# Patient Record
Sex: FEMALE | Race: WHITE | NOT HISPANIC OR LATINO | Employment: OTHER | URBAN - METROPOLITAN AREA
[De-identification: names, ages, dates, MRNs, and addresses within clinical notes are randomized per-mention and may not be internally consistent; named-entity substitution may affect disease eponyms.]

---

## 2017-01-05 ENCOUNTER — ANTI-COAG VISIT (OUTPATIENT)
Dept: CARDIOLOGY | Facility: CLINIC | Age: 72
End: 2017-01-05
Payer: MEDICARE

## 2017-01-05 ENCOUNTER — OFFICE VISIT (OUTPATIENT)
Dept: ENDOCRINOLOGY | Facility: CLINIC | Age: 72
End: 2017-01-05
Payer: MEDICARE

## 2017-01-05 VITALS
HEIGHT: 57 IN | SYSTOLIC BLOOD PRESSURE: 100 MMHG | WEIGHT: 164.88 LBS | HEART RATE: 60 BPM | BODY MASS INDEX: 35.57 KG/M2 | DIASTOLIC BLOOD PRESSURE: 44 MMHG

## 2017-01-05 DIAGNOSIS — I63.9 CEREBROVASCULAR ACCIDENT (CVA), UNSPECIFIED MECHANISM: ICD-10-CM

## 2017-01-05 DIAGNOSIS — E11.22 TYPE 2 DIABETES MELLITUS WITH STAGE 4 CHRONIC KIDNEY DISEASE, WITH LONG-TERM CURRENT USE OF INSULIN: ICD-10-CM

## 2017-01-05 DIAGNOSIS — I50.30 DIASTOLIC CONGESTIVE HEART FAILURE, NYHA CLASS 1: ICD-10-CM

## 2017-01-05 DIAGNOSIS — Z79.4 TYPE 2 DIABETES MELLITUS WITH STAGE 4 CHRONIC KIDNEY DISEASE, WITH LONG-TERM CURRENT USE OF INSULIN: ICD-10-CM

## 2017-01-05 DIAGNOSIS — N18.4 CKD (CHRONIC KIDNEY DISEASE), STAGE IV: ICD-10-CM

## 2017-01-05 DIAGNOSIS — E78.2 MIXED HYPERLIPIDEMIA: ICD-10-CM

## 2017-01-05 DIAGNOSIS — N18.4 CHRONIC KIDNEY DISEASE, STAGE IV (SEVERE): ICD-10-CM

## 2017-01-05 DIAGNOSIS — E55.9 VITAMIN D DEFICIENCY DISEASE: ICD-10-CM

## 2017-01-05 DIAGNOSIS — E66.09 NON MORBID OBESITY DUE TO EXCESS CALORIES: ICD-10-CM

## 2017-01-05 DIAGNOSIS — G45.9 TRANSIENT CEREBRAL ISCHEMIA, UNSPECIFIED TYPE: ICD-10-CM

## 2017-01-05 DIAGNOSIS — N18.4 TYPE 2 DIABETES MELLITUS WITH STAGE 4 CHRONIC KIDNEY DISEASE, WITH LONG-TERM CURRENT USE OF INSULIN: ICD-10-CM

## 2017-01-05 DIAGNOSIS — E11.21 DIABETIC NEPHROPATHY ASSOCIATED WITH TYPE 2 DIABETES MELLITUS: Primary | ICD-10-CM

## 2017-01-05 DIAGNOSIS — E03.9 ACQUIRED HYPOTHYROIDISM: ICD-10-CM

## 2017-01-05 DIAGNOSIS — Z79.01 LONG TERM (CURRENT) USE OF ANTICOAGULANTS: Primary | ICD-10-CM

## 2017-01-05 DIAGNOSIS — I10 ESSENTIAL HYPERTENSION: ICD-10-CM

## 2017-01-05 DIAGNOSIS — I48.91 ATRIAL FIBRILLATION WITH RVR: ICD-10-CM

## 2017-01-05 LAB
CTP QC/QA: NORMAL
INR PPP: 3.4 (ref 2–3)

## 2017-01-05 PROCEDURE — 99999 PR PBB SHADOW E&M-EST. PATIENT-LVL I: CPT | Mod: PBBFAC,,,

## 2017-01-05 PROCEDURE — 99214 OFFICE O/P EST MOD 30 MIN: CPT | Mod: S$PBB,,, | Performed by: NURSE PRACTITIONER

## 2017-01-05 PROCEDURE — 99211 OFF/OP EST MAY X REQ PHY/QHP: CPT | Mod: PBBFAC,27

## 2017-01-05 PROCEDURE — 85610 PROTHROMBIN TIME: CPT | Mod: PBBFAC

## 2017-01-05 PROCEDURE — 99999 PR PBB SHADOW E&M-EST. PATIENT-LVL IV: CPT | Mod: PBBFAC,,, | Performed by: NURSE PRACTITIONER

## 2017-01-05 RX ORDER — INSULIN ASPART 100 [IU]/ML
INJECTION, SOLUTION INTRAVENOUS; SUBCUTANEOUS
Qty: 2 BOX | Refills: 6
Start: 2017-01-05 | End: 2017-04-05 | Stop reason: SDUPTHER

## 2017-01-05 RX ORDER — LEVOTHYROXINE SODIUM 50 UG/1
50 TABLET ORAL
Qty: 90 TABLET | Refills: 4 | Status: SHIPPED | OUTPATIENT
Start: 2017-01-05 | End: 2017-07-03

## 2017-01-05 RX ORDER — ERGOCALCIFEROL 1.25 MG/1
50000 CAPSULE ORAL
Qty: 4 CAPSULE | Refills: 12 | Status: ON HOLD | OUTPATIENT
Start: 2017-01-05 | End: 2017-03-13

## 2017-01-05 NOTE — MR AVS SNAPSHOT
[facility]  [address]  [facility] [address]  Phone: [phone]                  Savita Polo   2017 9:30 AM   Office Visit    Description:  Female : 1945   Provider:  Nohemi Ardon DNP, NP   Department:  [facility]           Reason for Visit     Diabetes Mellitus           Diagnoses this Visit        Comments    Diabetic nephropathy associated with type 2 diabetes mellitus    -  Primary     Chronic kidney disease, stage IV (severe)         Diastolic congestive heart failure, NYHA class 1         Atrial fibrillation with RVR         CKD (chronic kidney disease), stage IV         Essential hypertension         Acquired hypothyroidism         Mixed hyperlipidemia         Vitamin D deficiency disease         Non morbid obesity due to excess calories         Type 2 diabetes mellitus with stage 4 chronic kidney disease, with long-term current use of insulin                To Do List           Future Appointments        Provider Department Dept Phone    2017 11:15 AM Patt Lindquist, PharmD [facility] Coumadin [phone]    1/10/2017 9:15 AM Lizeth Fleming MD [facility] - Dermatology [phone]    2017 10:00 AM LAB, APPOINTMENT [facility] [phone]    3/29/2017 8:00 AM LAB, APPOINTMENT [facility] [phone]    3/31/2017 8:00 AM Nadiya Nava MD [facility] - Internal Medicine [phone]      Goals (5 Years of Data)              12/20/16    9/15/16    6/8/16    HEMOGLOBIN A1C < 7.5   7.6  8.8  8.0      Follow-Up and Disposition     Follow-up and Disposition History       These Medications        Disp Refills Start End    levothyroxine (SYNTHROID) 50 MCG tablet 90 tablet 4 2017    Take 1 tablet (50 mcg total) by mouth before breakfast. - Oral    [facility] Ph #: [phone]        ergocalciferol (ERGOCALCIFEROL) 50,000 unit Cap 4 capsule 12 1/5/2017     Take 1 capsule (50,000 Units total) by mouth every 7 days. - Oral    [address] [facility] [facility] Ph #: [phone]       insulin detemir (LEVEMIR FLEXTOUCH) 100 unit/mL (3 mL) SubQ InPn pen 2 Box 6 1/5/2017     Inject 26 Units into the skin every evening. - Subcutaneous    [address] [facility] [facility] Ph #: [phone]       insulin aspart (NOVOLOG FLEXPEN) 100 unit/mL InPn pen 2 Box 6 1/5/2017 26-28-26 Plus correction scale    [address] [facility] [facility] Ph #: [phone]         [facility] On Call     [facility] On Call Nurse Care Line - 24/7 Assistance  Registered nurses in the [facility] provide clinical advisement, health education, appointment booking, and other advisory services.  Call for this free service at [phone].             Medications           Message regarding Medications     Verify the changes and/or additions to your medication regime listed below are the same as discussed with your clinician today.  If any of these changes or additions are incorrect, please notify your healthcare provider.        START taking these NEW medications        Refills    levothyroxine (SYNTHROID) 50 MCG tablet 4    Sig: Take 1 tablet (50 mcg total) by mouth before breakfast.    Class: Normal    Route: Oral    ergocalciferol (ERGOCALCIFEROL) 50,000 unit Cap 12    Sig: Take 1 capsule (50,000 Units total) by mouth every 7 days.    Class: Normal    Route: Oral      CHANGE how you are taking these medications     Start Taking Instead of    insulin detemir (LEVEMIR FLEXTOUCH) 100 unit/mL (3 mL) SubQ InPn pen insulin detemir (LEVEMIR FLEXTOUCH) 100 unit/mL (3 mL) SubQ InPn pen    Dosage:  Inject 26 Units into the skin every evening. Dosage:  Inject 60 Units into the skin every  evening.    Reason for Change:  Reorder     insulin aspart (NOVOLOG FLEXPEN) 100 unit/mL InPn pen insulin aspart (NOVOLOG FLEXPEN) 100 unit/mL InPn pen    Dosage:  26-28-26 Plus correction scale Dosage:  Inject 28 Units into the skin 3 (three) times daily with meals. Plus correction scale    Reason for Change:  Reorder            Verify that the below list of medications is an accurate representation of the medications you are currently taking.  If none reported, the list may be blank. If incorrect, please contact your healthcare provider. Carry this list with you in case of emergency.           Current Medications     ADVAIR DISKUS 500-50 mcg/dose DsDv diskus inhaler inhale 1 dose by mouth twice a day    albuterol (PROAIR HFA) 90 mcg/actuation inhaler Inhale 2 puffs into the lungs every 6 (six) hours as needed for Wheezing.    alprazolam (XANAX) 0.5 MG tablet TAKE 1 TABLET BY MOUTH 2 TIMES A DAY    buPROPion (WELLBUTRIN SR) 150 MG TBSR 12 hr tablet 1 BID    diltiazem (CARDIZEM CD) 240 MG 24 hr capsule Take 1 capsule (240 mg total) by mouth once daily.    ferrous sulfate 325 mg (65 mg iron) Tab tablet take 1 tablet by mouth once daily    fluticasone (FLONASE) 50 mcg/actuation nasal spray instill 1 spray into each nostril once daily    furosemide (LASIX) 20 MG tablet Take 1 tablet (20 mg total) by mouth 2 (two) times daily.    insulin aspart (NOVOLOG FLEXPEN) 100 unit/mL InPn pen 26-28-26 Plus correction scale    insulin detemir (LEVEMIR FLEXTOUCH) 100 unit/mL (3 mL) SubQ InPn pen Inject 26 Units into the skin every evening.    ipratropium (ATROVENT) 0.02 % nebulizer solution USE 1 VIAL IN NEBULIZER EVERY 8 HOURS    lisinopril (PRINIVIL,ZESTRIL) 5 MG tablet Take 1 tablet (5 mg total) by mouth once daily.    metoprolol tartrate (LOPRESSOR) 25 MG tablet Take 1 tablet (25 mg total) by mouth 2 (two) times daily.    nicotine (NICOTROL) 10 mg Crtg One puff into the mouth as needed, maximum 6 cartridges/day.    ondansetron  "(ZOFRAN) 4 MG tablet Take 1 tablet (4 mg total) by mouth every 8 (eight) hours as needed for Nausea.    pen needle, diabetic, safety (NOVOFINE AUTOCOVER) 30 gauge x 1/3" Ndle Uses 4 a day    pravastatin (PRAVACHOL) 40 MG tablet take 1 tablet by mouth once daily    warfarin (COUMADIN) 5 MG tablet take 1 tablet by mouth once daily    ergocalciferol (ERGOCALCIFEROL) 50,000 unit Cap Take 1 capsule (50,000 Units total) by mouth every 7 days.    levalbuterol (XOPENEX) 0.63 mg/3 mL nebulizer solution Take 3 mLs (0.63 mg total) by nebulization every 8 (eight) hours.    levothyroxine (SYNTHROID) 50 MCG tablet Take 1 tablet (50 mcg total) by mouth before breakfast.           Clinical Reference Information           Vital Signs - Last Recorded  Most recent update: 1/5/2017  9:50 AM by Nyla Crabtree MA    BP Pulse Ht Wt LMP BMI    (!) 100/44 (BP Location: Left arm, Patient Position: Sitting, BP Method: Automatic) 60 4' 9" (1.448 m) 74.8 kg (164 lb 14.5 oz) (LMP Unknown) 35.68 kg/m2      Blood Pressure          Most Recent Value    BP  (!)  100/44      Allergies as of 1/5/2017     Latex, Natural Rubber    Adhesive    Sulfa (Sulfonamide Antibiotics)      Immunizations Administered on Date of Encounter - 1/5/2017     None      Orders Placed During Today's Visit     Future Labs/Procedures Expected by Expires    TSH  1/5/2017 3/6/2018    Vitamin D  4/6/2017 1/5/2018    Comprehensive metabolic panel  4/7/2017 1/5/2018    Hemoglobin A1c  4/7/2017 1/5/2018    TSH  5/6/2017 3/6/2018    Lipid panel  5/7/2017 1/5/2018      [facility] Sign-Up     Activating your [facility] account is as easy as 1-2-3!     1) Visit [email or web address], select Sign Up Now, enter this activation code and your date of birth, then select Next.  [email or web address]  Expires: 1/14/2017  9:27 AM      2) Create a username and password to use when you visit [facility] in the future and select a security question in case you lose your password and select Next.    3) " Enter your e-mail address and click Sign Up!    Additional Information  If you have questions, please e-mail [email or web address] or call [phone] to talk to our [facility] staff. Remember, [facility] is NOT to be used for urgent needs. For medical emergencies, dial 911.         Smoking Cessation     If you would like to quit smoking:   You may be eligible for free services if you are a [place] resident and started smoking cigarettes before September 1, 1988.  Call the Smoking [employer]essation [employer] (SCT) toll free at [phone] [phone] or [phone].   Call 6-782-QUIT-NOW if you do not meet the above criteria.

## 2017-01-05 NOTE — PROGRESS NOTES
CC: Ms. Savita Polo arrives today for management of Type 2 complicated by neuropathy, nephropathy and CV disease and review of chronic medical conditions. She arrives with boyfriend today, in a wheelchair with home O2 therapy in use.    HPI: Ms. Savita Polo was diagnosed with Type 2 . Started on oral agents. Converted to insulin in 1992. Never hospitalized r/t DM.     Large bruise noted to left forehead; fell last week out of her walker- did not go to er- daughter made her stay awake for 8 hours  Right leg also hurting- declines xrays/er/urgent care  She is aware BG fluctuations based on po intake and admits to dietary indiscretions.     Quit smoking 2/14/16- (started smoking at age 19) then resumed smoking in April 2016 ; Smoking 1 pack over 2 weeks; on home O2- 3L  Continues to take Wellbutrin     CURRENT DIABETIC MEDS:  Levemir 28 units PM, Novolog 26-26-30 units AC  Uses Vials or Pens: pens  Denies difficulty with insulin administration. Rotates injection sites in abdomen.     Missing Insulin/PO medication doses: No  Prandial Insulin Injections Taken with Meals: Yes    Type of Glucose Meter: Prodigy  Denies hypoglycemia,    Brings logs for review  Lunch:   Dinner; 177-262  Bedtime:     Exercise: No    Dietary Habits: 2 meals/ daily; skips BK ; coffee from breakfast; gets Meals On Wheels for lunch - has not menu to choose options; daughter cooks dinner - home cooked meals; snacks sometimes at night     Last Eye Exam: 5/2016      REVIEW OF SYSTEMS   General: weight stable; denies fatigue.  Eyes: wears glasses; + blurry vision, denies eye pain .  Cardiac: denies palpitations or chest pain.   Respiratory: c/o SOB, c/o dyspnea;  uses inhalers and home O2 therapy- 3 L  GI: good appetite, no nausea, vomit, diarrhea, or abdominal pain.   : c/o nocturia x 1 nightly; denies urgency, dysuria  Skin: c/o easy bruising;  Multiple scabbed lesions noted to forehead  Neuro: + numbness, tingling,  toes.  Musc: denies leg pain    PHYSICAL EXAMINATION  Constitutional: Appears well, no distress  Neck: Supple, trachea midline.   Respiratory:  even and unlabored.  Cardiovascular: Rate normal, S1 and S2 with no murmurs; no carotid bruits.1+ BLE edema  Abd: soft, injection sites clear   Lymph:  no edema.   Skin:   + rash to forehead  Feet: appropriate footwear.    Hemoglobin A1C   Date Value Ref Range Status   12/20/2016 7.6 (H) 4.5 - 6.2 % Final     Comment:     According to ADA guidelines, hemoglobin A1C <7.0% represents  optimal control in non-pregnant diabetic patients.  Different  metrics may apply to specific populations.   Standards of Medical Care in Diabetes - 2016.  For the purpose of screening for the presence of diabetes:  <5.7%     Consistent with the absence of diabetes  5.7-6.4%  Consistent with increasing risk for diabetes   (prediabetes)  >or=6.5%  Consistent with diabetes  Currently no consensus exists for use of hemoglobin A1C  for diagnosis of diabetes for children.     09/15/2016 8.8 (H) 4.5 - 6.2 % Final     Comment:     According to ADA guidelines, hemoglobin A1C <7.0% represents  optimal control in non-pregnant diabetic patients.  Different  metrics may apply to specific populations.   Standards of Medical Care in Diabetes - 2016.  For the purpose of screening for the presence of diabetes:  <5.7%     Consistent with the absence of diabetes  5.7-6.4%  Consistent with increasing risk for diabetes   (prediabetes)  >or=6.5%  Consistent with diabetes  Currently no consensus exists for use of hemoglobin A1C  for diagnosis of diabetes for children.     06/08/2016 8.0 (H) 4.5 - 6.2 % Final         Assessment/Plan     1. Type 2 diabetes mellitus with diabetic chronic kidney disease, nephropathy, neuropathy  Decrease levemir to 26 u qhs  [place] Novolog to 26-28-26  Check bg ac/hs  - takes ACEi, statin    2. CKD 4- followed by nephrology, avoid insulin stacking    3. DM Peripheral neuropathy- optimize DM  control    4. Afib, CHF- avoid metformin, insulin staking, limited green leafy veg in diet- on coumadin     5. Hypothyroidism - Decrease LT4 to 50 mcg, recheck tsh in 6 weeks  Lab Results   Component Value Date    TSH 0.239 (L) 11/30/2016     6. HLD- LDL goal < 100  - controlled, LFTs WNL, continue statin    7. HTN - controlled, continue meds as previously prescribed and monitor; bp on lower side- she reports r/t to not eating yet today- will monitor at home- Notify Dr Nava    8. Obesity- Body mass index is 35.68 kg/(m^2). monitor portions, weight has stabilized     9.  Vitamin d deficiency - Vitamin d 22- Start ergocalciferol 50,000 IU x 1 week for 8 weeks, Repeat level with RTC    10. Suspected cellulitis to right calf- scrapped after fall last week, declines urgent care appt/ER- will send picture to Dr Nava for opinion - patient has multiple allergies

## 2017-01-05 NOTE — PROGRESS NOTES
INR elevated, likely due to recent cranberry intake and possible cold/virus.  Hold then resume warfarin dose.  Patient reports 1 fall.  She reports hitting her head and her daughter observed her closely for 8 hours after.  She denies any change in vision or headaches.  I reminded her to seek medical care for any new symptoms and that it is recommended to be evaluated after hitting head when on anticoagulation.  Patient advised to contact clinic with any changes, questions, or concerns.

## 2017-01-05 NOTE — Clinical Note
Dr Nava, Ms Polo fell last week- it looks like she may have cellulitis to her shin- i placed a pic her note, she declines er, urgent f/u- has several allergies. What antibiotic would you recommend?

## 2017-01-10 ENCOUNTER — INITIAL CONSULT (OUTPATIENT)
Dept: DERMATOLOGY | Facility: CLINIC | Age: 72
End: 2017-01-10
Payer: MEDICARE

## 2017-01-10 DIAGNOSIS — L98.1 NEUROTIC EXCORIATIONS: ICD-10-CM

## 2017-01-10 DIAGNOSIS — L03.90 CELLULITIS, UNSPECIFIED CELLULITIS SITE: Primary | ICD-10-CM

## 2017-01-10 DIAGNOSIS — D69.2 PURPURA: ICD-10-CM

## 2017-01-10 PROCEDURE — 99213 OFFICE O/P EST LOW 20 MIN: CPT | Mod: S$PBB,,, | Performed by: DERMATOLOGY

## 2017-01-10 PROCEDURE — 99999 PR PBB SHADOW E&M-EST. PATIENT-LVL III: CPT | Mod: PBBFAC,,, | Performed by: DERMATOLOGY

## 2017-01-10 PROCEDURE — 99213 OFFICE O/P EST LOW 20 MIN: CPT | Mod: PBBFAC | Performed by: DERMATOLOGY

## 2017-01-10 RX ORDER — CIPROFLOXACIN 250 MG/1
250 TABLET, FILM COATED ORAL 2 TIMES DAILY
Qty: 28 TABLET | Refills: 0 | Status: SHIPPED | OUTPATIENT
Start: 2017-01-10 | End: 2017-01-24

## 2017-01-10 RX ORDER — TRIAMCINOLONE ACETONIDE 0.25 MG/G
OINTMENT TOPICAL 2 TIMES DAILY
COMMUNITY
End: 2017-01-10 | Stop reason: DRUGHIGH

## 2017-01-10 RX ORDER — TRIAMCINOLONE ACETONIDE 1 MG/G
OINTMENT TOPICAL 2 TIMES DAILY
Qty: 454 G | Refills: 1 | Status: ON HOLD | OUTPATIENT
Start: 2017-01-10 | End: 2017-03-13

## 2017-01-10 NOTE — PROGRESS NOTES
Subjective:       Patient ID:  Savita Polo is a 71 y.o. female who presents for   Chief Complaint   Patient presents with    Rash     f/u controlled with abx & TAC cream     Rash  - Initial  Affected locations: right lower leg, chin and neck  Signs / symptoms: itching  Relieving factors/Treatments tried: Rx topical steroids and antibiotics (TAC 0.025% cream; Cipro 250 mg bid x 10 days (Oct 2016))  Improvement on treatment: moderate    Last OV 5/21/15 with Dr Montelongo for similar lesions   Past Medical History   Diagnosis Date    Allergy     Anxiety     Asthma     Atherosclerotic cerebrovascular disease 7/25/2012    CHF (congestive heart failure)     COPD (chronic obstructive pulmonary disease)     Diabetes mellitus     Diabetic peripheral neuropathy 7/24/2012    Diabetic retinopathy     DJD (degenerative joint disease) 7/24/2012    Dyslipidemia 7/24/2012    Fatty liver     Heart attack     Hypertension     Hypothyroidism     Joint pain     Keloid cicatrix     Obesity, Class I, BMI 30-34.9 7/24/2012    Stroke     TIA (transient ischemic attack) 7/25/2012    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 Negative for fever and chills.   Skin: Positive for itching and rash. Negative for daily sunscreen use, activity-related sunscreen use and recent sunburn.   Hematologic/Lymphatic: Bruises/bleeds easily.        Objective:    Physical Exam   Constitutional: She appears well-developed and well-nourished. No distress.   Neurological: She is alert and oriented to person, place, and time. She is not disoriented.   Psychiatric: She has a normal mood and affect.   Skin:   Areas Examined (abnormalities noted in diagram):   Scalp / Hair Palpated and Inspected  Head / Face Inspection Performed  Neck Inspection Performed  Chest / Axilla Inspection Performed  RUE  Inspected  LUE Inspection Performed  RLE Inspected  LLE Inspection Performed                   Diagram Legend     Erythematous scaling macule/papule c/w actinic keratosis       Vascular papule c/w angioma      Pigmented verrucoid papule/plaque c/w seborrheic keratosis      Yellow umbilicated papule c/w sebaceous hyperplasia      Irregularly shaped tan macule c/w lentigo     1-2 mm smooth white papules consistent with Milia      Movable subcutaneous cyst with punctum c/w epidermal inclusion cyst      Subcutaneous movable cyst c/w pilar cyst      Firm pink to brown papule c/w dermatofibroma      Pedunculated fleshy papule(s) c/w skin tag(s)      Evenly pigmented macule c/w junctional nevus     Mildly variegated pigmented, slightly irregular-bordered macule c/w mildly atypical nevus      Flesh colored to evenly pigmented papule c/w intradermal nevus       Pink pearly papule/plaque c/w basal cell carcinoma      Erythematous hyperkeratotic cursted plaque c/w SCC      Surgical scar with no sign of skin cancer recurrence      Open and closed comedones      Inflammatory papules and pustules      Verrucoid papule consistent consistent with wart     Erythematous eczematous patches and plaques     Dystrophic onycholytic nail with subungual debris c/w onychomycosis     Umbilicated papule    Erythematous-base heme-crusted tan verrucoid plaque consistent with inflamed seborrheic keratosis     Erythematous Silvery Scaling Plaque c/w Psoriasis     See annotation      Assessment / Plan:        Cellulitis, unspecified cellulitis site  Pt responded well to Cipro in the past so will refill  -     ciprofloxacin HCl (CIPRO) 250 MG tablet; Take 1 tablet (250 mg total) by mouth 2 (two) times daily.  Dispense: 28 tablet; Refill: 0    Neurotic excoriations  Try to avoid scratching  -     triamcinolone acetonide 0.1% (KENALOG) 0.1 % ointment; Apply topically 2 (two) times daily.  Dispense: 454 g; Refill: 1    Purpura  Per pt, s/p fall            Return in about 4 weeks (around 2/7/2017).

## 2017-01-10 NOTE — MR AVS SNAPSHOT
[facility] Dermatology  [address]  [place] [address]  Phone: [phone]  Fax: [phone]                  Savita Polo   1/10/2017 9:15 AM   Initial consult    Description:  Female : 1945   Provider:  Lizeth Fleming MD   Department:  [facility] - Dermatology           Reason for Visit     Rash           Diagnoses this Visit        Comments    Cellulitis, unspecified cellulitis site    -  Primary     Neurotic excoriations         Purpura                To Do List           Future Appointments        Provider Department Dept Phone    1/10/2017 9:15 AM MD Kurt Perez [facility] Dermatology [phone]    2017 10:15 AM Maegan Jones, PharmD [facility] Coumadin [phone]    2017 9:00 AM Lizeth Fleming MD [facility] [phone]    2017 10:00 AM LAB, APPOINTMENT [facility] [phone]    3/29/2017 8:00 AM LAB, APPOINTMENT [facility] [phone]      Goals (5 Years of Data)              12/20/16    9/15/16    6/8/16    HEMOGLOBIN A1C < 7.5   7.6  8.8  8.0      Follow-Up and Disposition     Return in about 4 weeks (around 2017).    Follow-up and Disposition History       These Medications        Disp Refills Start End    triamcinolone acetonide 0.1% (KENALOG) 0.1 % ointment 454 g 1 1/10/2017     Apply topically 2 (two) times daily. - Topical (Top)    Pharmacy: [facility] [facility] Ph #: [phone]       ciprofloxacin HCl (CIPRO) 250 MG tablet 28 tablet 0 1/10/2017 2017    Take 1 tablet (250 mg total) by mouth 2 (two) times daily. - Oral    Pharmacy: [facility] - [place] [address] [place] [address] Ph #: [phone]         [facility] On Call     [facility] On Call Nurse Care Line -  Assistance  Registered nurses in the [facility] provide clinical advisement, health education, appointment  booking, and other advisory services.  Call for this free service at [phone].             Medications           Message regarding Medications     Verify the changes and/or additions to your medication regime listed below are the same as discussed with your clinician today.  If any of these changes or additions are incorrect, please notify your healthcare provider.        START taking these NEW medications        Refills    triamcinolone acetonide 0.1% (KENALOG) 0.1 % ointment 1    Sig: Apply topically 2 (two) times daily.    Class: Normal    Route: Topical (Top)    ciprofloxacin HCl (CIPRO) 250 MG tablet 0    Sig: Take 1 tablet (250 mg total) by mouth 2 (two) times daily.    Class: Normal    Route: Oral      STOP taking these medications     triamcinolone acetonide 0.025% (KENALOG) 0.025 % Oint Apply topically 2 (two) times daily.    triamcinolone acetonide 0.025% (KENALOG) 0.025 % Oint Apply topically 2 (two) times daily.           Verify that the below list of medications is an accurate representation of the medications you are currently taking.  If none reported, the list may be blank. If incorrect, please contact your healthcare provider. Carry this list with you in case of emergency.           Current Medications     ADVAIR DISKUS 500-50 mcg/dose DsDv diskus inhaler inhale 1 dose by mouth twice a day    albuterol (PROAIR HFA) 90 mcg/actuation inhaler Inhale 2 puffs into the lungs every 6 (six) hours as needed for Wheezing.    alprazolam (XANAX) 0.5 MG tablet TAKE 1 TABLET BY MOUTH 2 TIMES A DAY    buPROPion (WELLBUTRIN SR) 150 MG TBSR 12 hr tablet 1 BID    diltiazem (CARDIZEM CD) 240 MG 24 hr capsule Take 1 capsule (240 mg total) by mouth once daily.    ergocalciferol (ERGOCALCIFEROL) 50,000 unit Cap Take 1 capsule (50,000 Units total) by mouth every 7 days.    ferrous sulfate 325 mg (65 mg iron) Tab tablet take 1 tablet by mouth once daily    fluticasone (FLONASE) 50 mcg/actuation nasal spray instill 1  "spray into each nostril once daily    furosemide (LASIX) 20 MG tablet Take 1 tablet (20 mg total) by mouth 2 (two) times daily.    insulin aspart (NOVOLOG FLEXPEN) 100 unit/mL InPn pen 26-28-26 Plus correction scale    insulin detemir (LEVEMIR FLEXTOUCH) 100 unit/mL (3 mL) SubQ InPn pen Inject 26 Units into the skin every evening.    ipratropium (ATROVENT) 0.02 % nebulizer solution USE 1 VIAL IN NEBULIZER EVERY 8 HOURS    levothyroxine (SYNTHROID) 50 MCG tablet Take 1 tablet (50 mcg total) by mouth before breakfast.    lisinopril (PRINIVIL,ZESTRIL) 5 MG tablet Take 1 tablet (5 mg total) by mouth once daily.    metoprolol tartrate (LOPRESSOR) 25 MG tablet Take 1 tablet (25 mg total) by mouth 2 (two) times daily.    nicotine (NICOTROL) 10 mg Crtg One puff into the mouth as needed, maximum 6 cartridges/day.    ondansetron (ZOFRAN) 4 MG tablet Take 1 tablet (4 mg total) by mouth every 8 (eight) hours as needed for Nausea.    pen needle, diabetic, safety (NOVOFINE AUTOCOVER) 30 gauge x 1/3" Ndle Uses 4 a day    pravastatin (PRAVACHOL) 40 MG tablet take 1 tablet by mouth once daily    warfarin (COUMADIN) 5 MG tablet take 1 tablet by mouth once daily    ciprofloxacin HCl (CIPRO) 250 MG tablet Take 1 tablet (250 mg total) by mouth 2 (two) times daily.    levalbuterol (XOPENEX) 0.63 mg/3 mL nebulizer solution Take 3 mLs (0.63 mg total) by nebulization every 8 (eight) hours.    triamcinolone acetonide 0.1% (KENALOG) 0.1 % ointment Apply topically 2 (two) times daily.           Clinical Reference Information           Vital Signs - Last Recorded     LMP                   (LMP Unknown)           Allergies as of 1/10/2017     Latex, Natural Rubber    Adhesive    Sulfa (Sulfonamide Antibiotics)      Immunizations Administered on Date of Encounter - 1/10/2017     None      [facility] Sign-Up     Activating your [facility] account is as easy as 1-2-3!     1) Visit [email or web address], select Sign Up Now, enter this activation code and " your date of birth, then select Next.  [email or web address]  Expires: 1/14/2017  9:27 AM      2) Create a username and password to use when you visit [facility] in the future and select a security question in case you lose your password and select Next.    3) Enter your e-mail address and click Sign Up!    Additional Information  If you have questions, please e-mail [email or web address] or call [phone] to talk to our [facility] staff. Remember, [facility] is NOT to be used for urgent needs. For medical emergencies, dial 911.         Smoking Cessation     If you would like to quit smoking:   You may be eligible for free services if you are a [place] resident and started smoking cigarettes before September 1, 1988.  Call the [employer]oking [employer]essation [employer] (SCT) toll free at (897) 661-3277 or (829) 750-9863.   Call 6-800-QUIT-NOW if you do not meet the above criteria.

## 2017-01-10 NOTE — LETTER
January 10, 2017      Nadiya Nava MD  [address]  [place] [address]           [facility] - Dermatology  [address]  [place] [address]  Phone: [phone]  Fax: [phone]          Patient: Savita Polo   MR Number: [id number]   YOB: 1945   Date of Visit: 1/10/2017       Dear Dr. Nadiya Nava:    Thank you for referring Savita Polo to me for evaluation. Attached you will find relevant portions of my assessment and plan of care.    If you have questions, please do not hesitate to call me. I look forward to following Savita Polo along with you.    Sincerely,    Lizeth Fleming MD    Enclosure  CC:  No Recipients    If you would like to receive this communication electronically, please contact [email or web address] or [phone] to request more information on [facility] access.    For providers and/or their staff who would like to refer a patient to [facility], please contact us through our one-stop-shop provider referral line, [facility], at [phone].    If you feel you have received this communication in error or would no longer like to receive these types of communications, please e-mail [email or web address]

## 2017-01-12 ENCOUNTER — OFFICE VISIT (OUTPATIENT)
Dept: PSYCHIATRY | Facility: CLINIC | Age: 72
End: 2017-01-12
Payer: MEDICARE

## 2017-01-12 VITALS
DIASTOLIC BLOOD PRESSURE: 44 MMHG | SYSTOLIC BLOOD PRESSURE: 91 MMHG | HEART RATE: 55 BPM | WEIGHT: 163 LBS | BODY MASS INDEX: 35.27 KG/M2

## 2017-01-12 DIAGNOSIS — F41.1 GAD (GENERALIZED ANXIETY DISORDER): Primary | ICD-10-CM

## 2017-01-12 PROCEDURE — 99999 PR PBB SHADOW E&M-EST. PATIENT-LVL II: CPT | Mod: PBBFAC,,, | Performed by: PSYCHIATRY & NEUROLOGY

## 2017-01-12 PROCEDURE — 99214 OFFICE O/P EST MOD 30 MIN: CPT | Mod: S$PBB,,, | Performed by: PSYCHIATRY & NEUROLOGY

## 2017-01-12 PROCEDURE — 99212 OFFICE O/P EST SF 10 MIN: CPT | Mod: PBBFAC | Performed by: PSYCHIATRY & NEUROLOGY

## 2017-01-12 RX ORDER — ALPRAZOLAM 0.5 MG/1
TABLET ORAL
Qty: 60 TABLET | Refills: 3 | Status: SHIPPED | OUTPATIENT
Start: 2017-01-12 | End: 2017-05-02 | Stop reason: SDUPTHER

## 2017-01-12 NOTE — PROGRESS NOTES
Outpatient Psychiatry Follow-Up Visit (MD/NP)    1/12/2017    Clinical Status of Patient:  Outpatient (Ambulatory)    Chief Complaint:  Savita Polo is a 71 y.o. female who presents today for follow-up of depression and anxiety.  Met with patient.      Interval History and Content of Current Session:  Interim Events/Subjective Report/Content of Current Session: She is having a very difficult time.  Six weeks ago, she found her boyfriend in bed with another woman.  She went to her own apartment and one hour later the police arrived and arrested her daughter for embezzlement.  Pt's grand daughter had a baby and moved in with the pt.  Pt is financially strapped, thinks she may need to find less expensive housing.  I suggested she contact Case Management and gave her their phone number.  She discontinued her Wellbutrin.  She was again advised to use as littlee as possible of her Xanax because of fall risk.    Psychotherapy:  · Target symptoms: depression, anxiety   · Why chosen therapy is appropriate versus another modality: relevant to diagnosis  · Outcome monitoring methods: self-report, observation  · Therapeutic intervention type: supportive psychotherapy  · Topics discussed/themes: relationships difficulties, parenting issues, stress related to medical comorbidities, symptom recognition, financial stressors, legal stressors  · The patient's response to the intervention is accepting. The patient's progress toward treatment goals is fair.   · Duration of intervention: 10 minutes.    Review of Systems   · PSYCHIATRIC: Pertinant items are noted in the narrative.    Past Medical, Family and Social History: The patient's past medical, family and social history have been reviewed and updated as appropriate within the electronic medical record - see encounter notes.    Compliance: yes    Side effects: None    Risk Parameters:  Patient reports no suicidal ideation  Patient reports no homicidal ideation  Patient  reports no self-injurious behavior  Patient reports no violent behavior    Exam (detailed: at least 9 elements; comprehensive: all 15 elements)   Constitutional  Vitals:  Most recent vital signs, dated less than 90 days prior to this appointment, were reviewed.   Vitals:    01/12/17 1035   BP: (!) 91/44   Pulse: (!) 55   Weight: 73.9 kg (163 lb)        General:  unremarkable, age appropriate     Musculoskeletal  Muscle Strength/Tone:  no tremor   Gait & Station:  non-ataxic     Psychiatric  Speech:  no latency; no press   Mood & Affect:  anxious, depressed  congruent and appropriate   Thought Process:  normal and logical   Associations:  intact   Thought Content:  normal, no suicidality, no homicidality, delusions, or paranoia   Insight:  intact   Judgement: behavior is adequate to circumstances   Orientation:  grossly intact   Memory: intact for content of interview   Language: grossly intact   Attention Span & Concentration:  able to focus   Fund of Knowledge:  intact and appropriate to age and level of education     Assessment and Diagnosis   Status/Progress: Based on the examination today, the patient's problem(s) is/are adequately but not ideally controlled.  New problems have been presented today.   Co-morbidities are complicating management of the primary condition.  There are no active rule-out diagnoses for this patient at this time.     General Impression: Depressed    No diagnosis found.    Intervention/Counseling/Treatment Plan   · Medication Management: Continue current medications. The risks and benefits of medication were discussed with the patient. except Wellbutrin already discontinued      Return to Clinic: 2 months

## 2017-01-22 RX ORDER — WARFARIN SODIUM 5 MG/1
TABLET ORAL
Qty: 30 TABLET | Refills: 0 | OUTPATIENT
Start: 2017-01-22

## 2017-01-23 ENCOUNTER — ANTI-COAG VISIT (OUTPATIENT)
Dept: CARDIOLOGY | Facility: CLINIC | Age: 72
End: 2017-01-23
Payer: MEDICARE

## 2017-01-23 DIAGNOSIS — Z79.01 LONG TERM (CURRENT) USE OF ANTICOAGULANTS: ICD-10-CM

## 2017-01-23 LAB — INR PPP: 2.2 (ref 2–3)

## 2017-01-23 PROCEDURE — 85610 PROTHROMBIN TIME: CPT | Mod: PBBFAC

## 2017-01-23 PROCEDURE — 99999 PR PBB SHADOW E&M-EST. PATIENT-LVL I: CPT | Mod: PBBFAC,,,

## 2017-01-23 PROCEDURE — 99211 OFF/OP EST MAY X REQ PHY/QHP: CPT | Mod: PBBFAC

## 2017-01-23 RX ORDER — METOPROLOL TARTRATE 25 MG/1
TABLET, FILM COATED ORAL
Qty: 60 TABLET | Refills: 2 | Status: SHIPPED | OUTPATIENT
Start: 2017-01-23 | End: 2017-04-22 | Stop reason: SDUPTHER

## 2017-01-23 RX ORDER — FUROSEMIDE 20 MG/1
TABLET ORAL
Qty: 60 TABLET | Refills: 2 | Status: SHIPPED | OUTPATIENT
Start: 2017-01-23 | End: 2017-04-22 | Stop reason: SDUPTHER

## 2017-01-23 RX ORDER — DILTIAZEM HYDROCHLORIDE 240 MG/1
CAPSULE, COATED, EXTENDED RELEASE ORAL
Qty: 30 CAPSULE | Refills: 2 | Status: SHIPPED | OUTPATIENT
Start: 2017-01-23 | End: 2017-03-22 | Stop reason: SDUPTHER

## 2017-01-24 RX ORDER — WARFARIN SODIUM 5 MG/1
TABLET ORAL
Qty: 30 TABLET | Refills: 0 | Status: SHIPPED | OUTPATIENT
Start: 2017-01-24 | End: 2017-02-21 | Stop reason: SDUPTHER

## 2017-01-24 RX ORDER — WARFARIN SODIUM 5 MG/1
TABLET ORAL
Qty: 30 TABLET | Refills: 0 | Status: CANCELLED | OUTPATIENT
Start: 2017-01-24

## 2017-01-24 RX ORDER — WARFARIN SODIUM 5 MG/1
TABLET ORAL
Qty: 30 TABLET | Refills: 0 | OUTPATIENT
Start: 2017-01-24

## 2017-01-24 NOTE — TELEPHONE ENCOUNTER
----- Message from Harman Stephens sent at 1/24/2017  2:31 PM CST -----  Contact: Zakia Tom [phone]  Type: Rx    Name of medication(s):warfarin (COUMADIN) 5 MG tablet     Is this a refill? New rx? Refill    Who prescribed medication? Dr Nava    Pharmacy Name, Phone, & Location: [facility]    Comments:Please advice    Thanks

## 2017-02-16 ENCOUNTER — LAB VISIT (OUTPATIENT)
Dept: LAB | Facility: HOSPITAL | Age: 72
End: 2017-02-16
Payer: MEDICARE

## 2017-02-16 DIAGNOSIS — E11.21 DIABETIC NEPHROPATHY ASSOCIATED WITH TYPE 2 DIABETES MELLITUS: ICD-10-CM

## 2017-02-16 LAB — TSH SERPL DL<=0.005 MIU/L-ACNC: 2.06 UIU/ML

## 2017-02-16 PROCEDURE — 84443 ASSAY THYROID STIM HORMONE: CPT

## 2017-02-16 PROCEDURE — 36415 COLL VENOUS BLD VENIPUNCTURE: CPT

## 2017-02-20 ENCOUNTER — ANTI-COAG VISIT (OUTPATIENT)
Dept: CARDIOLOGY | Facility: CLINIC | Age: 72
End: 2017-02-20
Payer: MEDICARE

## 2017-02-20 DIAGNOSIS — I63.9 CEREBROVASCULAR ACCIDENT (CVA), UNSPECIFIED MECHANISM: ICD-10-CM

## 2017-02-20 DIAGNOSIS — Z79.01 LONG TERM (CURRENT) USE OF ANTICOAGULANTS: ICD-10-CM

## 2017-02-20 DIAGNOSIS — G45.9 TRANSIENT CEREBRAL ISCHEMIA, UNSPECIFIED TYPE: ICD-10-CM

## 2017-02-20 LAB — INR PPP: 2.6 (ref 2–3)

## 2017-02-20 PROCEDURE — 85610 PROTHROMBIN TIME: CPT | Mod: PBBFAC

## 2017-02-20 PROCEDURE — 99999 PR PBB SHADOW E&M-EST. PATIENT-LVL I: CPT | Mod: PBBFAC,,,

## 2017-02-20 PROCEDURE — 99211 OFF/OP EST MAY X REQ PHY/QHP: CPT | Mod: PBBFAC

## 2017-02-20 NOTE — PROGRESS NOTES
Pt reports no changes or issues to alter INR. Will maintain dose.  I have reviewed the student's initial findings and agree with their assessment. I have personally spoken with and assessed the patient in clinic to devise care plan.

## 2017-02-21 RX ORDER — WARFARIN SODIUM 5 MG/1
TABLET ORAL
Qty: 30 TABLET | Refills: 0 | Status: ON HOLD | OUTPATIENT
Start: 2017-02-21 | End: 2017-03-13

## 2017-03-12 ENCOUNTER — HOSPITAL ENCOUNTER (INPATIENT)
Facility: HOSPITAL | Age: 72
LOS: 3 days | Discharge: HOME | DRG: 190 | End: 2017-03-16
Attending: EMERGENCY MEDICINE | Admitting: HOSPITALIST
Payer: MEDICARE

## 2017-03-12 DIAGNOSIS — I50.30 DIASTOLIC CONGESTIVE HEART FAILURE, NYHA CLASS 1: ICD-10-CM

## 2017-03-12 DIAGNOSIS — R05.9 COUGH: Primary | ICD-10-CM

## 2017-03-12 DIAGNOSIS — R06.00 DYSPNEA, UNSPECIFIED TYPE: ICD-10-CM

## 2017-03-12 DIAGNOSIS — J44.1 COPD EXACERBATION: Chronic | ICD-10-CM

## 2017-03-12 DIAGNOSIS — E11.9 TYPE 2 DIABETES MELLITUS WITHOUT RETINOPATHY: ICD-10-CM

## 2017-03-12 DIAGNOSIS — J18.9 PNEUMONIA: ICD-10-CM

## 2017-03-12 DIAGNOSIS — I10 ESSENTIAL HYPERTENSION: ICD-10-CM

## 2017-03-12 DIAGNOSIS — J18.9 CAP (COMMUNITY ACQUIRED PNEUMONIA): ICD-10-CM

## 2017-03-12 LAB
BASOPHILS # BLD AUTO: 0.03 K/UL
BASOPHILS NFR BLD: 0.2 %
DIFFERENTIAL METHOD: ABNORMAL
EOSINOPHIL # BLD AUTO: 0 K/UL
EOSINOPHIL NFR BLD: 0.1 %
ERYTHROCYTE [DISTWIDTH] IN BLOOD BY AUTOMATED COUNT: 12.7 %
HCT VFR BLD AUTO: 44.3 %
HGB BLD-MCNC: 15 G/DL
LYMPHOCYTES # BLD AUTO: 2.2 K/UL
LYMPHOCYTES NFR BLD: 15.6 %
MCH RBC QN AUTO: 32.9 PG
MCHC RBC AUTO-ENTMCNC: 33.9 %
MCV RBC AUTO: 97 FL
MONOCYTES # BLD AUTO: 1.2 K/UL
MONOCYTES NFR BLD: 8.6 %
NEUTROPHILS # BLD AUTO: 10.4 K/UL
NEUTROPHILS NFR BLD: 75.1 %
PLATELET # BLD AUTO: 202 K/UL
PMV BLD AUTO: 10.2 FL
RBC # BLD AUTO: 4.56 M/UL
WBC # BLD AUTO: 13.86 K/UL

## 2017-03-12 PROCEDURE — 83735 ASSAY OF MAGNESIUM: CPT

## 2017-03-12 PROCEDURE — 84484 ASSAY OF TROPONIN QUANT: CPT

## 2017-03-12 PROCEDURE — 83880 ASSAY OF NATRIURETIC PEPTIDE: CPT

## 2017-03-12 PROCEDURE — 99285 EMERGENCY DEPT VISIT HI MDM: CPT | Mod: 25

## 2017-03-12 PROCEDURE — 86900 BLOOD TYPING SEROLOGIC ABO: CPT

## 2017-03-12 PROCEDURE — 96365 THER/PROPH/DIAG IV INF INIT: CPT

## 2017-03-12 PROCEDURE — 83605 ASSAY OF LACTIC ACID: CPT

## 2017-03-12 PROCEDURE — 85025 COMPLETE CBC W/AUTO DIFF WBC: CPT

## 2017-03-12 PROCEDURE — 86901 BLOOD TYPING SEROLOGIC RH(D): CPT

## 2017-03-12 PROCEDURE — 93005 ELECTROCARDIOGRAM TRACING: CPT

## 2017-03-12 PROCEDURE — 87040 BLOOD CULTURE FOR BACTERIA: CPT

## 2017-03-12 PROCEDURE — 80053 COMPREHEN METABOLIC PANEL: CPT

## 2017-03-12 PROCEDURE — 96366 THER/PROPH/DIAG IV INF ADDON: CPT

## 2017-03-12 PROCEDURE — 96367 TX/PROPH/DG ADDL SEQ IV INF: CPT

## 2017-03-12 PROCEDURE — 99285 EMERGENCY DEPT VISIT HI MDM: CPT | Mod: ,,, | Performed by: EMERGENCY MEDICINE

## 2017-03-12 PROCEDURE — 85730 THROMBOPLASTIN TIME PARTIAL: CPT

## 2017-03-12 PROCEDURE — 84145 PROCALCITONIN (PCT): CPT

## 2017-03-12 PROCEDURE — 93010 ELECTROCARDIOGRAM REPORT: CPT | Mod: ,,, | Performed by: INTERNAL MEDICINE

## 2017-03-12 PROCEDURE — 84100 ASSAY OF PHOSPHORUS: CPT

## 2017-03-12 PROCEDURE — 85610 PROTHROMBIN TIME: CPT

## 2017-03-12 NOTE — IP AVS SNAPSHOT
[facility]  [address]  [facility] [address]  Phone: [phone]           Patient Discharge Instructions     Our goal is to set you up for success. This packet includes information on your condition, medications, and your home care. It will help you to care for yourself so you don't get sicker and need to go back to the hospital.     Please ask your nurse if you have any questions.        There are many details to remember when preparing to leave the hospital. Here is what you will need to do:    1. Take your medicine. If you are prescribed medications, review your Medication List in the following pages. You may have new medications to  at the pharmacy and others that you'll need to stop taking. Review the instructions for how and when to take your medications. Talk with your doctor or nurses if you are unsure of what to do.     2. Go to your follow-up appointments. Specific follow-up information is listed in the following pages. Your may be contacted by a transition nurse or clinical provider about future appointments. Be sure we have all of the phone numbers to reach you, if needed. Please contact your provider's office if you are unable to make an appointment.     3. Watch for warning signs. Your doctor or nurse will give you detailed warning signs to watch for and when to call for assistance. These instructions may also include educational information about your condition. If you experience any of warning signs to your health, call your doctor.               [facility]  Unless otherwise directed by your provider, please contact [facility], our nurse care line that is available for 24/7 assistance.     [phone] (toll-free)    Registered nurses in the [facility] provide clinical advisement, health education, appointment booking, and other advisory services.                    ** Verify the list of medication(s) below is accurate and up  to date. Carry this with you in case of emergency. If your medications have changed, please notify your healthcare provider.             Medication List      START taking these medications        Additional Info                      azithromycin 250 MG tablet   Commonly known as:  Z-MERCY   Quantity:  1 tablet   Refills:  0   Dose:  250 mg   Indications:  Pneumonia    Last time this was given:  250 mg on 3/16/2017  8:45 AM   Instructions:  Take 1 tablet (250 mg total) by mouth once daily.     Begin Date    AM    Noon    PM    Bedtime       cetirizine 10 MG tablet   Commonly known as:  ZYRTEC   Refills:  0   Dose:  10 mg    Last time this was given:  10 mg on 3/16/2017  8:45 AM   Instructions:  Take 1 tablet (10 mg total) by mouth once daily.     Begin Date    AM    Noon    PM    Bedtime       predniSONE 20 MG tablet   Commonly known as:  DELTASONE   Quantity:  2 tablet   Refills:  0   Dose:  40 mg    Last time this was given:  40 mg on 3/16/2017  8:45 AM   Instructions:  Take 2 tablets (40 mg total) by mouth once daily.     Begin Date    AM    Noon    PM    Bedtime         CHANGE how you take these medications        Additional Info                      ADVAIR DISKUS 500-50 mcg/dose Dsdv diskus inhaler   Quantity:  60 each   Refills:  2   What changed:  See the new instructions.   Generic drug:  fluticasone-salmeterol 500-50 mcg/dose    Instructions:  inhale 1 dose by mouth twice a day     Begin Date    AM    Noon    PM    Bedtime       fluticasone 50 mcg/actuation nasal spray   Commonly known as:  FLONASE   Quantity:  16 g   Refills:  1   What changed:  See the new instructions.    Last time this was given:  2 sprays on 3/16/2017  8:46 AM   Instructions:  instill 1 spray into each nostril once daily     Begin Date    AM    Noon    PM    Bedtime       ipratropium 0.02 % nebulizer solution   Commonly known as:  ATROVENT   Quantity:  62.5 mL   Refills:  4   What changed:  See the new instructions.    Instructions:  USE  1 VIAL IN NEBULIZER EVERY 8 HOURS     Begin Date    AM    Noon    PM    Bedtime         CONTINUE taking these medications        Additional Info                      albuterol 90 mcg/actuation inhaler   Commonly known as:  PROAIR HFA   Quantity:  2 Inhaler   Refills:  3   Dose:  2 puff    Instructions:  Inhale 2 puffs into the lungs every 6 (six) hours as needed for Wheezing.     Begin Date    AM    Noon    PM    Bedtime       alprazolam 0.5 MG tablet   Commonly known as:  XANAX   Quantity:  60 tablet   Refills:  3    Last time this was given:  0.25 mg on 3/16/2017  8:44 AM   Instructions:  TAKE 1 TABLET BY MOUTH 2 TIMES A DAY     Begin Date    AM    Noon    PM    Bedtime       diltiaZEM 240 MG 24 hr capsule   Commonly known as:  CARDIZEM CD   Quantity:  30 capsule   Refills:  2    Last time this was given:  240 mg on 3/16/2017  8:45 AM   Instructions:  take 1 capsule by mouth once daily     Begin Date    AM    Noon    PM    Bedtime       FERROUS SULFATE ORAL   Refills:  0   Dose:  1 tablet    Last time this was given:  325 mg on 3/16/2017  8:45 AM   Instructions:  Take 1 tablet by mouth once daily.     Begin Date    AM    Noon    PM    Bedtime       furosemide 20 MG tablet   Commonly known as:  LASIX   Quantity:  60 tablet   Refills:  2    Last time this was given:  20 mg on 3/15/2017  8:30 AM   Instructions:  take 1 tablet by mouth twice a day     Begin Date    AM    Noon    PM    Bedtime       insulin aspart 100 unit/mL Inpn pen   Commonly known as:  NOVOLOG FLEXPEN   Quantity:  2 Box   Refills:  6   Indications:  type 2 diabetes mellitus    Last time this was given:  12 Units on 3/16/2017  1:16 PM   Instructions:  26-28-26 Plus correction scale     Begin Date    AM    Noon    PM    Bedtime       insulin detemir 100 unit/mL (3 mL) Inpn pen   Commonly known as:  LEVEMIR FLEXTOUCH   Quantity:  2 Box   Refills:  6   Dose:  26 Units    Last time this was given:  20 Units on 3/16/2017  8:00 AM   Instructions:  Inject  "26 Units into the skin every evening.     Begin Date    AM    Noon    PM    Bedtime       levothyroxine 50 MCG tablet   Commonly known as:  SYNTHROID   Quantity:  90 tablet   Refills:  4   Dose:  50 mcg    Last time this was given:  50 mcg on 3/16/2017  5:45 AM   Instructions:  Take 1 tablet (50 mcg total) by mouth before breakfast.     Begin Date    AM    Noon    PM    Bedtime       lisinopril 5 MG tablet   Commonly known as:  PRINIVIL,ZESTRIL   Quantity:  90 tablet   Refills:  1   Dose:  5 mg    Last time this was given:  5 mg on 3/16/2017  8:45 AM   Instructions:  Take 1 tablet (5 mg total) by mouth once daily.     Begin Date    AM    Noon    PM    Bedtime       metoprolol tartrate 25 MG tablet   Commonly known as:  LOPRESSOR   Quantity:  60 tablet   Refills:  2    Last time this was given:  25 mg on 3/16/2017  8:45 AM   Instructions:  take 1 tablet by mouth twice a day     Begin Date    AM    Noon    PM    Bedtime       ondansetron 4 MG tablet   Commonly known as:  ZOFRAN   Quantity:  12 tablet   Refills:  0   Dose:  4 mg    Instructions:  Take 1 tablet (4 mg total) by mouth every 8 (eight) hours as needed for Nausea.     Begin Date    AM    Noon    PM    Bedtime       pen needle, diabetic, safety 30 gauge x 1/3" Ndle   Commonly known as:  NOVOFINE AUTOCOVER   Quantity:  150 each   Refills:  12    Instructions:  Uses 4 a day     Begin Date    AM    Noon    PM    Bedtime       pravastatin 40 MG tablet   Commonly known as:  PRAVACHOL   Quantity:  30 tablet   Refills:  3    Last time this was given:  40 mg on 3/16/2017  8:45 AM   Instructions:  take 1 tablet by mouth once daily     Begin Date    AM    Noon    PM    Bedtime       warfarin 2.5 MG tablet   Commonly known as:  COUMADIN   Refills:  0   Dose:  2.5 mg    Last time this was given:  5 mg on 3/15/2017  5:04 PM   Instructions:  Take 2.5 mg by mouth once daily. Except take 5 mg on Wed     Begin Date    AM    Noon    PM    Bedtime            Where to Get Your " Medications      These medications were sent to [address] - [place] - [address] [facility]  [address] [facility] [address]     Phone:  [phone]     azithromycin 250 MG tablet    predniSONE 20 MG tablet         You can get these medications from any pharmacy     You don't need a prescription for these medications     cetirizine 10 MG tablet                  Please bring to all follow up appointments:    1. A copy of your discharge instructions.  2. All medicines you are currently taking in their original bottles.  3. Identification and insurance card.    Please arrive 15 minutes ahead of scheduled appointment time.    Please call 24 hours in advance if you must reschedule your appointment and/or time.        Your Scheduled Appointments     Mar 22, 2017  8:00 AM CDT   Hospital Follow Up with PHYSICIAN, [facility]   [facility] ([facility])    [address] [place] [address]  [place] [address] [phone]            Mar 29, 2017  7:55 AM CDT   Fasting Lab with LAB, APPOINTMENT [place] [facility] ([facility])    [address] [facility] [address] [phone]            Mar 29, 2017  8:30 AM CDT   Anticoagulation with Adali Kim, PharmD   [facility] - Coumadin ([facility] )    [address] [address]  [place] [address]   [phone]            Mar 31, 2017  8:00 AM CDT   Physical with Nadiya Nava MD   [facility] - Internal Medicine ([facility])    [address] [place] [address] [phone]            Apr 05, 2017  9:30 AM CDT   Established Patient with Nohemi Ardon, DUGLAS, NP   [facility] - Endocrinology ([facility] )    [facility] [address] [phone]              Follow-up Information     Follow up with LAB, APPOINTMENT [place] On 3/29/2017.    Specialty:  Lab    Why:  At 7:55 AM         Follow up with Adali Kim PharmD On 3/29/2017.    Specialty:  Pharmacist    Why:  At 8:30 AM in Coumadin Clinic        Follow up with Nadiya Nava MD On 3/31/2017.    Specialty:  Internal Medicine    Why:  At 8:00 AM, Hospital Follow-up with PCP    Contact information:    [address]  [facility] [address]  [phone]          Follow up with Nohemi Ardon DNP, NP On 4/4/2017.    Specialty:  Endocrinology    Why:  At 9:30 AM in Endocrinology Clinic    Contact information:    [address]  [facility] [address]  [phone]          Follow up with Abad Kruger MD On 4/4/2017.    Specialty:  Psychiatry    Why:  At 9:30 AM in Behavioral Medicine Clinic    Contact information:    [address]  [facility] [address]  [phone]          Follow up with [facility] On 3/22/2017.    Specialty:  Priority Care    Why:  At 8:00 AM     Contact information:    [address] [facility] [id number] [phone]    Additional information:    [facility]      Referrals     Future Orders    Ambulatory referral to Outpatient Case Management     Questions:    Does the patient have a chronic or uncontrolled disease process?:      Does the patient have a new diagnosis of a catastrophic or life altering illness/treatment?:      Does the patient have any psycho-social issues that may affect their ability to adhere to treatment plan?:      Does patient have any behaviors or circumstances that may impede ability to adhere to treatment plan?:      Is patient at risk for admission/readmission?:          Discharge Instructions     Future Orders    Activity as tolerated     Call MD for:  difficulty breathing or increased cough     Call MD for:  persistent nausea and vomiting or diarrhea     Diet general     Questions:    Total calories:      Fat restriction, if any:      Protein restriction, if any:      Na restriction,  if any:      Fluid restriction:      Additional restrictions:          Primary Diagnosis     Your primary diagnosis was:  Respiratory Failure      Admission Information     Date & Time Provider Department CSN    3/12/2017 10:59 PM Enedelia Styles MD [facility] [id number]      Care Providers     Provider Role Specialty Primary office phone    Enedelia Styles MD Attending Provider Hospitalist [phone]    Enedelia Styles MD Team Attending  Hospitalist [phone]      Your Vitals Were     BP Pulse Temp Resp Height Weight    112/59 (BP Location: Left arm, Patient Position: Lying, BP Method: Automatic) 67 97.2 °F (36.2 °C) (Oral) 20 5' (1.524 m) 64.4 kg (142 lb)    Last Period SpO2 BMI          (LMP Unknown) 98% 27.73 kg/m2        Recent Lab Values        2/25/2015 6/25/2015 1/4/2016 2/23/2016 6/8/2016 9/15/2016 12/20/2016 3/13/2017      3:58 AM 10:54 AM 10:10 AM 11:42 AM  9:12 AM 10:18 AM  9:47 AM  6:20 AM    A1C 10.3 (H) 10.8 (H) 9.7 (H) 9.3 (H) 8.0 (H) 8.8 (H) 7.6 (H) 7.8 (H)    Comment for A1C at 10:18 AM on 9/15/2016:  According to ADA guidelines, hemoglobin A1C <7.0% represents  optimal control in non-pregnant diabetic patients.  Different  metrics may apply to specific populations.   Standards of Medical Care in Diabetes - 2016.  For the purpose of screening for the presence of diabetes:  <5.7%     Consistent with the absence of diabetes  5.7-6.4%  Consistent with increasing risk for diabetes   (prediabetes)  >or=6.5%  Consistent with diabetes  Currently no consensus exists for use of hemoglobin A1C  for diagnosis of diabetes for children.      Comment for A1C at  9:47 AM on 12/20/2016:  According to ADA guidelines, hemoglobin A1C <7.0% represents  optimal control in non-pregnant diabetic patients.  Different  metrics may apply to specific populations.   Standards of Medical Care in Diabetes - 2016.  For the purpose of screening for the presence of diabetes:  <5.7%     Consistent with the  absence of diabetes  5.7-6.4%  Consistent with increasing risk for diabetes   (prediabetes)  >or=6.5%  Consistent with diabetes  Currently no consensus exists for use of hemoglobin A1C  for diagnosis of diabetes for children.      Comment for A1C at  6:20 AM on 3/13/2017:  According to ADA guidelines, hemoglobin A1C <7.0% represents  optimal control in non-pregnant diabetic patients.  Different  metrics may apply to specific populations.   Standards of Medical Care in Diabetes - 2016.  For the purpose of screening for the presence of diabetes:  <5.7%     Consistent with the absence of diabetes  5.7-6.4%  Consistent with increasing risk for diabetes   (prediabetes)  >or=6.5%  Consistent with diabetes  Currently no consensus exists for use of hemoglobin A1C  for diagnosis of diabetes for children.        Pending Labs     Order Current Status    Blood culture x two cultures. Draw prior to antibiotics. Preliminary result    Blood culture x two cultures. Draw prior to antibiotics. Preliminary result      Allergies as of 3/16/2017        Reactions    Latex, Natural Rubber Dermatitis, Rash    Adhesive Itching, Rash    Allergy to adhesive in nicotine patch.    Sulfa (Sulfonamide Antibiotics) Rash      Advance Directives     An advance directive is a document which, in the event you are no longer able to make decisions for yourself, tells your healthcare team what kind of treatment you do or do not want to receive, or who you would like to make those decisions for you.  If you do not currently have an advance directive, [facility] encourages you to create one.  For more information call:  [phone]), [phone] ([phone]),  or log on to [email or web address].        Language Assistance Services     ATTENTION: Language assistance services are available, free of charge. Please call [phone].      ATENCIÓN: Si habla español, tiene a yoo disposición servicios gratuitos de asistencia lingüística. Sanjay  al [phone].     [facility] Ý: N?u b?n nói Ti?ng Vi?t, có các d?ch v? h? tr? ngôn ng? mi?n phí dành cho b?n. G?i s? [phone].        Stroke Education              Heart Failure Education       Heart Failure: Being Active  You have a condition called heart failure. Being active doesnt mean that you have to wear yourself out. Even a little movement each day helps to strengthen your heart. If you cant get out to exercise, you can do simple stretching and strengthening exercises at home. These are good ways to keep you well-conditioned and prevent you and your heart from becoming excessively weak.    Ideas to get you started  · Add a little movement to things you do now. Walk to mail letters. Park your car at the far end of the parking lot and walk to the store. Walk up a flight of stairs instead of taking the elevator.  · Choose activities you enjoy. You might walk, swim, or ride an exercise bike. Things like gardening and washing the car count, too. Other possibilities include: washing dishes, walking the dog, walking around the mall, and doing aerobic activities with friends.  · Join a group exercise program at a [facility] or [facility], a senior center, or a community center. Or look into a hospital cardiac rehabilitation program. Ask your doctor if you qualify.  Tips to keep you going  · Get up and get dressed each day. Go to a coffee shop and read a newspaper or go somewhere that you'll be in the presence of other active people. Youll feel more like being active.  · Make a plan. Choose one or more activities that you enjoy and that you can easily do. Then plan to do at least one each day. You might write your plan on a calendar.  · Go with a friend or a group if you like company. This can help you feel supported and stay motivated, too.  · Plan social events that you enjoy. This will keep you mentally engaged as well as physically motivated to do things you find pleasure in.  For your safety  · Talk with your  healthcare provider before starting an exercise program.  · Exercise indoors when its too hot or too cold outside, or when the air quality is poor. Try walking at a shopping mall.  · Wear socks and sturdy shoes to maintain your balance and prevent falls.  · Start slowly. Do a few minutes several times a day at first. Increase your time and speed little by little.  · Stop and rest whenever you feel tired or get short of breath.  · Dont push yourself on days when you dont feel well.  Date Last Reviewed: 3/20/2016  © 9048-7598 [employer]. [address] [place] [address]. All rights reserved. This information is not intended as a substitute for professional medical care. Always follow your healthcare professional's instructions.              Heart Failure: Evaluating Your Heart  You have a condition called heart failure. To evaluate your condition, your doctor will examine you, ask questions, and do some tests. Along with looking for signs of heart failure, the doctor looks for any other health problems that may have led to heart failure. The results of your evaluation will help your doctor form a treatment plan.  Health history and physical exam  Your visit will start with a health history. Tell the doctor about any symptoms youve noticed and about all medicines you take. Then youll have a physical exam. This includes listening to your heartbeat and breathing. Youll also be checked for swelling (edema) in your legs and neck. When you have fluid buildup or fluid in the lungs, it may be called congestive heart failure.  Diagnosing heart failure     During an echocardiogram, sound waves bounce off the heart. These are converted into a picture on the screen.   The following may be done to help your doctor form a diagnosis:  · X-rays show the size and shape of your heart. These pictures can also show fluid in your lungs.  · An electrocardiogram (ECG or EKG) shows the pattern of your heartbeat.  Small pads (electrodes) are placed on your chest, arms, and legs. Wires connect the pads to the ECG machine, which records your hearts electrical signals. This can give the doctor information about heart function.  · An echocardiogram uses ultrasound waves to show the structure and movement of your heart muscle. This shows how well the heart pumps. It also shows the thickness of the heart walls, and if the heart is enlarged. It is one of the most useful, non-invasive tests as it provides information about the heart's general function. This helps your doctor make treatment decisions.  · Lab tests evaluate small amounts of blood or urine for signs of problems. A BNP lab test can help diagnose and evaluate heart failure. BNP stands for B-type natriuretic peptide. The ventricles secrete more BNP when heart failure worsens. Lab tests can also provide information about metabolic dysfunction or heart dysfunction.  Your treatment plan  Based on the results of your evaluation and tests, your doctor will develop a treatment plan. This plan is designed to relieve some of your heart failure symptoms and help make you more comfortable. Your treatment plan may include:  · Medicine to help your heart work better and improve your quality of life  · Changes in what you eat and drink to help prevent fluid from backing up in your body  · Daily monitoring of your weight and heart failure symptoms to see how well your treatment plan is working  · Exercise to help you stay healthy  · Help with quitting smoking  · Emotional and psychological support to help adjust to the changes  · Referrals to other specialists to make sure you are being treated comprehensively  Date Last Reviewed: 3/21/2016  © 9898-3632 [employer]. [address] [place] [address], [place] [address]. All rights reserved. This information is not intended as a substitute for professional medical care. Always follow your healthcare professional's  instructions.              Heart Failure: Making Changes to Your Diet  You have a condition called heart failure. When you have heart failure, excess fluid is more likely to build up in your body because your heart isn't working well. This makes the heart work harder to pump blood. Fluid buildup causes symptoms such as shortness of breath and swelling (edema). This is often referred to as congestive heart failure or CHF. Controlling the amount of salt (sodium) you eat may help stop fluid from building up. Your doctor may also tell you to reduce the amount of fluid you drink.  Reading food labels    Your healthcare provider will tell you how much sodium you can eat each day. Read food labels to keep track. Keep in mind that certain foods are high in salt. These include canned, frozen, and processed foods. Check the amount of sodium in each serving. Watch out for high-sodium ingredients. These include MSG (monosodium glutamate), baking soda, and sodium phosphate.   Eating less salt  Give yourself time to get used to eating less salt. It may take a little while. Here are some tips to help:  · Take the saltshaker off the table. Replace it with salt-free herb mixes and spices.  · Eat fresh or plain frozen vegetables. These have much less salt than canned vegetables.  · Choose low-sodium snacks like sodium-free pretzels, crackers, or air-popped popcorn.  · Dont add salt to your food when youre cooking. Instead, season your foods with pepper, lemon, garlic, or onion.  · When you eat out, ask that your food be cooked without added salt.  · Avoid eating fried foods as these often have a great deal of salt.  If youre told to limit fluids  You may need to limit how much fluid you have to help prevent swelling. This includes anything that is liquid at room temperature, such as ice cream and soup. If your doctor tells you to limit fluid, try these tips:  · Measure drinks in a measuring cup before you drink them. This will  help you meet daily goals.  · Chill drinks to make them more refreshing.  · Suck on frozen lemon wedges to quench thirst.  · Only drink when youre thirsty.  · Chew sugarless gum or suck on hard candy to keep your mouth moist.  · Weigh yourself daily to know if your body's fluid content is rising.  My sodium goal  Your healthcare provider may give you a sodium goal to meet each day. This includes sodium found in food as well as salt that you add. My goal is to eat no more than ___________ mg of sodium per day.     When to call your doctor  Call your doctor right away if you have any symptoms of worsening heart failure. These can include:  · Sudden weight gain  · Increased swelling of your legs or ankles  · Trouble breathing when youre resting or at night  · Increase in the number of pillows you have to sleep on  · Chest pain, pressure, discomfort, or pain in the jaw, neck, or back   Date Last Reviewed: 3/21/2016  © 8010-1875 [employer]. [address] [place] [address]. All rights reserved. This information is not intended as a substitute for professional medical care. Always follow your healthcare professional's instructions.              Heart Failure: Medicines to Help Your Heart    You have a condition called heart failure (also known as congestive heart failure, or CHF). Your doctor will likely prescribe medicines for heart failure and any underlying health problems you have. Most heart failure patients take one or more types of medicinen. Your healthcare provider will work to find the combination of medicines that works best for you.  Heart failure medicines  Here are the most common heart failure medicines:  · ACE inhibitors lower blood pressure and decrease strain on the heart. This makes it easier for the heart to pump. Angiotensin receptor blockers have similar effects. These are prescribed for some patients instead of ACE inhibitors.  · Beta-blockers relieve stress on the heart.  They also improve symptoms. They may also improve the heart's pumping action over time.  · Diuretics (also called water pills) help rid your body of excess water. This can help rid your body of swelling (edema). Having less fluid to pump means your heart doesnt have to work as hard. Some diuretics make your body lose a mineral called potassium. Your doctor will tell you if you need to take supplements or eat more foods high in potassium.  · Digoxin helps your heart pump with more strength. This helps your heart pump more blood with each beat. So, more oxygen-rich blood travels to the rest of the body.  · Aldosterone antagonists help alter hormones and decrease strain on the heart.  · Hydralazine and nitrates are two separate medicines used together to treat heart failure. They may come in one combination pill. They lower blood pressure and decrease how hard the heart has to pump.  Medicines for related conditions  Controlling other heart problems helps keep heart failure under control, too. Depending on other heart problems you have, medicines may be prescribed to:  · Lower blood pressure (antihypertensives).  · Lower cholesterol levels (statins).  · Prevent blood clots (anticoagulants or aspirin).  · Keep the heartbeat steady (antiarrhythmics).  Date Last Reviewed: 3/5/2016  © 5137-0649 [employer]. [address] [place] [address], [place] [address]. All rights reserved. This information is not intended as a substitute for professional medical care. Always follow your healthcare professional's instructions.              Heart Failure: Procedures That May Help    The heart is a muscle that pumps oxygen-rich blood to all parts of the body. When you have heart failure, the heart is not able to pump as well as it should. Blood and fluid may back up into the lungs (congestive heart failure), and some parts of the body dont get enough oxygen-rich blood to work normally. These problems lead to the symptoms of  heart failure.     Certain procedures may help the heart pump better in some cases of heart failure. Some procedures are done to treat health problems that may have caused the heart failure such as coronary artery disease or heart rhythm problems. For more serious heart failure, other options are available.  Treating artery and valve problems  If you have coronary artery disease or valve disease, procedures may be done to improve blood flow. This helps the heart pump better, which can improve heart failure symptoms. First, your doctor may do a cardiac catheterization to help detect clogged blood vessels or valve damage. During this procedure, a  thin tube (catheter) in inserted into a blood vessel and guided to the heart. There a dye is injected and a special type of X-ray (angiogram) is taken of the blood vessels. Procedures to open a blocked artery or fix damaged valves can also be done using catheterization.  · Angioplasty uses a balloon-tipped instrument at the end of the catheter. The balloon is inflated to widen the narrowed artery. In many cases, a stent is expanded to further support the narrowed artery. A stent is a metal mesh tube.  · Valve surgery repairs or replacement of faulty valves can also be done during catheterization so blood can flow properly through the chambers of the heart.  Bypass surgery is another option to help treat blocked arteries. It uses a healthy blood vessel from elsewhere in the body. The healthy blood vessel is attached above and below the blocked area so that blood can flow around the blocked artery.  Treating heart rhythm problems  A device may be placed in the chest to help a weak heart maintain a healthy, heartbeat so the heart can pump more effectively:  · Pacemaker. A pacemaker is an implanted device that regulates your heartbeat electronically. It monitors your heart's rhythm and generates a painless electric impulse that helps the heart beat in a regular rhythm. A  pacemaker is programmed to meet your specific heart rhythm needs.  · Biventricular pacing/cardiac resynchronization therapy. A type of pacemaker that paces both pumping chambers of the heart at the same time to coordinate contractions and to improve the heart's function. Some people with heart failure are candidates for this therapy.  · Implantable cardioverter defibrillator. A device similar to a pacemaker that senses when the heart is beating too fast and delivers an electrical shock to convert the fast rhythm to a normal rhythm. This can be a life saving device.  In severe cases  In more serious cases of heart failure when other treatments no longer work, other options may include:  · Ventricular assist devices (VADs). These are mechanical devices used to take over the pumping function for one or both of the heart's ventricles, or pumping chambers. A VAD may be necessary when heart failure progresses to the point that medicines and other treatments no longer help. In some cases, a VAD may be used as a bridge to transplant.  · Heart transplant. This is replacing the diseased heart with a healthy one from a donor. This is an option for a few people who are very sick. A heart transplant is very serious and not an option for all patients. Your doctor can tell you more.  Date Last Reviewed: 3/20/2016  © 6763-6441 [employer]. [address] [place] [address], [place] [id number]. All rights reserved. This information is not intended as a substitute for professional medical care. Always follow your healthcare professional's instructions.              Heart Failure: Tracking Your Weight  You have a condition called heart failure. When you have heart failure, a sudden weight gain or a steady rise in weight is a warning sign that your body is retaining too much water and salt. This could mean your heart failure is getting worse. If left untreated, it can cause problems for your lungs and result in shortness of breath.  Weighing yourself each day is the best way to know if youre retaining water. If your weight goes up quickly, call your doctor. You will be given instructions on how to get rid of the excess water. You will likely need medicines and to avoid salt. This will help your heart work better.  Call your doctor if you gain more than 2 pounds in 1 day, more than 5 pounds in 1 week, or whatever weight gain you were told to report by your doctor. This is often a sign of worsening heart failure and needs to be evaluated and treated. Your doctor will tell you what to do next.   Tips for weighing yourself    · Weigh yourself at the same time each morning, wearing the same clothes. Weigh yourself after urinating and before eating.  · Use the same scale each day. Make sure the numbers are easy to read. Put the scale on a flat, hard surface -- not on a rug or carpet.  · Do not stop weighing yourself. If you forget one day, weigh again the next morning.  How to use your weight chart  · Keep your weight chart near the scale. Write your weight on the chart as soon as you get off the scale.  · Fill in the month and the start date on the chart. Then write down your weight each day. Your chart will look like this:    · If you miss a day, leave the space blank. Weigh yourself the next day and write your weight in the next space.  · Take your weight chart with you when you go to see your doctor.  Date Last Reviewed: 3/20/2016  © 6675-1337 [employer]. [address] [place] [address], [place] [address]. All rights reserved. This information is not intended as a substitute for professional medical care. Always follow your healthcare professional's instructions.              Heart Failure: Warning Signs of a Flare-Up  You have a condition called heart failure. Once you have heart failure, flare-ups can happen. Below are signs that can mean your heart failure is getting worse. If you notice any of these warning signs, call your healthcare  provider.  Swelling    · Your feet, ankles, or lower legs get puffier.  · You notice skin changes on your lower legs.  · Your shoes feel too tight.  · Your clothes are tighter in the waist.  · You have trouble getting rings on or off your fingers.  Shortness of breath  · You have to breathe harder even when youre doing your normal activities or when youre resting.  · You are short of breath walking up stairs or even short distances.  · You wake up at night short of breath or coughing.  · You need to use more pillows or sit up to sleep.  · You wake up tired or restless.  Other warning signs  · You feel weaker, dizzy, or more tired.  · You have chest pain or changes in your heartbeat.  · You have a cough that wont go away.  · You cant remember things or dont feel like eating.  Tracking your weight  Gaining weight is often the first warning sign that heart failure is getting worse. Gaining even a few pounds can be a sign that your body is retaining excess water and salt. Weighing yourself each day in the morning after you urinate and before you eat, is the best way to know if you're retaining water. Get a scale that is easy to read and make sure you wear the same clothes and use the same scale every time you weigh. Your healthcare provider will show you how to track your weight. Call your doctor if you gain more than 2 pounds in 1 day, 5 pounds in 1 week, or whatever weight gain you were told to report by your doctor. This is often a sign of worsening heart failure and needs to be evaluated and treated before it compromises your breathing. Your doctor will tell you what to do next.    Date Last Reviewed: 3/15/2016  © 2069-4280 [employer]. [address] [place] [address], [place] [address]. All rights reserved. This information is not intended as a substitute for professional medical care. Always follow your healthcare professional's instructions.              Coumadin Discharge Instructions                          Chronic Kindey Disease Education             Diabetes Discharge Instructions                                   [facility] Sign-Up     Activating your [facility] account is as easy as 1-2-3!     1) Visit [email or web address], select Sign Up Now, enter this activation code and your date of birth, then select Next.  [email or web address]  Expires: 4/30/2017  4:18 PM      2) Create a username and password to use when you visit [facility] in the future and select a security question in case you lose your password and select Next.    3) Enter your e-mail address and click Sign Up!    Additional Information  If you have questions, please e-mail [email or web address] or call [phone] to talk to our [facility] staff. Remember, [facility] is NOT to be used for urgent needs. For medical emergencies, dial 911.          [facility] complies with applicable Federal civil rights laws and does not discriminate on the basis of race, color, national origin, age, disability, or sex.

## 2017-03-13 PROBLEM — I48.0 PAROXYSMAL A-FIB: Status: ACTIVE | Noted: 2017-03-13

## 2017-03-13 PROBLEM — J18.9 PNEUMONIA: Status: ACTIVE | Noted: 2017-03-13

## 2017-03-13 PROBLEM — J96.21 ACUTE ON CHRONIC RESPIRATORY FAILURE WITH HYPOXIA: Status: ACTIVE | Noted: 2017-03-13

## 2017-03-13 PROBLEM — R05.9 COUGH: Status: ACTIVE | Noted: 2017-03-13

## 2017-03-13 PROBLEM — E86.9 VOLUME DEPLETION: Status: ACTIVE | Noted: 2017-03-13

## 2017-03-13 LAB
ABO + RH BLD: NORMAL
ALBUMIN SERPL BCP-MCNC: 2.9 G/DL
ALBUMIN SERPL BCP-MCNC: 2.9 G/DL
ALP SERPL-CCNC: 72 U/L
ALP SERPL-CCNC: 78 U/L
ALT SERPL W/O P-5'-P-CCNC: 23 U/L
ALT SERPL W/O P-5'-P-CCNC: 26 U/L
ANION GAP SERPL CALC-SCNC: 10 MMOL/L
ANION GAP SERPL CALC-SCNC: 11 MMOL/L
AORTIC VALVE STENOSIS: ABNORMAL
APTT BLDCRRT: 32.6 SEC
AST SERPL-CCNC: 20 U/L
AST SERPL-CCNC: 21 U/L
BACTERIA #/AREA URNS AUTO: ABNORMAL /HPF
BASOPHILS # BLD AUTO: 0.05 K/UL
BASOPHILS NFR BLD: 0.4 %
BILIRUB SERPL-MCNC: 0.5 MG/DL
BILIRUB SERPL-MCNC: 0.5 MG/DL
BILIRUB UR QL STRIP: NEGATIVE
BLD GP AB SCN CELLS X3 SERPL QL: NORMAL
BNP SERPL-MCNC: 370 PG/ML
BUN SERPL-MCNC: 47 MG/DL
BUN SERPL-MCNC: 49 MG/DL
CALCIUM SERPL-MCNC: 9.3 MG/DL
CALCIUM SERPL-MCNC: 9.5 MG/DL
CHLORIDE SERPL-SCNC: 102 MMOL/L
CHLORIDE SERPL-SCNC: 102 MMOL/L
CLARITY UR REFRACT.AUTO: CLEAR
CO2 SERPL-SCNC: 24 MMOL/L
CO2 SERPL-SCNC: 26 MMOL/L
COLOR UR AUTO: YELLOW
CREAT SERPL-MCNC: 1.4 MG/DL
CREAT SERPL-MCNC: 1.6 MG/DL
DIASTOLIC DYSFUNCTION: YES
DIFFERENTIAL METHOD: ABNORMAL
EOSINOPHIL # BLD AUTO: 0 K/UL
EOSINOPHIL NFR BLD: 0.1 %
ERYTHROCYTE [DISTWIDTH] IN BLOOD BY AUTOMATED COUNT: 12.7 %
EST. GFR  (AFRICAN AMERICAN): 36.8 ML/MIN/1.73 M^2
EST. GFR  (AFRICAN AMERICAN): 43.3 ML/MIN/1.73 M^2
EST. GFR  (NON AFRICAN AMERICAN): 32 ML/MIN/1.73 M^2
EST. GFR  (NON AFRICAN AMERICAN): 37.6 ML/MIN/1.73 M^2
ESTIMATED AVG GLUCOSE: 177 MG/DL
ESTIMATED PA SYSTOLIC PRESSURE: 63
FLUAV AG SPEC QL IA: NEGATIVE
FLUBV AG SPEC QL IA: NEGATIVE
GLUCOSE SERPL-MCNC: 150 MG/DL
GLUCOSE SERPL-MCNC: 184 MG/DL
GLUCOSE UR QL STRIP: NEGATIVE
GRAM STN SPEC: NORMAL
HBA1C MFR BLD HPLC: 7.8 %
HCT VFR BLD AUTO: 43.8 %
HGB BLD-MCNC: 14.9 G/DL
HGB UR QL STRIP: ABNORMAL
HYALINE CASTS UR QL AUTO: 14 /LPF
INR PPP: 2
INR PPP: 2.4
KETONES UR QL STRIP: NEGATIVE
LACTATE SERPL-SCNC: 0.7 MMOL/L
LACTATE SERPL-SCNC: 1.2 MMOL/L
LACTATE SERPL-SCNC: 1.4 MMOL/L
LACTATE SERPL-SCNC: 1.7 MMOL/L
LEUKOCYTE ESTERASE UR QL STRIP: NEGATIVE
LYMPHOCYTES # BLD AUTO: 1.4 K/UL
LYMPHOCYTES NFR BLD: 10.7 %
MAGNESIUM SERPL-MCNC: 1.6 MG/DL
MAGNESIUM SERPL-MCNC: 2 MG/DL
MCH RBC QN AUTO: 33 PG
MCHC RBC AUTO-ENTMCNC: 34 %
MCV RBC AUTO: 97 FL
MICROSCOPIC COMMENT: ABNORMAL
MITRAL VALVE MOBILITY: ABNORMAL
MITRAL VALVE REGURGITATION: ABNORMAL
MITRAL VALVE STENOSIS: ABNORMAL
MONOCYTES # BLD AUTO: 0.3 K/UL
MONOCYTES NFR BLD: 1.9 %
NEUTROPHILS # BLD AUTO: 11.5 K/UL
NEUTROPHILS NFR BLD: 86.7 %
NITRITE UR QL STRIP: NEGATIVE
PH UR STRIP: 5 [PH] (ref 5–8)
PHOSPHATE SERPL-MCNC: 3 MG/DL
PHOSPHATE SERPL-MCNC: 3 MG/DL
PLATELET # BLD AUTO: 191 K/UL
PMV BLD AUTO: 10.4 FL
POCT GLUCOSE: 213 MG/DL (ref 70–110)
POCT GLUCOSE: 326 MG/DL (ref 70–110)
POCT GLUCOSE: 329 MG/DL (ref 70–110)
POCT GLUCOSE: 357 MG/DL (ref 70–110)
POCT GLUCOSE: 371 MG/DL (ref 70–110)
POTASSIUM SERPL-SCNC: 4.9 MMOL/L
POTASSIUM SERPL-SCNC: 5.3 MMOL/L
PROCALCITONIN SERPL IA-MCNC: <0.09 NG/ML
PROT SERPL-MCNC: 7.9 G/DL
PROT SERPL-MCNC: 8.4 G/DL
PROT UR QL STRIP: ABNORMAL
PROTHROMBIN TIME: 20.3 SEC
PROTHROMBIN TIME: 23.8 SEC
RBC # BLD AUTO: 4.51 M/UL
RBC #/AREA URNS AUTO: 4 /HPF (ref 0–4)
RETIRED EF AND QEF - SEE NOTES: 68 (ref 55–65)
SODIUM SERPL-SCNC: 137 MMOL/L
SODIUM SERPL-SCNC: 138 MMOL/L
SP GR UR STRIP: 1.01 (ref 1–1.03)
SPECIMEN SOURCE: NORMAL
SQUAMOUS #/AREA URNS AUTO: 1 /HPF
TRICUSPID VALVE REGURGITATION: ABNORMAL
TROPONIN I SERPL DL<=0.01 NG/ML-MCNC: 0.04 NG/ML
URN SPEC COLLECT METH UR: ABNORMAL
UROBILINOGEN UR STRIP-ACNC: NEGATIVE EU/DL
WBC # BLD AUTO: 13.25 K/UL
WBC #/AREA URNS AUTO: 1 /HPF (ref 0–5)

## 2017-03-13 PROCEDURE — 25000003 PHARM REV CODE 250: Performed by: EMERGENCY MEDICINE

## 2017-03-13 PROCEDURE — 87400 INFLUENZA A/B EACH AG IA: CPT

## 2017-03-13 PROCEDURE — 63600175 PHARM REV CODE 636 W HCPCS: Performed by: STUDENT IN AN ORGANIZED HEALTH CARE EDUCATION/TRAINING PROGRAM

## 2017-03-13 PROCEDURE — 25000003 PHARM REV CODE 250: Performed by: STUDENT IN AN ORGANIZED HEALTH CARE EDUCATION/TRAINING PROGRAM

## 2017-03-13 PROCEDURE — 80053 COMPREHEN METABOLIC PANEL: CPT

## 2017-03-13 PROCEDURE — 63600175 PHARM REV CODE 636 W HCPCS: Performed by: INTERNAL MEDICINE

## 2017-03-13 PROCEDURE — 87086 URINE CULTURE/COLONY COUNT: CPT

## 2017-03-13 PROCEDURE — 81001 URINALYSIS AUTO W/SCOPE: CPT

## 2017-03-13 PROCEDURE — 36415 COLL VENOUS BLD VENIPUNCTURE: CPT

## 2017-03-13 PROCEDURE — 83735 ASSAY OF MAGNESIUM: CPT

## 2017-03-13 PROCEDURE — 83036 HEMOGLOBIN GLYCOSYLATED A1C: CPT

## 2017-03-13 PROCEDURE — 27000221 HC OXYGEN, UP TO 24 HOURS

## 2017-03-13 PROCEDURE — 84100 ASSAY OF PHOSPHORUS: CPT

## 2017-03-13 PROCEDURE — 63600175 PHARM REV CODE 636 W HCPCS: Performed by: EMERGENCY MEDICINE

## 2017-03-13 PROCEDURE — G8987 SELF CARE CURRENT STATUS: HCPCS | Mod: CJ

## 2017-03-13 PROCEDURE — 85610 PROTHROMBIN TIME: CPT

## 2017-03-13 PROCEDURE — 87040 BLOOD CULTURE FOR BACTERIA: CPT

## 2017-03-13 PROCEDURE — 94640 AIRWAY INHALATION TREATMENT: CPT

## 2017-03-13 PROCEDURE — 93306 TTE W/DOPPLER COMPLETE: CPT | Mod: 26,,, | Performed by: INTERNAL MEDICINE

## 2017-03-13 PROCEDURE — 11000001 HC ACUTE MED/SURG PRIVATE ROOM

## 2017-03-13 PROCEDURE — 99223 1ST HOSP IP/OBS HIGH 75: CPT | Mod: AI,GC,, | Performed by: HOSPITALIST

## 2017-03-13 PROCEDURE — 87205 SMEAR GRAM STAIN: CPT

## 2017-03-13 PROCEDURE — 25000003 PHARM REV CODE 250: Performed by: INTERNAL MEDICINE

## 2017-03-13 PROCEDURE — 94760 N-INVAS EAR/PLS OXIMETRY 1: CPT

## 2017-03-13 PROCEDURE — 83605 ASSAY OF LACTIC ACID: CPT | Mod: 91

## 2017-03-13 PROCEDURE — 25000242 PHARM REV CODE 250 ALT 637 W/ HCPCS: Performed by: STUDENT IN AN ORGANIZED HEALTH CARE EDUCATION/TRAINING PROGRAM

## 2017-03-13 PROCEDURE — G8988 SELF CARE GOAL STATUS: HCPCS | Mod: CI

## 2017-03-13 PROCEDURE — 99900035 HC TECH TIME PER 15 MIN (STAT)

## 2017-03-13 PROCEDURE — 97161 PT EVAL LOW COMPLEX 20 MIN: CPT

## 2017-03-13 PROCEDURE — 97116 GAIT TRAINING THERAPY: CPT

## 2017-03-13 PROCEDURE — 93306 TTE W/DOPPLER COMPLETE: CPT

## 2017-03-13 PROCEDURE — 97166 OT EVAL MOD COMPLEX 45 MIN: CPT

## 2017-03-13 PROCEDURE — 97530 THERAPEUTIC ACTIVITIES: CPT

## 2017-03-13 PROCEDURE — 85025 COMPLETE CBC W/AUTO DIFF WBC: CPT

## 2017-03-13 RX ORDER — IBUPROFEN 200 MG
24 TABLET ORAL
Status: DISCONTINUED | OUTPATIENT
Start: 2017-03-13 | End: 2017-03-13

## 2017-03-13 RX ORDER — INSULIN ASPART 100 [IU]/ML
5 INJECTION, SOLUTION INTRAVENOUS; SUBCUTANEOUS
Status: DISCONTINUED | OUTPATIENT
Start: 2017-03-13 | End: 2017-03-14

## 2017-03-13 RX ORDER — ALPRAZOLAM 0.25 MG/1
0.25 TABLET ORAL NIGHTLY PRN
Status: COMPLETED | OUTPATIENT
Start: 2017-03-13 | End: 2017-03-13

## 2017-03-13 RX ORDER — PREDNISONE 20 MG/1
20 TABLET ORAL DAILY
Status: DISCONTINUED | OUTPATIENT
Start: 2017-03-13 | End: 2017-03-13

## 2017-03-13 RX ORDER — MOXIFLOXACIN HYDROCHLORIDE 400 MG/1
400 TABLET ORAL DAILY
Status: DISCONTINUED | OUTPATIENT
Start: 2017-03-13 | End: 2017-03-13

## 2017-03-13 RX ORDER — IBUPROFEN 200 MG
16 TABLET ORAL
Status: DISCONTINUED | OUTPATIENT
Start: 2017-03-13 | End: 2017-03-13

## 2017-03-13 RX ORDER — METOPROLOL TARTRATE 25 MG/1
25 TABLET, FILM COATED ORAL 2 TIMES DAILY
Status: DISCONTINUED | OUTPATIENT
Start: 2017-03-13 | End: 2017-03-16 | Stop reason: HOSPADM

## 2017-03-13 RX ORDER — DILTIAZEM HYDROCHLORIDE 240 MG/1
240 CAPSULE, COATED, EXTENDED RELEASE ORAL DAILY
Status: DISCONTINUED | OUTPATIENT
Start: 2017-03-13 | End: 2017-03-16 | Stop reason: HOSPADM

## 2017-03-13 RX ORDER — MOXIFLOXACIN HYDROCHLORIDE 400 MG/250ML
400 INJECTION, SOLUTION INTRAVENOUS
Status: DISCONTINUED | OUTPATIENT
Start: 2017-03-13 | End: 2017-03-13

## 2017-03-13 RX ORDER — WARFARIN 2.5 MG/1
2.5 TABLET ORAL
Status: DISCONTINUED | OUTPATIENT
Start: 2017-03-13 | End: 2017-03-16 | Stop reason: HOSPADM

## 2017-03-13 RX ORDER — LISINOPRIL 5 MG/1
5 TABLET ORAL DAILY
Status: DISCONTINUED | OUTPATIENT
Start: 2017-03-13 | End: 2017-03-16 | Stop reason: HOSPADM

## 2017-03-13 RX ORDER — AZITHROMYCIN 250 MG/1
250 TABLET, FILM COATED ORAL DAILY
Status: DISCONTINUED | OUTPATIENT
Start: 2017-03-14 | End: 2017-03-16 | Stop reason: HOSPADM

## 2017-03-13 RX ORDER — ONDANSETRON 2 MG/ML
4 INJECTION INTRAMUSCULAR; INTRAVENOUS EVERY 12 HOURS PRN
Status: DISCONTINUED | OUTPATIENT
Start: 2017-03-13 | End: 2017-03-16 | Stop reason: HOSPADM

## 2017-03-13 RX ORDER — IBUPROFEN 200 MG
24 TABLET ORAL
Status: DISCONTINUED | OUTPATIENT
Start: 2017-03-13 | End: 2017-03-16 | Stop reason: HOSPADM

## 2017-03-13 RX ORDER — GUAIFENESIN/DEXTROMETHORPHAN 100-10MG/5
5 SYRUP ORAL EVERY 4 HOURS PRN
Status: DISCONTINUED | OUTPATIENT
Start: 2017-03-13 | End: 2017-03-14

## 2017-03-13 RX ORDER — FLUTICASONE FUROATE AND VILANTEROL 200; 25 UG/1; UG/1
1 POWDER RESPIRATORY (INHALATION) DAILY
Status: DISCONTINUED | OUTPATIENT
Start: 2017-03-13 | End: 2017-03-16 | Stop reason: HOSPADM

## 2017-03-13 RX ORDER — GLUCAGON 1 MG
1 KIT INJECTION
Status: DISCONTINUED | OUTPATIENT
Start: 2017-03-13 | End: 2017-03-16 | Stop reason: HOSPADM

## 2017-03-13 RX ORDER — MOXIFLOXACIN HYDROCHLORIDE 400 MG/1
400 TABLET ORAL DAILY
Status: DISCONTINUED | OUTPATIENT
Start: 2017-03-14 | End: 2017-03-13

## 2017-03-13 RX ORDER — LEVOTHYROXINE SODIUM 50 UG/1
50 TABLET ORAL
Status: DISCONTINUED | OUTPATIENT
Start: 2017-03-13 | End: 2017-03-16 | Stop reason: HOSPADM

## 2017-03-13 RX ORDER — PREDNISONE 20 MG/1
40 TABLET ORAL DAILY
Status: DISCONTINUED | OUTPATIENT
Start: 2017-03-14 | End: 2017-03-16 | Stop reason: HOSPADM

## 2017-03-13 RX ORDER — IPRATROPIUM BROMIDE AND ALBUTEROL SULFATE 2.5; .5 MG/3ML; MG/3ML
3 SOLUTION RESPIRATORY (INHALATION) EVERY 4 HOURS
Status: DISCONTINUED | OUTPATIENT
Start: 2017-03-13 | End: 2017-03-16 | Stop reason: HOSPADM

## 2017-03-13 RX ORDER — FUROSEMIDE 20 MG/1
20 TABLET ORAL 2 TIMES DAILY
Status: DISCONTINUED | OUTPATIENT
Start: 2017-03-13 | End: 2017-03-13

## 2017-03-13 RX ORDER — WARFARIN 2.5 MG/1
2.5 TABLET ORAL
Status: DISCONTINUED | OUTPATIENT
Start: 2017-03-18 | End: 2017-03-16 | Stop reason: HOSPADM

## 2017-03-13 RX ORDER — WARFARIN SODIUM 5 MG/1
5 TABLET ORAL
Status: DISCONTINUED | OUTPATIENT
Start: 2017-03-15 | End: 2017-03-16 | Stop reason: HOSPADM

## 2017-03-13 RX ORDER — IBUPROFEN 200 MG
16 TABLET ORAL
Status: DISCONTINUED | OUTPATIENT
Start: 2017-03-13 | End: 2017-03-16 | Stop reason: HOSPADM

## 2017-03-13 RX ORDER — PRAVASTATIN SODIUM 40 MG/1
40 TABLET ORAL DAILY
Status: DISCONTINUED | OUTPATIENT
Start: 2017-03-13 | End: 2017-03-16 | Stop reason: HOSPADM

## 2017-03-13 RX ORDER — ONDANSETRON 8 MG/1
8 TABLET, ORALLY DISINTEGRATING ORAL EVERY 8 HOURS PRN
Status: DISCONTINUED | OUTPATIENT
Start: 2017-03-13 | End: 2017-03-16 | Stop reason: HOSPADM

## 2017-03-13 RX ORDER — SODIUM CHLORIDE 9 MG/ML
INJECTION, SOLUTION INTRAVENOUS CONTINUOUS
Status: DISCONTINUED | OUTPATIENT
Start: 2017-03-13 | End: 2017-03-13

## 2017-03-13 RX ORDER — INSULIN ASPART 100 [IU]/ML
3 INJECTION, SOLUTION INTRAVENOUS; SUBCUTANEOUS
Status: DISCONTINUED | OUTPATIENT
Start: 2017-03-13 | End: 2017-03-13

## 2017-03-13 RX ORDER — FERROUS SULFATE 325(65) MG
325 TABLET, DELAYED RELEASE (ENTERIC COATED) ORAL DAILY
Status: DISCONTINUED | OUTPATIENT
Start: 2017-03-13 | End: 2017-03-16 | Stop reason: HOSPADM

## 2017-03-13 RX ORDER — WARFARIN 2.5 MG/1
2.5 TABLET ORAL DAILY
Status: ON HOLD | COMMUNITY
End: 2017-10-27 | Stop reason: HOSPADM

## 2017-03-13 RX ORDER — PREDNISONE 20 MG/1
40 TABLET ORAL DAILY
Status: DISCONTINUED | OUTPATIENT
Start: 2017-03-14 | End: 2017-03-13

## 2017-03-13 RX ORDER — FLUTICASONE PROPIONATE 50 MCG
1 SPRAY, SUSPENSION (ML) NASAL DAILY
Status: DISCONTINUED | OUTPATIENT
Start: 2017-03-13 | End: 2017-03-14

## 2017-03-13 RX ORDER — INSULIN ASPART 100 [IU]/ML
5 INJECTION, SOLUTION INTRAVENOUS; SUBCUTANEOUS ONCE
Status: COMPLETED | OUTPATIENT
Start: 2017-03-13 | End: 2017-03-13

## 2017-03-13 RX ORDER — WARFARIN 2.5 MG/1
2.5 TABLET ORAL DAILY
Status: DISCONTINUED | OUTPATIENT
Start: 2017-03-13 | End: 2017-03-13

## 2017-03-13 RX ORDER — IPRATROPIUM BROMIDE AND ALBUTEROL SULFATE 2.5; .5 MG/3ML; MG/3ML
3 SOLUTION RESPIRATORY (INHALATION) EVERY 4 HOURS PRN
Status: DISCONTINUED | OUTPATIENT
Start: 2017-03-13 | End: 2017-03-16 | Stop reason: HOSPADM

## 2017-03-13 RX ORDER — INSULIN ASPART 100 [IU]/ML
0-5 INJECTION, SOLUTION INTRAVENOUS; SUBCUTANEOUS
Status: DISCONTINUED | OUTPATIENT
Start: 2017-03-13 | End: 2017-03-16 | Stop reason: HOSPADM

## 2017-03-13 RX ADMIN — SODIUM CHLORIDE 500 ML: 0.9 INJECTION, SOLUTION INTRAVENOUS at 05:03

## 2017-03-13 RX ADMIN — IPRATROPIUM BROMIDE AND ALBUTEROL SULFATE 3 ML: .5; 3 SOLUTION RESPIRATORY (INHALATION) at 03:03

## 2017-03-13 RX ADMIN — IPRATROPIUM BROMIDE AND ALBUTEROL SULFATE 3 ML: .5; 3 SOLUTION RESPIRATORY (INHALATION) at 11:03

## 2017-03-13 RX ADMIN — INSULIN ASPART 5 UNITS: 100 INJECTION, SOLUTION INTRAVENOUS; SUBCUTANEOUS at 10:03

## 2017-03-13 RX ADMIN — INSULIN ASPART 5 UNITS: 100 INJECTION, SOLUTION INTRAVENOUS; SUBCUTANEOUS at 12:03

## 2017-03-13 RX ADMIN — INSULIN ASPART 3 UNITS: 100 INJECTION, SOLUTION INTRAVENOUS; SUBCUTANEOUS at 09:03

## 2017-03-13 RX ADMIN — FERROUS SULFATE TAB EC 325 MG (65 MG FE EQUIVALENT) 325 MG: 325 (65 FE) TABLET DELAYED RESPONSE at 08:03

## 2017-03-13 RX ADMIN — DILTIAZEM HYDROCHLORIDE 240 MG: 240 CAPSULE, COATED, EXTENDED RELEASE ORAL at 08:03

## 2017-03-13 RX ADMIN — AZITHROMYCIN MONOHYDRATE 500 MG: 500 INJECTION, POWDER, LYOPHILIZED, FOR SOLUTION INTRAVENOUS at 01:03

## 2017-03-13 RX ADMIN — IPRATROPIUM BROMIDE AND ALBUTEROL SULFATE 3 ML: .5; 3 SOLUTION RESPIRATORY (INHALATION) at 12:03

## 2017-03-13 RX ADMIN — ALPRAZOLAM 0.25 MG: 0.25 TABLET ORAL at 09:03

## 2017-03-13 RX ADMIN — METHYLPREDNISOLONE SODIUM SUCCINATE 80 MG: 40 INJECTION, POWDER, FOR SOLUTION INTRAMUSCULAR; INTRAVENOUS at 03:03

## 2017-03-13 RX ADMIN — INSULIN ASPART 4 UNITS: 100 INJECTION, SOLUTION INTRAVENOUS; SUBCUTANEOUS at 12:03

## 2017-03-13 RX ADMIN — LEVOTHYROXINE SODIUM 50 MCG: 50 TABLET ORAL at 05:03

## 2017-03-13 RX ADMIN — INSULIN ASPART 2 UNITS: 100 INJECTION, SOLUTION INTRAVENOUS; SUBCUTANEOUS at 08:03

## 2017-03-13 RX ADMIN — CEFTRIAXONE 1 G: 1 INJECTION, SOLUTION INTRAVENOUS at 12:03

## 2017-03-13 RX ADMIN — PREDNISONE 20 MG: 20 TABLET ORAL at 08:03

## 2017-03-13 RX ADMIN — IPRATROPIUM BROMIDE AND ALBUTEROL SULFATE 3 ML: .5; 3 SOLUTION RESPIRATORY (INHALATION) at 07:03

## 2017-03-13 RX ADMIN — WARFARIN SODIUM 2.5 MG: 2.5 TABLET ORAL at 05:03

## 2017-03-13 RX ADMIN — METOPROLOL TARTRATE 25 MG: 25 TABLET ORAL at 08:03

## 2017-03-13 RX ADMIN — INSULIN DETEMIR 15 UNITS: 100 INJECTION, SOLUTION SUBCUTANEOUS at 09:03

## 2017-03-13 RX ADMIN — IPRATROPIUM BROMIDE AND ALBUTEROL SULFATE 3 ML: .5; 3 SOLUTION RESPIRATORY (INHALATION) at 08:03

## 2017-03-13 RX ADMIN — PRAVASTATIN SODIUM 40 MG: 40 TABLET ORAL at 12:03

## 2017-03-13 RX ADMIN — METOPROLOL TARTRATE 25 MG: 25 TABLET ORAL at 09:03

## 2017-03-13 RX ADMIN — FLUTICASONE PROPIONATE 1 SPRAY: 50 SPRAY, METERED NASAL at 03:03

## 2017-03-13 RX ADMIN — SODIUM CHLORIDE: 0.9 INJECTION, SOLUTION INTRAVENOUS at 06:03

## 2017-03-13 RX ADMIN — IPRATROPIUM BROMIDE AND ALBUTEROL SULFATE 3 ML: .5; 3 SOLUTION RESPIRATORY (INHALATION) at 04:03

## 2017-03-13 RX ADMIN — INSULIN ASPART 5 UNITS: 100 INJECTION, SOLUTION INTRAVENOUS; SUBCUTANEOUS at 08:03

## 2017-03-13 RX ADMIN — INSULIN ASPART 5 UNITS: 100 INJECTION, SOLUTION INTRAVENOUS; SUBCUTANEOUS at 06:03

## 2017-03-13 NOTE — PT/OT/SLP EVAL
Physical Therapy  Evaluation    Savita Polo   MRN: [id number]   Admitting Diagnosis: Acute on chronic respiratory failure with hypoxia    PT Received On: 03/13/17  PT Start Time: 0930     PT Stop Time: 1028    PT Total Time (min): 58 min       Billable Minutes:  Evaluation 20, Gait Mycespfw00 and Therapeutic Activity 8    Diagnosis: Acute on chronic respiratory failure with hypoxia  Impaired mobility, decreased activity tolerance    Past Medical History:   Diagnosis Date    Allergy     Anxiety     Asthma     Atherosclerotic cerebrovascular disease 7/25/2012    CHF (congestive heart failure)     COPD (chronic obstructive pulmonary disease)     Diabetes mellitus     Diabetic peripheral neuropathy 7/24/2012    Diabetic retinopathy     DJD (degenerative joint disease) 7/24/2012    Dyslipidemia 7/24/2012    Fatty liver     Heart attack     Hypertension     Hypothyroidism     Joint pain     Keloid cicatrix     Obesity, Class I, BMI 30-34.9 7/24/2012    Stroke     TIA (transient ischemic attack) 7/25/2012    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     Ulcerative colitis     Vitamin D deficiency disease 7/24/2012      Past Surgical History:   Procedure Laterality Date    ABDOMINAL SURGERY      ANKLE SURGERY      CATARACT EXTRACTION  8/17/00    right eye    CATARACT EXTRACTION  6/26/00    left eye    COLONOSCOPY      HYSTERECTOMY         Referring physician: Romana Giron MD  Date referred to PT: 3/12/2017    General Precautions: Standard, fall  Orthopedic Precautions: N/A   Braces: N/A       Do you have any cultural, spiritual, Moravian conflicts, given your current situation?: None given    Patient History:  Lives With: child(lux), adult  Living Arrangements: house  Home Accessibility:  (no concerns)  Home Layout: Able to live on 1st floor  Transportation Available: family or friend will provide  Living Environment Comment: Pt lives in two story home with daughter who works,  "reports boyfriend is around 50% of the time. Has bed and full bath on 1st level. Pt reports using roillator for mobility at home. Pt reports I with all ADL's in the home. Daughter shops.  Equipment Currently Used at Home: shower chair, rollator, oxygen  DME owned (not currently used): none    Previous Level of Function:  Ambulation Skills: needs device  Transfer Skills: needs device  ADL Skills: needs device  Work/Leisure Activity: needs device    Subjective:  Communicated with nursing prior to session.  "I am ready to get up, but every time I move my left arm the machine beeps."  Chief Complaint: I feel a little weak  Patient goals: go home    Pain Ratin/10               Pain Rating Post-Intervention: 0/10    Objective:   Patient found with: peripheral IV, oxygen     Cognitive Exam:  Oriented to: Person, Place, Time and Situation    Follows Commands/attention: Easily distracted, Follows one-step commands and Follows two-step commands  Communication: clear/fluent  Safety awareness/insight to disability: intact    Physical Exam:  Postural examination/scapula alignment: Rounded shoulder and Head forward    Skin integrity: Visible skin intact  Edema: Mild DONAL LE    Sensation:   Intact    Lower Extremity Range of Motion:  Right Lower Extremity: WFL  Left Lower Extremity: WFL    Lower Extremity Strength:  Right Lower Extremity: WFL  Left Lower Extremity: WFL     Functional Mobility:  Bed Mobility:  Rolling/Turning to Left: Stand by assistance  Scooting/Bridging: Stand by Assistance  Supine to Sit: Stand by Assistance    Transfers:  Sit <> Stand Assistance: Stand By Assistance, Contact Guard Assistance  Sit <> Stand Assistive Device: Rolling Walker    Gait:   Gait Distance: 150' RW with cues for walker safety. Will trial rollator next session.  Assistance 1: Contact Guard Assistance  Gait Assistive Device: Rolling walker  Gait Pattern: swing-through gait  Gait Deviation(s): decreased renata, increased time in double " stance (narrow base of support)      Balance:   Static Sit: GOOD-: Takes MODERATE challenges from all directions but inconsistently  Dynamic Sit: GOOD-: Maintains balance through MODERATE excursions of active trunk movement,     Static Stand: FAIR+: Takes MINIMAL challenges from all directions  Dynamic stand: FAIR: Needs CONTACT GUARD during gait    Therapeutic Activities and Exercises:  Evaluation completed. Bed mobility with supervision and cues for safety with IV and oxygen lines, sit to stand with RW with cues for pushing up from bed, cues for safely avoiding IV pole and oxygen tank. Obstacle avoidance training in small area around equipment and opening door and stepping backwards with cues for walker management. Walker management training on and off toilet. Gait training in room and in laird with cues for safety, walker management and hand placement. Pt showing increased difficulty with turning with RW. Will trial rollator next session as this is her AD at home.    AM-PAC 6 CLICK MOBILITY  How much help from another person does this patient currently need?   1 = Unable, Total/Dependent Assistance  2 = A lot, Maximum/Moderate Assistance  3 = A little, Minimum/Contact Guard/Supervision  4 = None, Modified [place]/Independent    Turning over in bed (including adjusting bedclothes, sheets and blankets)?: 4  Sitting down on and standing up from a chair with arms (e.g., wheelchair, bedside commode, etc.): 4  Moving from lying on back to sitting on the side of the bed?: 4  Moving to and from a bed to a chair (including a wheelchair)?: 3  Need to walk in hospital room?: 3  Climbing 3-5 steps with a railing?: 3  Total Score: 21     AM-PAC Raw Score CMS G-Code Modifier Level of Impairment Assistance   6 % Total / Unable   7 - 9 CM 80 - 100% Maximal Assist   10 - 14 CL 60 - 80% Moderate Assist   15 - 19 CK 40 - 60% Moderate Assist   20 - 22 CJ 20 - 40% Minimal Assist   23 CI 1-20% SBA / CGA   24 CH 0%  Independent/ Mod I     Patient left supine on transfer stretcher with all lines intact and on the way off the floor with transport.    Assessment:   Savita Polo is a 71 y.o. female with a medical diagnosis of Acute on chronic respiratory failure with hypoxia and presents with decreased mobility, decreased activity tolerance, decreased endurance. Decreased safety awareness.    Rehab identified problem list/impairments: Rehab identified problem list/impairments: weakness, impaired endurance, impaired self care skills, gait instability, impaired functional mobilty, impaired balance, decreased safety awareness    Rehab potential is good.    Activity tolerance: Good    Discharge recommendations: Discharge Facility/Level Of Care Needs: home health PT     Barriers to discharge: Barriers to Discharge: None    Equipment recommendations: Equipment Needed After Discharge: none     GOALS:   Physical Therapy Goals        Problem: Physical Therapy Goal    Goal Priority Disciplines Outcome Goal Variances Interventions   Physical Therapy Goal     PT/OT, PT Ongoing (interventions implemented as appropriate)     Description:  Goals to be met by: 3/31/2017     Patient will increase functional independence with mobility by performin. Supine to sit with Modified [place]  2. Sit to stand transfer with Modified [place]  3. Gait  x 100 feet with Modified [place] using Rolling Walker or rollator.   4. Stand for 10 minutes with Modified [place] using Rolling Walker or Rollator walker for increased standing tolerance with ADL's.                PLAN:    Patient to be seen 4 x/week to address the above listed problems via    Plan of Care expires: 17  Plan of Care reviewed with: patient          Cheryle Ryan, PT  2017

## 2017-03-13 NOTE — PLAN OF CARE
[facility] - IP COPD Exacerbation - [facility]      COPD Step 1       RT: Education on COPD disease provided each shift Met       RT: Clinical Outcome Respiratory rate < 20 at time of aerosol treatment Met       RT: Clinical Outcomes: Reduced wheezing with adequate air movement at time of aerosol treatment Met        Team at bedside while patient taking treatiment.  Team will reassess this afternoon and possibly change neb tx's to Q4WA.    Will continue to monitor

## 2017-03-13 NOTE — ASSESSMENT & PLAN NOTE
-last 2D echo shows EF 60 with diastolic dysfunction   -patient currently euvolemic,    -continue home lasix 20 mg daily  -repeat 2D echo

## 2017-03-13 NOTE — PT/OT/SLP EVAL
Occupational Therapy  Evaluation    Savita Polo   MRN: [id number]   Admitting Diagnosis: Acute on chronic respiratory failure with hypoxia    OT Date of Treatment: 03/13/17   OT Start Time: 1009  OT Stop Time: 1029  OT Total Time (min): 20 min    Billable Minutes:  Evaluation 20    Diagnosis: Acute on chronic respiratory failure with hypoxia       Past Medical History:   Diagnosis Date    Allergy     Anxiety     Asthma     Atherosclerotic cerebrovascular disease 7/25/2012    CHF (congestive heart failure)     COPD (chronic obstructive pulmonary disease)     Diabetes mellitus     Diabetic peripheral neuropathy 7/24/2012    Diabetic retinopathy     DJD (degenerative joint disease) 7/24/2012    Dyslipidemia 7/24/2012    Fatty liver     Heart attack     Hypertension     Hypothyroidism     Joint pain     Keloid cicatrix     Obesity, Class I, BMI 30-34.9 7/24/2012    Stroke     TIA (transient ischemic attack) 7/25/2012    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     Ulcerative colitis     Vitamin D deficiency disease 7/24/2012      Past Surgical History:   Procedure Laterality Date    ABDOMINAL SURGERY      ANKLE SURGERY      CATARACT EXTRACTION  8/17/00    right eye    CATARACT EXTRACTION  6/26/00    left eye    COLONOSCOPY      HYSTERECTOMY         Referring physician: Romana Giron MD   Date referred to OT: 3/13/2017    General Precautions: Standard, fall (cardiac)  Orthopedic Precautions: N/A  Braces: N/A    Do you have any cultural, spiritual, Caodaism conflicts, given your current situation?: none stated     Patient History: gathered from PT due to pt going to ECHO test  Living Environment  Lives With: child(lux), adult  Living Arrangements: house  Home Layout: Able to live on 1st floor  Transportation Available: family or friend will provide  Living Environment Comment: Pt reported living with daughter and occassionally boyfriend in a 2 story home with 1 EMMA.  Bed/bathroom are located on the 1st floor with walkin shower for bath. PTA, pt was (I) with all ADLs and used rollator for mobility.   Equipment Currently Used at Home: oxygen, rollator, shower chair    Prior level of function:   Bed Mobility/Transfers: needs device  Grooming: independent  Bathing: needs device  Upper Body Dressing: independent  Lower Body Dressing: independent  Toileting: independent        Dominant hand: right but uses L hand for toileting    Subjective:  Communicated with RN prior to session.    Chief Complaint: not feeling clean  Patient/Family stated goals: return home    Pain Ratin/10  Pain Rating Post-Intervention: 0/10    Objective:  Patient found with: peripheral IV, oxygen, telemetry     Pt found with PT on the toilet. OT joined OT evaluation and performed toileting with pt.     Cognitive Exam:  Follows Commands/attention: Inattentive, Easily distracted and Follows two-step commands  Communication: clear/fluent  Memory:  No Deficits noted  Safety awareness/insight to disability: impaired  Coping skills/emotional control: Appropriate to situation    Visual/perceptual:  Intact    Physical Exam:  Postural examination/scapula alignment: Rounded shoulder and Head forward  Skin integrity: Thin and Dry  Edema: None noted in BUE    Sensation:   Intact    Upper Extremity Range of Motion:  Right Upper Extremity: WFL  Left Upper Extremity: WFL    Upper Extremity Strength: not formally assessed using MMT, observed from ADLs  Right Upper Extremity: WFL  Left Upper Extremity: WFL    Functional Mobility:  Bed Mobility:  Supine to Sit:  (pt found with PT on toilet)  Sit to Supine: Contact Guard Assistance (on stretcher)    Transfers:  Sit <> Stand Assistance: Contact Guard Assistance  Sit <> Stand Assistive Device: Grab bars   Toilet transfer: Contact Guard Assistance using grab bars and RW    Functional Ambulation: ~150 ft with CGA using RW, IV pole in tow. Verbal cues for safety, attention to task.  "    Activities of Daily Living: verbal cues for safety   Grooming Position: Standing at sink (for hand hygiene)  Grooming Level of Assistance: Contact guard assistance  Toileting Where Assessed: Toilet (standing)  Toileting Level of Assistance: Contact guard (for christine hygiene while standing using RW)    Balance:   Static Sit: FAIR+: Able to take MINIMAL challenges from all directions  Dynamic Sit: FAIR+: Maintains balance through MINIMAL excursions of active trunk motion  Static Stand: FAIR: Maintains without assist but unable to take challenges  Dynamic stand: FAIR: Needs CONTACT GUARD during gait    Therapeutic Activities and Exercises:  Once functional mobility ended, transport tech arrived to bring pt to test. Pt transferred to stretcher. Educated to sit UIC throughout her day. Verbalized understanding.    Pt educated on OT role/POC, whiteboard updated. Verbalized understanding.       AM-PAC 6 CLICK ADL  How much help from another person does this patient currently need?  1 = Unable, Total/Dependent Assistance  2 = A lot, Maximum/Moderate Assistance  3 = A little, Minimum/Contact Guard/Supervision  4 = None, Modified [place]/Independent    Putting on and taking off regular lower body clothing? : 3  Bathing (including washing, rinsing, drying)?: 3  Toileting, which includes using toilet, bedpan, or urinal? : 3  Putting on and taking off regular upper body clothing?: 3  Taking care of personal grooming such as brushing teeth?: 4  Eating meals?: 4  Total Score: 20    AM-PAC Raw Score CMS "G-Code Modifier Level of Impairment Assistance   6 % Total / Unable   7 - 9 CM 80 - 100% Maximal Assist   10 - 14 CL 60 - 80% Moderate Assist   15 - 19 CK 40 - 60% Moderate Assist   20 - 22 CJ 20 - 40% Minimal Assist   23 CI 1-20% SBA / CGA   24 CH 0% Independent/ Mod I       Patient left on stretcher with all lines intact and RN and transport tech present    Assessment:  Savita Polo is a 71 y.o. female with a " medical diagnosis of Acute on chronic respiratory failure with hypoxia. presents with impaired endurance and decreased safety awareness impacting performance on ADLs. Session tolerated well and pt eager to work with therapy. PTA, pt was (I) with ADLs but currently CGA for increased postural sway and safety cues while performing tasks. Required multiple cues for attention to task during mobility. However, pt is presenting close to  her PLOF baseline and stated her daughter is available to assist.  OT is recommending HHOT for safe transition, increase endurance and strength in home environment, and address safety concerns in home. Pt will continue to benefit from skilled OT services to maximize safety and increase independence in ADLs.     Pt presented with a moderate complexity OT evaluation.  Pt required  an expanded review of medical history and occupational profile.  Pt demod 5+ performance deficits (physical, cognitive, or psychosocial) resulting in limitations and engagement restrictions.  Clinical decision making required moderate analytical complexity with several treatment options.  Pt with possible comorbidities and required minimal to moderate modification of task/assistance with assessment.      Physical- skills refer to impairments of body structure or functions, balance, mobility; strength, endurance, FMC, GMC, sensation, dexterity, and posture.  Cognitive- skills refer to ability to attend, communicate, perceive, think, understand, problem solve, mentally sequence, learn, and remember resulting in ability to organize occupational performance in timely and safe manner.    Psychosocial- skills refer to interpersonal interactions, habits, routines, and behaviors, active use of coping strategies, and environmental adaptations to appropriately participate in everyday tasks and situations.         Rehab identified problem list/impairments: Rehab identified problem list/impairments: weakness, impaired self  care skills, impaired balance, impaired cognition, decreased safety awareness    Rehab potential is good.    Activity tolerance: Good    Discharge recommendations: Discharge Facility/Level Of Care Needs: home health OT (with 24/7 supervision)     Barriers to discharge: Barriers to Discharge: Inaccessible home environment    Equipment recommendations:  (TBD\)     GOALS:   Occupational Therapy Goals        Problem: Occupational Therapy Goal    Goal Priority Disciplines Outcome Interventions   Occupational Therapy Goal     OT, PT/OT Ongoing (interventions implemented as appropriate)    Description:  Goals to be met by: 3/23/2017     Patient will increase functional independence with ADLs by performing:    UE Dressing with Supervision.  LE Dressing with Supervision.  Grooming while standing at sink with Supervision.  Supine to sit with Supervision.  Stand pivot transfers with Supervision.  Toilet transfer to toilet with Supervision.  Upper extremity exercise program x15 reps per handout, with supervision.                PLAN:  Patient to be seen 4 x/week to address the above listed problems via self-care/home management, therapeutic exercises, therapeutic activities  Plan of Care expires: 04/12/17  Plan of Care reviewed with: patient    OT G-codes  Functional Assessment Tool Used: [facility]  Score: 20  Functional Limitation: Self care  Self Care Current Status (): NABILA  Self Care Goal Status (): RITA Blankenship  03/13/2017

## 2017-03-13 NOTE — ED NOTES
Patient connected to portable tele box. War room called and notified to monitor patient on tele box during transport. War room verified they can see patient on monitor.

## 2017-03-13 NOTE — ED NOTES
Pt presented to the ED c/o sob for the past 2 days. Pt c/o chest congestion and productive coughing. Pt uses 3L/NC at home. Pt states she had to stay om her home 02 today. Pt alert. Pt c/o chest tenderness from coughing. Pt has a hx of COPD.

## 2017-03-13 NOTE — ED PROVIDER NOTES
Encounter Date: 3/12/2017    SCRIBE #1 NOTE: I, Brielle Durna, am scribing for, and in the presence of,  Dr. Zendejas. I have scribed the following portions of the note - the EKG reading. Other sections scribed: HPI, ROS.       History     Chief Complaint   Patient presents with    Shortness of Breath     SOB for the past two days, chest congestion. Uses 3lpm nc as needed at home. Has been using it more often today.      Review of patient's allergies indicates:   Allergen Reactions    Latex, natural rubber Dermatitis and Rash    Adhesive Itching and Rash     Allergy to adhesive in nicotine patch.    Sulfa (sulfonamide antibiotics) Rash     HPI Comments: Time patient was seen by the provider: 11:29 PM      The patient is a 71 y.o. female with hx of: HTN, DM II, COPD, asthma, stroke that presents to the ED with a complaint of SOB x 2 days. She also reports productive cough with associated chest discomfort (only when coughing) and chest congestion. She also endorses intermittent subjective fever and vomiting. She denies diarrhea, hematemesis, leg swelling. Pt on 3L home O2 via NC, which she has used constantly in the last 2 days 2/2 SOB (rather than prn). Pt received a flu vaccination this year.    The history is provided by the patient.     Past Medical History:   Diagnosis Date    Allergy     Anxiety     Asthma     Atherosclerotic cerebrovascular disease 7/25/2012    CHF (congestive heart failure)     COPD (chronic obstructive pulmonary disease)     Diabetes mellitus     Diabetic peripheral neuropathy 7/24/2012    Diabetic retinopathy     DJD (degenerative joint disease) 7/24/2012    Dyslipidemia 7/24/2012    Fatty liver     Heart attack     Hypertension     Hypothyroidism     Joint pain     Keloid cicatrix     Obesity, Class I, BMI 30-34.9 7/24/2012    Stroke     TIA (transient ischemic attack) 7/25/2012    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     Ulcerative colitis      Vitamin D deficiency disease 7/24/2012     Past Surgical History:   Procedure Laterality Date    ABDOMINAL SURGERY      ANKLE SURGERY      CATARACT EXTRACTION  8/17/00    right eye    CATARACT EXTRACTION  6/26/00    left eye    COLONOSCOPY      HYSTERECTOMY       Family History   Problem Relation Age of Onset    Diabetes Mother     Stroke Mother     Hypertension Mother     Melanoma Mother     COPD Sister     Stroke Sister     Diabetes Sister     Hypertension Sister     COPD Brother     Diabetes Brother     Hypertension Brother     Heart disease Maternal Grandfather     No Known Problems Father     No Known Problems Maternal Aunt     No Known Problems Maternal Uncle     No Known Problems Paternal Aunt     Cancer Paternal Uncle     No Known Problems Maternal Grandmother     No Known Problems Paternal Grandmother     No Known Problems Paternal Grandfather     Psoriasis Neg Hx     Lupus Neg Hx     Eczema Neg Hx     Kidney disease Neg Hx     Heart attack Neg Hx     Amblyopia Neg Hx     Blindness Neg Hx     Cataracts Neg Hx     Glaucoma Neg Hx     Macular degeneration Neg Hx     Retinal detachment Neg Hx     Strabismus Neg Hx     Thyroid disease Neg Hx      Social History   Substance Use Topics    Smoking status: Light Tobacco Smoker     Packs/day: 0.50     Years: 20.00     Types: Cigarettes     Last attempt to quit: 2/15/2016    Smokeless tobacco: Never Used      Comment:  on Smoking program    Alcohol use No     Review of Systems   Constitutional: Positive for fever.   HENT: Negative for nosebleeds.    Eyes: Negative for visual disturbance.   Respiratory: Positive for cough (w/ associated chest discomfort) and shortness of breath.    Cardiovascular: Negative for leg swelling.   Gastrointestinal: Positive for vomiting. Negative for diarrhea.        No hematemesis   Genitourinary: Negative for hematuria.   Musculoskeletal: Negative for neck stiffness.   Skin: Negative for wound.    Neurological: Negative for seizures.       Physical Exam   Initial Vitals   BP Pulse Resp Temp SpO2   03/12/17 2249 03/12/17 2249 03/12/17 2249 -- 03/12/17 2249   104/49 72 20  82 %     Physical Exam    Nursing note and vitals reviewed.  Constitutional: She appears well-developed and well-nourished. She is not diaphoretic. No distress.   HENT:   Head: Normocephalic and atraumatic.   Right Ear: External ear normal.   Left Ear: External ear normal.   Mouth/Throat: Oropharynx is clear and moist.   Eyes: Conjunctivae are normal. Pupils are equal, round, and reactive to light. Right eye exhibits no discharge. Left eye exhibits no discharge. No scleral icterus.   Neck: Normal range of motion. Neck supple. No JVD present.   Cardiovascular: Normal rate, regular rhythm and intact distal pulses. Exam reveals no gallop.    Pulmonary/Chest: No stridor. No respiratory distress. She has no wheezes. She has rhonchi. She has rales. She exhibits no tenderness.   Coarse bs L base, no significant wheezing, good air entry, speaking full sentences   Abdominal: Soft. Bowel sounds are normal. She exhibits no distension. There is no tenderness. There is no rebound.   Musculoskeletal: Normal range of motion. She exhibits no edema or tenderness.   Neurological: She is alert and oriented to person, place, and time.   Skin: Skin is warm. No rash noted.   Psychiatric: She has a normal mood and affect.         ED Course   Procedures  Labs Reviewed   APTT - Abnormal; Notable for the following:        Result Value    aPTT 32.6 (*)     All other components within normal limits   CBC W/ AUTO DIFFERENTIAL - Abnormal; Notable for the following:     WBC 13.86 (*)     MCH 32.9 (*)     Gran # 10.4 (*)     Mono # 1.2 (*)     Gran% 75.1 (*)     Lymph% 15.6 (*)     All other components within normal limits   COMPREHENSIVE METABOLIC PANEL - Abnormal; Notable for the following:     Potassium 5.3 (*)     Glucose 150 (*)     BUN, Bld 49 (*)     Creatinine 1.6  (*)     Albumin 2.9 (*)     eGFR if  36.8 (*)     eGFR if non  32.0 (*)     All other components within normal limits   PROTIME-INR - Abnormal; Notable for the following:     Prothrombin Time 20.3 (*)     INR 2.0 (*)     All other components within normal limits   TROPONIN I - Abnormal; Notable for the following:     Troponin I 0.035 (*)     All other components within normal limits   URINALYSIS - Abnormal; Notable for the following:     Protein, UA 1+ (*)     Occult Blood UA 2+ (*)     All other components within normal limits   B-TYPE NATRIURETIC PEPTIDE - Abnormal; Notable for the following:      (*)     All other components within normal limits   URINALYSIS MICROSCOPIC - Abnormal; Notable for the following:     Hyaline Casts, UA 14 (*)     All other components within normal limits   CULTURE, BLOOD    Narrative:     Aerobic and anaerobic   CULTURE, BLOOD    Narrative:     Aerobic and anaerobic   CULTURE, URINE   GRAM STAIN   LACTIC ACID, PLASMA   MAGNESIUM   PHOSPHORUS   INFLUENZA A AND B ANTIGEN   PROCALCITONIN   TYPE & SCREEN     EKG Readings: (Independently Interpreted)   NSR. RAD. No acute ST elevation. No ectopy.       X-Rays:   Independently Interpreted Readings:   Other Readings:  Cxr:  No consolidation or acute congestive changes, ? Infiltrate LLL    Medical Decision Making:   History:   Old Medical Records: I decided to obtain old medical records.  Initial Assessment:   Cough, sputum, pleuritic pain, subjective fevers, increased 02 requirement.  Coarse bs on L, no significant wheezing.  Likely pna, less likely copd exacerbation, chf, acs, anemia, other.  Labs, ekg, cxr, monitor, re-eval.      12:52 AM cxr as noted, wbc elevated, bnp lower than has previously been, and ekg non-ischemia.  Will rx as CAP, ceftriaxone and azithro ordered.  sats in low 90s.  Case d/w Dr Kaur for admit to hospital med.  Pt/fam updated.   Independently Interpreted Test(s):   I have ordered  and independently interpreted EKG Reading(s) - see prior notes  Clinical Tests:   Lab Tests: Reviewed and Ordered  Radiological Study: Reviewed and Ordered  Medical Tests: Reviewed and Ordered  Other:   I have discussed this case with another health care provider.            Scribe Attestation:   Scribe #1: I performed the above scribed service and the documentation accurately describes the services I performed. I attest to the accuracy of the note.    Attending Attestation:           Physician Attestation for Scribe:  Physician Attestation Statement for Scribe #1: I, Dr. Zendejas, reviewed documentation, as scribed by Brielle Duran in my presence, and it is both accurate and complete.                 ED Course     Clinical Impression:   The primary encounter diagnosis was Cough. Diagnoses of Dyspnea, unspecified type, CAP (community acquired pneumonia), Type 2 diabetes mellitus without retinopathy, Essential hypertension, and Diastolic congestive heart failure, NYHA class 1 were also pertinent to this visit.    Disposition:   Disposition: Admitted       Boogie Zendejas MD  03/13/17 0053

## 2017-03-13 NOTE — H&P
[facility] Medicine  History & Physical    Patient Name: Savita Polo  MRN: [id number]  Admission Date: 3/12/2017  Attending Physician: Boogie Zendejas MD   Primary Care Provider: Nadiya Nava MD    [facility] Medicine Team: [facility] – [place], [place] HOSP MED 1 Romana Giron MD     Patient information was obtained from patient, past medical records and ER records.     Subjective:     Principal Problem:Pneumonia    Chief Complaint:   Chief Complaint   Patient presents with    Shortness of Breath     SOB for the past two days, chest congestion. Uses 3lpm nc as needed at home. Has been using it more often today.         HPI: Ms. 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 Patient states that she cleaned a dirty bathroom 3 days ago and the following day she developed subjective fevers (did not measure her temperature) and chills along with increased oxygen requirements. She can normally walk 1-2 blocks without stopping, but became very fatigued and dyspneic with minimal exertion these past two days. She also developed a productive cough with yellow sputum and had nausea and 2 episodes of emesis yesterday. Endorses nasal congestion and myalgias. Denies chest pain, abdominal pain, diarrhea, peripheral edema, dysuria. Patient lives with her daughter and can ambulate with a walker. Patient has been smoking since the age of 19 and currently smokes 1 cigarette every two days. Denies alcohol and drug use. Has had pneumonia and flu vaccines. No sick contacts.     Past Medical History:   Diagnosis Date    Allergy     Anxiety     Asthma     Atherosclerotic cerebrovascular disease 7/25/2012    CHF (congestive heart failure)     COPD (chronic obstructive pulmonary disease)     Diabetes mellitus      Diabetic peripheral neuropathy 7/24/2012    Diabetic retinopathy     DJD (degenerative joint disease) 7/24/2012    Dyslipidemia 7/24/2012    Fatty liver     Heart attack     Hypertension     Hypothyroidism     Joint pain     Keloid cicatrix     Obesity, Class I, BMI 30-34.9 7/24/2012    Stroke     TIA (transient ischemic attack) 7/25/2012    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    Sulfa (sulfonamide antibiotics) Rash       No current facility-administered medications on file prior to encounter.      Current Outpatient Prescriptions on File Prior to Encounter   Medication Sig    ADVAIR DISKUS 500-50 mcg/dose DsDv diskus inhaler inhale 1 dose by mouth twice a day    albuterol (PROAIR HFA) 90 mcg/actuation inhaler Inhale 2 puffs into the lungs every 6 (six) hours as needed for Wheezing.    alprazolam (XANAX) 0.5 MG tablet TAKE 1 TABLET BY MOUTH 2 TIMES A DAY    diltiaZEM (CARDIZEM CD) 240 MG 24 hr capsule take 1 capsule by mouth once daily    ergocalciferol (ERGOCALCIFEROL) 50,000 unit Cap Take 1 capsule (50,000 Units total) by mouth every 7 days.    ferrous sulfate 325 mg (65 mg iron) Tab tablet take 1 tablet by mouth once daily    fluticasone (FLONASE) 50 mcg/actuation nasal spray instill 1 spray into each nostril once daily (Patient taking differently: instill 1 spray into each nostril as needed)    furosemide (LASIX) 20 MG tablet take 1 tablet by mouth twice a day    insulin aspart (NOVOLOG  "FLEXPEN) 100 unit/mL InPn pen 26-28-26 Plus correction scale    insulin detemir (LEVEMIR FLEXTOUCH) 100 unit/mL (3 mL) SubQ InPn pen Inject 26 Units into the skin every evening.    ipratropium (ATROVENT) 0.02 % nebulizer solution USE 1 VIAL IN NEBULIZER EVERY 8 HOURS    levothyroxine (SYNTHROID) 50 MCG tablet Take 1 tablet (50 mcg total) by mouth before breakfast.    lisinopril (PRINIVIL,ZESTRIL) 5 MG tablet Take 1 tablet (5 mg total) by mouth once daily.    metoprolol tartrate (LOPRESSOR) 25 MG tablet take 1 tablet by mouth twice a day    nicotine (NICOTROL) 10 mg Crtg One puff into the mouth as needed, maximum 6 cartridges/day.    ondansetron (ZOFRAN) 4 MG tablet Take 1 tablet (4 mg total) by mouth every 8 (eight) hours as needed for Nausea.    pen needle, diabetic, safety (NOVOFINE AUTOCOVER) 30 gauge x 1/3" Ndle Uses 4 a day    pravastatin (PRAVACHOL) 40 MG tablet take 1 tablet by mouth once daily    triamcinolone acetonide 0.1% (KENALOG) 0.1 % ointment Apply topically 2 (two) times daily.    warfarin (COUMADIN) 5 MG tablet take 1 tablet by mouth once daily    levalbuterol (XOPENEX) 0.63 mg/3 mL nebulizer solution Take 3 mLs (0.63 mg total) by nebulization every 8 (eight) hours.     Family History     Problem Relation (Age of Onset)    COPD Sister, Brother    Cancer Paternal Uncle    Diabetes Mother, Sister, Brother    Heart disease Maternal Grandfather    Hypertension Mother, Sister, Brother    Melanoma Mother    No Known Problems Father, Maternal Aunt, Maternal Uncle, Paternal Aunt, Maternal Grandmother, Paternal Grandmother, Paternal Grandfather    Stroke Mother, Sister        Social History Main Topics    Smoking status: Light Tobacco Smoker     Packs/day: 0.50     Years: 20.00     Types: Cigarettes     Last attempt to quit: 2/15/2016    Smokeless tobacco: Never Used      Comment:  on Smoking program    Alcohol use No    Drug use: No    Sexual activity: Not on file     Review of Systems "   Constitutional: Positive for appetite change (decreased), chills and fever (subjective).   HENT: Positive for congestion, rhinorrhea and sneezing.    Eyes: Negative.    Respiratory: Positive for cough, shortness of breath and wheezing. Negative for choking.    Cardiovascular: Negative for chest pain, palpitations and leg swelling.   Gastrointestinal: Positive for nausea and vomiting. Negative for abdominal pain, blood in stool, constipation and diarrhea.   Endocrine: Negative.    Genitourinary: Negative for difficulty urinating and dysuria.   Musculoskeletal: Positive for myalgias.   Skin: Negative.    Neurological: Positive for weakness and light-headedness. Negative for syncope.   Psychiatric/Behavioral: Negative.      Objective:     Vital Signs (Most Recent):  Temp: 97.9 °F (36.6 °C) (03/12/17 2349)  Pulse: 88 (03/13/17 0109)  Resp: (!) 22 (03/13/17 0002)  BP: (!) 140/64 (03/13/17 0117)  SpO2: (!) 94 % (03/13/17 0109) Vital Signs (24h Range):  Temp:  [97.9 °F (36.6 °C)-98.8 °F (37.1 °C)] 97.9 °F (36.6 °C)  Pulse:  [72-88] 88  Resp:  [20-22] 22  SpO2:  [82 %-94 %] 94 %  BP: (104-141)/(49-65) 140/64     Weight: 64.4 kg (142 lb)  Body mass index is 30.73 kg/(m^2).    Physical Exam   Constitutional: She is oriented to person, place, and time. She appears well-developed and well-nourished. No distress.   HENT:   Head: Normocephalic and atraumatic.   Dry mucus membranes   Eyes: Conjunctivae are normal. Pupils are equal, round, and reactive to light.   Neck: Neck supple.   Cardiovascular: Normal rate, regular rhythm and intact distal pulses.    Murmur (1/6 systolic murmur in mitral region) heard.  Pulmonary/Chest: She has decreased breath sounds in the right upper field, the right middle field, the right lower field, the left upper field, the left middle field and the left lower field. She has wheezes in the right lower field and the left lower field. She has rales in the left lower field.   Abdominal: Soft. Bowel  sounds are normal. She exhibits no distension. There is no tenderness.   Musculoskeletal: She exhibits no edema or tenderness.   Neurological: She is alert and oriented to person, place, and time.   Skin: Skin is warm. No rash noted.   Psychiatric: She has a normal mood and affect.        Significant Labs:   CBC:   Recent Labs  Lab 03/12/17 2343   WBC 13.86*   HGB 15.0   HCT 44.3        CMP:   Recent Labs  Lab 03/12/17 2343      K 5.3*      CO2 24   *   BUN 49*   CREATININE 1.6*   CALCIUM 9.5   PROT 8.4   ALBUMIN 2.9*   BILITOT 0.5   ALKPHOS 72   AST 21   ALT 23   ANIONGAP 11   EGFRNONAA 32.0*     Lactic Acid:   Recent Labs  Lab 03/12/17  2343   LACTATE 0.7       Significant Imaging:   Imaging Results         X-Ray Chest AP Portable (Final result) Result time:  03/13/17 00:01:24    Final result by Moody Taylor MD (03/13/17 00:01:24)    Impression:       No acute process.              Electronically signed by: MOODY TAYLOR MD  Date:     03/13/17  Time:    00:01     Narrative:    Exam: [id number]  03/12/17  23:45:00 [id number] ([facility]) : XR CHEST AP PORTABLE    Technique:    Single frontal chest x-ray.    Comparison:    CT scan of the chest dated 08/22/16 and chest x-ray 11/11/2014    Findings:      Monitoring EKG leads are present.  The trachea is unremarkable.  There are calcifications of the aortic knob.  The cardiomediastinal silhouette is within normal limits.  The hemidiaphragms are unremarkable.  There is no evidence of free air beneath the hemidiaphragms.  There are no pleural effusions.  There is no evidence of a pneumothorax.  No airspace opacities are present.  There are stable chronic interstitial changes with lower lobe predominance. There are degenerative changes in the osseous structures.                Assessment/Plan:     * Pneumonia  -patient with 2 day hx of subjective fevers and chills, productive cough, and worsening dyspnea on exertion   -patient on 3L NC at home, currently  requiring 5L saturating ~90%  -CXR shows no pleural effusion and stable bibasilar interstitial opacifications   -Most likely COPD exacerbation +/- pneumonia  -Patient given rocephin and azithromycin in ED   -duonebs q4hrs   -continue on IV moxifloxacin 400 mg daily   -IV solumedrol 80 x1 followed by PO prednisone 20 mg daily       COPD exacerbation  See pneumonia      Chronic diastolic CHF (congestive heart failure)  -last 2D echo shows EF 60 with diastolic dysfunction   -patient currently euvolemic,    -continue home lasix 20 mg daily  -repeat 2D echo     Type 2 diabetes mellitus with diabetic chronic kidney disease  -patient takes ~20U levemir qhs and ~10U novolog TIDWM, adjusted according to BGs  -HbA1c 7.6 12/2016, recheck during this admission   -started patient on levemir 15U qhs and 5U TIDWM, low dose correction, accucheck ac/hs  -cardiac/diabetic diet      Diabetic peripheral neuropathy  -see diabetes      CKD (chronic kidney disease), stage IV  -kidney disease due to DM II and HTN, baseline Cr ~2.1  -kidney function at baseline  -continue to monitor daily       Atrial fibrillation with RVR  -patient diagnosed with Afib last year which spontaneously converted   -currently on warfarin 2.5 mg daily except 5 mg on Wed  -patient currently normal rhythm, INR at goal today at 2  -daily PT/INR  -continue cardizem and lopressor      Iron deficiency  -continue iron supplementation      Essential hypertension  -continue lisinopril 5 mg, lopressor 25, and cardizem 240      Hyperlipidemia  -continue pravastatin      Hypothyroidism  -continue synthroid       SUSAN (generalized anxiety disorder)  -patient takes xanax at home prn for anxiety   -hold xanax for now       Tobacco abuse  -patient currently smoking 1 cigarettes every 2 days  -smoking cessation counseling       On home oxygen therapy  -on 3L NC at home      Long term (current) use of anticoagulants  -daily PT/INR for goal 2-3      VTE Risk Mitigation          Ordered     warfarin (COUMADIN) tablet 2.5 mg  Every Saturday     Route:  Oral        03/13/17 0215     warfarin (COUMADIN) tablet 5 mg  Every Wednesday     Route:  Oral        03/13/17 0215     warfarin (COUMADIN) tablet 2.5 mg  Every Mon, Tue, Thu, Fri, Sun     Route:  Oral        03/13/17 0215        Romana Giron MD  Department of Hospital Medicine   [facility]

## 2017-03-13 NOTE — ASSESSMENT & PLAN NOTE
-patient takes ~20U levemir qhs and ~10U novolog TIDWM, adjusted according to BGs  -HbA1c 7.6 12/2016, recheck during this admission   -started patient on levemir 15U qhs and 5U TIDWM, low dose correction, accucheck ac/hs  -cardiac/diabetic diet

## 2017-03-13 NOTE — PHYSICIAN QUERY
"PT Name: Savita Polo  MR #: [id number]    Physician Query Form -Respiratory Condition Clarification    Reviewer  Ext [id number] Olivia Guy RN CDS    This form is a permanent document in the medical record.    Query Date: March 13, 2017    By submitting this query, we are merely seeking further clarification of documentation. Please utilize your independent clinical judgment when addressing the question(s) below.  (The Medical record reflects the following:)   Indicators   Supporting Clinical Findings Location in Medical Record   x "Wheezing", "Productive cough", "SOB", "CHAUDHRY", "Use of accessory muscles" documented presents to ED with SOB, H/P 3/13    Chest X-Ray =      x "Hypoxia" documented wheezing with hypoxia in ED     H/P 3/13   x Respiratory Distress or Failure documented chronic hypoxic respiratory failure    H/P 3/13   x RR=        PaO2=      PaCO2=      O2 sat= Sat= 82....94%   Flow sheet    Treatment:      BiPAP/Intubation     x Supplemental O2/Home O2: on home oxygen   H/P 3/13   x Oxygen dependence 3...>6 liters NC   Flow Sheet    Other:     Provider, please specify diagnosis or diagnoses associated with above clinical findings.    [  ] Acute Respiratory Failure  [  ] Chronic Respiratory Failure  [X] Acute on Chronic Respiratory Failure  [  ] Other Respiratory Diagnosis (Specify)        [  ] Clinically Undetermined    Please document in your progress notes daily for the duration of treatment, until resolved, and include in your discharge summary.                                                                           "

## 2017-03-13 NOTE — ED NOTES
Pt identifiers Savita PEÑA Melani were checked and correct  LOC: The patient is awake, alert, aware of environment with an appropriate affect. Oriented x3, speaking appropriately  APPEARANCE: Pt resting comfortably, in no acute distress, pt is clean and well groomed, clothing properly fastened  SKIN: Skin warm, dry and intact, normal skin turgor, moist mucus membranes  RESPIRATORY: Airway is open and patent, respirations are spontaneous, even and unlabored, normal effort and rate, pt c/o sob upon exertion. Pt c/o productive cough.    CARDIAC: Normal rate and rhythm, no peripheral edema noted, capillary refill < 3 seconds, bilateral radial pulses 2+  ABDOMEN: Soft, non tender, non distended. Bowel sounds present x 4 quadrants.   NEUROLOGIC: PERRLA, facial expression is symmetrical, patient moving all extremities spontaneously, normal sensation in all extremities when touched with a finger.  Follows all commands appropriately  MUSCULOSKELETAL: No obvious deformities.

## 2017-03-13 NOTE — PLAN OF CARE
Problem: Occupational Therapy Goal  Goal: Occupational Therapy Goal  Goals to be met by: 3/23/2017     Patient will increase functional independence with ADLs by performing:    UE Dressing with Supervision.  LE Dressing with Supervision.  Grooming while standing at sink with Supervision.  Supine to sit with Supervision.  Stand pivot transfers with Supervision.  Toilet transfer to toilet with Supervision.  Upper extremity exercise program x15 reps per handout, with supervision.   Outcome: Ongoing (interventions implemented as appropriate)  OT evaluation completed. OT goals and POC established.     RITA Scott  3/13/2017

## 2017-03-13 NOTE — PHARMACY MED REC
"Admission Medication Reconciliation - Pharmacy Consult Note    The home medication history was taken by Tete Brenner, Pharmacy Tech.     You may go to "Admission" then "Reconcile Home Medications" tabs to review and/or act upon these items. Based on information gathered and subsequent review by the clinical pharmacist, the items below may need attention.    Potentially problematic discrepancies with current MAR  o Patient IS taking the following which was not ordered upon admit  o Alprazolam 0.5mg PO BID    Potential issues to be addressed PRIOR TO DISCHARGE  o Patient lacks understanding of therapy  o Pt using her Advair PRN       Susan Rashid, PharmD  [id number]        Patient's prior to admission medication regimen was as follows:  Medication Sig    ADVAIR DISKUS 500-50 mcg/dose DsDv diskus inhaler inhale 1 dose by mouth twice a day (Patient taking differently: inhale 1 dose by mouth twice a day as needed)    albuterol (PROAIR HFA) 90 mcg/actuation inhaler Inhale 2 puffs into the lungs every 6 (six) hours as needed for Wheezing.    alprazolam (XANAX) 0.5 MG tablet TAKE 1 TABLET BY MOUTH 2 TIMES A DAY    diltiaZEM (CARDIZEM CD) 240 MG 24 hr capsule take 1 capsule by mouth once daily    FERROUS SULFATE ORAL Take 1 tablet by mouth once daily.    fluticasone (FLONASE) 50 mcg/actuation nasal spray instill 1 spray into each nostril once daily (Patient taking differently: instill 1 spray into each nostril as needed for allergies)    furosemide (LASIX) 20 MG tablet take 1 tablet by mouth twice a day    insulin aspart (NOVOLOG FLEXPEN) 100 unit/mL InPn pen 26-28-26 Plus correction scale    insulin detemir (LEVEMIR FLEXTOUCH) 100 unit/mL (3 mL) SubQ InPn pen Inject 26 Units into the skin every evening.    ipratropium (ATROVENT) 0.02 % nebulizer solution USE 1 VIAL IN NEBULIZER EVERY 8 HOURS (Patient taking differently: USE 1 VIAL IN NEBULIZER EVERY 8 HOURS AS NEEDED FOR WHEEZING/SHORTNESS OF BREATH)    " "levothyroxine (SYNTHROID) 50 MCG tablet Take 1 tablet (50 mcg total) by mouth before breakfast.    lisinopril (PRINIVIL,ZESTRIL) 5 MG tablet Take 1 tablet (5 mg total) by mouth once daily.    metoprolol tartrate (LOPRESSOR) 25 MG tablet take 1 tablet by mouth twice a day    ondansetron (ZOFRAN) 4 MG tablet Take 1 tablet (4 mg total) by mouth every 8 (eight) hours as needed for Nausea.    pen needle, diabetic, safety (NOVOFINE AUTOCOVER) 30 gauge x 1/3" Ndle Uses 4 a day    pravastatin (PRAVACHOL) 40 MG tablet take 1 tablet by mouth once daily    warfarin (COUMADIN) 2.5 MG tablet Take 2.5 mg by mouth once daily. Except take 5 mg on Wed         Please add appropriate    SmartPhrase below:        "

## 2017-03-13 NOTE — PLAN OF CARE
Problem: Physical Therapy Goal  Goal: Physical Therapy Goal  Goals to be met by: 3/31/2017     Patient will increase functional independence with mobility by performin. Supine to sit with Modified [place]  2. Sit to stand transfer with Modified [place]  3. Gait x 100 feet with Modified [place] using Rolling Walker or rollator.   4. Stand for 10 minutes with Modified [place] using Rolling Walker or Rollator walker for increased standing tolerance with ADLs.  Outcome: Ongoing (interventions implemented as appropriate)  Goals established this date.

## 2017-03-13 NOTE — ASSESSMENT & PLAN NOTE
-patient diagnosed with Afib last year which spontaneously converted   -currently on warfarin 2.5 mg daily except 5 mg on Wed  -patient currently normal rhythm, INR at goal today at 2  -daily PT/INR  -continue cardizem and lopressor

## 2017-03-13 NOTE — ASSESSMENT & PLAN NOTE
-kidney disease due to DM II and HTN, baseline Cr ~2.1  -kidney function at baseline  -continue to monitor daily

## 2017-03-13 NOTE — ASSESSMENT & PLAN NOTE
-patient with 2 day hx of subjective fevers and chills, productive cough, and worsening dyspnea on exertion   -patient on 3L NC at home, currently requiring 5L saturating ~90%  -CXR shows no pleural effusion and stable bibasilar interstitial opacifications   -Most likely COPD exacerbation +/- pneumonia  -Patient given rocephin and azithromycin in ED   -duonebs q4hrs   -continue on IV moxifloxacin 400 mg daily   -IV solumedrol 80 x1 followed by PO prednisone 20 mg daily

## 2017-03-13 NOTE — PLAN OF CARE
PATHWAY - IP COPD Exacerbation - [facility]      COPD Step 1       RT: Education on rescue Albuterol vs. preventative Albuterol completed once at time of medication administration during the morning shift Met       RT: Oxygen Education completed with aerosol treatments or every shift if no therapy scheduled Met       RT: Education on COPD disease provided each shift Met       RT: Clinical Outcome Respiratory rate < 20 at time of aerosol treatment Met       RT: Clinical Outcomes: Reduced wheezing with adequate air movement at time of aerosol treatment Met            Patient states that she is wears home oxygen on 3 LPM via nasal cannula.  Currently on 4 lpm via nasal cannula O2 Sat 88%  MD was at bedside and he stated that he tried weaning patient dropped to mid 80's.  Continue to keep on 4lpm at this time.    Patient also states that she is in the [facility] out patient smoking cessation program.  She continues to smoke when she gets upset.     Will continue to monitor patient

## 2017-03-13 NOTE — SUBJECTIVE & OBJECTIVE
Past Medical History:   Diagnosis Date    Allergy     Anxiety     Asthma     Atherosclerotic cerebrovascular disease 7/25/2012    CHF (congestive heart failure)     COPD (chronic obstructive pulmonary disease)     Diabetes mellitus     Diabetic peripheral neuropathy 7/24/2012    Diabetic retinopathy     DJD (degenerative joint disease) 7/24/2012    Dyslipidemia 7/24/2012    Fatty liver     Heart attack     Hypertension     Hypothyroidism     Joint pain     Keloid cicatrix     Obesity, Class I, BMI 30-34.9 7/24/2012    Stroke     TIA (transient ischemic attack) 7/25/2012    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    Sulfa (sulfonamide antibiotics) Rash       No current facility-administered medications on file prior to encounter.      Current Outpatient Prescriptions on File Prior to Encounter   Medication Sig    ADVAIR DISKUS 500-50 mcg/dose DsDv diskus inhaler inhale 1 dose by mouth twice a day    albuterol (PROAIR HFA) 90 mcg/actuation inhaler Inhale 2 puffs into the lungs every 6 (six) hours as needed for Wheezing.    alprazolam (XANAX) 0.5 MG tablet TAKE 1 TABLET BY MOUTH 2 TIMES A DAY    diltiaZEM (CARDIZEM CD) 240 MG 24 hr capsule take 1 capsule by mouth once daily    ergocalciferol (ERGOCALCIFEROL) 50,000 unit Cap Take 1 capsule (50,000 Units total) by mouth every 7 days.    ferrous sulfate 325 mg (65 mg iron) Tab tablet take 1 tablet by mouth once daily    fluticasone (FLONASE) 50  "mcg/actuation nasal spray instill 1 spray into each nostril once daily (Patient taking differently: instill 1 spray into each nostril as needed)    furosemide (LASIX) 20 MG tablet take 1 tablet by mouth twice a day    insulin aspart (NOVOLOG FLEXPEN) 100 unit/mL InPn pen 26-28-26 Plus correction scale    insulin detemir (LEVEMIR FLEXTOUCH) 100 unit/mL (3 mL) SubQ InPn pen Inject 26 Units into the skin every evening.    ipratropium (ATROVENT) 0.02 % nebulizer solution USE 1 VIAL IN NEBULIZER EVERY 8 HOURS    levothyroxine (SYNTHROID) 50 MCG tablet Take 1 tablet (50 mcg total) by mouth before breakfast.    lisinopril (PRINIVIL,ZESTRIL) 5 MG tablet Take 1 tablet (5 mg total) by mouth once daily.    metoprolol tartrate (LOPRESSOR) 25 MG tablet take 1 tablet by mouth twice a day    nicotine (NICOTROL) 10 mg Crtg One puff into the mouth as needed, maximum 6 cartridges/day.    ondansetron (ZOFRAN) 4 MG tablet Take 1 tablet (4 mg total) by mouth every 8 (eight) hours as needed for Nausea.    pen needle, diabetic, safety (NOVOFINE AUTOCOVER) 30 gauge x 1/3" Ndle Uses 4 a day    pravastatin (PRAVACHOL) 40 MG tablet take 1 tablet by mouth once daily    triamcinolone acetonide 0.1% (KENALOG) 0.1 % ointment Apply topically 2 (two) times daily.    warfarin (COUMADIN) 5 MG tablet take 1 tablet by mouth once daily    levalbuterol (XOPENEX) 0.63 mg/3 mL nebulizer solution Take 3 mLs (0.63 mg total) by nebulization every 8 (eight) hours.     Family History     Problem Relation (Age of Onset)    COPD Sister, Brother    Cancer Paternal Uncle    Diabetes Mother, Sister, Brother    Heart disease Maternal Grandfather    Hypertension Mother, Sister, Brother    Melanoma Mother    No Known Problems Father, Maternal Aunt, Maternal Uncle, Paternal Aunt, Maternal Grandmother, Paternal Grandmother, Paternal Grandfather    Stroke Mother, Sister        Social History Main Topics    Smoking status: Light Tobacco Smoker     Packs/day: " 0.50     Years: 20.00     Types: Cigarettes     Last attempt to quit: 2/15/2016    Smokeless tobacco: Never Used      Comment:  on Smoking program    Alcohol use No    Drug use: No    Sexual activity: Not on file     Review of Systems   Constitutional: Positive for appetite change (decreased), chills and fever (subjective).   HENT: Positive for congestion, rhinorrhea and sneezing.    Eyes: Negative.    Respiratory: Positive for cough, shortness of breath and wheezing. Negative for choking.    Cardiovascular: Negative for chest pain, palpitations and leg swelling.   Gastrointestinal: Positive for nausea and vomiting. Negative for abdominal pain, blood in stool, constipation and diarrhea.   Endocrine: Negative.    Genitourinary: Negative for difficulty urinating and dysuria.   Musculoskeletal: Positive for myalgias.   Skin: Negative.    Neurological: Positive for weakness and light-headedness. Negative for syncope.   Psychiatric/Behavioral: Negative.      Objective:     Vital Signs (Most Recent):  Temp: 97.9 °F (36.6 °C) (03/12/17 2349)  Pulse: 88 (03/13/17 0109)  Resp: (!) 22 (03/13/17 0002)  BP: (!) 140/64 (03/13/17 0117)  SpO2: (!) 94 % (03/13/17 0109) Vital Signs (24h Range):  Temp:  [97.9 °F (36.6 °C)-98.8 °F (37.1 °C)] 97.9 °F (36.6 °C)  Pulse:  [72-88] 88  Resp:  [20-22] 22  SpO2:  [82 %-94 %] 94 %  BP: (104-141)/(49-65) 140/64     Weight: 64.4 kg (142 lb)  Body mass index is 30.73 kg/(m^2).    Physical Exam   Constitutional: She is oriented to person, place, and time. She appears well-developed and well-nourished. No distress.   HENT:   Head: Normocephalic and atraumatic.   Dry mucus membranes   Eyes: Conjunctivae are normal. Pupils are equal, round, and reactive to light.   Neck: Neck supple.   Cardiovascular: Normal rate, regular rhythm and intact distal pulses.    Murmur (1/6 systolic murmur in mitral region) heard.  Pulmonary/Chest: She has decreased breath sounds in the right upper field, the right  middle field, the right lower field, the left upper field, the left middle field and the left lower field. She has wheezes in the right lower field and the left lower field. She has rales in the left lower field.   Abdominal: Soft. Bowel sounds are normal. She exhibits no distension. There is no tenderness.   Musculoskeletal: She exhibits no edema or tenderness.   Neurological: She is alert and oriented to person, place, and time.   Skin: Skin is warm. No rash noted.   Psychiatric: She has a normal mood and affect.        Significant Labs:   CBC:   Recent Labs  Lab 03/12/17 2343   WBC 13.86*   HGB 15.0   HCT 44.3        CMP:   Recent Labs  Lab 03/12/17 2343      K 5.3*      CO2 24   *   BUN 49*   CREATININE 1.6*   CALCIUM 9.5   PROT 8.4   ALBUMIN 2.9*   BILITOT 0.5   ALKPHOS 72   AST 21   ALT 23   ANIONGAP 11   EGFRNONAA 32.0*     Lactic Acid:   Recent Labs  Lab 03/12/17 2343   LACTATE 0.7       Significant Imaging:   Imaging Results         X-Ray Chest AP Portable (Final result) Result time:  03/13/17 00:01:24    Final result by Moody Taylor MD (03/13/17 00:01:24)    Impression:       No acute process.              Electronically signed by: MOODY TAYLOR MD  Date:     03/13/17  Time:    00:01     Narrative:    Exam: [id number]  03/12/17  23:45:00 [id number] ([facility]) : XR CHEST AP PORTABLE    Technique:    Single frontal chest x-ray.    Comparison:    CT scan of the chest dated 08/22/16 and chest x-ray 11/11/2014    Findings:      Monitoring EKG leads are present.  The trachea is unremarkable.  There are calcifications of the aortic knob.  The cardiomediastinal silhouette is within normal limits.  The hemidiaphragms are unremarkable.  There is no evidence of free air beneath the hemidiaphragms.  There are no pleural effusions.  There is no evidence of a pneumothorax.  No airspace opacities are present.  There are stable chronic interstitial changes with lower lobe predominance. There are  degenerative changes in the osseous structures.

## 2017-03-14 PROBLEM — J45.901 ALLERGIC BRONCHITIS WITH ACUTE EXACERBATION: Status: ACTIVE | Noted: 2017-03-13

## 2017-03-14 LAB
ANION GAP SERPL CALC-SCNC: 9 MMOL/L
BACTERIA UR CULT: NO GROWTH
BASOPHILS # BLD AUTO: 0.01 K/UL
BASOPHILS NFR BLD: 0 %
BUN SERPL-MCNC: 68 MG/DL
CALCIUM SERPL-MCNC: 9.1 MG/DL
CHLORIDE SERPL-SCNC: 103 MMOL/L
CO2 SERPL-SCNC: 26 MMOL/L
CREAT SERPL-MCNC: 1.8 MG/DL
DIFFERENTIAL METHOD: ABNORMAL
EOSINOPHIL # BLD AUTO: 0 K/UL
EOSINOPHIL NFR BLD: 0 %
ERYTHROCYTE [DISTWIDTH] IN BLOOD BY AUTOMATED COUNT: 12.5 %
EST. GFR  (AFRICAN AMERICAN): 32 ML/MIN/1.73 M^2
EST. GFR  (NON AFRICAN AMERICAN): 27.7 ML/MIN/1.73 M^2
GLUCOSE SERPL-MCNC: 268 MG/DL
HCT VFR BLD AUTO: 40.9 %
HGB BLD-MCNC: 13.9 G/DL
INR PPP: 2.7
LYMPHOCYTES # BLD AUTO: 1.5 K/UL
LYMPHOCYTES NFR BLD: 7.1 %
MCH RBC QN AUTO: 32.4 PG
MCHC RBC AUTO-ENTMCNC: 34 %
MCV RBC AUTO: 95 FL
MONOCYTES # BLD AUTO: 0.9 K/UL
MONOCYTES NFR BLD: 4.1 %
NEUTROPHILS # BLD AUTO: 18.5 K/UL
NEUTROPHILS NFR BLD: 88.4 %
PLATELET # BLD AUTO: 214 K/UL
PMV BLD AUTO: 10.1 FL
POCT GLUCOSE: 281 MG/DL (ref 70–110)
POCT GLUCOSE: 286 MG/DL (ref 70–110)
POCT GLUCOSE: 396 MG/DL (ref 70–110)
POTASSIUM SERPL-SCNC: 4.9 MMOL/L
PROTHROMBIN TIME: 26.8 SEC
RBC # BLD AUTO: 4.29 M/UL
SODIUM SERPL-SCNC: 138 MMOL/L
WBC # BLD AUTO: 20.95 K/UL

## 2017-03-14 PROCEDURE — 25000242 PHARM REV CODE 250 ALT 637 W/ HCPCS: Performed by: STUDENT IN AN ORGANIZED HEALTH CARE EDUCATION/TRAINING PROGRAM

## 2017-03-14 PROCEDURE — 80048 BASIC METABOLIC PNL TOTAL CA: CPT

## 2017-03-14 PROCEDURE — 11000001 HC ACUTE MED/SURG PRIVATE ROOM

## 2017-03-14 PROCEDURE — 99900035 HC TECH TIME PER 15 MIN (STAT)

## 2017-03-14 PROCEDURE — 25000003 PHARM REV CODE 250: Performed by: STUDENT IN AN ORGANIZED HEALTH CARE EDUCATION/TRAINING PROGRAM

## 2017-03-14 PROCEDURE — 85610 PROTHROMBIN TIME: CPT

## 2017-03-14 PROCEDURE — 99232 SBSQ HOSP IP/OBS MODERATE 35: CPT | Mod: GC,,, | Performed by: HOSPITALIST

## 2017-03-14 PROCEDURE — 25000003 PHARM REV CODE 250

## 2017-03-14 PROCEDURE — 94761 N-INVAS EAR/PLS OXIMETRY MLT: CPT

## 2017-03-14 PROCEDURE — 63600175 PHARM REV CODE 636 W HCPCS: Performed by: INTERNAL MEDICINE

## 2017-03-14 PROCEDURE — 36415 COLL VENOUS BLD VENIPUNCTURE: CPT

## 2017-03-14 PROCEDURE — 85025 COMPLETE CBC W/AUTO DIFF WBC: CPT

## 2017-03-14 PROCEDURE — 25000003 PHARM REV CODE 250: Performed by: INTERNAL MEDICINE

## 2017-03-14 PROCEDURE — 94640 AIRWAY INHALATION TREATMENT: CPT

## 2017-03-14 PROCEDURE — 27000221 HC OXYGEN, UP TO 24 HOURS

## 2017-03-14 PROCEDURE — 63600175 PHARM REV CODE 636 W HCPCS

## 2017-03-14 RX ORDER — INSULIN ASPART 100 [IU]/ML
6 INJECTION, SOLUTION INTRAVENOUS; SUBCUTANEOUS
Status: DISCONTINUED | OUTPATIENT
Start: 2017-03-14 | End: 2017-03-15

## 2017-03-14 RX ORDER — CETIRIZINE HYDROCHLORIDE 5 MG/1
10 TABLET ORAL DAILY
Status: DISCONTINUED | OUTPATIENT
Start: 2017-03-14 | End: 2017-03-16 | Stop reason: HOSPADM

## 2017-03-14 RX ORDER — FLUTICASONE PROPIONATE 50 MCG
2 SPRAY, SUSPENSION (ML) NASAL DAILY
Status: DISCONTINUED | OUTPATIENT
Start: 2017-03-15 | End: 2017-03-16 | Stop reason: HOSPADM

## 2017-03-14 RX ORDER — INSULIN ASPART 100 [IU]/ML
5 INJECTION, SOLUTION INTRAVENOUS; SUBCUTANEOUS ONCE
Status: COMPLETED | OUTPATIENT
Start: 2017-03-14 | End: 2017-03-14

## 2017-03-14 RX ORDER — FUROSEMIDE 20 MG/1
20 TABLET ORAL 2 TIMES DAILY
Status: DISCONTINUED | OUTPATIENT
Start: 2017-03-14 | End: 2017-03-15

## 2017-03-14 RX ORDER — BENZONATATE 100 MG/1
100 CAPSULE ORAL 3 TIMES DAILY PRN
Status: DISCONTINUED | OUTPATIENT
Start: 2017-03-14 | End: 2017-03-16 | Stop reason: HOSPADM

## 2017-03-14 RX ORDER — ALPRAZOLAM 0.25 MG/1
0.25 TABLET ORAL 2 TIMES DAILY PRN
Status: DISCONTINUED | OUTPATIENT
Start: 2017-03-14 | End: 2017-03-16 | Stop reason: HOSPADM

## 2017-03-14 RX ADMIN — LISINOPRIL 5 MG: 5 TABLET ORAL at 08:03

## 2017-03-14 RX ADMIN — INSULIN DETEMIR 18 UNITS: 100 INJECTION, SOLUTION SUBCUTANEOUS at 09:03

## 2017-03-14 RX ADMIN — GUAIFENESIN AND DEXTROMETHORPHAN 5 ML: 100; 10 SYRUP ORAL at 08:03

## 2017-03-14 RX ADMIN — INSULIN ASPART 6 UNITS: 100 INJECTION, SOLUTION INTRAVENOUS; SUBCUTANEOUS at 01:03

## 2017-03-14 RX ADMIN — PREDNISONE 40 MG: 20 TABLET ORAL at 08:03

## 2017-03-14 RX ADMIN — METOPROLOL TARTRATE 25 MG: 25 TABLET ORAL at 09:03

## 2017-03-14 RX ADMIN — FLUTICASONE FUROATE AND VILANTEROL TRIFENATATE 1 PUFF: 200; 25 POWDER RESPIRATORY (INHALATION) at 10:03

## 2017-03-14 RX ADMIN — ALPRAZOLAM 0.25 MG: 0.25 TABLET ORAL at 09:03

## 2017-03-14 RX ADMIN — METOPROLOL TARTRATE 25 MG: 25 TABLET ORAL at 08:03

## 2017-03-14 RX ADMIN — INSULIN ASPART 3 UNITS: 100 INJECTION, SOLUTION INTRAVENOUS; SUBCUTANEOUS at 05:03

## 2017-03-14 RX ADMIN — CETIRIZINE HYDROCHLORIDE 10 MG: 5 TABLET, FILM COATED ORAL at 10:03

## 2017-03-14 RX ADMIN — INSULIN ASPART 3 UNITS: 100 INJECTION, SOLUTION INTRAVENOUS; SUBCUTANEOUS at 11:03

## 2017-03-14 RX ADMIN — LEVOTHYROXINE SODIUM 50 MCG: 50 TABLET ORAL at 05:03

## 2017-03-14 RX ADMIN — FERROUS SULFATE TAB EC 325 MG (65 MG FE EQUIVALENT) 325 MG: 325 (65 FE) TABLET DELAYED RESPONSE at 08:03

## 2017-03-14 RX ADMIN — AZITHROMYCIN 250 MG: 250 TABLET, FILM COATED ORAL at 08:03

## 2017-03-14 RX ADMIN — IPRATROPIUM BROMIDE AND ALBUTEROL SULFATE 3 ML: .5; 3 SOLUTION RESPIRATORY (INHALATION) at 04:03

## 2017-03-14 RX ADMIN — IPRATROPIUM BROMIDE AND ALBUTEROL SULFATE 3 ML: .5; 3 SOLUTION RESPIRATORY (INHALATION) at 07:03

## 2017-03-14 RX ADMIN — INSULIN ASPART 5 UNITS: 100 INJECTION, SOLUTION INTRAVENOUS; SUBCUTANEOUS at 01:03

## 2017-03-14 RX ADMIN — INSULIN ASPART 6 UNITS: 100 INJECTION, SOLUTION INTRAVENOUS; SUBCUTANEOUS at 05:03

## 2017-03-14 RX ADMIN — PRAVASTATIN SODIUM 40 MG: 40 TABLET ORAL at 08:03

## 2017-03-14 RX ADMIN — FUROSEMIDE 20 MG: 20 TABLET ORAL at 05:03

## 2017-03-14 RX ADMIN — DILTIAZEM HYDROCHLORIDE 240 MG: 240 CAPSULE, COATED, EXTENDED RELEASE ORAL at 08:03

## 2017-03-14 RX ADMIN — WARFARIN SODIUM 2.5 MG: 2.5 TABLET ORAL at 05:03

## 2017-03-14 RX ADMIN — FLUTICASONE PROPIONATE 1 SPRAY: 50 SPRAY, METERED NASAL at 08:03

## 2017-03-14 RX ADMIN — IPRATROPIUM BROMIDE AND ALBUTEROL SULFATE 3 ML: .5; 3 SOLUTION RESPIRATORY (INHALATION) at 11:03

## 2017-03-14 RX ADMIN — INSULIN ASPART 5 UNITS: 100 INJECTION, SOLUTION INTRAVENOUS; SUBCUTANEOUS at 09:03

## 2017-03-14 RX ADMIN — FUROSEMIDE 20 MG: 20 TABLET ORAL at 10:03

## 2017-03-14 RX ADMIN — INSULIN ASPART 3 UNITS: 100 INJECTION, SOLUTION INTRAVENOUS; SUBCUTANEOUS at 08:03

## 2017-03-14 RX ADMIN — INSULIN ASPART 6 UNITS: 100 INJECTION, SOLUTION INTRAVENOUS; SUBCUTANEOUS at 08:03

## 2017-03-14 NOTE — ASSESSMENT & PLAN NOTE
-last 2D echo shows EF 60 with diastolic dysfunction   -patient currently euvolemic,    -continue home lasix 20 mg bid  -repeat 2D echo showing preserved EF with diastolic dysfunction

## 2017-03-14 NOTE — ASSESSMENT & PLAN NOTE
-Likely COPD exacerbation with allergic bronchitis (see COPD exacerbation and Allergic bronchitis)  -Pt was also on fluids. Coarse breath sounds on exam today. Holding fluids and restarting home dose lasix 20 mg bid. Monitor I/O.

## 2017-03-14 NOTE — PLAN OF CARE
[facility] - IP COPD Exacerbation - [facility]      COPD Step 1       RT: Education on rescue Albuterol vs. preventative Albuterol completed once at time of medication administration during evening shift Met       RT: Oxygen Education completed with aerosol treatments or every shift if no therapy scheduled Met       RT: Education on COPD disease provided each shift Met       RT: Clinical Outcome Respiratory rate < 20 at time of aerosol treatment Met       RT: Clinical Outcomes: Reduced wheezing with adequate air movement at time of aerosol treatment Met

## 2017-03-14 NOTE — ASSESSMENT & PLAN NOTE
-patient diagnosed with Afib last year which spontaneously converted   -currently on warfarin 2.5 mg daily except 5 mg on Wed  -patient currently normal rhythm, INR at goal today at 2.7  -daily PT/INR  -continue cardizem and lopressor

## 2017-03-14 NOTE — ASSESSMENT & PLAN NOTE
-Will continue scheduled duo-neb treatments  -5 day course of azithromycin and prednisone  -Continue home advair  -Pt on 3L NC at home. Currently at 4L NC. Pt states that she uses her O2 intermittently and usually just at night when she sleeps. O2 sats in the low 90s currently. Pt likely normally sats 88-92%. Will maintain this goal

## 2017-03-14 NOTE — MEDICAL/APP STUDENT
Progress Note  Hospital Medicine    Patient Name: Savita Polo  YOB: 1945   MRN: [id number]  Attending Physician: Enedelia Styles MD  PCP: Nadiya Nava MD      Admit Date: 3/12/2017                     LOS: 1    SUBJECTIVE:     Reason for Admission:  Acute on chronic respiratory failure with hypoxia.     Interval history: Pt reported SOB, coughing, spitting up blood-tinged sputum overnight. Upon investigation of sputum, minor hemoptysis seen. Pt also reported chest pain/palpitations during these coughing bouts and that the pain in chest mimicked the pain she had experienced previously before presenting to hospital on 03/12/2017 when she was admitted. Pt reports that her fluids had been stopped over concern that she was fluid overloaded. Pt also reported N/V on 03/14/2017 following dextromethorphan-guaifenesin 10-100mg/5ml liquid. Pt has COPD, chronic hypoxic respiratory failure on O2, T2DM, CKD, diverticulosis. Pt reports urinating and defecating normally. ***     albuterol-ipratropium 2.5mg-0.5mg/3mL  3 mL Nebulization Q4H    azithromycin  250 mg Oral Daily    cetirizine  10 mg Oral Daily    diltiaZEM  240 mg Oral Daily    ferrous sulfate  325 mg Oral Daily    [START ON 3/15/2017] fluticasone  2 spray Each Nare Daily    fluticasone-vilanterol  1 puff Inhalation Daily    furosemide  20 mg Oral BID    insulin aspart  6 Units Subcutaneous TIDWM    insulin detemir  18 Units Subcutaneous Daily    levothyroxine  50 mcg Oral Before breakfast    lisinopril  5 mg Oral Daily    metoprolol tartrate  25 mg Oral BID    pravastatin  40 mg Oral Daily    predniSONE  40 mg Oral Daily    warfarin  2.5 mg Oral Every Mon, Tue, Thu, Fri, Sun    [START ON 3/18/2017] warfarin  2.5 mg Oral Every Sat    [START ON 3/15/2017] warfarin  5 mg Oral Every Wed     OBJECTIVE:     Vital Signs Range (Last 24H):  Temp:  [97.4 °F (36.3 °C)-98.1 °F (36.7 °C)]   Pulse:  [61-89]   Resp:  [16-20]   BP:  (102-121)/(46-58)   SpO2:  [90 %-95 %] Body mass index is 27.73 kg/(m^2).  Wt Readings from Last 1 Encounters:   03/12/17 2249 64.4 kg (142 lb)       I & O (Last 24H):  Intake/Output Summary (Last 24 hours) at 03/14/17 1313  Last data filed at 03/14/17 0800   Gross per 24 hour   Intake              440 ml   Output              300 ml   Net              140 ml       Physical Exam:  General: appears older than stated age  Lungs:  labored breathing, rales base - right and wheezes base - right  Cardiovascular: Heart: regular rate and rhythm, S1, S2 normal and S3 present. Chest Wall: not examined. Extremities: no cyanosis or edema, or clubbing and no edema, redness or tenderness in the calves or thighs.   Abdomen/Rectal: Abdomen: soft, non-tender non-distented; bowel sounds normal; no masses,  no organomegaly.     Diagnostic Results:    Recent Labs  Lab 03/12/17  2343 03/13/17  0620 03/14/17  0556   WBC 13.86* 13.25* 20.95*   HGB 15.0 14.9 13.9   HCT 44.3 43.8 40.9    191 214         Recent Labs  Lab 03/12/17  2343 03/13/17  0620 03/14/17  0831    138 138   K 5.3* 4.9 4.9    102 103   CO2 24 26 26   BUN 49* 47* 68*   CREATININE 1.6* 1.4 1.8*   CALCIUM 9.5 9.3 9.1   PROT 8.4 7.9  --    BILITOT 0.5 0.5  --    ALKPHOS 72 78  --    ALT 23 26  --    AST 21 20  --      Lab Results   Component Value Date    INR 2.7 (H) 03/14/2017    INR 2.4 (H) 03/13/2017    INR 2.0 (H) 03/12/2017     Lab Results   Component Value Date    HGBA1C 7.8 (H) 03/13/2017     Recent Labs      03/13/17   0722  03/13/17   1140  03/13/17   1713  03/13/17   1844  03/13/17   2158  03/14/17   0843   POCTGLUCOSE  213*  329*  357*  326*  371*  281*       ASSESSMENT/PLAN:     Active Hospital Problems    Diagnosis  POA    *Acute on chronic respiratory failure with hypoxia [J96.21]  Yes    Pneumonia [J18.9]  Yes    Paroxysmal a-fib [I48.0]  Yes    Volume depletion [E86.9]  Yes    CKD (chronic kidney disease), stage IV [N18.4]  Yes    Type  2 diabetes mellitus with diabetic chronic kidney disease [E11.22]  Yes    Essential hypertension [I10]  Yes    Hyperlipidemia [E78.5]  Yes    Iron deficiency [E61.1]  Yes    Chronic diastolic CHF (congestive heart failure) [I50.32]  Yes    COPD exacerbation [J44.1]  Yes     Chronic    Tobacco abuse [Z72.0]  Yes     Chronic    Long term (current) use of anticoagulants [Z79.01]  Not Applicable    On home oxygen therapy [Z99.81]  Not Applicable    SUSAN (generalized anxiety disorder) [F41.1]  Yes    Diabetic peripheral neuropathy [E11.42]  Yes     Chronic    Hypothyroidism [E03.9]  Yes      Resolved Hospital Problems    Diagnosis Date Resolved POA   No resolved problems to display.       ***  A/P  71 y.o. Female who presented 03/12/2017 for acute on chronic respiratory failure with hypoxia.     1. CHF (preserved EF):  - s/p 2D Echo: diastolic dysfxn, Pulmonary artery Systolic pressure = 63 mmHg, mild mitral valve stenosis  -   - Meds:   -Metroprolol 25 mg bid   - Lisinopril 5 mg daily   - Pravastatin 40 mg daily  -     2. Acute on chronic respiratory failure on hypoxia   - Meds:   - albuterol-ipratroprium 2.5 - 0.5 mg/3 ml nebulizer every 4 hr   - azithromycin 250 mg tablet daily   - methyl     3. Diabetes  - Meds:    - insulin aspart    - insulin determir    4. Sinusitis  - Meds:   - Cetrizine 10 mg tab daily   - fluticasone 50 mcg bid   - benzonatate capsule 100 mg tid   - DISCONTINUED: dextromethorphan-guaifenesin 10-100mg/5ml liquid     Discharge: monitor for 1-2 days and reassess for discharge

## 2017-03-14 NOTE — PLAN OF CARE
Problem: Patient Care Overview  Goal: Plan of Care Review  Pt free from injury/fall. Bed in locked lowest position, call light within reach. Pt able to reposition independently. No new skin changes. Pt on 4LNC, Spo2 low 90s, wothout O2 pt SPo2 mid 80s. VSS otherwise. Pt reports SOB with exertion, breathing tx scheduled q4 hr for wheezing. Moderate white/clear sputum noted, few with scant red streaks. Pt denies pain for shift. Will continue to monitor.

## 2017-03-14 NOTE — PROGRESS NOTES
[facility] Hospital Medicine  Progress Note    Patient Name: Savita Polo  MRN: [id number]  Patient Class: IP- Inpatient   Admission Date: 3/12/2017  Length of Stay: 1 days  Attending Physician: Enedelia Styles MD  Primary Care Provider: Nadiya Nava MD    Hospital Medicine Team: [facility] Naila Jeong MD    Subjective:     Principal Problem:Acute on chronic respiratory failure with hypoxia    HPI:  Ms. 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 Patient states that she cleaned a dirty bathroom 3 days ago and the following day she developed subjective fevers (did not measure her temperature) and chills along with increased oxygen requirements. She can normally walk 1-2 blocks without stopping, but became very fatigued and dyspneic with minimal exertion these past two days. She also developed a productive cough with yellow sputum and had nausea and 2 episodes of emesis yesterday. Endorses nasal congestion and myalgias. Denies chest pain, abdominal pain, diarrhea, peripheral edema, dysuria. Patient lives with her daughter and can ambulate with a walker. Patient has been smoking since the age of 19 and currently smokes 1 cigarette every two days. Denies alcohol and drug use. Has had pneumonia and flu vaccines. No sick contacts.     Hospital Course:  Pt was started on scheduled duo-nebs, steroids and azithromycin. Pt improved, but still required 4L NC. Pt normally on 3L NC. Pt developed minimal hemoptysis and cough mixed with green sputum on 3/14. Pt also endorsing nasal/sinus congestion.     Interval History: Pt reports worsening cough and post nasal drip last night and states that she did not sleep very well. She reports a sore throat and cough productive of green sputum mixed with  blood. She reports her sob is improved slightly.     Review of Systems   Constitutional: Negative for chills and fever.   HENT: Positive for congestion, postnasal drip, sinus pressure and sore throat.    Respiratory: Positive for cough (productive of green sputum mixed with blood) and shortness of breath (improving). Negative for wheezing.    Cardiovascular: Negative for chest pain, palpitations and leg swelling.   Gastrointestinal: Negative for abdominal pain, constipation, diarrhea, nausea and vomiting.   Genitourinary: Negative for difficulty urinating and dysuria.     Objective:     Vital Signs (Most Recent):  Temp: 97.8 °F (36.6 °C) (03/14/17 1519)  Pulse: 70 (03/14/17 1519)  Resp: 20 (03/14/17 1519)  BP: (!) 106/53 (03/14/17 1519)  SpO2: (!) 93 % (03/14/17 1519) Vital Signs (24h Range):  Temp:  [97.4 °F (36.3 °C)-98.1 °F (36.7 °C)] 97.8 °F (36.6 °C)  Pulse:  [61-86] 70  Resp:  [16-20] 20  SpO2:  [90 %-95 %] 93 %  BP: (102-121)/(46-58) 106/53     Weight: 64.4 kg (142 lb)  Body mass index is 27.73 kg/(m^2).    Intake/Output Summary (Last 24 hours) at 03/14/17 1538  Last data filed at 03/14/17 1200   Gross per 24 hour   Intake              880 ml   Output              300 ml   Net              580 ml      Physical Exam   Constitutional: She is oriented to person, place, and time. She appears well-developed and well-nourished. No distress.   HENT:   Head: Normocephalic.   Right Ear: External ear normal.   Left Ear: External ear normal.   Eyes: EOM are normal. Pupils are equal, round, and reactive to light.   Neck: Normal range of motion. Neck supple.   Cardiovascular: Normal rate, regular rhythm, normal heart sounds and intact distal pulses.  Exam reveals no gallop and no friction rub.    No murmur heard.  Pulmonary/Chest: Effort normal. No respiratory distress. She has no wheezes. She has rales.   Coarse breath sounds bilaterally     Abdominal: Soft. Bowel sounds are normal. She exhibits no distension. There is  no tenderness.   Musculoskeletal: Normal range of motion. She exhibits edema (trace pitting BLE). She exhibits no deformity.   Lymphadenopathy:     She has no cervical adenopathy.   Neurological: She is alert and oriented to person, place, and time.   Skin: Skin is warm and dry.   Psychiatric: She has a normal mood and affect. Thought content normal.       Significant Labs:   CBC:   Recent Labs  Lab 03/12/17  2343 03/13/17  0620 03/14/17  0556   WBC 13.86* 13.25* 20.95*   HGB 15.0 14.9 13.9   HCT 44.3 43.8 40.9    191 214     CMP:   Recent Labs  Lab 03/12/17  2343 03/13/17  0620 03/14/17  0831    138 138   K 5.3* 4.9 4.9    102 103   CO2 24 26 26   * 184* 268*   BUN 49* 47* 68*   CREATININE 1.6* 1.4 1.8*   CALCIUM 9.5 9.3 9.1   PROT 8.4 7.9  --    ALBUMIN 2.9* 2.9*  --    BILITOT 0.5 0.5  --    ALKPHOS 72 78  --    AST 21 20  --    ALT 23 26  --    ANIONGAP 11 10 9   EGFRNONAA 32.0* 37.6* 27.7*       Significant Imaging: I have reviewed all pertinent imaging results/findings within the past 24 hours.    Assessment/Plan:      * Acute on chronic respiratory failure with hypoxia  -Likely COPD exacerbation with allergic bronchitis (see COPD exacerbation and Allergic bronchitis)  -Pt was also on fluids. Coarse breath sounds on exam today. Holding fluids and restarting home dose lasix 20 mg bid. Monitor I/O.      Hypothyroidism  -continue synthroid       Diabetic peripheral neuropathy  -see diabetes      SUSAN (generalized anxiety disorder)  -patient takes xanax at home prn for anxiety   -Pt requesting her xanax. Will give prn.      COPD exacerbation  -Will continue scheduled duo-neb treatments  -5 day course of azithromycin and prednisone  -Continue home advair  -Pt on 3L NC at home. Currently at 4L NC. Pt states that she uses her O2 intermittently and usually just at night when she sleeps. O2 sats in the low 90s currently. Pt likely normally sats 88-92%. Will maintain this goal      Tobacco  abuse  -patient currently smoking 1 cigarettes every 2 days  -smoking cessation counseling       Chronic diastolic CHF (congestive heart failure)  -last 2D echo shows EF 60 with diastolic dysfunction   -patient currently euvolemic,    -continue home lasix 20 mg bid  -repeat 2D echo showing preserved EF with diastolic dysfunction    Atrial fibrillation with RVR  -patient diagnosed with Afib last year which spontaneously converted   -currently on warfarin 2.5 mg daily except 5 mg on Wed  -patient currently normal rhythm, INR at goal today at 2.7  -daily PT/INR  -continue cardizem and lopressor      Iron deficiency  -continue iron supplementation      Essential hypertension  -continue lisinopril 5 mg, lopressor 25, and cardizem 240      Hyperlipidemia  -continue pravastatin      Type 2 diabetes mellitus with diabetic chronic kidney disease  -patient takes ~20U levemir qhs and ~10U novolog TIDWM, adjusted according to BGs  -HbA1c 7.8 on this admission  -BG in the 300s yesterday 2/2 starting steroids.  -Increase levemir to 18 qam with novolog 6 qac. Low dose ssi  -cardiac/diabetic diet      CKD (chronic kidney disease), stage IV  -kidney disease due to DM II and HTN, baseline Cr ~2.1  -kidney function at baseline  -continue to monitor daily       Allergic bronchitis with acute exacerbation  -patient with 2 day hx of subjective fevers and chills, productive cough, and worsening dyspnea on exertion   -CXR shows no pleural effusion and stable bibasilar interstitial opacifications. No areas of consolidation seen  -Patient also reporting post nasal drip and sinus congestion  -Start scheduled zyrtec and flonase. Steroids, abx and nebulizer treatment as above      VTE Risk Mitigation         Ordered     warfarin (COUMADIN) tablet 2.5 mg  Every Saturday     Route:  Oral        03/13/17 0215     warfarin (COUMADIN) tablet 5 mg  Every Wednesday     Route:  Oral        03/13/17 0215     warfarin (COUMADIN) tablet 2.5 mg   Every Mon, Tue, Thu, Fri, Sun     Route:  Oral        03/13/17 0215          Naila Jeong MD  Department of Hospital Medicine   [facility]

## 2017-03-14 NOTE — PROGRESS NOTES
Patient rested most of the day. No signs of distress. Still coughing. Ambulates to the bathroom.  Sits up for meals.  Will continue to monitor.

## 2017-03-14 NOTE — PLAN OF CARE
Future Appointments  Date Time Provider Department Center   3/29/2017 7:55 AM LAB, APPOINTMENT [place] [facility] LAB VNP [address] [facility]   3/29/2017 8:30 AM Adali Kim, PharmD [facility] COUMAD [address]   3/31/2017 8:00 AM Nadiya Nava MD [facility] IM [address] PCW   4/5/2017 9:30 AM Nohemi Ardon DNP, NP [facility] ENDODIA [address]   5/2/2017 9:30 AM Abad Kruger MD [facility] PSYCH [address]     Nadiya Nava MD    [facility] [place] [address]. [place], [place] - [facility] [address] [address]  Phone: [phone] Fax: [phone]    Pt has O2 @ home but can't remember the name of the company,  is [id number] w/ Jose to inform him that pt was in the hospital and ? The name of the company     03/14/17 1133   Discharge Assessment   Assessment Type Discharge Planning Assessment   Confirmed/corrected address and phone number on facesheet? Yes   Assessment information obtained from? Patient   Expected Length of Stay (days) 2   Prior to hospitilization cognitive status: Alert/Oriented   Prior to hospitalization functional status: Assistive Equipment;Needs Assistance   Current cognitive status: Alert/Oriented   Current Functional Status: Assistive Equipment;Needs Assistance   Arrived From home or self-care   Lives With child(lux), adult   Able to Return to Prior Arrangements yes   Is patient able to care for self after discharge? Yes   How many people do you have in your home that can help with your care after discharge? >4   Who are your caregiver(s) and their phone number(s)? Nancy Alexander  dtr  [phone]   Patient's perception of discharge disposition home or selfcare   Readmission Within The Last 30 Days no previous admission in last 30 days   Patient currently being followed by outpatient case management? No   Patient currently receives home health services? No   Does the patient currently use HME? Yes   Equipment Currently Used at Home oxygen;hospital  bed;walker, rolling;shower chair   Do you have any problems affording any of your prescribed medications? No   Is the patient taking medications as prescribed? yes   Do you have any financial concerns preventing you from receiving the healthcare you need? No   Does the patient have transportation to healthcare appointments? Yes   Transportation Available family or friend will provide   On Dialysis? No   Does the patient receive services at the [facility]? Yes   Are there any open cases? No   Discharge Plan A Home with family   Discharge Plan B Home Health   Patient/Family In Agreement With Plan yes

## 2017-03-14 NOTE — ASSESSMENT & PLAN NOTE
-patient takes ~20U levemir qhs and ~10U novolog TIDWM, adjusted according to BGs  -HbA1c 7.8 on this admission  -BG in the 300s yesterday 2/2 starting steroids.  -Increase levemir to 18 qam with novolog 6 qac. Low dose ssi  -cardiac/diabetic diet

## 2017-03-14 NOTE — SUBJECTIVE & OBJECTIVE
Interval History: Pt reports worsening cough and post nasal drip last night and states that she did not sleep very well. She reports a sore throat and cough productive of green sputum mixed with blood. She reports her sob is improved slightly.     Review of Systems   Constitutional: Negative for chills and fever.   HENT: Positive for congestion, postnasal drip, sinus pressure and sore throat.    Respiratory: Positive for cough (productive of green sputum mixed with blood) and shortness of breath (improving). Negative for wheezing.    Cardiovascular: Negative for chest pain, palpitations and leg swelling.   Gastrointestinal: Negative for abdominal pain, constipation, diarrhea, nausea and vomiting.   Genitourinary: Negative for difficulty urinating and dysuria.     Objective:     Vital Signs (Most Recent):  Temp: 97.8 °F (36.6 °C) (03/14/17 1519)  Pulse: 70 (03/14/17 1519)  Resp: 20 (03/14/17 1519)  BP: (!) 106/53 (03/14/17 1519)  SpO2: (!) 93 % (03/14/17 1519) Vital Signs (24h Range):  Temp:  [97.4 °F (36.3 °C)-98.1 °F (36.7 °C)] 97.8 °F (36.6 °C)  Pulse:  [61-86] 70  Resp:  [16-20] 20  SpO2:  [90 %-95 %] 93 %  BP: (102-121)/(46-58) 106/53     Weight: 64.4 kg (142 lb)  Body mass index is 27.73 kg/(m^2).    Intake/Output Summary (Last 24 hours) at 03/14/17 1538  Last data filed at 03/14/17 1200   Gross per 24 hour   Intake              880 ml   Output              300 ml   Net              580 ml      Physical Exam   Constitutional: She is oriented to person, place, and time. She appears well-developed and well-nourished. No distress.   HENT:   Head: Normocephalic.   Right Ear: External ear normal.   Left Ear: External ear normal.   Eyes: EOM are normal. Pupils are equal, round, and reactive to light.   Neck: Normal range of motion. Neck supple.   Cardiovascular: Normal rate, regular rhythm, normal heart sounds and intact distal pulses.  Exam reveals no gallop and no friction rub.    No murmur heard.  Pulmonary/Chest:  Effort normal. No respiratory distress. She has no wheezes. She has rales.   Coarse breath sounds bilaterally     Abdominal: Soft. Bowel sounds are normal. She exhibits no distension. There is no tenderness.   Musculoskeletal: Normal range of motion. She exhibits edema (trace pitting BLE). She exhibits no deformity.   Lymphadenopathy:     She has no cervical adenopathy.   Neurological: She is alert and oriented to person, place, and time.   Skin: Skin is warm and dry.   Psychiatric: She has a normal mood and affect. Thought content normal.       Significant Labs:   CBC:   Recent Labs  Lab 03/12/17  2343 03/13/17  0620 03/14/17  0556   WBC 13.86* 13.25* 20.95*   HGB 15.0 14.9 13.9   HCT 44.3 43.8 40.9    191 214     CMP:   Recent Labs  Lab 03/12/17  2343 03/13/17  0620 03/14/17  0831    138 138   K 5.3* 4.9 4.9    102 103   CO2 24 26 26   * 184* 268*   BUN 49* 47* 68*   CREATININE 1.6* 1.4 1.8*   CALCIUM 9.5 9.3 9.1   PROT 8.4 7.9  --    ALBUMIN 2.9* 2.9*  --    BILITOT 0.5 0.5  --    ALKPHOS 72 78  --    AST 21 20  --    ALT 23 26  --    ANIONGAP 11 10 9   EGFRNONAA 32.0* 37.6* 27.7*       Significant Imaging: I have reviewed all pertinent imaging results/findings within the past 24 hours.

## 2017-03-14 NOTE — NURSING
Pt HS BG was 371, 3 units correction dose insulin given per standing order, called IM1 per standing order protocol. Md ordered 5 additional units of aspart insulin to give. Given per MD order. Pt is currently being switched from solu medrol to prednisone. Educated pt on hypoglycemia s/s and will continue to monitor.

## 2017-03-14 NOTE — ASSESSMENT & PLAN NOTE
-patient with 2 day hx of subjective fevers and chills, productive cough, and worsening dyspnea on exertion   -CXR shows no pleural effusion and stable bibasilar interstitial opacifications. No areas of consolidation seen  -Patient also reporting post nasal drip and sinus congestion  -Start scheduled zyrtec and flonase. Steroids, abx and nebulizer treatment as above

## 2017-03-15 LAB
ANION GAP SERPL CALC-SCNC: 8 MMOL/L
ANISOCYTOSIS BLD QL SMEAR: SLIGHT
BASOPHILS # BLD AUTO: 0.01 K/UL
BASOPHILS NFR BLD: 0 %
BUN SERPL-MCNC: 74 MG/DL
CALCIUM SERPL-MCNC: 8.8 MG/DL
CHLORIDE SERPL-SCNC: 106 MMOL/L
CO2 SERPL-SCNC: 25 MMOL/L
CREAT SERPL-MCNC: 1.9 MG/DL
DIFFERENTIAL METHOD: ABNORMAL
EOSINOPHIL # BLD AUTO: 0 K/UL
EOSINOPHIL NFR BLD: 0 %
ERYTHROCYTE [DISTWIDTH] IN BLOOD BY AUTOMATED COUNT: 12.8 %
EST. GFR  (AFRICAN AMERICAN): 29.9 ML/MIN/1.73 M^2
EST. GFR  (NON AFRICAN AMERICAN): 26 ML/MIN/1.73 M^2
GLUCOSE SERPL-MCNC: 327 MG/DL
HCT VFR BLD AUTO: 39.8 %
HGB BLD-MCNC: 13 G/DL
HYPOCHROMIA BLD QL SMEAR: ABNORMAL
INR PPP: 2.3
LYMPHOCYTES # BLD AUTO: 1.9 K/UL
LYMPHOCYTES NFR BLD: 7.8 %
MCH RBC QN AUTO: 32.3 PG
MCHC RBC AUTO-ENTMCNC: 32.7 %
MCV RBC AUTO: 99 FL
MONOCYTES # BLD AUTO: 1.2 K/UL
MONOCYTES NFR BLD: 5 %
NEUTROPHILS # BLD AUTO: 21 K/UL
NEUTROPHILS NFR BLD: 87.2 %
OVALOCYTES BLD QL SMEAR: ABNORMAL
PLATELET # BLD AUTO: 225 K/UL
PMV BLD AUTO: 10.7 FL
POCT GLUCOSE: 192 MG/DL (ref 70–110)
POCT GLUCOSE: 336 MG/DL (ref 70–110)
POCT GLUCOSE: 355 MG/DL (ref 70–110)
POCT GLUCOSE: 364 MG/DL (ref 70–110)
POCT GLUCOSE: 388 MG/DL (ref 70–110)
POCT GLUCOSE: 390 MG/DL (ref 70–110)
POCT GLUCOSE: 392 MG/DL (ref 70–110)
POIKILOCYTOSIS BLD QL SMEAR: SLIGHT
POLYCHROMASIA BLD QL SMEAR: ABNORMAL
POTASSIUM SERPL-SCNC: 5.1 MMOL/L
PROTHROMBIN TIME: 22.6 SEC
RBC # BLD AUTO: 4.02 M/UL
SODIUM SERPL-SCNC: 139 MMOL/L
WBC # BLD AUTO: 24.11 K/UL

## 2017-03-15 PROCEDURE — 94640 AIRWAY INHALATION TREATMENT: CPT

## 2017-03-15 PROCEDURE — 25000003 PHARM REV CODE 250: Performed by: INTERNAL MEDICINE

## 2017-03-15 PROCEDURE — 25000003 PHARM REV CODE 250: Performed by: STUDENT IN AN ORGANIZED HEALTH CARE EDUCATION/TRAINING PROGRAM

## 2017-03-15 PROCEDURE — 25000003 PHARM REV CODE 250

## 2017-03-15 PROCEDURE — 11000001 HC ACUTE MED/SURG PRIVATE ROOM

## 2017-03-15 PROCEDURE — 63600175 PHARM REV CODE 636 W HCPCS

## 2017-03-15 PROCEDURE — 36415 COLL VENOUS BLD VENIPUNCTURE: CPT

## 2017-03-15 PROCEDURE — 85025 COMPLETE CBC W/AUTO DIFF WBC: CPT

## 2017-03-15 PROCEDURE — 80048 BASIC METABOLIC PNL TOTAL CA: CPT

## 2017-03-15 PROCEDURE — 97530 THERAPEUTIC ACTIVITIES: CPT

## 2017-03-15 PROCEDURE — 97116 GAIT TRAINING THERAPY: CPT

## 2017-03-15 PROCEDURE — 85610 PROTHROMBIN TIME: CPT

## 2017-03-15 PROCEDURE — 99232 SBSQ HOSP IP/OBS MODERATE 35: CPT | Mod: GC,,, | Performed by: HOSPITALIST

## 2017-03-15 PROCEDURE — 25000242 PHARM REV CODE 250 ALT 637 W/ HCPCS: Performed by: STUDENT IN AN ORGANIZED HEALTH CARE EDUCATION/TRAINING PROGRAM

## 2017-03-15 PROCEDURE — 27000221 HC OXYGEN, UP TO 24 HOURS

## 2017-03-15 RX ORDER — PREDNISONE 20 MG/1
40 TABLET ORAL DAILY
Qty: 1 TABLET | Refills: 0 | Status: SHIPPED | OUTPATIENT
Start: 2017-03-15 | End: 2017-03-16

## 2017-03-15 RX ORDER — INSULIN ASPART 100 [IU]/ML
10 INJECTION, SOLUTION INTRAVENOUS; SUBCUTANEOUS
Status: DISCONTINUED | OUTPATIENT
Start: 2017-03-16 | End: 2017-03-16 | Stop reason: HOSPADM

## 2017-03-15 RX ORDER — INSULIN ASPART 100 [IU]/ML
7 INJECTION, SOLUTION INTRAVENOUS; SUBCUTANEOUS
Status: DISCONTINUED | OUTPATIENT
Start: 2017-03-15 | End: 2017-03-15

## 2017-03-15 RX ORDER — BENZONATATE 100 MG/1
100 CAPSULE ORAL 3 TIMES DAILY PRN
Qty: 10 CAPSULE | Refills: 0 | Status: SHIPPED | OUTPATIENT
Start: 2017-03-15 | End: 2017-03-16 | Stop reason: HOSPADM

## 2017-03-15 RX ORDER — CETIRIZINE HYDROCHLORIDE 10 MG/1
10 TABLET ORAL DAILY
Refills: 0 | COMMUNITY
Start: 2017-03-15 | End: 2017-03-22 | Stop reason: SDUPTHER

## 2017-03-15 RX ORDER — AZITHROMYCIN 250 MG/1
250 TABLET, FILM COATED ORAL DAILY
Qty: 1 TABLET | Refills: 0 | Status: SHIPPED | OUTPATIENT
Start: 2017-03-15 | End: 2017-03-16

## 2017-03-15 RX ORDER — INSULIN ASPART 100 [IU]/ML
8 INJECTION, SOLUTION INTRAVENOUS; SUBCUTANEOUS
Status: DISCONTINUED | OUTPATIENT
Start: 2017-03-15 | End: 2017-03-15

## 2017-03-15 RX ADMIN — PRAVASTATIN SODIUM 40 MG: 40 TABLET ORAL at 08:03

## 2017-03-15 RX ADMIN — PREDNISONE 40 MG: 20 TABLET ORAL at 08:03

## 2017-03-15 RX ADMIN — INSULIN ASPART 5 UNITS: 100 INJECTION, SOLUTION INTRAVENOUS; SUBCUTANEOUS at 05:03

## 2017-03-15 RX ADMIN — LISINOPRIL 5 MG: 5 TABLET ORAL at 08:03

## 2017-03-15 RX ADMIN — FERROUS SULFATE TAB EC 325 MG (65 MG FE EQUIVALENT) 325 MG: 325 (65 FE) TABLET DELAYED RESPONSE at 08:03

## 2017-03-15 RX ADMIN — INSULIN ASPART 8 UNITS: 100 INJECTION, SOLUTION INTRAVENOUS; SUBCUTANEOUS at 12:03

## 2017-03-15 RX ADMIN — INSULIN ASPART 4 UNITS: 100 INJECTION, SOLUTION INTRAVENOUS; SUBCUTANEOUS at 12:03

## 2017-03-15 RX ADMIN — DILTIAZEM HYDROCHLORIDE 240 MG: 240 CAPSULE, COATED, EXTENDED RELEASE ORAL at 08:03

## 2017-03-15 RX ADMIN — METOPROLOL TARTRATE 25 MG: 25 TABLET ORAL at 08:03

## 2017-03-15 RX ADMIN — IPRATROPIUM BROMIDE AND ALBUTEROL SULFATE 3 ML: .5; 3 SOLUTION RESPIRATORY (INHALATION) at 03:03

## 2017-03-15 RX ADMIN — INSULIN ASPART 8 UNITS: 100 INJECTION, SOLUTION INTRAVENOUS; SUBCUTANEOUS at 05:03

## 2017-03-15 RX ADMIN — LEVOTHYROXINE SODIUM 50 MCG: 50 TABLET ORAL at 06:03

## 2017-03-15 RX ADMIN — FUROSEMIDE 20 MG: 20 TABLET ORAL at 08:03

## 2017-03-15 RX ADMIN — IPRATROPIUM BROMIDE AND ALBUTEROL SULFATE 3 ML: .5; 3 SOLUTION RESPIRATORY (INHALATION) at 04:03

## 2017-03-15 RX ADMIN — IPRATROPIUM BROMIDE AND ALBUTEROL SULFATE 3 ML: .5; 3 SOLUTION RESPIRATORY (INHALATION) at 08:03

## 2017-03-15 RX ADMIN — IPRATROPIUM BROMIDE AND ALBUTEROL SULFATE 3 ML: .5; 3 SOLUTION RESPIRATORY (INHALATION) at 12:03

## 2017-03-15 RX ADMIN — ALPRAZOLAM 0.25 MG: 0.25 TABLET ORAL at 08:03

## 2017-03-15 RX ADMIN — FLUTICASONE FUROATE AND VILANTEROL TRIFENATATE 1 PUFF: 200; 25 POWDER RESPIRATORY (INHALATION) at 08:03

## 2017-03-15 RX ADMIN — METOPROLOL TARTRATE 25 MG: 25 TABLET ORAL at 09:03

## 2017-03-15 RX ADMIN — INSULIN ASPART 6 UNITS: 100 INJECTION, SOLUTION INTRAVENOUS; SUBCUTANEOUS at 08:03

## 2017-03-15 RX ADMIN — INSULIN ASPART 5 UNITS: 100 INJECTION, SOLUTION INTRAVENOUS; SUBCUTANEOUS at 09:03

## 2017-03-15 RX ADMIN — FLUTICASONE PROPIONATE 2 SPRAY: 50 SPRAY, METERED NASAL at 08:03

## 2017-03-15 RX ADMIN — CETIRIZINE HYDROCHLORIDE 10 MG: 5 TABLET, FILM COATED ORAL at 08:03

## 2017-03-15 RX ADMIN — AZITHROMYCIN 250 MG: 250 TABLET, FILM COATED ORAL at 08:03

## 2017-03-15 RX ADMIN — ALPRAZOLAM 0.25 MG: 0.25 TABLET ORAL at 09:03

## 2017-03-15 RX ADMIN — WARFARIN SODIUM 5 MG: 5 TABLET ORAL at 05:03

## 2017-03-15 NOTE — PT/OT/SLP PROGRESS
Physical Therapy  Treatment    Savita Polo   MRN: [id number]   Admitting Diagnosis: Acute on chronic respiratory failure with hypoxia    PT Received On: 03/15/17  PT Start Time: 0730     PT Stop Time: 0800    PT Total Time (min): 30 min       Billable Minutes:  Gait [id number], Therapeutic Activity 10 and Therapeutic Exercise 10    Treatment Type: Treatment  PT/PTA: PTA     PTA Visit Number: 1       General Precautions: Standard, fall  Orthopedic Precautions: N/A   Braces: N/A    Do you have any cultural, spiritual, Rastafari conflicts, given your current situation?: None given    Subjective:  Communicated with NSG prior to session.  I'm cold in here    Pain Ratin/10                   Objective:   Patient found with: oxygen (4L)    Functional Mobility:  Bed Mobility:   Supine to Sit: Supervision (with elevated HOB)    Transfers:  Sit <> Stand Assistance: Stand By Assistance  Sit <> Stand Assistive Device: Rolling Walker    Gait:   Gait Distance: 180-200 feet  Assistance 1: Contact Guard Assistance  Gait Assistive Device: Rolling walker  Gait Pattern: swing-through gait  Gait Deviation(s): decreased renata, decreased step length, decreased stride length (no LOB but unsteady gait.  Patient required VC's for walker mgmnt and safety.  Patient would take her hands off the walker when talking. Noted min SOB post gait.  O2 sats 97% on 4L )          Balance:   Static Sit: NORMAL: No deviations seen in posture held statically  Dynamic Sit: GOOD+: Maintains balance through MAXIMAL excursions of active trunk motion  Static Stand: NORMAL: No deviations seen in posture held statically  Dynamic stand: GOOD-: Needs SUPERVISION only during gait and able to self right with moderate      Therapeutic Activities and Exercises:  Patient performed supine exercises AP,QS,GS,hip ABD/ADD and hs x15 reps  Patient tolerated sitting EOB Mod I  Patient tolerated static standing 8-10 min with RW SBA    AM-PAC 6 CLICK MOBILITY  How much  help from another person does this patient currently need?   1 = Unable, Total/Dependent Assistance  2 = A lot, Maximum/Moderate Assistance  3 = A little, Minimum/Contact Guard/Supervision  4 = None, Modified [place]/Independent    Turning over in bed (including adjusting bedclothes, sheets and blankets)?: 4  Sitting down on and standing up from a chair with arms (e.g., wheelchair, bedside commode, etc.): 4  Moving from lying on back to sitting on the side of the bed?: 4  Moving to and from a bed to a chair (including a wheelchair)?: 4  Need to walk in hospital room?: 3  Climbing 3-5 steps with a railing?: 3  Total Score: 22    AM-PAC Raw Score CMS G-Code Modifier Level of Impairment Assistance   6 % Total / Unable   7 - 9 CM 80 - 100% Maximal Assist   10 - 14 CL 60 - 80% Moderate Assist   15 - 19 CK 40 - 60% Moderate Assist   20 - 22 CJ 20 - 40% Minimal Assist   23 CI 1-20% SBA / CGA   24 CH 0% Independent/ Mod I     Patient left up in chair with all lines intact and call button in reach.    Assessment:  Savita Polo is a 71 y.o. female with a medical diagnosis of Acute on chronic respiratory failure with hypoxia and presents with decrease mobility, balance and endurance.  Patient tolerated increase distance with gait training well.  Patient demonstrated good sitting and fair standing balance.  Patient progressing toward goals.  Patient would benefit from further IP therapy.    Rehab identified problem list/impairments: Rehab identified problem list/impairments: impaired endurance, gait instability, impaired balance, impaired self care skills, decreased safety awareness, impaired functional mobilty    Rehab potential is good.    Activity tolerance: Good    Discharge recommendations: Discharge Facility/Level Of Care Needs: home health PT     Barriers to discharge:      Equipment recommendations: Equipment Needed After Discharge: none     GOALS:   Physical Therapy Goals        Problem: Physical  Therapy Goal    Goal Priority Disciplines Outcome Goal Variances Interventions   Physical Therapy Goal     PT/OT, PT Ongoing (interventions implemented as appropriate)     Description:  Goals to be met by: 3/31/2017     Patient will increase functional independence with mobility by performin. Supine to sit with Modified [place]  2. Sit to stand transfer with Modified [place]  3. Gait  x 100 feet with Modified [place] using Rolling Walker or rollator.   4. Stand for 10 minutes with Modified [place] using Rolling Walker or Rollator walker for increased standing tolerance with ADL's.                PLAN:    Patient to be seen 4 x/week  to address the above listed problems via gait training, therapeutic activities, therapeutic exercises  Plan of Care expires: 17  Plan of Care reviewed with: patient         Killian Wan II, PTA  03/15/2017

## 2017-03-15 NOTE — PLAN OF CARE
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]  Yes     Priority: 3     Allergic bronchitis with acute exacerbation [J45.901]  Yes     Priority: 4     On home oxygen therapy [Z99.81]  Not Applicable     Priority: 5     Paroxysmal a-fib [I48.0]  Yes    Volume depletion [E86.9]  Yes    CKD (chronic kidney disease), stage IV [N18.4]  Yes    Essential hypertension [I10]  Yes    Hyperlipidemia [E78.5]  Yes    Iron deficiency [E61.1]  Yes    Chronic diastolic CHF (congestive heart failure) [I50.32]  Yes    Tobacco abuse [Z72.0]  Yes     Chronic    Long term (current) use of anticoagulants [Z79.01]  Not Applicable    SUSAN (generalized anxiety disorder) [F41.1]  Yes    Diabetic peripheral neuropathy [E11.42]  Yes     Chronic    Hypothyroidism [E03.9]  Yes      Resolved Hospital Problems    Diagnosis Date Resolved POA   No resolved problems to display.       Future Appointments  Date Time Provider Department Center   3/29/2017 7:55 AM LAB, APPOINTMENT [facility] LAB VNP [facility] [place] [facility]   3/29/2017 8:30 AM Adali Kim, PharmD [facility] COUMAD [address]   3/31/2017 8:00 AM Nadiya Nava MD [facility] IM [address] PCW   4/5/2017 9:30 AM Nohemi Ardon DNP, NP [facility] ENDODIA [address]   5/2/2017 9:30 AM Abad Kruger MD [facility] PSYCH [facility]           I have seen and examined this patient face to face today. My clinical findings that support the  need for the home health skilled services and home bound status are the following:  Weakness/numbness causing balance and gait disturbance due to COPD Exacerbation making it taxing to leave home.    Allergies:  Review of patient's allergies indicates:   Allergen Reactions    Latex, natural rubber Dermatitis and Rash    Adhesive Itching and Rash     Allergy to adhesive in nicotine patch.    Sulfa (sulfonamide antibiotics) Rash       Diet: cardiac diet    Activities: activity as tolerated    Nursing:   SN to complete comprehensive assessment including routine vital signs. Instruct on disease process and s/s of complications to report to MD. Review/verify medication list sent home with the patient at time of discharge  and instruct patient/caregiver as needed. Frequency may be adjusted depending on start of care date.    Notify MD if SBP > 160 or < 90; DBP > 90 or < 50; HR > 120 or < 50; Temp > 101      CONSULTS:    Physical Therapy to evaluate and treat. Evaluate for home safety and equipment needs; Establish/upgrade home exercise program. Perform / instruct on therapeutic exercises, gait training, transfer training, and Range of Motion.  Occupational Therapy to evaluate and treat. Evaluate home environment for safety and equipment needs. Perform/Instruct on transfers, ADL training, ROM, and therapeutic exercises.    MISCELLANEOUS CARE:  N/A    WOUND CARE ORDERS  n/a      Medications: Review discharge medications with patient and family and provide education.      Current Discharge Medication List      START taking these medications    Details   azithromycin (Z-MERCY) 250 MG tablet Take 1 tablet (250 mg total) by mouth once daily.  Qty: 1 tablet, Refills: 0      benzonatate (TESSALON) 100 MG capsule Take 1 capsule (100 mg total) by mouth 3 (three) times daily as needed for Cough.  Qty: 10 capsule, Refills: 0      cetirizine (ZYRTEC) 10 MG tablet Take 1 tablet (10 mg total) by mouth once daily.  Refills: 0       predniSONE (DELTASONE) 20 MG tablet Take 2 tablets (40 mg total) by mouth once daily.  Qty: 1 tablet, Refills: 0         CONTINUE these medications which have NOT CHANGED    Details   ADVAIR DISKUS 500-50 mcg/dose DsDv diskus inhaler inhale 1 dose by mouth twice a day  Qty: 60 each, Refills: 2      albuterol (PROAIR HFA) 90 mcg/actuation inhaler Inhale 2 puffs into the lungs every 6 (six) hours as needed for Wheezing.  Qty: 2 Inhaler, Refills: 3      alprazolam (XANAX) 0.5 MG tablet TAKE 1 TABLET BY MOUTH 2 TIMES A DAY  Qty: 60 tablet, Refills: 3      diltiaZEM (CARDIZEM CD) 240 MG 24 hr capsule take 1 capsule by mouth once daily  Qty: 30 capsule, Refills: 2      FERROUS SULFATE ORAL Take 1 tablet by mouth once daily.      fluticasone (FLONASE) 50 mcg/actuation nasal spray instill 1 spray into each nostril once daily  Qty: 16 g, Refills: 1      furosemide (LASIX) 20 MG tablet take 1 tablet by mouth twice a day  Qty: 60 tablet, Refills: 2      insulin aspart (NOVOLOG FLEXPEN) 100 unit/mL InPn pen 26-28-26 Plus correction scale  Qty: 2 Box, Refills: 6    Associated Diagnoses: Type 2 diabetes mellitus with stage 4 chronic kidney disease, with long-term current use of insulin      insulin detemir (LEVEMIR FLEXTOUCH) 100 unit/mL (3 mL) SubQ InPn pen Inject 26 Units into the skin every evening.  Qty: 2 Box, Refills: 6    Associated Diagnoses: Type 2 diabetes mellitus with stage 4 chronic kidney disease, with long-term current use of insulin      ipratropium (ATROVENT) 0.02 % nebulizer solution USE 1 VIAL IN NEBULIZER EVERY 8 HOURS  Qty: 62.5 mL, Refills: 4      levothyroxine (SYNTHROID) 50 MCG tablet Take 1 tablet (50 mcg total) by mouth before breakfast.  Qty: 90 tablet, Refills: 4      lisinopril (PRINIVIL,ZESTRIL) 5 MG tablet Take 1 tablet (5 mg total) by mouth once daily.  Qty: 90 tablet, Refills: 1    Associated Diagnoses: Essential hypertension      metoprolol tartrate (LOPRESSOR) 25 MG tablet take 1 tablet by  "mouth twice a day  Qty: 60 tablet, Refills: 2      ondansetron (ZOFRAN) 4 MG tablet Take 1 tablet (4 mg total) by mouth every 8 (eight) hours as needed for Nausea.  Qty: 12 tablet, Refills: 0      pen needle, diabetic, safety (NOVOFINE AUTOCOVER) 30 gauge x 1/3" Ndle Uses 4 a day  Qty: 150 each, Refills: 12      pravastatin (PRAVACHOL) 40 MG tablet take 1 tablet by mouth once daily  Qty: 30 tablet, Refills: 3    Associated Diagnoses: Dyslipidemia      warfarin (COUMADIN) 2.5 MG tablet Take 2.5 mg by mouth once daily. Except take 5 mg on Wed             I certify that this patient is confined to her home and needs physical therapy and occupational therapy.      "

## 2017-03-15 NOTE — ASSESSMENT & PLAN NOTE
-Will continue scheduled duo-neb treatments  -5 day course of azithromycin and prednisone for a total of 5 days  -Continue home advair  -Pt on 3L NC at home. Currently at 4L NC. Pt states that she uses her O2 intermittently and usually just at night when she sleeps. O2 sats in the low 90s currently. Pt likely normally sats 88-92%. Will maintain this goal  - likely to be discharged tomorrow

## 2017-03-15 NOTE — PLAN OF CARE
Problem: Physical Therapy Goal  Goal: Physical Therapy Goal  Goals to be met by: 3/31/2017     Patient will increase functional independence with mobility by performin. Supine to sit with Modified [place]  2. Sit to stand transfer with Modified [place]  3. Gait x 100 feet with Modified [place] using Rolling Walker or rollator.   4. Stand for 10 minutes with Modified [place] using Rolling Walker or Rollator walker for increased standing tolerance with ADLs.   Patient goals remain appropriate.  Patient will continue to benefit from further PT services.  Killian Wan, PTA

## 2017-03-15 NOTE — PROGRESS NOTES
[facility] Hospital Medicine  Progress Note    Patient Name: Savita Polo  MRN: [id number]  Patient Class: IP- Inpatient   Admission Date: 3/12/2017  Length of Stay: 2 days  Attending Physician: Enedelia Styles MD  Primary Care Provider: Nadiya Nava MD    Hospital Medicine Team: [facility] MED 1 Taiwo Giron MD    Subjective:     Principal Problem:Acute on chronic respiratory failure with hypoxia    HPI:  Ms. 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 Patient states that she cleaned a dirty bathroom 3 days ago and the following day she developed subjective fevers (did not measure her temperature) and chills along with increased oxygen requirements. She can normally walk 1-2 blocks without stopping, but became very fatigued and dyspneic with minimal exertion these past two days. She also developed a productive cough with yellow sputum and had nausea and 2 episodes of emesis yesterday. Endorses nasal congestion and myalgias. Denies chest pain, abdominal pain, diarrhea, peripheral edema, dysuria. Patient lives with her daughter and can ambulate with a walker. Patient has been smoking since the age of 19 and currently smokes 1 cigarette every two days. Denies alcohol and drug use. Has had pneumonia and flu vaccines. No sick contacts.     Interval History: Patient reports feeling well in AM.  Has coughs in AM, but improved with nebulizers.  Denies fevers, chills, N/V, chest or abdominal pains.      Review of Systems   Constitutional: Negative for chills and fever.   HENT: Positive for congestion, postnasal drip, sinus pressure and sore throat.    Respiratory: Positive for cough (productive of green sputum mixed with blood) and shortness of breath (improving). Negative for wheezing.     Cardiovascular: Negative for chest pain, palpitations and leg swelling.   Gastrointestinal: Negative for abdominal pain, constipation, diarrhea, nausea and vomiting.   Genitourinary: Negative for difficulty urinating and dysuria.     Objective:     Vital Signs (Most Recent):  Temp: 97.9 °F (36.6 °C) (03/15/17 1615)  Pulse: 90 (03/15/17 1700)  Resp: 16 (03/15/17 1615)  BP: (!) 123/56 (03/15/17 1615)  SpO2: (!) 93 % (03/15/17 1615) Vital Signs (24h Range):  Temp:  [97.6 °F (36.4 °C)-98.2 °F (36.8 °C)] 97.9 °F (36.6 °C)  Pulse:  [60-90] 90  Resp:  [14-18] 16  SpO2:  [91 %-97 %] 93 %  BP: (108-123)/(54-59) 123/56     Weight: 64.4 kg (142 lb)  Body mass index is 27.73 kg/(m^2).    Intake/Output Summary (Last 24 hours) at 03/15/17 1819  Last data filed at 03/15/17 1100   Gross per 24 hour   Intake              620 ml   Output              350 ml   Net              270 ml      Physical Exam   Constitutional: She is oriented to person, place, and time. She appears well-developed and well-nourished. No distress.   HENT:   Head: Normocephalic.   Right Ear: External ear normal.   Left Ear: External ear normal.   Eyes: EOM are normal. Pupils are equal, round, and reactive to light.   Neck: Normal range of motion. Neck supple.   Cardiovascular: Normal rate, regular rhythm, normal heart sounds and intact distal pulses.  Exam reveals no gallop and no friction rub.    No murmur heard.  Pulmonary/Chest: Effort normal. No respiratory distress. She has no wheezes. She has rales.   Coarse breath sounds bilaterally     Abdominal: Soft. Bowel sounds are normal. She exhibits no distension. There is no tenderness.   Musculoskeletal: Normal range of motion. She exhibits edema (trace pitting BLE). She exhibits no deformity.   Lymphadenopathy:     She has no cervical adenopathy.   Neurological: She is alert and oriented to person, place, and time.   Skin: Skin is warm and dry.   Psychiatric: She has a normal mood and affect. Thought content  normal.       Significant Labs:   Recent Results (from the past 24 hour(s))   POCT glucose    Collection Time: 03/14/17  9:20 PM   Result Value Ref Range    POCT Glucose 364 (H) 70 - 110 mg/dL   POCT glucose    Collection Time: 03/14/17 11:19 PM   Result Value Ref Range    POCT Glucose 388 (H) 70 - 110 mg/dL   POCT glucose    Collection Time: 03/14/17 11:20 PM   Result Value Ref Range    POCT Glucose 355 (H) 70 - 110 mg/dL   CBC with Automated Differential    Collection Time: 03/15/17  5:21 AM   Result Value Ref Range    WBC 24.11 (H) 3.90 - 12.70 K/uL    RBC 4.02 4.00 - 5.40 M/uL    Hemoglobin 13.0 12.0 - 16.0 g/dL    Hematocrit 39.8 37.0 - 48.5 %    MCV 99 (H) 82 - 98 fL    MCH 32.3 (H) 27.0 - 31.0 pg    MCHC 32.7 32.0 - 36.0 %    RDW 12.8 11.5 - 14.5 %    Platelets 225 150 - 350 K/uL    MPV 10.7 9.2 - 12.9 fL    Gran # 21.0 (H) 1.8 - 7.7 K/uL    Lymph # 1.9 1.0 - 4.8 K/uL    Mono # 1.2 (H) 0.3 - 1.0 K/uL    Eos # 0.0 0.0 - 0.5 K/uL    Baso # 0.01 0.00 - 0.20 K/uL    Gran% 87.2 (H) 38.0 - 73.0 %    Lymph% 7.8 (L) 18.0 - 48.0 %    Mono% 5.0 4.0 - 15.0 %    Eosinophil% 0.0 0.0 - 8.0 %    Basophil% 0.0 0.0 - 1.9 %    Aniso Slight     Poik Slight     Poly Occasional     Hypo Occasional     Ovalocytes Occasional     Differential Method Automated    PT/INR    Collection Time: 03/15/17  5:21 AM   Result Value Ref Range    Prothrombin Time 22.6 (H) 9.0 - 12.5 sec    INR 2.3 (H) 0.8 - 1.2   Basic metabolic panel    Collection Time: 03/15/17  5:21 AM   Result Value Ref Range    Sodium 139 136 - 145 mmol/L    Potassium 5.1 3.5 - 5.1 mmol/L    Chloride 106 95 - 110 mmol/L    CO2 25 23 - 29 mmol/L    Glucose 327 (H) 70 - 110 mg/dL    BUN, Bld 74 (H) 8 - 23 mg/dL    Creatinine 1.9 (H) 0.5 - 1.4 mg/dL    Calcium 8.8 8.7 - 10.5 mg/dL    Anion Gap 8 8 - 16 mmol/L    eGFR if African American 29.9 (A) >60 mL/min/1.73 m^2    eGFR if non  26.0 (A) >60 mL/min/1.73 m^2   POCT glucose    Collection Time: 03/15/17  8:25 AM    Result Value Ref Range    POCT Glucose 192 (H) 70 - 110 mg/dL   POCT glucose    Collection Time: 03/15/17 12:14 PM   Result Value Ref Range    POCT Glucose 336 (H) 70 - 110 mg/dL   POCT glucose    Collection Time: 03/15/17  5:03 PM   Result Value Ref Range    POCT Glucose 392 (H) 70 - 110 mg/dL       Significant Imaging: I have reviewed all pertinent imaging results/findings within the past 24 hours.    Assessment/Plan:      * Acute on chronic respiratory failure with hypoxia  -Likely COPD exacerbation with allergic bronchitis (see COPD exacerbation and Allergic bronchitis)  -Pt was also on fluids. Coarse breath sounds on exam today. Holding fluids and restarting home dose lasix 20 mg bid. Monitor I/O.      COPD exacerbation  -Will continue scheduled duo-neb treatments  -5 day course of azithromycin and prednisone for a total of 5 days  -Continue home advair  -Pt on 3L NC at home. Currently at 4L NC. Pt states that she uses her O2 intermittently and usually just at night when she sleeps. O2 sats in the low 90s currently. Pt likely normally sats 88-92%. Will maintain this goal  - likely to be discharged tomorrow      Type 2 diabetes mellitus with diabetic chronic kidney disease  -patient takes ~20U levemir qhs and ~10U novolog TIDWM, adjusted according to BGs  -HbA1c 7.8 on this admission  -BG in the 300s yesterday 2/2 starting steroids.  -Increase levemir to 20 units daily and 10 units TIDWM with sliding scale   -cardiac/diabetic diet      Allergic bronchitis with acute exacerbation  -patient with 2 day hx of subjective fevers and chills, productive cough, and worsening dyspnea on exertion   -CXR shows no pleural effusion and stable bibasilar interstitial opacifications. No areas of consolidation seen  -Patient also reporting post nasal drip and sinus congestion  -Start scheduled zyrtec and flonase. Steroids, abx and nebulizer, improved from day prior        On home oxygen therapy  -on 3L NC at home  - will keep  goal of 88-92%      Hypothyroidism  -continue synthroid       SUSAN (generalized anxiety disorder)  -patient takes xanax at home prn for anxiety   -Pt requesting her xanax. Will give prn.      Long term (current) use of anticoagulants  -daily PT/INR for goal 2-3      Tobacco abuse  -patient currently smoking 1 cigarettes every 2 days  -smoking cessation counseling       Chronic diastolic CHF (congestive heart failure)  -last 2D echo shows EF 60 with diastolic dysfunction   -patient currently euvolemic,    -continue home lasix 20 mg bid  -repeat 2D echo showing preserved EF with diastolic dysfunction    Atrial fibrillation with RVR  -patient diagnosed with Afib last year which spontaneously converted   -currently on warfarin 2.5 mg daily except 5 mg on Wed  -patient currently normal rhythm, INR at goal today at 2.7  -daily PT/INR  -continue cardizem and lopressor      Iron deficiency  -continue iron supplementation      Essential hypertension  -continue lisinopril 5 mg, lopressor 25, and cardizem 240      Hyperlipidemia  -continue pravastatin      VTE Risk Mitigation         Ordered     warfarin (COUMADIN) tablet 2.5 mg  Every Saturday     Route:  Oral        03/13/17 0215     warfarin (COUMADIN) tablet 5 mg  Every Wednesday     Route:  Oral        03/13/17 0215     warfarin (COUMADIN) tablet 2.5 mg  Every Mon, Tue, Thu, Fri, Sun     Route:  Oral        03/13/17 0215          Taiwo Giron MD  Department of Hospital Medicine   [facility]

## 2017-03-15 NOTE — SUBJECTIVE & OBJECTIVE
Interval History: Patient reports feeling well in AM.  Has coughs in AM, but improved with nebulizers.  Denies fevers, chills, N/V, chest or abdominal pains.      Review of Systems   Constitutional: Negative for chills and fever.   HENT: Positive for congestion, postnasal drip, sinus pressure and sore throat.    Respiratory: Positive for cough (productive of green sputum mixed with blood) and shortness of breath (improving). Negative for wheezing.    Cardiovascular: Negative for chest pain, palpitations and leg swelling.   Gastrointestinal: Negative for abdominal pain, constipation, diarrhea, nausea and vomiting.   Genitourinary: Negative for difficulty urinating and dysuria.     Objective:     Vital Signs (Most Recent):  Temp: 97.9 °F (36.6 °C) (03/15/17 1615)  Pulse: 90 (03/15/17 1700)  Resp: 16 (03/15/17 1615)  BP: (!) 123/56 (03/15/17 1615)  SpO2: (!) 93 % (03/15/17 1615) Vital Signs (24h Range):  Temp:  [97.6 °F (36.4 °C)-98.2 °F (36.8 °C)] 97.9 °F (36.6 °C)  Pulse:  [60-90] 90  Resp:  [14-18] 16  SpO2:  [91 %-97 %] 93 %  BP: (108-123)/(54-59) 123/56     Weight: 64.4 kg (142 lb)  Body mass index is 27.73 kg/(m^2).    Intake/Output Summary (Last 24 hours) at 03/15/17 1819  Last data filed at 03/15/17 1100   Gross per 24 hour   Intake              620 ml   Output              350 ml   Net              270 ml      Physical Exam   Constitutional: She is oriented to person, place, and time. She appears well-developed and well-nourished. No distress.   HENT:   Head: Normocephalic.   Right Ear: External ear normal.   Left Ear: External ear normal.   Eyes: EOM are normal. Pupils are equal, round, and reactive to light.   Neck: Normal range of motion. Neck supple.   Cardiovascular: Normal rate, regular rhythm, normal heart sounds and intact distal pulses.  Exam reveals no gallop and no friction rub.    No murmur heard.  Pulmonary/Chest: Effort normal. No respiratory distress. She has no wheezes. She has rales.   Coarse  breath sounds bilaterally     Abdominal: Soft. Bowel sounds are normal. She exhibits no distension. There is no tenderness.   Musculoskeletal: Normal range of motion. She exhibits edema (trace pitting BLE). She exhibits no deformity.   Lymphadenopathy:     She has no cervical adenopathy.   Neurological: She is alert and oriented to person, place, and time.   Skin: Skin is warm and dry.   Psychiatric: She has a normal mood and affect. Thought content normal.       Significant Labs:   Recent Results (from the past 24 hour(s))   POCT glucose    Collection Time: 03/14/17  9:20 PM   Result Value Ref Range    POCT Glucose 364 (H) 70 - 110 mg/dL   POCT glucose    Collection Time: 03/14/17 11:19 PM   Result Value Ref Range    POCT Glucose 388 (H) 70 - 110 mg/dL   POCT glucose    Collection Time: 03/14/17 11:20 PM   Result Value Ref Range    POCT Glucose 355 (H) 70 - 110 mg/dL   CBC with Automated Differential    Collection Time: 03/15/17  5:21 AM   Result Value Ref Range    WBC 24.11 (H) 3.90 - 12.70 K/uL    RBC 4.02 4.00 - 5.40 M/uL    Hemoglobin 13.0 12.0 - 16.0 g/dL    Hematocrit 39.8 37.0 - 48.5 %    MCV 99 (H) 82 - 98 fL    MCH 32.3 (H) 27.0 - 31.0 pg    MCHC 32.7 32.0 - 36.0 %    RDW 12.8 11.5 - 14.5 %    Platelets 225 150 - 350 K/uL    MPV 10.7 9.2 - 12.9 fL    Gran # 21.0 (H) 1.8 - 7.7 K/uL    Lymph # 1.9 1.0 - 4.8 K/uL    Mono # 1.2 (H) 0.3 - 1.0 K/uL    Eos # 0.0 0.0 - 0.5 K/uL    Baso # 0.01 0.00 - 0.20 K/uL    Gran% 87.2 (H) 38.0 - 73.0 %    Lymph% 7.8 (L) 18.0 - 48.0 %    Mono% 5.0 4.0 - 15.0 %    Eosinophil% 0.0 0.0 - 8.0 %    Basophil% 0.0 0.0 - 1.9 %    Aniso Slight     Poik Slight     Poly Occasional     Hypo Occasional     Ovalocytes Occasional     Differential Method Automated    PT/INR    Collection Time: 03/15/17  5:21 AM   Result Value Ref Range    Prothrombin Time 22.6 (H) 9.0 - 12.5 sec    INR 2.3 (H) 0.8 - 1.2   Basic metabolic panel    Collection Time: 03/15/17  5:21 AM   Result Value Ref Range     Sodium 139 136 - 145 mmol/L    Potassium 5.1 3.5 - 5.1 mmol/L    Chloride 106 95 - 110 mmol/L    CO2 25 23 - 29 mmol/L    Glucose 327 (H) 70 - 110 mg/dL    BUN, Bld 74 (H) 8 - 23 mg/dL    Creatinine 1.9 (H) 0.5 - 1.4 mg/dL    Calcium 8.8 8.7 - 10.5 mg/dL    Anion Gap 8 8 - 16 mmol/L    eGFR if African American 29.9 (A) >60 mL/min/1.73 m^2    eGFR if non  26.0 (A) >60 mL/min/1.73 m^2   POCT glucose    Collection Time: 03/15/17  8:25 AM   Result Value Ref Range    POCT Glucose 192 (H) 70 - 110 mg/dL   POCT glucose    Collection Time: 03/15/17 12:14 PM   Result Value Ref Range    POCT Glucose 336 (H) 70 - 110 mg/dL   POCT glucose    Collection Time: 03/15/17  5:03 PM   Result Value Ref Range    POCT Glucose 392 (H) 70 - 110 mg/dL       Significant Imaging: I have reviewed all pertinent imaging results/findings within the past 24 hours.

## 2017-03-15 NOTE — ASSESSMENT & PLAN NOTE
-patient takes ~20U levemir qhs and ~10U novolog TIDWM, adjusted according to BGs  -HbA1c 7.8 on this admission  -BG in the 300s yesterday 2/2 starting steroids.  -Increase levemir to 20 units daily and 10 units TIDWM with sliding scale   -cardiac/diabetic diet

## 2017-03-15 NOTE — ASSESSMENT & PLAN NOTE
-patient with 2 day hx of subjective fevers and chills, productive cough, and worsening dyspnea on exertion   -CXR shows no pleural effusion and stable bibasilar interstitial opacifications. No areas of consolidation seen  -Patient also reporting post nasal drip and sinus congestion  -Start scheduled zyrtec and flonase. Steroids, abx and nebulizer, improved from day prior

## 2017-03-15 NOTE — PT/OT/SLP PROGRESS
"Occupational Therapy      Savita Polo  MRN: [id number]    Attempted pt in the afternoon. Patient not seen today secondary to fatigue. OT educated on the importance of participating in OT services, stated she was "too tired". Educated on the importance of OOB activity, pt stated "I just got out the damn chair". Refused all ADLs, transfers,and bed mobility. Will follow-up next scheduled session.    RITA Rascon  3/15/2017  "

## 2017-03-15 NOTE — PLAN OF CARE
Problem: Patient Care Overview  Goal: Plan of Care Review  Pt free from injury/fall. Bed in locked lowest position, call light within reach. Pt able to reposition independently, no new skin changes. HS BG treated with once dose of 5 units and correction dose insulin, no s/s of hypoglycemia. VSS, afebrile. Unable to titrate O2 during shift, pt required 4LNC. Breathing tx q4 hr as scheduled. Pt coughing off white sputum. Spo2 mid 90s for shift when O2 stayed on. Pt denies pain for shift. Will continue to monitor.

## 2017-03-16 VITALS
TEMPERATURE: 97 F | SYSTOLIC BLOOD PRESSURE: 112 MMHG | HEIGHT: 60 IN | DIASTOLIC BLOOD PRESSURE: 59 MMHG | BODY MASS INDEX: 27.88 KG/M2 | WEIGHT: 142 LBS | HEART RATE: 67 BPM | OXYGEN SATURATION: 98 % | RESPIRATION RATE: 20 BRPM

## 2017-03-16 LAB
ANION GAP SERPL CALC-SCNC: 5 MMOL/L
ANION GAP SERPL CALC-SCNC: 7 MMOL/L
BASOPHILS # BLD AUTO: 0.01 K/UL
BASOPHILS NFR BLD: 0 %
BUN SERPL-MCNC: 71 MG/DL
BUN SERPL-MCNC: 79 MG/DL
CALCIUM SERPL-MCNC: 8.9 MG/DL
CALCIUM SERPL-MCNC: 9.3 MG/DL
CHLORIDE SERPL-SCNC: 104 MMOL/L
CHLORIDE SERPL-SCNC: 106 MMOL/L
CO2 SERPL-SCNC: 26 MMOL/L
CO2 SERPL-SCNC: 30 MMOL/L
CREAT SERPL-MCNC: 1.7 MG/DL
CREAT SERPL-MCNC: 1.9 MG/DL
DIFFERENTIAL METHOD: ABNORMAL
EOSINOPHIL # BLD AUTO: 0 K/UL
EOSINOPHIL NFR BLD: 0 %
ERYTHROCYTE [DISTWIDTH] IN BLOOD BY AUTOMATED COUNT: 12.9 %
EST. GFR  (AFRICAN AMERICAN): 29.9 ML/MIN/1.73 M^2
EST. GFR  (AFRICAN AMERICAN): 34.2 ML/MIN/1.73 M^2
EST. GFR  (NON AFRICAN AMERICAN): 26 ML/MIN/1.73 M^2
EST. GFR  (NON AFRICAN AMERICAN): 29.7 ML/MIN/1.73 M^2
GLUCOSE SERPL-MCNC: 220 MG/DL
GLUCOSE SERPL-MCNC: 249 MG/DL
HCT VFR BLD AUTO: 40.1 %
HGB BLD-MCNC: 13.3 G/DL
INR PPP: 2.2
LYMPHOCYTES # BLD AUTO: 2.2 K/UL
LYMPHOCYTES NFR BLD: 10.6 %
MCH RBC QN AUTO: 32.3 PG
MCHC RBC AUTO-ENTMCNC: 33.2 %
MCV RBC AUTO: 97 FL
MONOCYTES # BLD AUTO: 1.2 K/UL
MONOCYTES NFR BLD: 5.5 %
NEUTROPHILS # BLD AUTO: 17.5 K/UL
NEUTROPHILS NFR BLD: 83.3 %
PLATELET # BLD AUTO: 213 K/UL
PMV BLD AUTO: 10.2 FL
POCT GLUCOSE: 195 MG/DL (ref 70–110)
POCT GLUCOSE: 209 MG/DL (ref 70–110)
POCT GLUCOSE: 293 MG/DL (ref 70–110)
POTASSIUM SERPL-SCNC: 4.8 MMOL/L
POTASSIUM SERPL-SCNC: 5 MMOL/L
PROTHROMBIN TIME: 22 SEC
RBC # BLD AUTO: 4.12 M/UL
SODIUM SERPL-SCNC: 139 MMOL/L
SODIUM SERPL-SCNC: 139 MMOL/L
WBC # BLD AUTO: 21.01 K/UL

## 2017-03-16 PROCEDURE — 85025 COMPLETE CBC W/AUTO DIFF WBC: CPT

## 2017-03-16 PROCEDURE — 80048 BASIC METABOLIC PNL TOTAL CA: CPT

## 2017-03-16 PROCEDURE — 63600175 PHARM REV CODE 636 W HCPCS

## 2017-03-16 PROCEDURE — 25000003 PHARM REV CODE 250

## 2017-03-16 PROCEDURE — 94761 N-INVAS EAR/PLS OXIMETRY MLT: CPT

## 2017-03-16 PROCEDURE — 25000003 PHARM REV CODE 250: Performed by: INTERNAL MEDICINE

## 2017-03-16 PROCEDURE — 27000221 HC OXYGEN, UP TO 24 HOURS

## 2017-03-16 PROCEDURE — 97530 THERAPEUTIC ACTIVITIES: CPT

## 2017-03-16 PROCEDURE — 85610 PROTHROMBIN TIME: CPT

## 2017-03-16 PROCEDURE — 94640 AIRWAY INHALATION TREATMENT: CPT

## 2017-03-16 PROCEDURE — 36415 COLL VENOUS BLD VENIPUNCTURE: CPT

## 2017-03-16 PROCEDURE — 80048 BASIC METABOLIC PNL TOTAL CA: CPT | Mod: 91

## 2017-03-16 PROCEDURE — 25000003 PHARM REV CODE 250: Performed by: STUDENT IN AN ORGANIZED HEALTH CARE EDUCATION/TRAINING PROGRAM

## 2017-03-16 PROCEDURE — 99900035 HC TECH TIME PER 15 MIN (STAT)

## 2017-03-16 PROCEDURE — 99238 HOSP IP/OBS DSCHRG MGMT 30/<: CPT | Mod: GC,,, | Performed by: HOSPITALIST

## 2017-03-16 PROCEDURE — 25000242 PHARM REV CODE 250 ALT 637 W/ HCPCS: Performed by: STUDENT IN AN ORGANIZED HEALTH CARE EDUCATION/TRAINING PROGRAM

## 2017-03-16 RX ORDER — PREDNISONE 20 MG/1
40 TABLET ORAL DAILY
Qty: 2 TABLET | Refills: 0 | Status: SHIPPED | OUTPATIENT
Start: 2017-03-16 | End: 2017-03-17

## 2017-03-16 RX ORDER — AZITHROMYCIN 250 MG/1
250 TABLET, FILM COATED ORAL DAILY
Qty: 1 TABLET | Refills: 0 | Status: SHIPPED | OUTPATIENT
Start: 2017-03-16 | End: 2017-03-17

## 2017-03-16 RX ADMIN — PRAVASTATIN SODIUM 40 MG: 40 TABLET ORAL at 08:03

## 2017-03-16 RX ADMIN — CETIRIZINE HYDROCHLORIDE 10 MG: 5 TABLET, FILM COATED ORAL at 08:03

## 2017-03-16 RX ADMIN — FERROUS SULFATE TAB EC 325 MG (65 MG FE EQUIVALENT) 325 MG: 325 (65 FE) TABLET DELAYED RESPONSE at 08:03

## 2017-03-16 RX ADMIN — LISINOPRIL 5 MG: 5 TABLET ORAL at 08:03

## 2017-03-16 RX ADMIN — METOPROLOL TARTRATE 25 MG: 25 TABLET ORAL at 08:03

## 2017-03-16 RX ADMIN — IPRATROPIUM BROMIDE AND ALBUTEROL SULFATE 3 ML: .5; 3 SOLUTION RESPIRATORY (INHALATION) at 03:03

## 2017-03-16 RX ADMIN — INSULIN ASPART 10 UNITS: 100 INJECTION, SOLUTION INTRAVENOUS; SUBCUTANEOUS at 07:03

## 2017-03-16 RX ADMIN — SODIUM CHLORIDE 500 ML: 0.9 INJECTION, SOLUTION INTRAVENOUS at 07:03

## 2017-03-16 RX ADMIN — ALPRAZOLAM 0.25 MG: 0.25 TABLET ORAL at 08:03

## 2017-03-16 RX ADMIN — IPRATROPIUM BROMIDE AND ALBUTEROL SULFATE 3 ML: .5; 3 SOLUTION RESPIRATORY (INHALATION) at 11:03

## 2017-03-16 RX ADMIN — DILTIAZEM HYDROCHLORIDE 240 MG: 240 CAPSULE, COATED, EXTENDED RELEASE ORAL at 08:03

## 2017-03-16 RX ADMIN — FLUTICASONE FUROATE AND VILANTEROL TRIFENATATE 1 PUFF: 200; 25 POWDER RESPIRATORY (INHALATION) at 08:03

## 2017-03-16 RX ADMIN — FLUTICASONE PROPIONATE 2 SPRAY: 50 SPRAY, METERED NASAL at 08:03

## 2017-03-16 RX ADMIN — IPRATROPIUM BROMIDE AND ALBUTEROL SULFATE 3 ML: .5; 3 SOLUTION RESPIRATORY (INHALATION) at 07:03

## 2017-03-16 RX ADMIN — IPRATROPIUM BROMIDE AND ALBUTEROL SULFATE 3 ML: .5; 3 SOLUTION RESPIRATORY (INHALATION) at 12:03

## 2017-03-16 RX ADMIN — INSULIN ASPART 2 UNITS: 100 INJECTION, SOLUTION INTRAVENOUS; SUBCUTANEOUS at 01:03

## 2017-03-16 RX ADMIN — INSULIN ASPART 10 UNITS: 100 INJECTION, SOLUTION INTRAVENOUS; SUBCUTANEOUS at 01:03

## 2017-03-16 RX ADMIN — PREDNISONE 40 MG: 20 TABLET ORAL at 08:03

## 2017-03-16 RX ADMIN — LEVOTHYROXINE SODIUM 50 MCG: 50 TABLET ORAL at 05:03

## 2017-03-16 RX ADMIN — AZITHROMYCIN 250 MG: 250 TABLET, FILM COATED ORAL at 08:03

## 2017-03-16 NOTE — PLAN OF CARE
[facility] - IP COPD Exacerbation - [facility]      COPD Step 1       RT: Education on rescue Albuterol vs. preventative Albuterol completed once at time of medication administration during the morning shift Met       RT: Oxygen Education completed with aerosol treatments or every shift if no therapy scheduled Met       RT: Education on COPD disease provided each shift Met       RT: Clinical Outcomes: Reduced wheezing with adequate air movement at time of aerosol treatment Met          [facility] - IP COPD Exacerbation - [facility]      COPD Step 1       RT: Clinical Outcome Respiratory rate < 20 at time of aerosol treatment Not Met          PATHWAYS RESP RATE>20          Chronic Obstructive Pulmonary Disease (Adult)     Signs and Symptoms of Listed Potential Problems Will be Absent, Minimized or Managed (Chronic Obstructive Pulmonary Disease) Ongoing (interventions implemented as appropriate)

## 2017-03-16 NOTE — SUBJECTIVE & OBJECTIVE
Interval History: Patient reports feeling well in AM.  Cough improved from day prior, states feeling stronger.  Anticipate to go home today.      Review of Systems   Constitutional: Negative for chills and fever.   HENT: Positive for congestion, postnasal drip, sinus pressure and sore throat.    Respiratory: Positive for cough (productive of green sputum mixed with blood) and shortness of breath (improving). Negative for wheezing.    Cardiovascular: Negative for chest pain, palpitations and leg swelling.   Gastrointestinal: Negative for abdominal pain, constipation, diarrhea, nausea and vomiting.   Genitourinary: Negative for difficulty urinating and dysuria.     Objective:     Vital Signs (Most Recent):  Temp: 97.2 °F (36.2 °C) (03/16/17 1150)  Pulse: 72 (03/16/17 1517)  Resp: 18 (03/16/17 1517)  BP: (!) 112/59 (03/16/17 1150)  SpO2: (!) 92 % (03/16/17 1517) Vital Signs (24h Range):  Temp:  [97.2 °F (36.2 °C)-97.9 °F (36.6 °C)] 97.2 °F (36.2 °C)  Pulse:  [57-90] 72  Resp:  [16-20] 18  SpO2:  [92 %-98 %] 92 %  BP: (106-126)/(55-64) 112/59     Weight: 64.4 kg (142 lb)  Body mass index is 27.73 kg/(m^2).    Intake/Output Summary (Last 24 hours) at 03/16/17 1524  Last data filed at 03/16/17 0500   Gross per 24 hour   Intake              400 ml   Output                0 ml   Net              400 ml      Physical Exam   Constitutional: She is oriented to person, place, and time. She appears well-developed and well-nourished. No distress.   HENT:   Head: Normocephalic.   Right Ear: External ear normal.   Left Ear: External ear normal.   Eyes: EOM are normal. Pupils are equal, round, and reactive to light.   Neck: Normal range of motion. Neck supple.   Cardiovascular: Normal rate, regular rhythm, normal heart sounds and intact distal pulses.  Exam reveals no gallop and no friction rub.    No murmur heard.  Pulmonary/Chest: Effort normal. No respiratory distress. She has no wheezes. She has rales.   Coarse breath sounds  bilaterally     Abdominal: Soft. Bowel sounds are normal. She exhibits no distension. There is no tenderness.   Musculoskeletal: Normal range of motion. She exhibits edema (trace pitting BLE). She exhibits no deformity.   Lymphadenopathy:     She has no cervical adenopathy.   Neurological: She is alert and oriented to person, place, and time.   Skin: Skin is warm and dry.   Psychiatric: She has a normal mood and affect. Thought content normal.       Significant Labs:   Recent Results (from the past 24 hour(s))   POCT glucose    Collection Time: 03/15/17  5:03 PM   Result Value Ref Range    POCT Glucose 392 (H) 70 - 110 mg/dL   POCT glucose    Collection Time: 03/15/17  9:03 PM   Result Value Ref Range    POCT Glucose 390 (H) 70 - 110 mg/dL   POCT glucose    Collection Time: 03/16/17 12:16 AM   Result Value Ref Range    POCT Glucose 293 (H) 70 - 110 mg/dL   CBC with Automated Differential    Collection Time: 03/16/17  5:36 AM   Result Value Ref Range    WBC 21.01 (H) 3.90 - 12.70 K/uL    RBC 4.12 4.00 - 5.40 M/uL    Hemoglobin 13.3 12.0 - 16.0 g/dL    Hematocrit 40.1 37.0 - 48.5 %    MCV 97 82 - 98 fL    MCH 32.3 (H) 27.0 - 31.0 pg    MCHC 33.2 32.0 - 36.0 %    RDW 12.9 11.5 - 14.5 %    Platelets 213 150 - 350 K/uL    MPV 10.2 9.2 - 12.9 fL    Gran # 17.5 (H) 1.8 - 7.7 K/uL    Lymph # 2.2 1.0 - 4.8 K/uL    Mono # 1.2 (H) 0.3 - 1.0 K/uL    Eos # 0.0 0.0 - 0.5 K/uL    Baso # 0.01 0.00 - 0.20 K/uL    Gran% 83.3 (H) 38.0 - 73.0 %    Lymph% 10.6 (L) 18.0 - 48.0 %    Mono% 5.5 4.0 - 15.0 %    Eosinophil% 0.0 0.0 - 8.0 %    Basophil% 0.0 0.0 - 1.9 %    Differential Method Automated    PT/INR    Collection Time: 03/16/17  5:36 AM   Result Value Ref Range    Prothrombin Time 22.0 (H) 9.0 - 12.5 sec    INR 2.2 (H) 0.8 - 1.2   Basic metabolic panel    Collection Time: 03/16/17  5:36 AM   Result Value Ref Range    Sodium 139 136 - 145 mmol/L    Potassium 4.8 3.5 - 5.1 mmol/L    Chloride 104 95 - 110 mmol/L    CO2 30 (H) 23 - 29  mmol/L    Glucose 249 (H) 70 - 110 mg/dL    BUN, Bld 79 (H) 8 - 23 mg/dL    Creatinine 1.9 (H) 0.5 - 1.4 mg/dL    Calcium 9.3 8.7 - 10.5 mg/dL    Anion Gap 5 (L) 8 - 16 mmol/L    eGFR if African American 29.9 (A) >60 mL/min/1.73 m^2    eGFR if non  26.0 (A) >60 mL/min/1.73 m^2   POCT glucose    Collection Time: 03/16/17  7:38 AM   Result Value Ref Range    POCT Glucose 195 (H) 70 - 110 mg/dL   POCT glucose    Collection Time: 03/16/17 11:53 AM   Result Value Ref Range    POCT Glucose 209 (H) 70 - 110 mg/dL   Basic metabolic panel    Collection Time: 03/16/17 12:24 PM   Result Value Ref Range    Sodium 139 136 - 145 mmol/L    Potassium 5.0 3.5 - 5.1 mmol/L    Chloride 106 95 - 110 mmol/L    CO2 26 23 - 29 mmol/L    Glucose 220 (H) 70 - 110 mg/dL    BUN, Bld 71 (H) 8 - 23 mg/dL    Creatinine 1.7 (H) 0.5 - 1.4 mg/dL    Calcium 8.9 8.7 - 10.5 mg/dL    Anion Gap 7 (L) 8 - 16 mmol/L    eGFR if African American 34.2 (A) >60 mL/min/1.73 m^2    eGFR if non  29.7 (A) >60 mL/min/1.73 m^2       Significant Imaging: I have reviewed all pertinent imaging results/findings within the past 24 hours.

## 2017-03-16 NOTE — ASSESSMENT & PLAN NOTE
-Will continue scheduled duo-neb treatments  -5 day course of azithromycin and prednisone for a total of 5 days, one day remaining for home use   -Continue home advair  -Pt on 3L NC at home. Currently at 4L NC. Pt states that she uses her O2 intermittently and usually just at night when she sleeps. O2 sats in the low 90s currently. Pt likely normally sats 88-92%. Will maintain this goal  - patient to be discharged today

## 2017-03-16 NOTE — PT/OT/SLP PROGRESS
"Occupational Therapy      Savita Polo  MRN: [id number]     Time Spent: 5774-3495 (10 mins)    Pt found supine in bed with oxygen donned. OT educated on the importance of participating in OT services and OOB activity, verbalized understanding but refused OOB activity after maximum encouragement. Pt stated " I am going home". Pt fully educated on bathroom safety and safety at  home and answered questions from pt regarding safety. Additionally, OT answered questions from pt on RW vs rollator DME and HH services. OT inquired if pt had any further questions for skilled OT services before discharge, pt stated "no".  Will follow up if pt is not discharged tomorrow, she will continue to benefit from skilled OT services to maximize safety and increase (I) in ADLs.     RITA Rascon  3/16/2017  "

## 2017-03-16 NOTE — ASSESSMENT & PLAN NOTE
-patient takes ~20U levemir qhs and ~10U novolog TIDWM, adjusted according to BGs  -HbA1c 7.8 on this admission  --390 in last 24 hours, likely 2/2 to steroid use, will likely improve after steroid treatment   -continue home levemir to 20 units daily and 10 units TIDWM with sliding scale   -cardiac/diabetic diet

## 2017-03-16 NOTE — ASSESSMENT & PLAN NOTE
-patient diagnosed with Afib last year which spontaneously converted   -currently on warfarin 2.5 mg daily except 5 mg on Wed  -patient currently normal rhythm, INR theraputic   -daily PT/INR  -continue cardizem and lopressor

## 2017-03-16 NOTE — PROGRESS NOTES
[facility] Hospital Medicine  Progress Note    Patient Name: Savita Polo  MRN: [id number]  Patient Class: IP- Inpatient   Admission Date: 3/12/2017  Length of Stay: 3 days  Attending Physician: Enedelia Styles MD  Primary Care Provider: Nadiya Nava MD    Hospital Medicine Team: [facility] HOSP MED 1 Taiwo Giron MD    Subjective:     Principal Problem:Acute on chronic respiratory failure with hypoxia    HPI:  Ms. Polo is a 70 yo F with PMHx paroxysmal Afib (on coumadin), CVA with no residual weakness, COPD (on home oxygen 3L), HFpEF, DM II on insulin, CKD IV, hypothyroidism, HTN, HLD, presents with 2 day hx of subjective fevers/chills and worsening SOB. Patient states that she cleaned a dirty bathroom 3 days ago and the following day she developed subjective fevers (did not measure her temperature) and chills along with increased oxygen requirements. She can normally walk 1-2 blocks without stopping, but became very fatigued and dyspneic with minimal exertion these past two days. She also developed a productive cough with yellow sputum and had nausea and 2 episodes of emesis yesterday. Endorses nasal congestion and myalgias. Denies chest pain, abdominal pain, diarrhea, peripheral edema, dysuria. Patient lives with her daughter and can ambulate with a walker. Patient has been smoking since the age of 19 and currently smokes 1 cigarette every two days. Denies alcohol and drug use. Has had pneumonia and flu vaccines. No sick contacts.     Interval History: Patient reports feeling well in AM.  Cough improved from day prior, states feeling stronger.  Anticipate to go home today.      Review of Systems   Constitutional: Negative for chills and fever.   HENT: Positive for congestion, postnasal drip, sinus pressure and sore throat.    Respiratory: Positive for cough (productive of green sputum mixed with blood) and shortness of breath (improving). Negative for wheezing.    Cardiovascular:  Negative for chest pain, palpitations and leg swelling.   Gastrointestinal: Negative for abdominal pain, constipation, diarrhea, nausea and vomiting.   Genitourinary: Negative for difficulty urinating and dysuria.     Objective:     Vital Signs (Most Recent):  Temp: 97.2 °F (36.2 °C) (03/16/17 1150)  Pulse: 72 (03/16/17 1517)  Resp: 18 (03/16/17 1517)  BP: (!) 112/59 (03/16/17 1150)  SpO2: (!) 92 % (03/16/17 1517) Vital Signs (24h Range):  Temp:  [97.2 °F (36.2 °C)-97.9 °F (36.6 °C)] 97.2 °F (36.2 °C)  Pulse:  [57-90] 72  Resp:  [16-20] 18  SpO2:  [92 %-98 %] 92 %  BP: (106-126)/(55-64) 112/59     Weight: 64.4 kg (142 lb)  Body mass index is 27.73 kg/(m^2).    Intake/Output Summary (Last 24 hours) at 03/16/17 1524  Last data filed at 03/16/17 0500   Gross per 24 hour   Intake              400 ml   Output                0 ml   Net              400 ml      Physical Exam   Constitutional: She is oriented to person, place, and time. She appears well-developed and well-nourished. No distress.   HENT:   Head: Normocephalic.   Right Ear: External ear normal.   Left Ear: External ear normal.   Eyes: EOM are normal. Pupils are equal, round, and reactive to light.   Neck: Normal range of motion. Neck supple.   Cardiovascular: Normal rate, regular rhythm, normal heart sounds and intact distal pulses.  Exam reveals no gallop and no friction rub.    No murmur heard.  Pulmonary/Chest: Effort normal. No respiratory distress. She has no wheezes. She has rales.   Coarse breath sounds bilaterally     Abdominal: Soft. Bowel sounds are normal. She exhibits no distension. There is no tenderness.   Musculoskeletal: Normal range of motion. She exhibits edema (trace pitting BLE). She exhibits no deformity.   Lymphadenopathy:     She has no cervical adenopathy.   Neurological: She is alert and oriented to person, place, and time.   Skin: Skin is warm and dry.   Psychiatric: She has a normal mood and affect. Thought content normal.        Significant Labs:   Recent Results (from the past 24 hour(s))   POCT glucose    Collection Time: 03/15/17  5:03 PM   Result Value Ref Range    POCT Glucose 392 (H) 70 - 110 mg/dL   POCT glucose    Collection Time: 03/15/17  9:03 PM   Result Value Ref Range    POCT Glucose 390 (H) 70 - 110 mg/dL   POCT glucose    Collection Time: 03/16/17 12:16 AM   Result Value Ref Range    POCT Glucose 293 (H) 70 - 110 mg/dL   CBC with Automated Differential    Collection Time: 03/16/17  5:36 AM   Result Value Ref Range    WBC 21.01 (H) 3.90 - 12.70 K/uL    RBC 4.12 4.00 - 5.40 M/uL    Hemoglobin 13.3 12.0 - 16.0 g/dL    Hematocrit 40.1 37.0 - 48.5 %    MCV 97 82 - 98 fL    MCH 32.3 (H) 27.0 - 31.0 pg    MCHC 33.2 32.0 - 36.0 %    RDW 12.9 11.5 - 14.5 %    Platelets 213 150 - 350 K/uL    MPV 10.2 9.2 - 12.9 fL    Gran # 17.5 (H) 1.8 - 7.7 K/uL    Lymph # 2.2 1.0 - 4.8 K/uL    Mono # 1.2 (H) 0.3 - 1.0 K/uL    Eos # 0.0 0.0 - 0.5 K/uL    Baso # 0.01 0.00 - 0.20 K/uL    Gran% 83.3 (H) 38.0 - 73.0 %    Lymph% 10.6 (L) 18.0 - 48.0 %    Mono% 5.5 4.0 - 15.0 %    Eosinophil% 0.0 0.0 - 8.0 %    Basophil% 0.0 0.0 - 1.9 %    Differential Method Automated    PT/INR    Collection Time: 03/16/17  5:36 AM   Result Value Ref Range    Prothrombin Time 22.0 (H) 9.0 - 12.5 sec    INR 2.2 (H) 0.8 - 1.2   Basic metabolic panel    Collection Time: 03/16/17  5:36 AM   Result Value Ref Range    Sodium 139 136 - 145 mmol/L    Potassium 4.8 3.5 - 5.1 mmol/L    Chloride 104 95 - 110 mmol/L    CO2 30 (H) 23 - 29 mmol/L    Glucose 249 (H) 70 - 110 mg/dL    BUN, Bld 79 (H) 8 - 23 mg/dL    Creatinine 1.9 (H) 0.5 - 1.4 mg/dL    Calcium 9.3 8.7 - 10.5 mg/dL    Anion Gap 5 (L) 8 - 16 mmol/L    eGFR if African American 29.9 (A) >60 mL/min/1.73 m^2    eGFR if non  26.0 (A) >60 mL/min/1.73 m^2   POCT glucose    Collection Time: 03/16/17  7:38 AM   Result Value Ref Range    POCT Glucose 195 (H) 70 - 110 mg/dL   POCT glucose    Collection Time:  03/16/17 11:53 AM   Result Value Ref Range    POCT Glucose 209 (H) 70 - 110 mg/dL   Basic metabolic panel    Collection Time: 03/16/17 12:24 PM   Result Value Ref Range    Sodium 139 136 - 145 mmol/L    Potassium 5.0 3.5 - 5.1 mmol/L    Chloride 106 95 - 110 mmol/L    CO2 26 23 - 29 mmol/L    Glucose 220 (H) 70 - 110 mg/dL    BUN, Bld 71 (H) 8 - 23 mg/dL    Creatinine 1.7 (H) 0.5 - 1.4 mg/dL    Calcium 8.9 8.7 - 10.5 mg/dL    Anion Gap 7 (L) 8 - 16 mmol/L    eGFR if African American 34.2 (A) >60 mL/min/1.73 m^2    eGFR if non  29.7 (A) >60 mL/min/1.73 m^2       Significant Imaging: I have reviewed all pertinent imaging results/findings within the past 24 hours.    Assessment/Plan:      * Acute on chronic respiratory failure with hypoxia  -Likely COPD exacerbation with allergic bronchitis (see COPD exacerbation and Allergic bronchitis)       COPD exacerbation  -Will continue scheduled duo-neb treatments  -5 day course of azithromycin and prednisone for a total of 5 days, one day remaining for home use   -Continue home advair  -Pt on 3L NC at home. Currently at 4L NC. Pt states that she uses her O2 intermittently and usually just at night when she sleeps. O2 sats in the low 90s currently. Pt likely normally sats 88-92%. Will maintain this goal  - patient to be discharged today       Type 2 diabetes mellitus with diabetic chronic kidney disease  -patient takes ~20U levemir qhs and ~10U novolog TIDWM, adjusted according to BGs  -HbA1c 7.8 on this admission  --390 in last 24 hours, likely 2/2 to steroid use, will likely improve after steroid treatment   -continue home levemir to 20 units daily and 10 units TIDWM with sliding scale   -cardiac/diabetic diet      CKD (chronic kidney disease), stage IV  -kidney disease due to DM II and HTN, baseline Cr ~1.8  - mild increase to 1.9 in AM draw   - given pre-renal azotemia and alkalosis, likely 2/2 to diuretic use  - patient given 500 cc NS bolus with  subsequent draw with improvement of Cr to 1.7  - stable for discharge       Allergic bronchitis with acute exacerbation  -patient with 2 day hx of subjective fevers and chills, productive cough, and worsening dyspnea on exertion   -CXR shows no pleural effusion and stable bibasilar interstitial opacifications. No areas of consolidation seen  -Patient also reporting post nasal drip and sinus congestion  -Start scheduled zyrtec and flonase. Steroids, abx and nebulizer, improved from day prior        On home oxygen therapy  -on 3L NC at home  - will keep goal of 88-92%      Hypothyroidism  -continue synthroid       Diabetic peripheral neuropathy  -see diabetes      SUSAN (generalized anxiety disorder)  -patient takes xanax at home prn for anxiety   -Pt requesting her xanax. Will give prn.      Long term (current) use of anticoagulants  -daily PT/INR for goal 2-3, INR today 2.2   - continue home warfarin       Tobacco abuse  -patient currently smoking 1 cigarettes every 2 days  -smoking cessation counseling       Atrial fibrillation with RVR  -patient diagnosed with Afib last year which spontaneously converted   -currently on warfarin 2.5 mg daily except 5 mg on Wed  -patient currently normal rhythm, INR theraputic   -daily PT/INR  -continue cardizem and lopressor      Iron deficiency  -continue iron supplementation      Essential hypertension  -continue lisinopril 5 mg, lopressor 25, and cardizem 240      Hyperlipidemia  -continue pravastatin      VTE Risk Mitigation         Ordered     warfarin (COUMADIN) tablet 2.5 mg  Every Saturday     Route:  Oral        03/13/17 0215     warfarin (COUMADIN) tablet 5 mg  Every Wednesday     Route:  Oral        03/13/17 0215     warfarin (COUMADIN) tablet 2.5 mg  Every Mon, Tue, Thu, Fri, Sun     Route:  Oral        03/13/17 0215          Taiwo Giron MD  Department of Hospital Medicine   [facility]

## 2017-03-16 NOTE — DISCHARGE SUMMARY
[facility] Hospital Medicine  Discharge Summary      Patient Name: Savita Polo  MRN: [id number]  Admission Date: 3/12/2017  Hospital Length of Stay: 3 days  Discharge Date and Time:  03/16/2017 3:46 PM  Attending Physician: Enedelia Styles MD   Discharging Provider: Taiwo Giron MD  Primary Care Provider: Nadiya Nava MD  Hospital Medicine Team: [facility] HOSP MED 1 Taiwo Giron MD    HPI:   Ms. 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 Patient states that she cleaned a dirty bathroom 3 days ago and the following day she developed subjective fevers (did not measure her temperature) and chills along with increased oxygen requirements. She can normally walk 1-2 blocks without stopping, but became very fatigued and dyspneic with minimal exertion these past two days. She also developed a productive cough with yellow sputum and had nausea and 2 episodes of emesis yesterday. Endorses nasal congestion and myalgias. Denies chest pain, abdominal pain, diarrhea, peripheral edema, dysuria. Patient lives with her daughter and can ambulate with a walker. Patient has been smoking since the age of 19 and currently smokes 1 cigarette every two days. Denies alcohol and drug use. Has had pneumonia and flu vaccines. No sick contacts.     * No surgery found *      Indwelling Lines/Drains at time of discharge:   Lines/Drains/Airways          No matching active lines, drains, or airways        Hospital Course:   Pt was started on scheduled duo-nebs, steroids and azithromycin. Pt improved, but still required 4L NC. Pt normally on 3L NC. Pt developed minimal hemoptysis and cough mixed with green sputum on 3/14. Pt also endorsing nasal/sinus congestion, where she was started on fluticasone nasal spray and  zyrtec.  Patient with noted improvement of respiratory status with continued nebulizer treatment, oral steroids, and azithromycin.  Patient was subsequently discharged on 3/16 in fair condition.  She was given a 500 cc bolus of normal saline as her creatinine increased, but went back to baseline after bolus.  Blood sugars also were difficult to control during admission, which was likely secondary to the oral steroids.  She was given a prescription of prednisone PO and azithromycin PO to complete her 5 day therapy.  She was also offered home health PT and OT, but she had declined.  She is to follow up with priority care clinic for follow up and her PCP for post hospital stay follow up as well.       Consults:   Consults         Status Ordering Provider     Inpatient consult to Social Work/Case Management  Once     Provider:  (Not yet assigned)    Acknowledged HYUN DIOR          Significant Diagnostic Studies:     Recent Results (from the past 24 hour(s))   POCT glucose    Collection Time: 03/15/17  5:03 PM   Result Value Ref Range    POCT Glucose 392 (H) 70 - 110 mg/dL   POCT glucose    Collection Time: 03/15/17  9:03 PM   Result Value Ref Range    POCT Glucose 390 (H) 70 - 110 mg/dL   POCT glucose    Collection Time: 03/16/17 12:16 AM   Result Value Ref Range    POCT Glucose 293 (H) 70 - 110 mg/dL   CBC with Automated Differential    Collection Time: 03/16/17  5:36 AM   Result Value Ref Range    WBC 21.01 (H) 3.90 - 12.70 K/uL    RBC 4.12 4.00 - 5.40 M/uL    Hemoglobin 13.3 12.0 - 16.0 g/dL    Hematocrit 40.1 37.0 - 48.5 %    MCV 97 82 - 98 fL    MCH 32.3 (H) 27.0 - 31.0 pg    MCHC 33.2 32.0 - 36.0 %    RDW 12.9 11.5 - 14.5 %    Platelets 213 150 - 350 K/uL    MPV 10.2 9.2 - 12.9 fL    Gran # 17.5 (H) 1.8 - 7.7 K/uL    Lymph # 2.2 1.0 - 4.8 K/uL    Mono # 1.2 (H) 0.3 - 1.0 K/uL    Eos # 0.0 0.0 - 0.5 K/uL    Baso # 0.01 0.00 - 0.20 K/uL    Gran% 83.3 (H) 38.0 - 73.0 %    Lymph% 10.6 (L) 18.0 - 48.0 %    Mono% 5.5  4.0 - 15.0 %    Eosinophil% 0.0 0.0 - 8.0 %    Basophil% 0.0 0.0 - 1.9 %    Differential Method Automated    PT/INR    Collection Time: 03/16/17  5:36 AM   Result Value Ref Range    Prothrombin Time 22.0 (H) 9.0 - 12.5 sec    INR 2.2 (H) 0.8 - 1.2   Basic metabolic panel    Collection Time: 03/16/17  5:36 AM   Result Value Ref Range    Sodium 139 136 - 145 mmol/L    Potassium 4.8 3.5 - 5.1 mmol/L    Chloride 104 95 - 110 mmol/L    CO2 30 (H) 23 - 29 mmol/L    Glucose 249 (H) 70 - 110 mg/dL    BUN, Bld 79 (H) 8 - 23 mg/dL    Creatinine 1.9 (H) 0.5 - 1.4 mg/dL    Calcium 9.3 8.7 - 10.5 mg/dL    Anion Gap 5 (L) 8 - 16 mmol/L    eGFR if African American 29.9 (A) >60 mL/min/1.73 m^2    eGFR if non  26.0 (A) >60 mL/min/1.73 m^2   POCT glucose    Collection Time: 03/16/17  7:38 AM   Result Value Ref Range    POCT Glucose 195 (H) 70 - 110 mg/dL   POCT glucose    Collection Time: 03/16/17 11:53 AM   Result Value Ref Range    POCT Glucose 209 (H) 70 - 110 mg/dL   Basic metabolic panel    Collection Time: 03/16/17 12:24 PM   Result Value Ref Range    Sodium 139 136 - 145 mmol/L    Potassium 5.0 3.5 - 5.1 mmol/L    Chloride 106 95 - 110 mmol/L    CO2 26 23 - 29 mmol/L    Glucose 220 (H) 70 - 110 mg/dL    BUN, Bld 71 (H) 8 - 23 mg/dL    Creatinine 1.7 (H) 0.5 - 1.4 mg/dL    Calcium 8.9 8.7 - 10.5 mg/dL    Anion Gap 7 (L) 8 - 16 mmol/L    eGFR if African American 34.2 (A) >60 mL/min/1.73 m^2    eGFR if non  29.7 (A) >60 mL/min/1.73 m^2         Pending Diagnostic Studies:     None        Final Active Diagnoses:    Diagnosis Date Noted POA    PRINCIPAL PROBLEM:  Acute on chronic respiratory failure with hypoxia [J96.21] 03/13/2017 Yes    COPD exacerbation [J44.1] 04/24/2014 Yes     Chronic    Type 2 diabetes mellitus with diabetic chronic kidney disease [E11.22] 02/01/2016 Yes    Allergic bronchitis with acute exacerbation [J45.901] 03/13/2017 Yes    CKD (chronic kidney disease), stage IV  [N18.4] 06/23/2016 Yes    On home oxygen therapy [Z99.81] 06/22/2013 Not Applicable    Paroxysmal a-fib [I48.0] 03/13/2017 Yes    Volume depletion [E86.9] 03/13/2017 Yes    Essential hypertension [I10] 01/04/2016 Yes    Hyperlipidemia [E78.5] 01/04/2016 Yes    Iron deficiency [E61.1] 11/14/2014 Yes    Chronic diastolic CHF (congestive heart failure) [I50.32] 10/26/2014 Yes    Tobacco abuse [Z72.0] 04/24/2014 Yes     Chronic    Long term (current) use of anticoagulants [Z79.01] 04/15/2014 Not Applicable    SUSAN (generalized anxiety disorder) [F41.1] 11/15/2012 Yes    Diabetic peripheral neuropathy [E11.42] 07/24/2012 Yes     Chronic    Hypothyroidism [E03.9] 06/29/2012 Yes      Problems Resolved During this Admission:    Diagnosis Date Noted Date Resolved POA      * Acute on chronic respiratory failure with hypoxia  -Likely COPD exacerbation with allergic bronchitis (see COPD exacerbation and Allergic bronchitis)       COPD exacerbation  -Will continue scheduled duo-neb treatments  -5 day course of azithromycin and prednisone for a total of 5 days, one day remaining for home use   -Continue home advair  -Pt on 3L NC at home. Currently at 4L NC. Pt states that she uses her O2 intermittently and usually just at night when she sleeps. O2 sats in the low 90s currently. Pt likely normally sats 88-92%. Will maintain this goal  - patient to be discharged today       Type 2 diabetes mellitus with diabetic chronic kidney disease  -patient takes ~20U levemir qhs and ~10U novolog TIDWM, adjusted according to BGs  -HbA1c 7.8 on this admission  --390 in last 24 hours, likely 2/2 to steroid use, will likely improve after steroid treatment   -continue home levemir to 20 units daily and 10 units TIDWM with sliding scale   -cardiac/diabetic diet      CKD (chronic kidney disease), stage IV  -kidney disease due to DM II and HTN, baseline Cr ~1.8  - mild increase to 1.9 in AM draw   - given pre-renal azotemia and  alkalosis, likely 2/2 to diuretic use  - patient given 500 cc NS bolus with subsequent draw with improvement of Cr to 1.7  - stable for discharge       Allergic bronchitis with acute exacerbation  -patient with 2 day hx of subjective fevers and chills, productive cough, and worsening dyspnea on exertion   -CXR shows no pleural effusion and stable bibasilar interstitial opacifications. No areas of consolidation seen  -Patient also reporting post nasal drip and sinus congestion  -Start scheduled zyrtec and flonase. Steroids, abx and nebulizer, improved from day prior        On home oxygen therapy  -on 3L NC at home  - will keep goal of 88-92%      Hypothyroidism  -continue synthroid       Diabetic peripheral neuropathy  -see diabetes      SUSAN (generalized anxiety disorder)  -patient takes xanax at home prn for anxiety   -Pt requesting her xanax. Will give prn.      Long term (current) use of anticoagulants  -daily PT/INR for goal 2-3, INR today 2.2   - continue home warfarin       Tobacco abuse  -patient currently smoking 1 cigarettes every 2 days  -smoking cessation counseling       Chronic diastolic CHF (congestive heart failure)  -last 2D echo shows EF 60 with diastolic dysfunction   -patient currently euvolemic,    -continue home lasix 20 mg bid  -repeat 2D echo showing preserved EF with diastolic dysfunction    Atrial fibrillation with RVR  -patient diagnosed with Afib last year which spontaneously converted   -currently on warfarin 2.5 mg daily except 5 mg on Wed  -patient currently normal rhythm, INR theraputic   -daily PT/INR  -continue cardizem and lopressor      Iron deficiency  -continue iron supplementation      Essential hypertension  -continue lisinopril 5 mg, lopressor 25, and cardizem 240      Hyperlipidemia  -continue pravastatin        Discharged Condition: fair    Disposition: Home or Self Care    Follow Up:  Follow-up Information     Follow up with LAB, [facility] [place] On 3/29/2017.     Specialty:  Lab    Why:  At 7:55 AM        Follow up with Adali Kim PharmD On 3/29/2017.    Specialty:  Pharmacist    Why:  At 8:30 AM in Coumadin Clinic        Follow up with Nadiya Nava MD On 3/31/2017.    Specialty:  Internal Medicine    Why:  At 8:00 AM, Hospital Follow-up with PCP    Contact information:    [address]  [facility] [address]  [phone]          Follow up with Nohemi Ardon DNP, NP On 4/4/2017.    Specialty:  Endocrinology    Why:  At 9:30 AM in Endocrinology Clinic    Contact information:    [address]  [facility] [address]  [phone]          Follow up with Abad Kruger MD On 4/4/2017.    Specialty:  Psychiatry    Why:  At 9:30 AM in Behavioral Medicine Clinic    Contact information:    [address]  [facility] [address]  [phone]          Follow up with [facility] On 3/22/2017.    Specialty:  [facility] Care    Why:  At 8:00 AM     Contact information:    [address]  [facility] [id number] [phone]    Additional information:    [facility]        Patient Instructions:     Ambulatory referral to Outpatient Case Management   Referral Priority: Routine Referral Type: Consultation   Referral Reason: Specialty Services Required    Number of Visits Requested: 1      Diet general     Activity as tolerated     Call MD for:  difficulty breathing or increased cough     Call MD for:  persistent nausea and vomiting or diarrhea       Medications:  Reconciled Home Medications:   Current Discharge Medication List      START taking these medications    Details   azithromycin (Z-MERCY) 250 MG tablet Take 1 tablet (250 mg total) by mouth once daily.  Qty: 1 tablet, Refills: 0      cetirizine (ZYRTEC) 10 MG tablet Take 1 tablet (10 mg total) by mouth once daily.  Refills: 0      predniSONE (DELTASONE) 20 MG tablet Take 2 tablets (40 mg total) by mouth once daily.  Qty: 2  tablet, Refills: 0         CONTINUE these medications which have NOT CHANGED    Details   ADVAIR DISKUS 500-50 mcg/dose DsDv diskus inhaler inhale 1 dose by mouth twice a day  Qty: 60 each, Refills: 2      albuterol (PROAIR HFA) 90 mcg/actuation inhaler Inhale 2 puffs into the lungs every 6 (six) hours as needed for Wheezing.  Qty: 2 Inhaler, Refills: 3      alprazolam (XANAX) 0.5 MG tablet TAKE 1 TABLET BY MOUTH 2 TIMES A DAY  Qty: 60 tablet, Refills: 3      diltiaZEM (CARDIZEM CD) 240 MG 24 hr capsule take 1 capsule by mouth once daily  Qty: 30 capsule, Refills: 2      FERROUS SULFATE ORAL Take 1 tablet by mouth once daily.      fluticasone (FLONASE) 50 mcg/actuation nasal spray instill 1 spray into each nostril once daily  Qty: 16 g, Refills: 1      furosemide (LASIX) 20 MG tablet take 1 tablet by mouth twice a day  Qty: 60 tablet, Refills: 2      insulin aspart (NOVOLOG FLEXPEN) 100 unit/mL InPn pen 26-28-26 Plus correction scale  Qty: 2 Box, Refills: 6    Associated Diagnoses: Type 2 diabetes mellitus with stage 4 chronic kidney disease, with long-term current use of insulin      insulin detemir (LEVEMIR FLEXTOUCH) 100 unit/mL (3 mL) SubQ InPn pen Inject 26 Units into the skin every evening.  Qty: 2 Box, Refills: 6    Associated Diagnoses: Type 2 diabetes mellitus with stage 4 chronic kidney disease, with long-term current use of insulin      ipratropium (ATROVENT) 0.02 % nebulizer solution USE 1 VIAL IN NEBULIZER EVERY 8 HOURS  Qty: 62.5 mL, Refills: 4      levothyroxine (SYNTHROID) 50 MCG tablet Take 1 tablet (50 mcg total) by mouth before breakfast.  Qty: 90 tablet, Refills: 4      lisinopril (PRINIVIL,ZESTRIL) 5 MG tablet Take 1 tablet (5 mg total) by mouth once daily.  Qty: 90 tablet, Refills: 1    Associated Diagnoses: Essential hypertension      metoprolol tartrate (LOPRESSOR) 25 MG tablet take 1 tablet by mouth twice a day  Qty: 60 tablet, Refills: 2      ondansetron (ZOFRAN) 4 MG tablet Take 1 tablet  "(4 mg total) by mouth every 8 (eight) hours as needed for Nausea.  Qty: 12 tablet, Refills: 0      pen needle, diabetic, safety (NOVOFINE AUTOCOVER) 30 gauge x 1/3" Ndle Uses 4 a day  Qty: 150 each, Refills: 12      pravastatin (PRAVACHOL) 40 MG tablet take 1 tablet by mouth once daily  Qty: 30 tablet, Refills: 3    Associated Diagnoses: Dyslipidemia      warfarin (COUMADIN) 2.5 MG tablet Take 2.5 mg by mouth once daily. Except take 5 mg on Wed           Time spent on the discharge of patient: 20 minutes    Taiwo Giron MD  Department of Hospital Medicine  [facility]  "

## 2017-03-16 NOTE — PLAN OF CARE
Problem: Patient Care Overview  Goal: Plan of Care Review  Pt free from injury/fall. Bed in locked lowest position, call light within reach. Pt able to reposition independently. No new skin changes. Pt denies pain for shift. Hs BG corrected with per MD telephone orders. No s/s of hypoglycemia. Pt on baseline O2 of 3LNC, tolerated well throughout shift. Sputum production has lessened this shift, than the previous shift. Xanax prn for anxiety, pt able to sleep for most of shift. Will continue to monitor.

## 2017-03-16 NOTE — NURSING
Pt given all discharge instructions. Pt and family verbalize understanding of all medications, instructions, and follow-up appointments. Pt taken off unit in WC. NO s/s of distress at time of discharge.

## 2017-03-16 NOTE — ASSESSMENT & PLAN NOTE
-Likely COPD exacerbation with allergic bronchitis (see COPD exacerbation and Allergic bronchitis)

## 2017-03-16 NOTE — ASSESSMENT & PLAN NOTE
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

## 2017-03-18 LAB
BACTERIA BLD CULT: NORMAL
BACTERIA BLD CULT: NORMAL

## 2017-03-19 ENCOUNTER — OUTPATIENT CASE MANAGEMENT (OUTPATIENT)
Dept: ADMINISTRATIVE | Facility: OTHER | Age: 72
End: 2017-03-19

## 2017-03-20 ENCOUNTER — OUTPATIENT CASE MANAGEMENT (OUTPATIENT)
Dept: ADMINISTRATIVE | Facility: OTHER | Age: 72
End: 2017-03-20

## 2017-03-20 ENCOUNTER — PATIENT OUTREACH (OUTPATIENT)
Dept: ADMINISTRATIVE | Facility: CLINIC | Age: 72
End: 2017-03-20
Payer: MEDICARE

## 2017-03-20 NOTE — PATIENT INSTRUCTIONS
Discharge Instructions: COPD  You have been diagnosed with chronic obstructive pulmonary disease (COPD). This is a name given to a group of diseases that limit the flow of air in and out of your lungs. This makes it harder to breathe. With COPD, you are also more likely to get lung infections. COPD includes chronic bronchitis and emphysema. COPD is most often caused by heavy, long-term cigarette smoking.  Home care  Quit smoking  · If you smoke, quit. It is the best thing you can do for your COPD and your overall health.  · Join a stop-smoking program. There are even telephone, text message, and Internet programs to help you quit.  · Ask your healthcare provider about medicines or other methods to help you quit.  · Ask family members to quit smoking as well.  · Don't allow people to smoke in your home, in your car, or when they are around you.  Protect yourself from infection  · Wash your hands often. Do your best to keep your hands away from your face. Most germs are spread from your hands to your mouth.  · Get a flu shot every year. Also ask your provider about pneumonia vaccines.  · Avoid crowds. It's especially important to do this in the winter when more people have colds and flu.  · To stay healthy, get enough sleep, exercise regularly, and eat a balanced diet. You should:  ¨ Get about 8 hours of sleep every night.  ¨ Try to exercise for at least 30 minutes on most days.  ¨ Have healthy foods including fruits and vegetables, 100% whole grains, lean meats and fish, and low-fat dairy products. Try to stay away from foods high in fats and sugar.  Take your medicines  Take your medicines exactly as directed. Don't skip doses.  Manage your stress  Stress can make COPD worse. Use this stress management technique:  · Find a quiet place and sit or lie in a comfortable position.  · Close your eyes and perform breathing exercises for several minutes. Ask your provider about the best way to breathe.  Pulmonary  rehabilitation  · Pulmonary rehab can help you feel better. These programs include exercise, breathing techniques, information about COPD, counseling, and help for smokers.  · Ask your provider or your local hospital about programs in your area.  When to call your healthcare provider  Call your provider immediately if you have any of the following:  · Shortness of breath, wheezing, or coughing  · Increased mucus  · Yellow, green, bloody, or smelly mucus  · Fever or chills  · Tightness in your chest that does not go away with rest or medicine  · An irregular heartbeat or a feeling that your heart is beating very fast  · Swollen ankles   Date Last Reviewed: 5/1/2016  © 5509-1335 [employer]. [address] [place] [address], [place] [address]. All rights reserved. This information is not intended as a substitute for professional medical care. Always follow your healthcare professional's instructions.

## 2017-03-20 NOTE — PT/OT/SLP DISCHARGE
Occupational Therapy Discharge Summary    Savita Polo  MRN: [id number]   Acute on chronic respiratory failure with hypoxia   Patient Discharged from acute Occupational Therapy on 3/16/2017.  Please refer to prior OT note dated on 3/16/2017 for functional status.     Assessment:   Patient appropriate for care in another setting. Patient has not met goals.  GOALS:   Occupational Therapy Goals        Problem: Occupational Therapy Goal    Goal Priority Disciplines Outcome Interventions   Occupational Therapy Goal     OT, PT/OT Ongoing (interventions implemented as appropriate)    Description:  Goals to be met by: 3/23/2017     Patient will increase functional independence with ADLs by performing:    UE Dressing with Supervision.  LE Dressing with Supervision.  Grooming while standing at sink with Supervision.  Supine to sit with Supervision.  Stand pivot transfers with Supervision.  Toilet transfer to toilet with Supervision.  Upper extremity exercise program x15 reps per handout, with supervision.              Reasons for Discontinuation of Therapy Services  Transfer to alternate level of care.      Plan:  Patient Discharged to: Home with Home Health Service.    RITA Scott  3/20/2017

## 2017-03-20 NOTE — PROGRESS NOTES
Patient referred to OPCM on 3/16/2017.  This RN received referral on 3/20/2017.  CASSY Gomez, OPCM-RN

## 2017-03-20 NOTE — PROGRESS NOTES
Talked to patient to perform initial assessment for OPCM.  Patient declines OPCM services at this time.  Patient stated that she has her daughter and boyfriend taking care of her at this time.  Encouraged patient to call this RN if having any questions/concerns.  Will close case at this time for OPCM.  CASSY Gomez, OPCM-RN

## 2017-03-21 PROBLEM — J45.901 ALLERGIC BRONCHITIS WITH ACUTE EXACERBATION: Status: RESOLVED | Noted: 2017-03-13 | Resolved: 2017-03-21

## 2017-03-21 PROBLEM — N18.9 ANEMIA IN CHRONIC KIDNEY DISEASE(285.21): Chronic | Status: ACTIVE | Noted: 2017-03-21

## 2017-03-21 PROBLEM — D63.1 ANEMIA IN CHRONIC KIDNEY DISEASE(285.21): Chronic | Status: ACTIVE | Noted: 2017-03-21

## 2017-03-21 PROBLEM — R05.9 COUGH: Status: RESOLVED | Noted: 2017-03-13 | Resolved: 2017-03-21

## 2017-03-21 PROBLEM — E86.9 VOLUME DEPLETION: Status: RESOLVED | Noted: 2017-03-13 | Resolved: 2017-03-21

## 2017-03-21 PROBLEM — J96.21 ACUTE ON CHRONIC RESPIRATORY FAILURE WITH HYPOXIA: Status: RESOLVED | Noted: 2017-03-13 | Resolved: 2017-03-21

## 2017-03-21 NOTE — PT/OT/SLP DISCHARGE
Physical Therapy Discharge Summary    Savita Polo  MRN: [id number]   Acute on chronic respiratory failure with hypoxia   Patient Discharged from acute Physical Therapy on 3/16/2017.  Please refer to prior PT noted date on 3/15/2017 for functional status.     Assessment:   Patient appropriate for care in another setting.  GOALS:   Physical Therapy Goals        Problem: Physical Therapy Goal    Goal Priority Disciplines Outcome Goal Variances Interventions   Physical Therapy Goal     PT/OT, PT Ongoing (interventions implemented as appropriate)     Description:  Goals to be met by: 3/31/2017     Patient will increase functional independence with mobility by performin. Supine to sit with Modified [place]  2. Sit to stand transfer with Modified [place]  3. Gait  x 100 feet with Modified [place] using Rolling Walker or rollator.   4. Stand for 10 minutes with Modified [place] using Rolling Walker or Rollator walker for increased standing tolerance with ADL's.              Reasons for Discontinuation of Therapy Services  Transfer to alternate level of care.      Plan:  Patient Discharged to: Home with Home Health Service.

## 2017-03-22 ENCOUNTER — OFFICE VISIT (OUTPATIENT)
Dept: PRIMARY CARE CLINIC | Facility: CLINIC | Age: 72
End: 2017-03-22
Payer: MEDICARE

## 2017-03-22 VITALS
HEIGHT: 60 IN | WEIGHT: 161.38 LBS | BODY MASS INDEX: 31.68 KG/M2 | SYSTOLIC BLOOD PRESSURE: 94 MMHG | HEART RATE: 56 BPM | OXYGEN SATURATION: 95 % | DIASTOLIC BLOOD PRESSURE: 46 MMHG

## 2017-03-22 DIAGNOSIS — E61.1 IRON DEFICIENCY: ICD-10-CM

## 2017-03-22 DIAGNOSIS — R91.8 MULTIPLE LUNG NODULES: ICD-10-CM

## 2017-03-22 DIAGNOSIS — E78.2 MIXED HYPERLIPIDEMIA: Chronic | ICD-10-CM

## 2017-03-22 DIAGNOSIS — I50.810 RIGHT-SIDED HEART FAILURE: ICD-10-CM

## 2017-03-22 DIAGNOSIS — Z99.81 ON HOME OXYGEN THERAPY: ICD-10-CM

## 2017-03-22 DIAGNOSIS — N18.9 ANEMIA IN CHRONIC KIDNEY DISEASE(285.21): Chronic | ICD-10-CM

## 2017-03-22 DIAGNOSIS — E03.9 ACQUIRED HYPOTHYROIDISM: ICD-10-CM

## 2017-03-22 DIAGNOSIS — Z79.01 LONG TERM (CURRENT) USE OF ANTICOAGULANTS: ICD-10-CM

## 2017-03-22 DIAGNOSIS — J96.11 CHRONIC RESPIRATORY FAILURE WITH HYPOXIA: Chronic | ICD-10-CM

## 2017-03-22 DIAGNOSIS — N18.4 TYPE 2 DIABETES MELLITUS WITH STAGE 4 CHRONIC KIDNEY DISEASE, WITH LONG-TERM CURRENT USE OF INSULIN: Chronic | ICD-10-CM

## 2017-03-22 DIAGNOSIS — J98.4 CRLD (CHRONIC RESTRICTIVE LUNG DISEASE): Primary | ICD-10-CM

## 2017-03-22 DIAGNOSIS — D63.1 ANEMIA IN CHRONIC KIDNEY DISEASE(285.21): Chronic | ICD-10-CM

## 2017-03-22 DIAGNOSIS — N25.81 SECONDARY HYPERPARATHYROIDISM: ICD-10-CM

## 2017-03-22 DIAGNOSIS — E11.42 DIABETIC PERIPHERAL NEUROPATHY: Chronic | ICD-10-CM

## 2017-03-22 DIAGNOSIS — N18.4 CKD (CHRONIC KIDNEY DISEASE), STAGE IV: ICD-10-CM

## 2017-03-22 DIAGNOSIS — I48.0 PAROXYSMAL ATRIAL FIBRILLATION: ICD-10-CM

## 2017-03-22 DIAGNOSIS — E11.22 TYPE 2 DIABETES MELLITUS WITH STAGE 4 CHRONIC KIDNEY DISEASE, WITH LONG-TERM CURRENT USE OF INSULIN: Chronic | ICD-10-CM

## 2017-03-22 DIAGNOSIS — F41.1 GAD (GENERALIZED ANXIETY DISORDER): ICD-10-CM

## 2017-03-22 DIAGNOSIS — I15.0 RENOVASCULAR HYPERTENSION: ICD-10-CM

## 2017-03-22 DIAGNOSIS — Z79.4 TYPE 2 DIABETES MELLITUS WITH STAGE 4 CHRONIC KIDNEY DISEASE, WITH LONG-TERM CURRENT USE OF INSULIN: Chronic | ICD-10-CM

## 2017-03-22 PROCEDURE — 99496 TRANSJ CARE MGMT HIGH F2F 7D: CPT | Mod: PBBFAC

## 2017-03-22 PROCEDURE — 99214 OFFICE O/P EST MOD 30 MIN: CPT | Mod: PBBFAC

## 2017-03-22 PROCEDURE — 99999 PR PBB SHADOW E&M-EST. PATIENT-LVL IV: CPT | Mod: PBBFAC,,,

## 2017-03-22 RX ORDER — DILTIAZEM HYDROCHLORIDE 240 MG/1
240 CAPSULE, COATED, EXTENDED RELEASE ORAL DAILY
Qty: 90 CAPSULE | Refills: 4 | Status: SHIPPED | OUTPATIENT
Start: 2017-03-22

## 2017-03-22 RX ORDER — CETIRIZINE HYDROCHLORIDE 10 MG/1
10 TABLET ORAL DAILY
Qty: 90 TABLET | Refills: 6 | Status: SHIPPED | OUTPATIENT
Start: 2017-03-22

## 2017-03-22 RX ORDER — FLUTICASONE PROPIONATE 50 MCG
1 SPRAY, SUSPENSION (ML) NASAL DAILY
Qty: 16 G | Refills: 12 | Status: SHIPPED | OUTPATIENT
Start: 2017-03-22

## 2017-03-22 RX ORDER — IPRATROPIUM BROMIDE AND ALBUTEROL SULFATE 2.5; .5 MG/3ML; MG/3ML
3 SOLUTION RESPIRATORY (INHALATION) EVERY 4 HOURS PRN
Qty: 200 VIAL | Refills: 12 | Status: SHIPPED | OUTPATIENT
Start: 2017-03-22 | End: 2018-03-22

## 2017-03-22 RX ORDER — ALBUTEROL SULFATE 90 UG/1
2 AEROSOL, METERED RESPIRATORY (INHALATION) EVERY 6 HOURS PRN
Qty: 2 INHALER | Refills: 6 | Status: SHIPPED | OUTPATIENT
Start: 2017-03-22

## 2017-03-22 RX ORDER — FLUTICASONE PROPIONATE AND SALMETEROL 500; 50 UG/1; UG/1
1 POWDER RESPIRATORY (INHALATION) 2 TIMES DAILY
Qty: 60 EACH | Refills: 6 | Status: SHIPPED | OUTPATIENT
Start: 2017-03-22

## 2017-03-22 NOTE — Clinical Note
[facility] Clinic Visit (Post Discharge Follow-up) Today:  - Our clinic physicians and nurses plan to follow the patient up for any medical issues in the [facility] for 30 days post discharge.  Future Appointments: 3/29/2017  7:55 AM    [facility], [facility] [place] VNP   [facility]  3/29/2017  8:30 AM    Adali Kim, PharmD  [facility] COUMAD    [facility] 4/5/2017   9:30 AM    Nohemi Ardon DNP, NP   [facility] ENDODIA   [facility] 4/12/2017  11:15 AM   Nadiya Nava MD       [facility] IM        [facility]  5/2/2017   9:30 AM    Abad Kruger MD      [facility] PSYCH     [facility]

## 2017-03-22 NOTE — PROGRESS NOTES
"The pt was recently admitted from 3/12 through 3/16 for fevers/chills/SOB.  She was discharged with "5 day course of azithromycin and prednisone for a total of 5 days, one day remaining for home use."  See calendar for recent INRs and coumadin doses. I was able to reach the pt.  She reports that she has completed the above meds.  I advised her to call us when she gets discharged and/or is given new meds.  Her INRs have been stable and she will be coming in next week for an f/u INR.  "

## 2017-03-22 NOTE — PROGRESS NOTES
[facility]  New Visit Progress Note   Recent Hospital Discharge     PRESENTING HISTORY     Chief Complaint/Reason for Visit:  Follow up Hospital Discharge   Chief Complaint   Patient presents with    Hospital Follow Up     PCP: Nadiya Nava MD    History of Present Illness: Ms. Savita Polo is a 71 y.o. female who was recently admitted to the hospital.    Admission Date: 3/12/2017  Hospital Length of Stay: 3 days  Discharge Date and Time:  03/16/2017 3:46 PM  Attending Physician: Enedelia Styles MD   Discharging Provider: Taiwo Giron MD  Primary Care Provider: Nadiya Nava MD  Hospital Medicine Team: [facility] MED 1 Taiwo Giron MD     HPI:   Ms. Polo is a 70 yo F with PMHx paroxysmal Afib (on coumadin), CVA with no residual weakness, COPD (on home oxygen 3L), HFpEF, DM II on insulin, CKD IV, hypothyroidism, HTN, HLD, presents with 2 day hx of subjective fevers/chills and worsening SOB. Patient states that she cleaned a dirty bathroom 3 days ago and the following day she developed subjective fevers (did not measure her temperature) and chills along with increased oxygen requirements. She can normally walk 1-2 blocks without stopping, but became very fatigued and dyspneic with minimal exertion these past two days. She also developed a productive cough with yellow sputum and had nausea and 2 episodes of emesis yesterday. Endorses nasal congestion and myalgias. Denies chest pain, abdominal pain, diarrhea, peripheral edema, dysuria. Patient lives with her daughter and can ambulate with a walker. Patient has been smoking since the age of 19 and currently smokes 1 cigarette every two days. Denies alcohol and drug use. Has had pneumonia and flu vaccines. No sick contacts.     Hospital Course:   Pt was started on scheduled duo-nebs, steroids and azithromycin. Pt improved, but still required 4L NC. Pt normally on 3L NC. Pt developed minimal hemoptysis and cough mixed with green sputum on 3/14. Pt  also endorsing nasal/sinus congestion, where she was started on fluticasone nasal spray and zyrtec. Patient with noted improvement of respiratory status with continued nebulizer treatment, oral steroids, and azithromycin. Patient was subsequently discharged on 3/16 in fair condition. She was given a 500 cc bolus of normal saline as her creatinine increased, but went back to baseline after bolus. Blood sugars also were difficult to control during admission, which was likely secondary to the oral steroids. She was given a prescription of prednisone PO and azithromycin PO to complete her 5 day therapy. She was also offered home health PT and OT, but she had declined. She is to follow up with [facility] care clinic for follow up and her PCP for post hospital stay follow up as well.       Consults:   Consults           Status Ordering Provider       Inpatient consult to Social Work/Case Management Once    Provider: (Not yet assigned)     Acknowledged HYUN DIOR                   Final Active Diagnoses:     Diagnosis Date Noted POA    PRINCIPAL PROBLEM: Acute on chronic respiratory failure with hypoxia [J96.21] 03/13/2017 Yes    COPD exacerbation [J44.1] 04/24/2014 Yes       Chronic    Type 2 diabetes mellitus with diabetic chronic kidney disease [E11.22] 02/01/2016 Yes    Allergic bronchitis with acute exacerbation [J45.901] 03/13/2017 Yes    CKD (chronic kidney disease), stage IV [N18.4] 06/23/2016 Yes    On home oxygen therapy [Z99.81] 06/22/2013 Not Applicable    Paroxysmal a-fib [I48.0] 03/13/2017 Yes    Volume depletion [E86.9] 03/13/2017 Yes    Essential hypertension [I10] 01/04/2016 Yes    Hyperlipidemia [E78.5] 01/04/2016 Yes    Iron deficiency [E61.1] 11/14/2014 Yes    Chronic diastolic CHF (congestive heart failure) [I50.32] 10/26/2014 Yes    Tobacco abuse [Z72.0] 04/24/2014 Yes       Chronic    Long term (current) use of anticoagulants [Z79.01] 04/15/2014 Not Applicable    SUSAN (generalized anxiety  disorder) [F41.1] 11/15/2012 Yes    Diabetic peripheral neuropathy [E11.42] 07/24/2012 Yes       Chronic    Hypothyroidism [E03.9] 06/29/2012 Yes       Problems Resolved During this Admission:     Diagnosis Date Noted Date Resolved POA      * Acute on chronic respiratory failure with hypoxia  -Likely COPD exacerbation with allergic bronchitis (see COPD exacerbation and Allergic bronchitis)      COPD exacerbation  -Will continue scheduled duo-neb treatments  -5 day course of azithromycin and prednisone for a total of 5 days, one day remaining for home use   -Continue home advair  -Pt on 3L NC at home. Currently at 4L NC. Pt states that she uses her O2 intermittently and usually just at night when she sleeps. O2 sats in the low 90s currently. Pt likely normally sats 88-92%. Will maintain this goal  - patient to be discharged today      Type 2 diabetes mellitus with diabetic chronic kidney disease  -patient takes ~20U levemir qhs and ~10U novolog TIDWM, adjusted according to BGs  -HbA1c 7.8 on this admission  --390 in last 24 hours, likely 2/2 to steroid use, will likely improve after steroid treatment   -continue home levemir to 20 units daily and 10 units TIDWM with sliding scale   -cardiac/diabetic diet     CKD (chronic kidney disease), stage IV  -kidney disease due to DM II and HTN, baseline Cr ~1.8  - mild increase to 1.9 in AM draw   - given pre-renal azotemia and alkalosis, likely 2/2 to diuretic use  - patient given 500 cc NS bolus with subsequent draw with improvement of Cr to 1.7  - stable for discharge      Allergic bronchitis with acute exacerbation  -patient with 2 day hx of subjective fevers and chills, productive cough, and worsening dyspnea on exertion   -CXR shows no pleural effusion and stable bibasilar interstitial opacifications. No areas of consolidation seen  -Patient also reporting post nasal drip and sinus congestion  -Start scheduled zyrtec and flonase. Steroids, abx and nebulizer,  improved from day prior     On home oxygen therapy  -on 3L NC at home  - will keep goal of 88-92%     Hypothyroidism  -continue synthroid      Diabetic peripheral neuropathy  -see diabetes     SUSAN (generalized anxiety disorder)  -patient takes xanax at home prn for anxiety   -Pt requesting her xanax. Will give prn.     Long term (current) use of anticoagulants  -daily PT/INR for goal 2-3, INR today 2.2   - continue home warfarin      Tobacco abuse  -patient currently smoking 1 cigarettes every 2 days  -smoking cessation counseling      Chronic diastolic CHF (congestive heart failure)  -last 2D echo shows EF 60 with diastolic dysfunction   -patient currently euvolemic,    -continue home lasix 20 mg bid  -repeat 2D echo showing preserved EF with diastolic dysfunction     Atrial fibrillation with RVR  -patient diagnosed with Afib last year which spontaneously converted   -currently on warfarin 2.5 mg daily except 5 mg on Wed  -patient currently normal rhythm, INR theraputic   -daily PT/INR  -continue cardizem and lopressor     Iron deficiency  -continue iron supplementation     Essential hypertension  -continue lisinopril 5 mg, lopressor 25, and cardizem 240     Hyperlipidemia  -continue pravastatin    ___________________________________________________________________    Today:  She is close to baseline.  She is on 3 L NC.  Minimal cough - white. No fever.  No swelling in her legs.    No chest pain.  She complains of hurting all over.    She lives with her daughter.    PAST HISTORY:     Past Medical History:   Diagnosis Date    Anemia in chronic kidney disease 3/21/2017    Anxiety     Atherosclerotic cerebrovascular disease 7/25/2012    Chronic respiratory failure with hypoxia 8/11/2014    CKD (chronic kidney disease), stage IV 6/23/2016    CRLD (chronic restrictive lung disease) 8/11/2014    Diabetic peripheral neuropathy 7/24/2012    Diabetic retinopathy     Diverticulosis of colon     DJD  (degenerative joint disease) 7/24/2012    Fatty liver     SUSAN (generalized anxiety disorder) 11/15/2012    Heart attack     History of PSVT (paroxysmal supraventricular tachycardia) 4/1/2014    Cardioverted on 2008     Iron deficiency 11/14/2014    Keloid cicatrix     Long term (current) use of anticoagulants 4/15/2014    Mixed hyperlipidemia 1/4/2016    Multiple lung nodules 8/30/2016    Obesity, Class I, BMI 30-34.9 7/24/2012    On home oxygen therapy 6/22/2013    3L NC    Paroxysmal atrial fibrillation 1/4/2016    Renovascular hypertension 1/4/2016    Right-sided heart failure 10/26/2014     1 - Normal left ventricular systolic function (EF 65-70%).    2 - Biatrial enlargement.    3 - Right ventricular enlargement with normal systolic function.    4 - Trivial aortic stenosis, MARY = 1.87 cm2, peak velocity = 1.7 m/s, mean gradient = 6.0 mmHg.    5 - The mitral valve is markedly sclerotic with moderately restricted leaflet mobility.    6 - Mild mitral stenosis, MVA = 2.2 cm2.    7 - Trivial to mild mitral regurgitation.    8 - Trivial to mild tricuspid regurgitation.    9 - Increased central venous pressure.    10 - Pulmonary hypertension. The estimated PA systolic pressure is 63 mmHg.    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     Vitamin D deficiency disease 7/24/2012       Past Surgical History:   Procedure Laterality Date    ABDOMINAL SURGERY      ANKLE SURGERY      CATARACT EXTRACTION  8/17/00    right eye    CATARACT EXTRACTION  6/26/00    left eye    COLONOSCOPY      HYSTERECTOMY         Family History   Problem Relation Age of Onset    Diabetes Mother     Stroke Mother     Hypertension Mother     Melanoma Mother     COPD Sister     Stroke Sister     Diabetes Sister     Hypertension  Sister     COPD Brother     Diabetes Brother     Hypertension Brother     Heart disease Maternal Grandfather     No Known Problems Father     No Known Problems Maternal Aunt     No Known Problems Maternal Uncle     No Known Problems Paternal Aunt     Cancer Paternal Uncle     No Known Problems Maternal Grandmother     No Known Problems Paternal Grandmother     No Known Problems Paternal Grandfather     Psoriasis Neg Hx     Lupus Neg Hx     Eczema Neg Hx     Kidney disease Neg Hx     Heart attack Neg Hx     Amblyopia Neg Hx     Blindness Neg Hx     Cataracts Neg Hx     Glaucoma Neg Hx     Macular degeneration Neg Hx     Retinal detachment Neg Hx     Strabismus Neg Hx     Thyroid disease Neg Hx        Social History     Social History    Marital status:      Spouse name: N/A    Number of children: N/A    Years of education: N/A     Occupational History    Retired       Social History Main Topics    Smoking status: Light Tobacco Smoker     Packs/day: 0.50     Years: 20.00     Types: Cigarettes     Last attempt to quit: 2/15/2016    Smokeless tobacco: Never Used      Comment:  on Smoking program    Alcohol use No    Drug use: No    Sexual activity: Not Asked     Other Topics Concern    Are You Pregnant Or Think You May Be? No    Breast-Feeding No     Social History Narrative     - has a daughter who she is currently lives with       MEDICATIONS & ALLERGIES:     Current Outpatient Prescriptions on File Prior to Visit   Medication Sig Dispense Refill    alprazolam (XANAX) 0.5 MG tablet TAKE 1 TABLET BY MOUTH 2 TIMES A DAY 60 tablet 3    FERROUS SULFATE ORAL Take 1 tablet by mouth once daily.      furosemide (LASIX) 20 MG tablet take 1 tablet by mouth twice a day 60 tablet 2    insulin aspart (NOVOLOG FLEXPEN) 100 unit/mL InPn pen 26-28-26 Plus correction scale 2 Box 6    insulin detemir (LEVEMIR FLEXTOUCH) 100 unit/mL (3 mL) SubQ InPn pen Inject 26 Units into the  "skin every evening. 2 Box 6    levothyroxine (SYNTHROID) 50 MCG tablet Take 1 tablet (50 mcg total) by mouth before breakfast. 90 tablet 4    metoprolol tartrate (LOPRESSOR) 25 MG tablet take 1 tablet by mouth twice a day 60 tablet 2    ondansetron (ZOFRAN) 4 MG tablet Take 1 tablet (4 mg total) by mouth every 8 (eight) hours as needed for Nausea. 12 tablet 0    pen needle, diabetic, safety (NOVOFINE AUTOCOVER) 30 gauge x 1/3" Ndle Uses 4 a day 150 each 12    pravastatin (PRAVACHOL) 40 MG tablet take 1 tablet by mouth once daily 30 tablet 3    warfarin (COUMADIN) 2.5 MG tablet Take 2.5 mg by mouth once daily. Except take 5 mg on Wed       ADVAIR DISKUS 500-50 mcg/dose DsDv diskus inhaler inhale 1 dose by mouth twice a day (Patient taking differently: inhale 1 dose by mouth twice a day as needed) 60 each 2     albuterol (PROAIR HFA) 90 mcg/actuation inhaler Inhale 2 puffs into the lungs every 6 (six) hours as needed for Wheezing. 2 Inhaler 3    cetirizine (ZYRTEC) 10 MG tablet Take 1 tablet (10 mg total) by mouth once daily.  0     diltiaZEM (CARDIZEM CD) 240 MG 24 hr capsule take 1 capsule by mouth once daily 30 capsule 2     fluticasone (FLONASE) 50 mcg/actuation nasal spray instill 1 spray into each nostril once daily (Patient taking differently: instill 1 spray into each nostril as needed for allergies) 16 g 1     ipratropium (ATROVENT) 0.02 % nebulizer solution USE 1 VIAL IN NEBULIZER EVERY 8 HOURS (Patient taking differently: USE 1 VIAL IN NEBULIZER EVERY 8 HOURS AS NEEDED FOR WHEEZING/SHORTNESS OF BREATH) 62.5 mL 4     lisinopril (PRINIVIL,ZESTRIL) 5 MG tablet Take 1 tablet (5 mg total) by mouth once daily. 90 tablet 1     No current facility-administered medications on file prior to visit.         Review of patient's allergies indicates:   Allergen Reactions    Latex, natural rubber Dermatitis and Rash    Adhesive Itching and Rash     Allergy to adhesive in nicotine patch.    Sulfa " (sulfonamide antibiotics) Rash       OBJECTIVE:     Vital Signs:  Vitals:    03/22/17 0804   BP: (!) 94/46   Pulse: (!) 56     Wt Readings from Last 1 Encounters:   03/22/17 0804 73.2 kg (161 lb 6 oz)     Body mass index is 31.52 kg/(m^2).     Physical Exam:  General: Well developed, well nourished. No distress.  HEENT: Head is normocephalic, atraumatic; ears are normal.    Eyes: Clear conjunctiva.  Neck: Supple, symmetrical neck; trachea midline.  Lungs: Clear to auscultation bilaterally and normal respiratory effort. Decrease breath sounds overall.  Cardiovascular: Irreg Irreg rhythm about 70.  Extremities: No LE edema.    Abdomen: Abdomen is soft, non-tender non-distended with normal bowel sounds.  Skin: Skin color, texture, turgor normal.    Musculoskeletal: Normal gait with walker.  Psychiatric: Not depressed.    Laboratory  Lab Results   Component Value Date    WBC 21.01 (H) 03/16/2017    HGB 13.3 03/16/2017    HCT 40.1 03/16/2017    MCV 97 03/16/2017     03/16/2017     BMP  Lab Results   Component Value Date     03/16/2017    K 5.0 03/16/2017     03/16/2017    CO2 26 03/16/2017    BUN 71 (H) 03/16/2017    CREATININE 1.7 (H) 03/16/2017    CALCIUM 8.9 03/16/2017    ANIONGAP 7 (L) 03/16/2017    ESTGFRAFRICA 34.2 (A) 03/16/2017    EGFRNONAA 29.7 (A) 03/16/2017     Lab Results   Component Value Date    ALT 26 03/13/2017    AST 20 03/13/2017    ALKPHOS 78 03/13/2017    BILITOT 0.5 03/13/2017     Lab Results   Component Value Date    INR 2.2 (H) 03/16/2017    INR 2.3 (H) 03/15/2017    INR 2.7 (H) 03/14/2017     Lab Results   Component Value Date    HGBA1C 7.8 (H) 03/13/2017       TRANSITION OF CARE:     [facility] On Call Contact Note: 3-20-17    Family and/or Caretaker present at visit?  Yes.  Diagnostic tests reviewed/disposition: No diagnosic tests pending after this hospitalization.  Disease/illness education: Chronic restrictive jose e disease, home oxygen, right sided CHF, afib  Home  health/community services discussion/referrals: [facility] home [facility].  Establishment or re-establishment of referral orders for community resources: No other necessary community resources.   Discussion with other health care providers: No discussion with other health care providers necessary.     Medications Reconciliation:   I have reconciled the patient's home medications and discharge medications with the patient/family. I have updated all changes.  Refer to After-Visit Medication List.    ASSESSMENT & PLAN:     CRLD (chronic restrictive lung disease)/COPD  Chronic respiratory failure with hypoxia  Multiple lung nodules  On home oxygen therapy 3L  - Currently compensated. Off abx and steroid.    Discussed preventive measures with:  -     fluticasone-salmeterol 500-50 mcg/dose (ADVAIR DISKUS) 500-50 mcg/dose DsDv diskus inhaler; Inhale 1 puff into the lungs 2 (two) times daily. Controller  Dispense: 60 each; Refill: 6  -     cetirizine (ZYRTEC) 10 MG tablet; Take 1 tablet (10 mg total) by mouth once daily.  Dispense: 90 tablet; Refill: 6  -     fluticasone (FLONASE) 50 mcg/actuation nasal spray; 1 spray by Each Nare route once daily.  Dispense: 16 g; Refill: 1    Rescue meds with:    -     albuterol-ipratropium 2.5mg-0.5mg/3mL (DUO-NEB) 0.5 mg-3 mg(2.5 mg base)/3 mL nebulizer solution; Take 3 mLs by nebulization every 4 (four) hours as needed for Wheezing. Rescue  Dispense: 200 vial; Refill: 12  -     albuterol (PROAIR HFA) 90 mcg/actuation inhaler; Inhale 2 puffs into the lungs every 6 (six) hours as needed for Wheezing or Shortness of Breath.  Dispense: 2 Inhaler; Refill: 6  2    Right-sided heart failure  Long term (current) use of anticoagulants  Paroxysmal atrial fibrillation  Renovascular hypertension  Secondary pulmonary hypertension  - BP is on the low side (despite not taking meds today).    Will DC lisinopril.  Keep Cardizem 240 mg CD daily and Lopressor 25 mg BID    Last two INR in range (2-3). Keep  current dose of Warfarin. Will follow up with Coumadin clinic.    Refilled:  -     diltiaZEM (CARDIZEM CD) 240 MG 24 hr capsule; Take 1 capsule (240 mg total) by mouth once daily.  Dispense: 90 capsule; Refill: 4    CKD (chronic kidney disease), stage IV  Anemia in chronic kidney disease  Secondary hyperparathyroidism  Iron deficiency  - CKD stable.    Diabetic peripheral neuropathy  Type 2 diabetes mellitus with stage 4 chronic kidney disease, with long-term current use of insulin  - On Levemir and Novolog.      Has follow up with Endocrine.    SUSAN (generalized anxiety disorder)  - On Xanax per Psych.    Mixed hyperlipidemia  - On Pravastatin.    Acquired hypothyroidism  - On Synthroid 50 mcg daily.    Instructions for the patient:  For COPD (bronchitis/emphysema):  1. To stabilize your disease:     ? Use Advair 1 puff twice daily.     2. When you get short of breath:      ? Use albuterol inhaler as needed every 4-6 as needed.     ?  If you have a nebulizer, you may use it instead of the inhaler at home. Use one treatment every 4-6 as needed for shortness of breath.      Scheduled Follow-up :  Future Appointments  Date Time Provider Department Center   3/29/2017 7:55 AM LAB, APPOINTMENT [facility] LAB [facility]   3/29/2017 8:30 AM Adali Kim PharmD [facility] COUMAD [facility]   4/5/2017 9:30 AM Nohemi Ardon DNP, NP [facility] ENDODIA [facility]   4/12/2017 11:15 AM Nadiya Nava MD [facility] IM [facility] PCW   5/2/2017 9:30 AM Abad Kruger MD [facility] PSYCH [facility]     After Visit Medication List :     Medication List          This list is accurate as of: 3/22/17  8:58 AM.  Always use your most recent med list.                     albuterol 90 mcg/actuation inhaler   Commonly known as:  PROAIR HFA   Inhale 2 puffs into the lungs every 6 (six) hours as needed for Wheezing or Shortness of Breath.       albuterol-ipratropium 2.5mg-0.5mg/3mL 0.5 mg-3 mg(2.5 mg base)/3 mL nebulizer solution   Commonly known  "as:  DUO-NEB   Take 3 mLs by nebulization every 4 (four) hours as needed for Wheezing. Rescue       alprazolam 0.5 MG tablet   Commonly known as:  XANAX   TAKE 1 TABLET BY MOUTH 2 TIMES A DAY       cetirizine 10 MG tablet   Commonly known as:  ZYRTEC   Take 1 tablet (10 mg total) by mouth once daily.       diltiaZEM 240 MG 24 hr capsule   Commonly known as:  CARDIZEM CD   Take 1 capsule (240 mg total) by mouth once daily.       FERROUS SULFATE ORAL       fluticasone 50 mcg/actuation nasal spray   Commonly known as:  FLONASE   1 spray by Each Nare route once daily.       fluticasone-salmeterol 500-50 mcg/dose 500-50 mcg/dose Dsdv diskus inhaler   Commonly known as:  ADVAIR DISKUS   Inhale 1 puff into the lungs 2 (two) times daily. Controller       furosemide 20 MG tablet   Commonly known as:  LASIX   take 1 tablet by mouth twice a day       insulin aspart 100 unit/mL Inpn pen   Commonly known as:  NOVOLOG FLEXPEN   26-28-26 Plus correction scale       insulin detemir 100 unit/mL (3 mL) Inpn pen   Commonly known as:  LEVEMIR FLEXTOUCH   Inject 26 Units into the skin every evening.       levothyroxine 50 MCG tablet   Commonly known as:  SYNTHROID   Take 1 tablet (50 mcg total) by mouth before breakfast.       metoprolol tartrate 25 MG tablet   Commonly known as:  LOPRESSOR   take 1 tablet by mouth twice a day       ondansetron 4 MG tablet   Commonly known as:  ZOFRAN   Take 1 tablet (4 mg total) by mouth every 8 (eight) hours as needed for Nausea.       pen needle, diabetic, safety 30 gauge x 1/3" Ndle   Commonly known as:  NOVOFINE AUTOCOVER   Uses 4 a day       pravastatin 40 MG tablet   Commonly known as:  PRAVACHOL   take 1 tablet by mouth once daily       warfarin 2.5 MG tablet   Commonly known as:  COUMADIN            Where to Get Your Medications      These medications were sent to [facility]. [facility] [place] - [address] [address]     Phone:  " [phone]     albuterol 90 mcg/actuation inhaler    albuterol-ipratropium 2.5mg-0.5mg/3mL 0.5 mg-3 mg(2.5 mg base)/3 mL nebulizer solution    cetirizine 10 MG tablet    diltiaZEM 240 MG 24 hr capsule    fluticasone 50 mcg/actuation nasal spray    fluticasone-salmeterol 500-50 mcg/dose 500-50 mcg/dose Dsdv diskus inhaler             Signing Physician:  Josh Giron MD

## 2017-03-22 NOTE — MR AVS SNAPSHOT
[facility]  [address]  [facility] [address]  Phone: [phone]                  Savita Polo   3/22/2017 8:00 AM   Office Visit    Description:  Female : 1945   Provider:  PHYSICIAN, [facility]   Department:  [facility]           Reason for Visit     Hospital Follow Up           Diagnoses this Visit        Comments    CRLD (chronic restrictive lung disease)    -  Primary     Chronic respiratory failure with hypoxia         Multiple lung nodules         On home oxygen therapy         Right-sided heart failure         Long term (current) use of anticoagulants         Paroxysmal atrial fibrillation         Renovascular hypertension         Secondary pulmonary hypertension         CKD (chronic kidney disease), stage IV         Anemia in chronic kidney disease         Iron deficiency         Secondary hyperparathyroidism         Diabetic peripheral neuropathy         Type 2 diabetes mellitus with stage 4 chronic kidney disease, with long-term current use of insulin         SUSAN (generalized anxiety disorder)         Mixed hyperlipidemia         Tobacco abuse         Acquired hypothyroidism         Vitamin D deficiency disease         Osteopenia                To Do List           Future Appointments        Provider Department Dept Phone    3/29/2017 7:55 AM LAB, APPOINTMENT [place] [facility] [phone]    3/29/2017 8:30 AM Adali Kim, PharmD [facility] Coumadin [phone]    2017 9:30 AM Nohemi Ardon, DUGLAS, NP [facility] - Endocrinology [phone]    2017 11:15 AM MD [facility] - Internal Medicine [phone]      Goals (5 Years of Data)              3/13/17    12/20/16    9/15/16    HEMOGLOBIN A1C < 7.5   7.8  7.6  8.8       These Medications        Disp Refills Start End    fluticasone-salmeterol 500-50 mcg/dose (ADVAIR DISKUS) 500-50 mcg/dose DsDv diskus inhaler 60 each 6  3/22/2017     Inhale 1 puff into the lungs 2 (two) times daily. Controller - Inhalation    Pharmacy: [facility] [place] [address] [place] [address] Ph #: [phone]       albuterol-ipratropium 2.5mg-0.5mg/3mL (DUO-NEB) 0.5 mg-3 mg(2.5 mg base)/3 mL nebulizer solution 200 vial 12 3/22/2017 3/22/2018    Take 3 mLs by nebulization every 4 (four) hours as needed for Wheezing. Rescue - Nebulization    Pharmacy: [facility]  [place] [address] Ph #: [phone]       albuterol (PROAIR HFA) 90 mcg/actuation inhaler 2 Inhaler 6 3/22/2017     Inhale 2 puffs into the lungs every 6 (six) hours as needed for Wheezing or Shortness of Breath. - Inhalation    Pharmacy: [facility] [place] [address] [place] [address] Ph #: [phone]       cetirizine (ZYRTEC) 10 MG tablet 90 tablet 6 3/22/2017     Take 1 tablet (10 mg total) by mouth once daily. - Oral    Pharmacy: [facility]  [place] [address] Ph #: [phone]       diltiaZEM (CARDIZEM CD) 240 MG 24 hr capsule 90 capsule 4 3/22/2017     Take 1 capsule (240 mg total) by mouth once daily. - Oral    Pharmacy: [facility]  [place] [address] Ph #: [phone]       fluticasone (FLONASE) 50 mcg/actuation nasal spray 16 g 12 3/22/2017     1 spray by Each Nare route once daily. - Each Nare    Pharmacy: [facility] [place] [address] - [place] - [address] [place] [address] Ph #: [phone]         [facility]     [facility] On Call Nurse Care Line - 24/7 Assistance  Registered nurses in the [facility] On Call Center provide clinical advisement, health education, appointment booking, and other advisory services.  Call for this free service at [phone].             Medications           Message regarding Medications     Verify the changes and/or additions to your medication regime listed below are the same as discussed  with your clinician today.  If any of these changes or additions are incorrect, please notify your healthcare provider.        START taking these NEW medications        Refills    albuterol-ipratropium 2.5mg-0.5mg/3mL (DUO-NEB) 0.5 mg-3 mg(2.5 mg base)/3 mL nebulizer solution 12    Sig: Take 3 mLs by nebulization every 4 (four) hours as needed for Wheezing. Rescue    Class: Normal    Route: Nebulization      CHANGE how you are taking these medications     Start Taking Instead of    fluticasone-salmeterol 500-50 mcg/dose (ADVAIR DISKUS) 500-50 mcg/dose DsDv diskus inhaler ADVAIR DISKUS 500-50 mcg/dose DsDv diskus inhaler    Dosage:  Inhale 1 puff into the lungs 2 (two) times daily. Controller Dosage:  inhale 1 dose by mouth twice a day    Reason for Change:  Reorder     albuterol (PROAIR HFA) 90 mcg/actuation inhaler albuterol (PROAIR HFA) 90 mcg/actuation inhaler    Dosage:  Inhale 2 puffs into the lungs every 6 (six) hours as needed for Wheezing or Shortness of Breath. Dosage:  Inhale 2 puffs into the lungs every 6 (six) hours as needed for Wheezing.    Reason for Change:  Reorder     diltiaZEM (CARDIZEM CD) 240 MG 24 hr capsule diltiaZEM (CARDIZEM CD) 240 MG 24 hr capsule    Dosage:  Take 1 capsule (240 mg total) by mouth once daily. Dosage:  take 1 capsule by mouth once daily    Reason for Change:  Reorder     fluticasone (FLONASE) 50 mcg/actuation nasal spray fluticasone (FLONASE) 50 mcg/actuation nasal spray    Dosage:  1 spray by Each Nare route once daily. Dosage:  instill 1 spray into each nostril once daily    Reason for Change:  Reorder       STOP taking these medications     ipratropium (ATROVENT) 0.02 % nebulizer solution USE 1 VIAL IN NEBULIZER EVERY 8 HOURS    lisinopril (PRINIVIL,ZESTRIL) 5 MG tablet Take 1 tablet (5 mg total) by mouth once daily.           Verify that the below list of medications is an accurate representation of the medications you are currently taking.  If none reported, the list  "may be blank. If incorrect, please contact your healthcare provider. Carry this list with you in case of emergency.           Current Medications     albuterol (PROAIR HFA) 90 mcg/actuation inhaler Inhale 2 puffs into the lungs every 6 (six) hours as needed for Wheezing or Shortness of Breath.    albuterol-ipratropium 2.5mg-0.5mg/3mL (DUO-NEB) 0.5 mg-3 mg(2.5 mg base)/3 mL nebulizer solution Take 3 mLs by nebulization every 4 (four) hours as needed for Wheezing. Rescue    alprazolam (XANAX) 0.5 MG tablet TAKE 1 TABLET BY MOUTH 2 TIMES A DAY    cetirizine (ZYRTEC) 10 MG tablet Take 1 tablet (10 mg total) by mouth once daily.    diltiaZEM (CARDIZEM CD) 240 MG 24 hr capsule Take 1 capsule (240 mg total) by mouth once daily.    FERROUS SULFATE ORAL Take 1 tablet by mouth once daily.    fluticasone (FLONASE) 50 mcg/actuation nasal spray 1 spray by Each Nare route once daily.    fluticasone-salmeterol 500-50 mcg/dose (ADVAIR DISKUS) 500-50 mcg/dose DsDv diskus inhaler Inhale 1 puff into the lungs 2 (two) times daily. Controller    furosemide (LASIX) 20 MG tablet take 1 tablet by mouth twice a day    insulin aspart (NOVOLOG FLEXPEN) 100 unit/mL InPn pen 26-28-26 Plus correction scale    insulin detemir (LEVEMIR FLEXTOUCH) 100 unit/mL (3 mL) SubQ InPn pen Inject 26 Units into the skin every evening.    levothyroxine (SYNTHROID) 50 MCG tablet Take 1 tablet (50 mcg total) by mouth before breakfast.    metoprolol tartrate (LOPRESSOR) 25 MG tablet take 1 tablet by mouth twice a day    ondansetron (ZOFRAN) 4 MG tablet Take 1 tablet (4 mg total) by mouth every 8 (eight) hours as needed for Nausea.    pen needle, diabetic, safety (NOVOFINE AUTOCOVER) 30 gauge x 1/3" Ndle Uses 4 a day    pravastatin (PRAVACHOL) 40 MG tablet take 1 tablet by mouth once daily    warfarin (COUMADIN) 2.5 MG tablet Take 2.5 mg by mouth once daily. Except take 5 mg on Wed           Clinical Reference Information           Your Vitals Were     BP Pulse " Height Weight Last Period SpO2    94/46 (BP Location: Left arm, Patient Position: Sitting) 56 5' (1.524 m) 73.2 kg (161 lb 6 oz) (LMP Unknown) 95%    BMI                31.52 kg/m2          Blood Pressure          Most Recent Value    BP  (!)  94/46      Allergies as of 3/22/2017     Latex, Natural Rubber    Adhesive    Sulfa (Sulfonamide Antibiotics)      Immunizations Administered on Date of Encounter - 3/22/2017     None      [facility] Sign-Up     Activating your [facility] account is as easy as 1-2-3!     1) Visit [email or web address], select Sign Up Now, enter this activation code and your date of birth, then select Next.  [email or web address]  Expires: 4/30/2017  4:18 PM      2) Create a username and password to use when you visit [facility] in the future and select a security question in case you lose your password and select Next.    3) Enter your e-mail address and click Sign Up!    Additional Information  If you have questions, please e-mail [email or web address] or call [phone] to talk to our [facility] staff. Remember, [facility] is NOT to be used for urgent needs. For medical emergencies, dial 911.         Smoking Cessation     If you would like to quit smoking:   You may be eligible for free services if you are a [place] resident and started smoking cigarettes before September 1, 1988.  Call the Smoking Cessation Trust ([facility]) toll free at ([phone] 028-8389 or (217) 347-5820.   Call 1-800-QUIT-NOW if you do not meet the above criteria.            Language Assistance Services     ATTENTION: Language assistance services are available, free of charge. Please call [phone].      ATENCIÓN: Si habla español, tiene a yoo disposición servicios gratuitos de asistencia lingüística. Llame al [phone].     BULMARO Ý: N?u b?n nói Ti?ng Vi?t, có các d?ch v? h? tr? ngôn ng? mi?n phí dành cho b?n. G?i s? [phone].         [facility] complies with applicable Federal civil rights laws and does not  discriminate on the basis of race, color, national origin, age, disability, or sex.

## 2017-03-23 RX ORDER — WARFARIN SODIUM 5 MG/1
TABLET ORAL
Qty: 30 TABLET | Refills: 0 | Status: SHIPPED | OUTPATIENT
Start: 2017-03-23 | End: 2017-04-05

## 2017-03-29 ENCOUNTER — ANTI-COAG VISIT (OUTPATIENT)
Dept: CARDIOLOGY | Facility: CLINIC | Age: 72
End: 2017-03-29
Payer: MEDICARE

## 2017-03-29 ENCOUNTER — LAB VISIT (OUTPATIENT)
Dept: LAB | Facility: HOSPITAL | Age: 72
End: 2017-03-29
Payer: MEDICARE

## 2017-03-29 DIAGNOSIS — N18.4 CHRONIC KIDNEY DISEASE, STAGE IV (SEVERE): ICD-10-CM

## 2017-03-29 DIAGNOSIS — Z79.01 LONG TERM (CURRENT) USE OF ANTICOAGULANTS: ICD-10-CM

## 2017-03-29 DIAGNOSIS — E11.21 DIABETIC NEPHROPATHY ASSOCIATED WITH TYPE 2 DIABETES MELLITUS: ICD-10-CM

## 2017-03-29 LAB
25(OH)D3+25(OH)D2 SERPL-MCNC: 19 NG/ML
ALBUMIN SERPL BCP-MCNC: 3.2 G/DL
ALP SERPL-CCNC: 71 U/L
ALT SERPL W/O P-5'-P-CCNC: 20 U/L
ANION GAP SERPL CALC-SCNC: 9 MMOL/L
AST SERPL-CCNC: 18 U/L
BILIRUB SERPL-MCNC: 0.4 MG/DL
BUN SERPL-MCNC: 38 MG/DL
CALCIUM SERPL-MCNC: 9.3 MG/DL
CHLORIDE SERPL-SCNC: 105 MMOL/L
CHOLEST/HDLC SERPL: 3 {RATIO}
CO2 SERPL-SCNC: 29 MMOL/L
CREAT SERPL-MCNC: 1.5 MG/DL
EST. GFR  (AFRICAN AMERICAN): 39.8 ML/MIN/1.73 M^2
EST. GFR  (NON AFRICAN AMERICAN): 34.6 ML/MIN/1.73 M^2
ESTIMATED AVG GLUCOSE: 203 MG/DL
GLUCOSE SERPL-MCNC: 138 MG/DL
HBA1C MFR BLD HPLC: 8.7 %
HDL/CHOLESTEROL RATIO: 33.1 %
HDLC SERPL-MCNC: 154 MG/DL
HDLC SERPL-MCNC: 51 MG/DL
INR PPP: 1.7 (ref 2–3)
LDLC SERPL CALC-MCNC: 87.8 MG/DL
NONHDLC SERPL-MCNC: 103 MG/DL
POTASSIUM SERPL-SCNC: 4.4 MMOL/L
PROT SERPL-MCNC: 7.7 G/DL
SODIUM SERPL-SCNC: 143 MMOL/L
TRIGL SERPL-MCNC: 76 MG/DL
TSH SERPL DL<=0.005 MIU/L-ACNC: 1.33 UIU/ML

## 2017-03-29 PROCEDURE — 85610 PROTHROMBIN TIME: CPT | Mod: PBBFAC

## 2017-03-29 PROCEDURE — 99211 OFF/OP EST MAY X REQ PHY/QHP: CPT | Mod: PBBFAC

## 2017-03-29 PROCEDURE — 99999 PR PBB SHADOW E&M-EST. PATIENT-LVL I: CPT | Mod: PBBFAC,,,

## 2017-03-29 NOTE — PROGRESS NOTES
Patient has completed azithromycin and prednisone. She has bruising on right hand and arms from use. She still feels weak after her recent hospitalization. She fell on her knees 3/18 but denies hitting her head. She has bruising on her knees from the fall. Her INR is low for no apparent reason. I am boosting and re-challenging this dose until follow-up. If her INR remains low I will consider increasing her weekly dose. I advised her to contact us with any changes or problems.

## 2017-04-05 ENCOUNTER — OFFICE VISIT (OUTPATIENT)
Dept: ENDOCRINOLOGY | Facility: CLINIC | Age: 72
End: 2017-04-05
Payer: MEDICARE

## 2017-04-05 ENCOUNTER — ANTI-COAG VISIT (OUTPATIENT)
Dept: CARDIOLOGY | Facility: CLINIC | Age: 72
End: 2017-04-05
Payer: MEDICARE

## 2017-04-05 VITALS
WEIGHT: 160 LBS | HEART RATE: 72 BPM | DIASTOLIC BLOOD PRESSURE: 52 MMHG | BODY MASS INDEX: 34.52 KG/M2 | HEIGHT: 57 IN | SYSTOLIC BLOOD PRESSURE: 110 MMHG

## 2017-04-05 DIAGNOSIS — Z79.4 TYPE 2 DIABETES MELLITUS WITH STAGE 4 CHRONIC KIDNEY DISEASE, WITH LONG-TERM CURRENT USE OF INSULIN: Primary | Chronic | ICD-10-CM

## 2017-04-05 DIAGNOSIS — E11.42 DIABETIC PERIPHERAL NEUROPATHY: Chronic | ICD-10-CM

## 2017-04-05 DIAGNOSIS — E55.9 VITAMIN D DEFICIENCY DISEASE: ICD-10-CM

## 2017-04-05 DIAGNOSIS — Z79.01 LONG TERM (CURRENT) USE OF ANTICOAGULANTS: ICD-10-CM

## 2017-04-05 DIAGNOSIS — Z72.0 TOBACCO ABUSE: Chronic | ICD-10-CM

## 2017-04-05 DIAGNOSIS — N18.4 TYPE 2 DIABETES MELLITUS WITH STAGE 4 CHRONIC KIDNEY DISEASE, WITH LONG-TERM CURRENT USE OF INSULIN: Primary | Chronic | ICD-10-CM

## 2017-04-05 DIAGNOSIS — E78.2 MIXED HYPERLIPIDEMIA: Chronic | ICD-10-CM

## 2017-04-05 DIAGNOSIS — E11.22 TYPE 2 DIABETES MELLITUS WITH STAGE 4 CHRONIC KIDNEY DISEASE, WITH LONG-TERM CURRENT USE OF INSULIN: Primary | Chronic | ICD-10-CM

## 2017-04-05 DIAGNOSIS — E03.9 ACQUIRED HYPOTHYROIDISM: ICD-10-CM

## 2017-04-05 DIAGNOSIS — N18.4 CKD (CHRONIC KIDNEY DISEASE), STAGE IV: ICD-10-CM

## 2017-04-05 LAB — INR PPP: 1.4 (ref 2–3)

## 2017-04-05 PROCEDURE — 99214 OFFICE O/P EST MOD 30 MIN: CPT | Mod: S$PBB,,, | Performed by: NURSE PRACTITIONER

## 2017-04-05 PROCEDURE — 99215 OFFICE O/P EST HI 40 MIN: CPT | Mod: PBBFAC | Performed by: NURSE PRACTITIONER

## 2017-04-05 PROCEDURE — 99999 PR PBB SHADOW E&M-EST. PATIENT-LVL V: CPT | Mod: PBBFAC,,, | Performed by: NURSE PRACTITIONER

## 2017-04-05 PROCEDURE — 99999 PR PBB SHADOW E&M-EST. PATIENT-LVL I: CPT | Mod: PBBFAC,,,

## 2017-04-05 RX ORDER — ERGOCALCIFEROL 1.25 MG/1
50000 CAPSULE ORAL
Qty: 4 CAPSULE | Refills: 12 | Status: SHIPPED | OUTPATIENT
Start: 2017-04-05

## 2017-04-05 RX ORDER — INSULIN ASPART 100 [IU]/ML
INJECTION, SOLUTION INTRAVENOUS; SUBCUTANEOUS
Qty: 2 BOX | Refills: 12 | Status: SHIPPED | OUTPATIENT
Start: 2017-04-05

## 2017-04-05 NOTE — MR AVS SNAPSHOT
[facility] Endocrin[facility]  [address]  [facility] [address]  Phone: [phone]                  Savita Polo   2017 9:30 AM   Office Visit    Description:  Female : 1945   Provider:  Nohemi Ardon DNP, NP   Department:  [facility] - Endocrinology           Reason for Visit     Diabetes Mellitus           Diagnoses this Visit        Comments    Type 2 diabetes mellitus with stage 4 chronic kidney disease, with long-term current use of insulin    -  Primary     Mixed hyperlipidemia         CKD (chronic kidney disease), stage IV         Acquired hypothyroidism         Tobacco abuse         Vitamin D deficiency disease                To Do List           Future Appointments        Provider Department Dept Phone    2017 11:15 AM Nadiya Nava MD [facility] - Internal Medicine [phone]    2017 1:00 PM Adali Kim, PharmD [facility] - Coumadin [phone]    2017 2:00 PM Samina Godoy MD [facility] - Nephrology [phone]    2017 1:30 PM Jose Elias Butterfield OD [place] - Optometry [phone]      Goals (5 Years of Data)              3/29/17    3/13/17    12/20/16    HEMOGLOBIN A1C < 7.5   8.7  7.8  7.6       These Medications        Disp Refills Start End    ergocalciferol (ERGOCALCIFEROL) 50,000 unit Cap 4 capsule 12 2017     Take 1 capsule (50,000 Units total) by mouth every 7 days. - Oral    Pharmacy: [facility] [address] [place] - [facility] Ph #: [phone]       insulin detemir (LEVEMIR FLEXTOUCH) 100 unit/mL (3 mL) SubQ InPn pen 1 Box 12 2017     Inject 26 Units into the skin every evening. - Subcutaneous    Pharmacy: [facility] - [place] - [facility] Ph #: [phone]       insulin aspart (NOVOLOG FLEXPEN) 100 unit/mL InPn pen 2 Box 12 2017     26 units AC    Pharmacy: [facility] [place] [address] - [place] - [address] [facility] Ph #: [phone]          [facility]     [facility] Nurse Care [facility] - 24/7 Assistance  Unless otherwise directed by your provider, please contact [facility], our nurse care line that is available for 24/7 assistance.     Registered nurses in the [facility] Center provide: appointment scheduling, clinical advisement, health education, and other advisory services.  Call: [phone] (toll free)               Medications           Message regarding Medications     Verify the changes and/or additions to your medication regime listed below are the same as discussed with your clinician today.  If any of these changes or additions are incorrect, please notify your healthcare provider.        START taking these NEW medications        Refills    ergocalciferol (ERGOCALCIFEROL) 50,000 unit Cap 12    Sig: Take 1 capsule (50,000 Units total) by mouth every 7 days.    Class: Normal    Route: Oral      CHANGE how you are taking these medications     Start Taking Instead of    insulin aspart (NOVOLOG FLEXPEN) 100 unit/mL InPn pen insulin aspart (NOVOLOG FLEXPEN) 100 unit/mL InPn pen    Dosage:  26 units AC Dosage:  26-28-26 Plus correction scale    Reason for Change:  Reorder            Verify that the below list of medications is an accurate representation of the medications you are currently taking.  If none reported, the list may be blank. If incorrect, please contact your healthcare provider. Carry this list with you in case of emergency.           Current Medications     albuterol (PROAIR HFA) 90 mcg/actuation inhaler Inhale 2 puffs into the lungs every 6 (six) hours as needed for Wheezing or Shortness of Breath.    alprazolam (XANAX) 0.5 MG tablet TAKE 1 TABLET BY MOUTH 2 TIMES A DAY    cetirizine (ZYRTEC) 10 MG tablet Take 1 tablet (10 mg total) by mouth once daily.    diltiaZEM (CARDIZEM CD) 240 MG 24 hr capsule Take 1 capsule (240 mg total) by mouth once daily.    FERROUS SULFATE ORAL Take 1 tablet by mouth once  "daily.    fluticasone (FLONASE) 50 mcg/actuation nasal spray 1 spray by Each Nare route once daily.    furosemide (LASIX) 20 MG tablet take 1 tablet by mouth twice a day    insulin aspart (NOVOLOG FLEXPEN) 100 unit/mL InPn pen 26 units AC    insulin detemir (LEVEMIR FLEXTOUCH) 100 unit/mL (3 mL) SubQ InPn pen Inject 26 Units into the skin every evening.    levothyroxine (SYNTHROID) 50 MCG tablet Take 1 tablet (50 mcg total) by mouth before breakfast.    metoprolol tartrate (LOPRESSOR) 25 MG tablet take 1 tablet by mouth twice a day    ondansetron (ZOFRAN) 4 MG tablet Take 1 tablet (4 mg total) by mouth every 8 (eight) hours as needed for Nausea.    pen needle, diabetic, safety (NOVOFINE AUTOCOVER) 30 gauge x 1/3" Ndle Uses 4 a day    pravastatin (PRAVACHOL) 40 MG tablet take 1 tablet by mouth once daily    warfarin (COUMADIN) 2.5 MG tablet Take 2.5 mg by mouth once daily. Except take 5 mg on Wed    albuterol-ipratropium 2.5mg-0.5mg/3mL (DUO-NEB) 0.5 mg-3 mg(2.5 mg base)/3 mL nebulizer solution Take 3 mLs by nebulization every 4 (four) hours as needed for Wheezing. Rescue    ergocalciferol (ERGOCALCIFEROL) 50,000 unit Cap Take 1 capsule (50,000 Units total) by mouth every 7 days.    fluticasone-salmeterol 500-50 mcg/dose (ADVAIR DISKUS) 500-50 mcg/dose DsDv diskus inhaler Inhale 1 puff into the lungs 2 (two) times daily. Controller           Clinical Reference Information           Your Vitals Were     BP Pulse Height Weight Last Period BMI    110/52 72 4' 9" (1.448 m) 72.6 kg (160 lb) (LMP Unknown) 34.62 kg/m2      Blood Pressure          Most Recent Value    BP  (!)  110/52      Allergies as of 4/5/2017     Latex, Natural Rubber    Adhesive    Sulfa (Sulfonamide Antibiotics)      Immunizations Administered on Date of Encounter - 4/5/2017     None      Orders Placed During Today's Visit      Normal Orders This Visit    Ambulatory consult to Optometry     Ambulatory referral to Nephrology     Future Labs/Procedures " Expected by Expires    Hemoglobin A1c  7/6/2017 4/5/2018    Comprehensive metabolic panel  8/5/2017 4/5/2018      [facility] Sign-Up     Activating your [facility] account is as easy as 1-2-3!     1) Visit [email or web address], select Sign Up Now, enter this activation code and your date of birth, then select Next.  [email or web address]  Expires: 4/30/2017  4:18 PM      2) Create a username and password to use when you visit [facility] in the future and select a security question in case you lose your password and select Next.    3) Enter your e-mail address and click Sign Up!    Additional Information  If you have questions, please e-mail [email or web address] or call [phone] to talk to our [facility] staff. Remember, [facility] is NOT to be used for urgent needs. For medical emergencies, dial 911.         Smoking Cessation     If you would like to quit smoking:   You may be eligible for free services if you are a [place] resident and started smoking cigarettes before September 1, 1988.  Call the Smoking Cessation Trust ([facility]) toll free at (157) 750-5647 or (837) 579-0793.   Call 9-095-QUIT-NOW if you do not meet the above criteria.   Contact us via email: [email or web address]   View our website for more information: [email or web address]        Language Assistance Services     ATTENTION: Language assistance services are available, free of charge. Please call [phone].      ATENCIÓN: Si habla español, tiene a yoo disposición servicios gratuitos de asistencia lingüística. Llame al [phone].     CHÚ Ý: N?u b?n nói Ti?ng Vi?t, có các d?ch v? h? tr? ngôn ng? mi?n phí dành cho b?n. G?i s? [phone].         [facility] complies with applicable Federal civil rights laws and does not discriminate on the basis of race, color, national origin, age, disability, or sex.

## 2017-04-05 NOTE — PROGRESS NOTES
"CC: Ms. Savita Polo arrives today for management of Type 2 complicated by neuropathy, nephropathy and CV disease and review of chronic medical conditions. She arrives with boyfriend today, in a wheelchair with home O2 therapy in use.    HPI: Ms. Savita Polo was diagnosed with Type 2 . Started on oral agents. Converted to insulin in 1992. Never hospitalized r/t DM.     She was admitted to the hospital w pneumonia a few weeks ago  She did receive steroids during her hospital stay- bg 300-400s     Reports that her bg was 127 this am  Checking 4 times a day, left her log at home today  Reports that her bg have been "average" - 120-140  No hypoglycemia   Resumed smoking in April 2016. Smoking 1 pack over 2 weeks; on home Oxygen 3L    CURRENT DIABETIC MEDS:  Levemir 28 units PM, Novolog 26-28-26 units AC  Uses Vials or Pens: pens  Denies difficulty with insulin administration. Rotates injection sites in abdomen.   Missing Insulin/PO medication doses: No  Prandial Insulin Injections Taken with Meals: Yes    Type of Glucose Meter: Prodigy  Exercise: No  Dietary Habits: 2 meals/ daily; coffee for breakfast; gets Meals On Wheels for lunch - has menu to choose options; daughter cooks dinner; snacks sometimes at night  Last Eye Exam: 5/2016      Lost 4 lbs since last visit    REVIEW OF SYSTEMS   General: weight stable; denies fatigue.  Eyes: wears glasses; + blurry vision, denies eye pain .  Cardiac: denies palpitations or chest pain.   Respiratory: c/o SOB, c/o dyspnea;  uses inhalers and home O2 therapy- 3 L  GI: good appetite, no nausea, vomit, diarrhea, or abdominal pain.   : c/o nocturia x 1 nightly; denies urgency, dysuria  Skin: c/o easy bruising; walks with walker  Neuro: + numbness, tingling, toes.  Musc: denies leg pain    PHYSICAL EXAMINATION  Constitutional: Appears well, no distress  Neck: Supple, trachea midline.   Respiratory:  even and unlabored.  Cardiovascular: Rate normal, S1 and S2 with no " murmurs;    Abd: soft, injection sites clear   Lymph:  no edema.   Skin:   Rashes improved  Feet: appropriate footwear.    Hemoglobin A1C   Date Value Ref Range Status   03/29/2017 8.7 (H) 4.5 - 6.2 % Final     Comment:     According to ADA guidelines, hemoglobin A1C <7.0% represents  optimal control in non-pregnant diabetic patients.  Different  metrics may apply to specific populations.   Standards of Medical Care in Diabetes - 2016.  For the purpose of screening for the presence of diabetes:  <5.7%     Consistent with the absence of diabetes  5.7-6.4%  Consistent with increasing risk for diabetes   (prediabetes)  >or=6.5%  Consistent with diabetes  Currently no consensus exists for use of hemoglobin A1C  for diagnosis of diabetes for children.     03/13/2017 7.8 (H) 4.5 - 6.2 % Final     Comment:     According to ADA guidelines, hemoglobin A1C <7.0% represents  optimal control in non-pregnant diabetic patients.  Different  metrics may apply to specific populations.   Standards of Medical Care in Diabetes - 2016.  For the purpose of screening for the presence of diabetes:  <5.7%     Consistent with the absence of diabetes  5.7-6.4%  Consistent with increasing risk for diabetes   (prediabetes)  >or=6.5%  Consistent with diabetes  Currently no consensus exists for use of hemoglobin A1C  for diagnosis of diabetes for children.     12/20/2016 7.6 (H) 4.5 - 6.2 % Final     Comment:     According to ADA guidelines, hemoglobin A1C <7.0% represents  optimal control in non-pregnant diabetic patients.  Different  metrics may apply to specific populations.   Standards of Medical Care in Diabetes - 2016.  For the purpose of screening for the presence of diabetes:  <5.7%     Consistent with the absence of diabetes  5.7-6.4%  Consistent with increasing risk for diabetes   (prediabetes)  >or=6.5%  Consistent with diabetes  Currently no consensus exists for use of hemoglobin A1C  for diagnosis of diabetes for children.            Assessment/Plan     1. Type 2 diabetes mellitus with diabetic chronic kidney disease, nephropathy, neuropathy  Decrease levemir to 26 u qhs; Continue Novolog to 26-28-26  Check bg ac/hs  - takes statin    2. CKD 4- followed by Dr. Godoy who recommended 2 mon f/u 6 months ago, Ace i stopped inpatient- defer to Dr Godoy, avoid insulin stacking    3. DM Peripheral neuropathy- optimize DM control    4. Afib, CHF- avoid metformin, insulin staking, limited green leafy veg in diet- on coumadin     5. Hypothyroidism -Continue LT4 to 50 mcg Recheck annually   Lab Results   Component Value Date    TSH 1.330 03/29/2017     6. HLD-  LFTs WNL, continue statin    7. Obesity-  monitor portions, weight has stabilized, Body mass index is 34.62 kg/(m^2).    8.  Vitamin d deficiency - Vitamin d 19- Start ergocalciferol 50,000 IU weekly, Repeat level  In 6 months    9. Tobacco abuse- smoking 1 pack per q2 weeks

## 2017-04-05 NOTE — PROGRESS NOTES
Patient has lost a lot of weight since her recent hospitalization. She has bruising on her arms from use. She will take a boosted dose today and begin an increased weekly dose with close monitoring until stability is reached. She denies any changes to warrant this INR therefore I am aggressively increasing her weekly dose. She will continue close follow-ups until stability is reached. I advised her to contact us with any changes or problems.

## 2017-04-12 ENCOUNTER — HOSPITAL ENCOUNTER (INPATIENT)
Facility: HOSPITAL | Age: 72
LOS: 6 days | Discharge: HOME HEALTH | DRG: 064 | End: 2017-04-18
Attending: EMERGENCY MEDICINE | Admitting: PSYCHIATRY & NEUROLOGY
Payer: MEDICARE

## 2017-04-12 ENCOUNTER — OFFICE VISIT (OUTPATIENT)
Dept: INTERNAL MEDICINE | Facility: CLINIC | Age: 72
DRG: 064 | End: 2017-04-12
Payer: MEDICARE

## 2017-04-12 VITALS
BODY MASS INDEX: 34.09 KG/M2 | WEIGHT: 158 LBS | SYSTOLIC BLOOD PRESSURE: 110 MMHG | HEART RATE: 79 BPM | TEMPERATURE: 98 F | HEIGHT: 57 IN | OXYGEN SATURATION: 93 % | DIASTOLIC BLOOD PRESSURE: 40 MMHG

## 2017-04-12 DIAGNOSIS — I63.219: Primary | ICD-10-CM

## 2017-04-12 DIAGNOSIS — I63.9 CVA (CEREBRAL VASCULAR ACCIDENT): ICD-10-CM

## 2017-04-12 DIAGNOSIS — I48.0 PAROXYSMAL ATRIAL FIBRILLATION: ICD-10-CM

## 2017-04-12 DIAGNOSIS — I63.9 CEREBROVASCULAR ACCIDENT (CVA), UNSPECIFIED MECHANISM: Primary | ICD-10-CM

## 2017-04-12 DIAGNOSIS — E87.5 HYPERKALEMIA: ICD-10-CM

## 2017-04-12 DIAGNOSIS — E11.42 DIABETIC PERIPHERAL NEUROPATHY: Chronic | ICD-10-CM

## 2017-04-12 DIAGNOSIS — I63.22: Primary | ICD-10-CM

## 2017-04-12 DIAGNOSIS — I63.211: ICD-10-CM

## 2017-04-12 DIAGNOSIS — I63.22: ICD-10-CM

## 2017-04-12 PROBLEM — J42 CHRONIC BRONCHITIS: Status: ACTIVE | Noted: 2017-04-12

## 2017-04-12 PROBLEM — G51.0: Status: ACTIVE | Noted: 2017-04-12

## 2017-04-12 PROBLEM — H53.462 HEMIANOPIA, HOMONYMOUS, LEFT: Status: ACTIVE | Noted: 2017-04-12

## 2017-04-12 PROBLEM — H90.5 SENSORY HEARING LOSS, UNILATERAL: Status: ACTIVE | Noted: 2017-04-12

## 2017-04-12 PROBLEM — I67.9: Status: ACTIVE | Noted: 2017-04-12

## 2017-04-12 PROBLEM — G93.6 CYTOTOXIC CEREBRAL EDEMA: Status: ACTIVE | Noted: 2017-04-12

## 2017-04-12 PROBLEM — G81.04: Status: ACTIVE | Noted: 2017-04-12

## 2017-04-12 PROBLEM — R20.0 SENSORY LOSS: Status: ACTIVE | Noted: 2017-04-12

## 2017-04-12 LAB
ALBUMIN SERPL BCP-MCNC: 3.3 G/DL
ALP SERPL-CCNC: 72 U/L
ALT SERPL W/O P-5'-P-CCNC: 16 U/L
ANION GAP SERPL CALC-SCNC: 12 MMOL/L
APTT BLDCRRT: 22.7 SEC
AST SERPL-CCNC: 21 U/L
BACTERIA #/AREA URNS AUTO: ABNORMAL /HPF
BASOPHILS # BLD AUTO: 0.04 K/UL
BASOPHILS NFR BLD: 0.3 %
BILIRUB SERPL-MCNC: 0.6 MG/DL
BILIRUB UR QL STRIP: NEGATIVE
BNP SERPL-MCNC: 125 PG/ML
BUN SERPL-MCNC: 43 MG/DL
CALCIUM SERPL-MCNC: 9.3 MG/DL
CHLORIDE SERPL-SCNC: 101 MMOL/L
CHOLEST/HDLC SERPL: 2.5 {RATIO}
CLARITY UR REFRACT.AUTO: ABNORMAL
CO2 SERPL-SCNC: 24 MMOL/L
COLOR UR AUTO: YELLOW
CREAT SERPL-MCNC: 1.6 MG/DL
DIFFERENTIAL METHOD: ABNORMAL
EOSINOPHIL # BLD AUTO: 0.1 K/UL
EOSINOPHIL NFR BLD: 0.6 %
ERYTHROCYTE [DISTWIDTH] IN BLOOD BY AUTOMATED COUNT: 13.1 %
EST. GFR  (AFRICAN AMERICAN): 36.8 ML/MIN/1.73 M^2
EST. GFR  (NON AFRICAN AMERICAN): 32 ML/MIN/1.73 M^2
GLUCOSE SERPL-MCNC: 158 MG/DL
GLUCOSE UR QL STRIP: NEGATIVE
HCT VFR BLD AUTO: 41.3 %
HDL/CHOLESTEROL RATIO: 40.6 %
HDLC SERPL-MCNC: 143 MG/DL
HDLC SERPL-MCNC: 58 MG/DL
HGB BLD-MCNC: 13.9 G/DL
HGB UR QL STRIP: ABNORMAL
HYALINE CASTS UR QL AUTO: 4 /LPF
INR PPP: 1.7
KETONES UR QL STRIP: NEGATIVE
LDLC SERPL CALC-MCNC: 69.4 MG/DL
LEUKOCYTE ESTERASE UR QL STRIP: ABNORMAL
LYMPHOCYTES # BLD AUTO: 2.8 K/UL
LYMPHOCYTES NFR BLD: 23.2 %
MCH RBC QN AUTO: 32.6 PG
MCHC RBC AUTO-ENTMCNC: 33.7 %
MCV RBC AUTO: 97 FL
MICROSCOPIC COMMENT: ABNORMAL
MONOCYTES # BLD AUTO: 1.5 K/UL
MONOCYTES NFR BLD: 12 %
NEUTROPHILS # BLD AUTO: 7.8 K/UL
NEUTROPHILS NFR BLD: 63.7 %
NITRITE UR QL STRIP: NEGATIVE
NONHDLC SERPL-MCNC: 85 MG/DL
PH UR STRIP: 6 [PH] (ref 5–8)
PLATELET # BLD AUTO: 215 K/UL
PMV BLD AUTO: 10.1 FL
POCT GLUCOSE: 180 MG/DL (ref 70–110)
POTASSIUM SERPL-SCNC: 4.3 MMOL/L
PROT SERPL-MCNC: 7.7 G/DL
PROT UR QL STRIP: NEGATIVE
PROTHROMBIN TIME: 17.2 SEC
RBC # BLD AUTO: 4.26 M/UL
RBC #/AREA URNS AUTO: 9 /HPF (ref 0–4)
SODIUM SERPL-SCNC: 137 MMOL/L
SP GR UR STRIP: 1.01 (ref 1–1.03)
SQUAMOUS #/AREA URNS AUTO: 4 /HPF
TRIGL SERPL-MCNC: 78 MG/DL
TROPONIN I SERPL DL<=0.01 NG/ML-MCNC: 0.03 NG/ML
TSH SERPL DL<=0.005 MIU/L-ACNC: 1.11 UIU/ML
URN SPEC COLLECT METH UR: ABNORMAL
UROBILINOGEN UR STRIP-ACNC: NEGATIVE EU/DL
WBC # BLD AUTO: 12.21 K/UL
WBC #/AREA URNS AUTO: 19 /HPF (ref 0–5)

## 2017-04-12 PROCEDURE — 85025 COMPLETE CBC W/AUTO DIFF WBC: CPT

## 2017-04-12 PROCEDURE — 81001 URINALYSIS AUTO W/SCOPE: CPT

## 2017-04-12 PROCEDURE — 94761 N-INVAS EAR/PLS OXIMETRY MLT: CPT

## 2017-04-12 PROCEDURE — P9612 CATHETERIZE FOR URINE SPEC: HCPCS

## 2017-04-12 PROCEDURE — 80061 LIPID PANEL: CPT

## 2017-04-12 PROCEDURE — 84484 ASSAY OF TROPONIN QUANT: CPT

## 2017-04-12 PROCEDURE — 93010 ELECTROCARDIOGRAM REPORT: CPT | Mod: ,,, | Performed by: INTERNAL MEDICINE

## 2017-04-12 PROCEDURE — 99285 EMERGENCY DEPT VISIT HI MDM: CPT | Mod: ,,, | Performed by: EMERGENCY MEDICINE

## 2017-04-12 PROCEDURE — 63600175 PHARM REV CODE 636 W HCPCS: Performed by: STUDENT IN AN ORGANIZED HEALTH CARE EDUCATION/TRAINING PROGRAM

## 2017-04-12 PROCEDURE — 84443 ASSAY THYROID STIM HORMONE: CPT

## 2017-04-12 PROCEDURE — 93005 ELECTROCARDIOGRAM TRACING: CPT

## 2017-04-12 PROCEDURE — 83880 ASSAY OF NATRIURETIC PEPTIDE: CPT

## 2017-04-12 PROCEDURE — 99223 1ST HOSP IP/OBS HIGH 75: CPT | Mod: AI,GC,, | Performed by: PSYCHIATRY & NEUROLOGY

## 2017-04-12 PROCEDURE — 99285 EMERGENCY DEPT VISIT HI MDM: CPT | Mod: 25,27

## 2017-04-12 PROCEDURE — 85730 THROMBOPLASTIN TIME PARTIAL: CPT

## 2017-04-12 PROCEDURE — 27000221 HC OXYGEN, UP TO 24 HOURS

## 2017-04-12 PROCEDURE — 96375 TX/PRO/DX INJ NEW DRUG ADDON: CPT

## 2017-04-12 PROCEDURE — 80053 COMPREHEN METABOLIC PANEL: CPT

## 2017-04-12 PROCEDURE — 20600001 HC STEP DOWN PRIVATE ROOM

## 2017-04-12 PROCEDURE — 94640 AIRWAY INHALATION TREATMENT: CPT

## 2017-04-12 PROCEDURE — 63600175 PHARM REV CODE 636 W HCPCS: Performed by: PSYCHIATRY & NEUROLOGY

## 2017-04-12 PROCEDURE — 99999 PR PBB SHADOW E&M-EST. PATIENT-LVL IV: CPT | Mod: PBBFAC,,, | Performed by: INTERNAL MEDICINE

## 2017-04-12 PROCEDURE — 82962 GLUCOSE BLOOD TEST: CPT

## 2017-04-12 PROCEDURE — 96374 THER/PROPH/DIAG INJ IV PUSH: CPT

## 2017-04-12 PROCEDURE — 85610 PROTHROMBIN TIME: CPT

## 2017-04-12 PROCEDURE — 25000242 PHARM REV CODE 250 ALT 637 W/ HCPCS: Performed by: PSYCHIATRY & NEUROLOGY

## 2017-04-12 PROCEDURE — 99214 OFFICE O/P EST MOD 30 MIN: CPT | Mod: S$PBB,,, | Performed by: INTERNAL MEDICINE

## 2017-04-12 RX ORDER — IPRATROPIUM BROMIDE AND ALBUTEROL SULFATE 2.5; .5 MG/3ML; MG/3ML
3 SOLUTION RESPIRATORY (INHALATION)
Status: DISCONTINUED | OUTPATIENT
Start: 2017-04-12 | End: 2017-04-14

## 2017-04-12 RX ORDER — LORAZEPAM 2 MG/ML
1 INJECTION INTRAMUSCULAR
Status: DISCONTINUED | OUTPATIENT
Start: 2017-04-12 | End: 2017-04-18

## 2017-04-12 RX ORDER — ATORVASTATIN CALCIUM 20 MG/1
40 TABLET, FILM COATED ORAL DAILY
Status: DISCONTINUED | OUTPATIENT
Start: 2017-04-12 | End: 2017-04-18 | Stop reason: HOSPADM

## 2017-04-12 RX ORDER — MORPHINE SULFATE 2 MG/ML
2 INJECTION, SOLUTION INTRAMUSCULAR; INTRAVENOUS
Status: COMPLETED | OUTPATIENT
Start: 2017-04-12 | End: 2017-04-12

## 2017-04-12 RX ORDER — INSULIN ASPART 100 [IU]/ML
1-10 INJECTION, SOLUTION INTRAVENOUS; SUBCUTANEOUS EVERY 6 HOURS PRN
Status: DISCONTINUED | OUTPATIENT
Start: 2017-04-12 | End: 2017-04-18 | Stop reason: HOSPADM

## 2017-04-12 RX ORDER — FLUTICASONE PROPIONATE 50 MCG
2 SPRAY, SUSPENSION (ML) NASAL DAILY
Status: DISCONTINUED | OUTPATIENT
Start: 2017-04-12 | End: 2017-04-18 | Stop reason: HOSPADM

## 2017-04-12 RX ORDER — ASPIRIN 325 MG
325 TABLET, DELAYED RELEASE (ENTERIC COATED) ORAL DAILY
Status: DISCONTINUED | OUTPATIENT
Start: 2017-04-12 | End: 2017-04-12

## 2017-04-12 RX ORDER — LEVOTHYROXINE SODIUM 50 UG/1
50 TABLET ORAL
Status: DISCONTINUED | OUTPATIENT
Start: 2017-04-13 | End: 2017-04-18 | Stop reason: HOSPADM

## 2017-04-12 RX ORDER — ONDANSETRON 2 MG/ML
4 INJECTION INTRAMUSCULAR; INTRAVENOUS EVERY 12 HOURS PRN
Status: DISCONTINUED | OUTPATIENT
Start: 2017-04-12 | End: 2017-04-18 | Stop reason: HOSPADM

## 2017-04-12 RX ORDER — HYDRALAZINE HYDROCHLORIDE 20 MG/ML
10 INJECTION INTRAMUSCULAR; INTRAVENOUS EVERY 6 HOURS PRN
Status: DISCONTINUED | OUTPATIENT
Start: 2017-04-12 | End: 2017-04-18 | Stop reason: HOSPADM

## 2017-04-12 RX ORDER — SODIUM CHLORIDE 0.9 % (FLUSH) 0.9 %
3 SYRINGE (ML) INJECTION EVERY 8 HOURS
Status: DISCONTINUED | OUTPATIENT
Start: 2017-04-12 | End: 2017-04-18 | Stop reason: HOSPADM

## 2017-04-12 RX ORDER — WARFARIN 2.5 MG/1
2.5 TABLET ORAL DAILY
Status: DISCONTINUED | OUTPATIENT
Start: 2017-04-12 | End: 2017-04-16

## 2017-04-12 RX ADMIN — IPRATROPIUM BROMIDE AND ALBUTEROL SULFATE 3 ML: .5; 3 SOLUTION RESPIRATORY (INHALATION) at 03:04

## 2017-04-12 RX ADMIN — LORAZEPAM 1 MG: 2 INJECTION INTRAMUSCULAR; INTRAVENOUS at 03:04

## 2017-04-12 RX ADMIN — IPRATROPIUM BROMIDE AND ALBUTEROL SULFATE 3 ML: .5; 3 SOLUTION RESPIRATORY (INHALATION) at 07:04

## 2017-04-12 RX ADMIN — MORPHINE SULFATE 2 MG: 2 INJECTION, SOLUTION INTRAMUSCULAR; INTRAVENOUS at 02:04

## 2017-04-12 NOTE — SUBJECTIVE & OBJECTIVE
Past Medical History:   Diagnosis Date    Anemia in chronic kidney disease 3/21/2017    Anxiety     Atherosclerotic cerebrovascular disease 7/25/2012    Chronic respiratory failure with hypoxia 8/11/2014    CKD (chronic kidney disease), stage IV 6/23/2016    CRLD (chronic restrictive lung disease) 8/11/2014    Diabetic peripheral neuropathy 7/24/2012    Diabetic retinopathy     Diverticulosis of colon     DJD (degenerative joint disease) 7/24/2012    Fatty liver     SUSAN (generalized anxiety disorder) 11/15/2012    Heart attack     History of PSVT (paroxysmal supraventricular tachycardia) 4/1/2014    Cardioverted on 2008     Iron deficiency 11/14/2014    Keloid cicatrix     Long term (current) use of anticoagulants 4/15/2014    Mixed hyperlipidemia 1/4/2016    Multiple lung nodules 8/30/2016    Obesity, Class I, BMI 30-34.9 7/24/2012    On home oxygen therapy 6/22/2013    3L NC    Paroxysmal atrial fibrillation 1/4/2016    Renovascular hypertension 1/4/2016    Right-sided heart failure 10/26/2014     1 - Normal left ventricular systolic function (EF 65-70%).    2 - Biatrial enlargement.    3 - Right ventricular enlargement with normal systolic function.    4 - Trivial aortic stenosis, MARY = 1.87 cm2, peak velocity = 1.7 m/s, mean gradient = 6.0 mmHg.    5 - The mitral valve is markedly sclerotic with moderately restricted leaflet mobility.    6 - Mild mitral stenosis, MVA = 2.2 cm2.    7 - Trivial to mild mitral regurgitation.    8 - Trivial to mild tricuspid regurgitation.    9 - Increased central venous pressure.    10 - Pulmonary hypertension. The estimated PA systolic pressure is 63 mmHg.    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     Vitamin D deficiency disease 7/24/2012     Past Surgical  History:   Procedure Laterality Date    ABDOMINAL SURGERY      ANKLE SURGERY      CATARACT EXTRACTION  8/17/00    right eye    CATARACT EXTRACTION  6/26/00    left eye    COLONOSCOPY      HYSTERECTOMY       Family History   Problem Relation Age of Onset    Diabetes Mother     Stroke Mother     Hypertension Mother     Melanoma Mother     COPD Sister     Stroke Sister     Diabetes Sister     Hypertension Sister     COPD Brother     Diabetes Brother     Hypertension Brother     Heart disease Maternal Grandfather     No Known Problems Father     No Known Problems Maternal Aunt     No Known Problems Maternal Uncle     No Known Problems Paternal Aunt     Cancer Paternal Uncle     No Known Problems Maternal Grandmother     No Known Problems Paternal Grandmother     No Known Problems Paternal Grandfather     Psoriasis Neg Hx     Lupus Neg Hx     Eczema Neg Hx     Kidney disease Neg Hx     Heart attack Neg Hx     Amblyopia Neg Hx     Blindness Neg Hx     Cataracts Neg Hx     Glaucoma Neg Hx     Macular degeneration Neg Hx     Retinal detachment Neg Hx     Strabismus Neg Hx     Thyroid disease Neg Hx      Social History   Substance Use Topics    Smoking status: Light Tobacco Smoker     Packs/day: 0.50     Years: 20.00     Types: Cigarettes     Last attempt to quit: 2/15/2016    Smokeless tobacco: Never Used      Comment:  on Smoking program    Alcohol use No     Review of patient's allergies indicates:   Allergen Reactions    Latex, natural rubber Dermatitis and Rash    Adhesive Itching and Rash     Allergy to adhesive in nicotine patch.    Sulfa (sulfonamide antibiotics) Rash     Medications: I have reviewed the current medication administration record.      (Not in a hospital admission)    Review of Systems   Constitutional: Negative for fatigue and fever.   HENT: Negative for tinnitus and trouble swallowing.    Eyes: Positive for visual disturbance. Negative for pain.    Respiratory: Positive for shortness of breath. Negative for chest tightness.    Cardiovascular: Negative for chest pain and palpitations.   Gastrointestinal: Negative for abdominal pain and diarrhea.   Endocrine: Negative for polydipsia and polyuria.   Genitourinary: Negative for dysuria and flank pain.   Musculoskeletal: Negative for back pain and neck stiffness.   Skin: Negative for color change and rash.   Neurological: Positive for facial asymmetry, weakness and numbness. Negative for speech difficulty and headaches.   Psychiatric/Behavioral: Negative for behavioral problems and confusion.     Objective:     Vital Signs (Most Recent):  Temp: 97.6 °F (36.4 °C) (04/12/17 1515)  Pulse: 72 (04/12/17 1553)  Resp: (!) 21 (04/12/17 1532)  BP: (!) 144/63 (04/12/17 1532)  SpO2: 98 % (3Lnc) (04/12/17 1553)    Vital Signs Range (Last 24H):  Temp:  [97.6 °F (36.4 °C)-98.2 °F (36.8 °C)]   Pulse:  [72-89]   Resp:  [18-22]   BP: (110-150)/(40-63)   SpO2:  [93 %-98 %]     Physical Exam   Constitutional: She appears well-developed and well-nourished.   HENT:   Head: Normocephalic and atraumatic.   Mouth/Throat: Oropharynx is clear and moist.   Eyes: EOM are normal. Pupils are equal, round, and reactive to light.   Neck: Normal range of motion. Neck supple.   Cardiovascular: Normal rate, regular rhythm and normal heart sounds.    Pulmonary/Chest: Effort normal and breath sounds normal.   Abdominal: Soft. Bowel sounds are normal.   Musculoskeletal: Normal range of motion.   Neurological: GCS eye subscore is 4 - spontaneous. GCS verbal subscore is 5 - oriented. GCS motor subscore is 6 - obeys commands.   Skin: Skin is warm and dry.   Psychiatric: She has a normal mood and affect. Her behavior is normal. Judgment and thought content normal.   Nursing note and vitals reviewed.      Neurological Exam:   LOC: alert and follows requests  Language: No aphasia  Speech: No dysarthria  Orientation: Person, Place, Time  Memory: Recent  memory intact, Remote memory intact, Age correct, Month correct  Visual Fields (CN II): Hemianopsia  left  EOM (CN III, IV, VI): Full/intact  Pupils (CN III, IV, VI): PERRL  Facial Sensation (CN V): Facial sensory loss left upper and left lower  Facial Movement (CN VII): right facial palsy with UMN pattern  Hearing (CN VIII): intact bilaterally  Gag Reflex (CN IX, X): normal/symmetric  Shoulder/Neck (CN XI): SCM-Left: Normal ; SCM-Right: Normal ; Shoulder Shrug: Normal/Symetric  Tongue (CN XII): to midline  Motor*: Arm Left:  Paretic:  3/5, Leg Left:   Paretic:  3/5, Arm Right:   Normal (5/5), Leg Right:   Normal (5/5)  Cerebellar*: Normal limb, Normal gait  , Normal stance  Sensation: jak-hypoesthesia left  Tone: Arm-Left: flaccid; Leg-Left: flaccid; Arm-Right: normal; Leg-Right: normal    Stroke Team Times:   Date and Time Taken: 2017 4:14 PM  Last Known Normal Date and Time : 201721:00  Symptom Onset Date and Time:201721:00  Stroke Team Called Date and Time: 201714:00  Stroke Team Arrived Date and Time: 201714:  CT Interpretation Time: 12:44    NIH Stroke Scale:  Interval: baseline (upon arrival/admit)  Level of Consciousness: 0 - alert  LOC Questions: 0 - answers both correctly  LOC Commands: 0 - performs both correctly  Best Gaze: 0 - normal  Visual: 2 - complete hemianopia  Facial Palsy: 2 - partial  Motor Left Arm: 2 - can't resist gravity  Motor Right Arm: 0 - no drift  Motor Left Le - can't resist gravity  Motor Right Le - no drift  Limb Ataxia: 0 - absent  Sensory: 1 - mild to moderate loss  Best Language: 0 - no aphasia  Dysarthria: 0 - normal articulation  Extinction and Inattention: 0 - no neglect  NIH Stroke Scale Total: 9  Hico Coma Scale:  Best Eye Response: 4 - spontaneous  Best Motor Response: 6 - obeys commands  Best Verbal Response: 5 - oriented  Hico Coma Scale Total: 15  Modified Onslow Scale:   Timeline: Prior to symptoms onset  Modified Onslow Score:  2 - slight disability    [id number] Scale:   Age: 2 - 75 years old or older  CHF History: 1 - yes  HTN History: 1 - yes  Stroke/TIA/Thromboembolism History: 2 - yes  Vascular Disease History: 1 - yes  Diabetes Mellitus in History: 1 - yes  Female: 1 - yes  [id number] Scale Total: 9      Laboratory:  CMP:   Recent Labs  Lab 04/12/17  1259   CALCIUM 9.3   ALBUMIN 3.3*   PROT 7.7      K 4.3   CO2 24      BUN 43*   CREATININE 1.6*   ALKPHOS 72   ALT 16   AST 21   BILITOT 0.6     CBC:   Recent Labs  Lab 04/12/17  1259   WBC 12.21   RBC 4.26   HGB 13.9   HCT 41.3      MCV 97   MCH 32.6*   MCHC 33.7     Lipid Panel:   Recent Labs  Lab 04/12/17  1259   CHOL 143   LDLCALC 69.4   HDL 58   TRIG 78     Hgb A1C: No results for input(s): HGBA1C in the last 168 hours.  TSH:   Recent Labs  Lab 04/12/17  1259   TSH 1.113       Diagnostic Results:  Brain Imaging: CT Head. Date: 4/12/17  Acute Pathology: None  Chronic microvascular ischemic changes    Cardiac Evaluation: EKG  EKG/Telemetry: Sinus rhythm with Premature atrial complexes

## 2017-04-12 NOTE — PROGRESS NOTES
MRI scan complete.  Patient tolerated well.  Patient placed back on portable telemetry box, telemetry room notified.  Patient calm and stable, denies need.  Awaiting transport back to [facility]

## 2017-04-12 NOTE — IP AVS SNAPSHOT
[facility]  [address]  [facility] [address]  Phone: [phone]           Patient Discharge Instructions   Our goal is to set you up for success. This packet includes information on your condition, medications, and your home care.  It will help you care for yourself to prevent having to return to the hospital.     Please ask your nurse if you have any questions.      There are many details to remember when preparing to leave the hospital. Here is what you will need to do:    1. Take your medicine. If you are prescribed medications, review your Medication List on the following pages. You may have new medications to  at the pharmacy and others that you'll need to stop taking. Review the instructions for how and when to take your medications. Talk with your doctor or nurses if you are unsure of what to do.     2. Go to your follow-up appointments. Specific follow-up information is listed in the following pages. Your may be contacted by a nurse or clinical provider about future appointments. Be sure we have all of the phone numbers to reach you. Please contact your provider's office if you are unable to make an appointment.     3. Watch for warning signs. Your doctor or nurse will give you detailed warning signs to watch for and when to call for assistance. These instructions may also include educational information about your condition. If you experience any of warning signs to your health, call your doctor.           [facility]  Unless otherwise directed by your provider, please   contact [facility], our nurse care line   that is available for 24/7 assistance.     [phone] (toll-free)     Registered nurses in the [facility]   provide: appointment scheduling, clinical advisement, health education, and other advisory services.                  ** Verify the list of medication(s) below is accurate and up to date. Carry this with you in case of  emergency. If your medications have changed, please notify your healthcare provider.             Medication List      START taking these medications        Additional Info                      levoFLOXacin 750 MG tablet   Commonly known as:  LEVAQUIN   Quantity:  7 tablet   Refills:  0   Dose:  750 mg    Instructions:  Take 1 tablet (750 mg total) by mouth once daily.     Begin Date    AM    Noon    PM    Bedtime       predniSONE 20 MG tablet   Commonly known as:  DELTASONE   Quantity:  6 tablet   Refills:  0   Dose:  40 mg    Last time this was given:  40 mg on 4/18/2017 11:29 AM   Instructions:  Take 2 tablets (40 mg total) by mouth once daily.     Begin Date    AM    Noon    PM    Bedtime       tramadol 50 mg tablet   Commonly known as:  ULTRAM   Quantity:  36 tablet   Refills:  0   Dose:  50 mg    Instructions:  Take 1 tablet (50 mg total) by mouth every 6 (six) hours as needed for Pain. Please only take when needed and when pain not relieved by Tylenol.     Begin Date    AM    Noon    PM    Bedtime         CONTINUE taking these medications        Additional Info                      albuterol 90 mcg/actuation inhaler   Commonly known as:  PROAIR HFA   Quantity:  2 Inhaler   Refills:  6   Dose:  2 puff    Instructions:  Inhale 2 puffs into the lungs every 6 (six) hours as needed for Wheezing or Shortness of Breath.     Begin Date    AM    Noon    PM    Bedtime       albuterol-ipratropium 2.5mg-0.5mg/3mL 0.5 mg-3 mg(2.5 mg base)/3 mL nebulizer solution   Commonly known as:  DUO-NEB   Quantity:  200 vial   Refills:  12   Dose:  3 mL    Last time this was given:  3 mLs on 4/18/2017 12:19 PM   Instructions:  Take 3 mLs by nebulization every 4 (four) hours as needed for Wheezing. Rescue     Begin Date    AM    Noon    PM    Bedtime       alprazolam 0.5 MG tablet   Commonly known as:  XANAX   Quantity:  60 tablet   Refills:  3    Last time this was given:  0.5 mg on 4/18/2017  5:05 AM   Instructions:  TAKE 1 TABLET BY  MOUTH 2 TIMES A DAY     Begin Date    AM    Noon    PM    Bedtime       cetirizine 10 MG tablet   Commonly known as:  ZYRTEC   Quantity:  90 tablet   Refills:  6   Dose:  10 mg    Instructions:  Take 1 tablet (10 mg total) by mouth once daily.     Begin Date    AM    Noon    PM    Bedtime       diltiaZEM 240 MG 24 hr capsule   Commonly known as:  CARDIZEM CD   Quantity:  90 capsule   Refills:  4   Dose:  240 mg    Last time this was given:  240 mg on 4/18/2017  9:33 AM   Instructions:  Take 1 capsule (240 mg total) by mouth once daily.     Begin Date    AM    Noon    PM    Bedtime       ergocalciferol 50,000 unit Cap   Commonly known as:  ERGOCALCIFEROL   Quantity:  4 capsule   Refills:  12   Dose:  49948 Units    Instructions:  Take 1 capsule (50,000 Units total) by mouth every 7 days.     Begin Date    AM    Noon    PM    Bedtime       FERROUS SULFATE ORAL   Refills:  0   Dose:  1 tablet    Instructions:  Take 1 tablet by mouth once daily.     Begin Date    AM    Noon    PM    Bedtime       fluticasone 50 mcg/actuation nasal spray   Commonly known as:  FLONASE   Quantity:  16 g   Refills:  12   Dose:  1 spray    Last time this was given:  2 sprays on 4/18/2017  9:33 AM   Instructions:  1 spray by Each Nare route once daily.     Begin Date    AM    Noon    PM    Bedtime       fluticasone-salmeterol 500-50 mcg/dose 500-50 mcg/dose Dsdv diskus inhaler   Commonly known as:  ADVAIR DISKUS   Quantity:  60 each   Refills:  6   Dose:  1 puff    Instructions:  Inhale 1 puff into the lungs 2 (two) times daily. Controller     Begin Date    AM    Noon    PM    Bedtime       furosemide 20 MG tablet   Commonly known as:  LASIX   Quantity:  60 tablet   Refills:  2    Last time this was given:  20 mg on 4/17/2017  8:54 AM   Instructions:  take 1 tablet by mouth twice a day     Begin Date    AM    Noon    PM    Bedtime       insulin aspart 100 unit/mL Inpn pen   Commonly known as:  NOVOLOG FLEXPEN   Quantity:  2 Box   Refills:  12  "  Indications:  type 2 diabetes mellitus    Last time this was given:  10 Units on 4/18/2017 11:30 AM   Instructions:  26 units AC     Begin Date    AM    Noon    PM    Bedtime       insulin detemir 100 unit/mL (3 mL) Inpn pen   Commonly known as:  LEVEMIR FLEXTOUCH   Quantity:  1 Box   Refills:  12   Dose:  26 Units    Last time this was given:  13 Units on 4/17/2017  8:05 PM   Instructions:  Inject 26 Units into the skin every evening.     Begin Date    AM    Noon    PM    Bedtime       levothyroxine 50 MCG tablet   Commonly known as:  SYNTHROID   Quantity:  90 tablet   Refills:  4   Dose:  50 mcg    Last time this was given:  50 mcg on 4/18/2017  5:01 AM   Instructions:  Take 1 tablet (50 mcg total) by mouth before breakfast.     Begin Date    AM    Noon    PM    Bedtime       metoprolol tartrate 25 MG tablet   Commonly known as:  LOPRESSOR   Quantity:  60 tablet   Refills:  2    Instructions:  take 1 tablet by mouth twice a day     Begin Date    AM    Noon    PM    Bedtime       ondansetron 4 MG tablet   Commonly known as:  ZOFRAN   Quantity:  12 tablet   Refills:  0   Dose:  4 mg    Instructions:  Take 1 tablet (4 mg total) by mouth every 8 (eight) hours as needed for Nausea.     Begin Date    AM    Noon    PM    Bedtime       pen needle, diabetic, safety 30 gauge x 1/3" Ndle   Commonly known as:  NOVOFINE AUTOCOVER   Quantity:  150 each   Refills:  12    Instructions:  Uses 4 a day     Begin Date    AM    Noon    PM    Bedtime       pravastatin 40 MG tablet   Commonly known as:  PRAVACHOL   Quantity:  30 tablet   Refills:  3    Instructions:  take 1 tablet by mouth once daily     Begin Date    AM    Noon    PM    Bedtime       warfarin 2.5 MG tablet   Commonly known as:  COUMADIN   Refills:  0   Dose:  2.5 mg    Last time this was given:  5 mg on 4/17/2017  4:30 PM   Instructions:  Take 2.5 mg by mouth once daily. Except take 5 mg on Wed     Begin Date    AM    Noon    PM    Bedtime            Where to Get " Your Medications      These medications [facility] [place] [facility] - [place] [facility] - [address]  [address] [place] [address]     Phone:  [phone]     levoFLOXacin 750 MG tablet    predniSONE 20 MG tablet         You can get these medications from any pharmacy     Bring a paper prescription for each of these medications     tramadol 50 mg tablet                  Please bring to all follow up appointments:    1. A copy of your discharge instructions.  2. All medicines you are currently taking in their original bottles.  3. Identification and insurance card.    Please arrive 15 minutes ahead of scheduled appointment time.    Please call 24 hours in advance if you must reschedule your appointment and/or time.        Your Scheduled Appointments     Apr 27, 2017 11:00 AM CDT   Hospital Follow Up with MD Kurt Hearn [facility] - Internal Medicine ([facility])    [address]  [place] [address] [phone]            May 04, 2017  2:00 PM CDT   Established Patient Visit with Samina Godoy MD   [facility] - Nephrology ([facility] )    [address]  [place] [address] [phone]            Jul 26, 2017  1:30 PM CDT   Established Patient Visit with Jose Elias Butterfield OD   [place] - Optometry ([facility] [place])    [address]  [place] [address] [phone]              Follow-up Information     Follow up with [facility] VASCULAR NEUROLOGY.    Specialty:  Vascular Neurology    Contact information:    [address] [address]  [phone]        Follow up with Nadiya Nava MD On 4/27/2017.    Specialty:  Internal Medicine    Why:  11:00 am    Contact information:    [address]  [place] [id number] [phone]          Discharge Instructions     Future Orders    Call 911 for any of the following:     Comments:    Call 911  right  away if any of the following warning signs come on suddenly, even if the symptoms only last for a few minutes. With stroke, timing is very important.   - Warning Signs of Stroke:  - Weakness: You may feel a sudden weakness, tingling or loss of feeling on one side of your face or body.  - Vision Problems: You may have sudden double vision or trouble seeing in one or both eyes.  - Speech Problems: You may have sudden trouble talking, slured speech, or problems understanding others.  - Headache: You may have sudden, severe headache.  - Movement Problems: You may experience dizziness, a feeling of spinning, a loss of balance, a feeling of falling or blackouts.    Call MD for:  difficulty breathing or increased cough     Call MD for:  increased confusion or weakness     Call MD for:  persistent dizziness, light-headedness, or visual disturbances     Call MD for:  persistent nausea and vomiting or diarrhea     Call MD for:  redness, tenderness, or signs of infection (pain, swelling, redness, odor or green/yellow discharge around incision site)     Call MD for:  severe persistent headache     Call MD for:  severe uncontrolled pain     Call MD for:  temperature >100.4     Call MD for:  worsening rash     Diet Cardiac     Comments:    See Stroke Patient Education Guide Booklet for details.        Primary Diagnosis     Your primary diagnosis was:  Stroke      Admission Information     Date & Time Provider Department CSN    4/12/2017 12:14 PM Pao Viveros MD [facility] [id number]      Care Providers     Provider Role Specialty Primary office phone    Pao Viveros MD Attending Provider Neurology [phone]    Gurdeep Flores MD Team Attending  Neuro Critical Care [phone]    Mukul Guzman MD Team Attending  Neuro Critical Care [phone]    Rajesh Bryant MD Team Attending  Interventional Neurology [phone]    Anthony Chery MD Team Attending  Neurology [phone]  "   Kamryn Clay MD Consulting Physician  Hospitalist [phone]    Vida Murphy MD Consulting Physician  Hospitalist [phone]      Your Vitals Were     BP Pulse Temp Resp Height Weight    136/60 (BP Location: Right arm, Patient Position: Sitting, BP Method: Automatic) 82 97.7 °F (36.5 °C) (Oral) 18 4' 9" (1.448 m) 68 kg (150 lb)    Last Period SpO2 BMI          (LMP Unknown) 92% 32.46 kg/m2        Recent Lab Values        1/4/2016 2/23/2016 6/8/2016 9/15/2016 12/20/2016 3/13/2017 3/29/2017 4/13/2017     10:10 AM 11:42 AM  9:12 AM 10:18 AM  9:47 AM  6:20 AM  8:25 AM  7:38 PM    A1C 9.7 (H) 9.3 (H) 8.0 (H) 8.8 (H) 7.6 (H) 7.8 (H) 8.7 (H) 8.1 (H)    Comment for A1C at 10:18 AM on 9/15/2016:  According to ADA guidelines, hemoglobin A1C <7.0% represents  optimal control in non-pregnant diabetic patients.  Different  metrics may apply to specific populations.   Standards of Medical Care in Diabetes - 2016.  For the purpose of screening for the presence of diabetes:  <5.7%     Consistent with the absence of diabetes  5.7-6.4%  Consistent with increasing risk for diabetes   (prediabetes)  >or=6.5%  Consistent with diabetes  Currently no consensus exists for use of hemoglobin A1C  for diagnosis of diabetes for children.      Comment for A1C at  9:47 AM on 12/20/2016:  According to ADA guidelines, hemoglobin A1C <7.0% represents  optimal control in non-pregnant diabetic patients.  Different  metrics may apply to specific populations.   Standards of Medical Care in Diabetes - 2016.  For the purpose of screening for the presence of diabetes:  <5.7%     Consistent with the absence of diabetes  5.7-6.4%  Consistent with increasing risk for diabetes   (prediabetes)  >or=6.5%  Consistent with diabetes  Currently no consensus exists for use of hemoglobin A1C  for diagnosis of diabetes for children.      Comment for A1C at  6:20 AM on 3/13/2017:  According to ADA guidelines, hemoglobin A1C <7.0% represents  optimal " control in non-pregnant diabetic patients.  Different  metrics may apply to specific populations.   Standards of Medical Care in Diabetes - 2016.  For the purpose of screening for the presence of diabetes:  <5.7%     Consistent with the absence of diabetes  5.7-6.4%  Consistent with increasing risk for diabetes   (prediabetes)  >or=6.5%  Consistent with diabetes  Currently no consensus exists for use of hemoglobin A1C  for diagnosis of diabetes for children.      Comment for A1C at  8:25 AM on 3/29/2017:  According to ADA guidelines, hemoglobin A1C <7.0% represents  optimal control in non-pregnant diabetic patients.  Different  metrics may apply to specific populations.   Standards of Medical Care in Diabetes - 2016.  For the purpose of screening for the presence of diabetes:  <5.7%     Consistent with the absence of diabetes  5.7-6.4%  Consistent with increasing risk for diabetes   (prediabetes)  >or=6.5%  Consistent with diabetes  Currently no consensus exists for use of hemoglobin A1C  for diagnosis of diabetes for children.      Comment for A1C at  7:38 PM on 4/13/2017:  According to ADA guidelines, hemoglobin A1C <7.0% represents  optimal control in non-pregnant diabetic patients.  Different  metrics may apply to specific populations.   Standards of Medical Care in Diabetes - 2016.  For the purpose of screening for the presence of diabetes:  <5.7%     Consistent with the absence of diabetes  5.7-6.4%  Consistent with increasing risk for diabetes   (prediabetes)  >or=6.5%  Consistent with diabetes  Currently no consensus exists for use of hemoglobin A1C  for diagnosis of diabetes for children.        Pending Labs     Order Current Status    Culture, Respiratory with Gram Stain Preliminary result      Allergies as of 4/18/2017        Reactions    Latex, Natural Rubber Dermatitis, Rash    Adhesive Itching, Rash    Allergy to adhesive in nicotine patch.    Sulfa (Sulfonamide Antibiotics) Rash      Advance  Directives     An advance directive is a document which, in the event you are no longer able to make decisions for yourself, tells your healthcare team what kind of treatment you do or do not want to receive, or who you would like to make those decisions for you.  If you do not currently have an advance directive, [facility] encourages you to create one.  For more information call:  [phone]), [phone] ([phone]),  or log on to [email or web address].        Smoking Cessation     If you would like to quit smoking:   You may be eligible for free services if you are a [place] resident and started smoking cigarettes before September 1, 1988.  Call the Smoking Cessation Trust (SCT) toll free at ([phone] [phone] or (878) [phone].   Call 3-381-QUIT-NOW if you do not meet the above criteria.   Contact us via email: [email or web address]   View our website for more information: [email or web address]        Language Assistance Services     ATTENTION: Language assistance services are available, free of charge. Please call [phone].      ATENCIÓN: Si habla español, tiene a yoo disposición servicios gratuitos de asistencia lingüística. Llame al [phone].     CHÚ Ý: N?u b?n nói Ti?ng Vi?t, có các d?ch v? h? tr? ngôn ng? mi?n phí dành cho b?n. G?i s? [phone].        Stroke Education              Heart Failure Education       Heart Failure: Being Active  You have a condition called heart failure. Being active doesnt mean that you have to wear yourself out. Even a little movement each day helps to strengthen your heart. If you cant get out to exercise, you can do simple stretching and strengthening exercises at home. These are good ways to keep you well-conditioned and prevent you and your heart from becoming excessively weak.    Ideas to get you started  · Add a little movement to things you do now. Walk to mail letters. Park your car at the far end of the parking lot and  walk to the store. Walk up a flight of stairs instead of taking the elevator.  · Choose activities you enjoy. You might walk, swim, or ride an exercise bike. Things like gardening and washing the car count, too. Other possibilities include: washing dishes, walking the dog, walking around the mall, and doing aerobic activities with friends.  · Join a group exercise program at a [facility] or [facility], a senior center, or a community center. Or look into a hospital cardiac rehabilitation program. Ask your doctor if you qualify.  Tips to keep you going  · Get up and get dressed each day. Go to a coffee shop and read a newspaper or go somewhere that you'll be in the presence of other active people. Youll feel more like being active.  · Make a plan. Choose one or more activities that you enjoy and that you can easily do. Then plan to do at least one each day. You might write your plan on a calendar.  · Go with a friend or a group if you like company. This can help you feel supported and stay motivated, too.  · Plan social events that you enjoy. This will keep you mentally engaged as well as physically motivated to do things you find pleasure in.  For your safety  · Talk with your healthcare provider before starting an exercise program.  · Exercise indoors when its too hot or too cold outside, or when the air quality is poor. Try walking at a shopping mall.  · Wear socks and sturdy shoes to maintain your balance and prevent falls.  · Start slowly. Do a few minutes several times a day at first. Increase your time and speed little by little.  · Stop and rest whenever you feel tired or get short of breath.  · Dont push yourself on days when you dont feel well.  Date Last Reviewed: 3/20/2016  © 2178-9663 [employer]. [address] [place] [address], [place] [address]. All rights reserved. This information is not intended as a substitute for professional medical care. Always follow your healthcare professional's  instructions.              Heart Failure: Evaluating Your Heart  You have a condition called heart failure. To evaluate your condition, your doctor will examine you, ask questions, and do some tests. Along with looking for signs of heart failure, the doctor looks for any other health problems that may have led to heart failure. The results of your evaluation will help your doctor form a treatment plan.  Health history and physical exam  Your visit will start with a health history. Tell the doctor about any symptoms youve noticed and about all medicines you take. Then youll have a physical exam. This includes listening to your heartbeat and breathing. Youll also be checked for swelling (edema) in your legs and neck. When you have fluid buildup or fluid in the lungs, it may be called congestive heart failure.  Diagnosing heart failure     During an echocardiogram, sound waves bounce off the heart. These are converted into a picture on the screen.   The following may be done to help your doctor form a diagnosis:  · X-rays show the size and shape of your heart. These pictures can also show fluid in your lungs.  · An electrocardiogram (ECG or EKG) shows the pattern of your heartbeat. Small pads (electrodes) are placed on your chest, arms, and legs. Wires connect the pads to the ECG machine, which records your hearts electrical signals. This can give the doctor information about heart function.  · An echocardiogram uses ultrasound waves to show the structure and movement of your heart muscle. This shows how well the heart pumps. It also shows the thickness of the heart walls, and if the heart is enlarged. It is one of the most useful, non-invasive tests as it provides information about the heart's general function. This helps your doctor make treatment decisions.  · Lab tests evaluate small amounts of blood or urine for signs of problems. A BNP lab test can help diagnose and evaluate heart failure. BNP stands for  B-type natriuretic peptide. The ventricles secrete more BNP when heart failure worsens. Lab tests can also provide information about metabolic dysfunction or heart dysfunction.  Your treatment plan  Based on the results of your evaluation and tests, your doctor will develop a treatment plan. This plan is designed to relieve some of your heart failure symptoms and help make you more comfortable. Your treatment plan may include:  · Medicine to help your heart work better and improve your quality of life  · Changes in what you eat and drink to help prevent fluid from backing up in your body  · Daily monitoring of your weight and heart failure symptoms to see how well your treatment plan is working  · Exercise to help you stay healthy  · Help with quitting smoking  · Emotional and psychological support to help adjust to the changes  · Referrals to other specialists to make sure you are being treated comprehensively  Date Last Reviewed: 3/21/2016  © 9131-8365 [employer]. [address] [place] [address]. All rights reserved. This information is not intended as a substitute for professional medical care. Always follow your healthcare professional's instructions.              Heart Failure: Making Changes to Your Diet  You have a condition called heart failure. When you have heart failure, excess fluid is more likely to build up in your body because your heart isn't working well. This makes the heart work harder to pump blood. Fluid buildup causes symptoms such as shortness of breath and swelling (edema). This is often referred to as congestive heart failure or CHF. Controlling the amount of salt (sodium) you eat may help stop fluid from building up. Your doctor may also tell you to reduce the amount of fluid you drink.  Reading food labels    Your healthcare provider will tell you how much sodium you can eat each day. Read food labels to keep track. Keep in mind that certain foods are high in salt.  These include canned, frozen, and processed foods. Check the amount of sodium in each serving. Watch out for high-sodium ingredients. These include MSG (monosodium glutamate), baking soda, and sodium phosphate.   Eating less salt  Give yourself time to get used to eating less salt. It may take a little while. Here are some tips to help:  · Take the saltshaker off the table. Replace it with salt-free herb mixes and spices.  · Eat fresh or plain frozen vegetables. These have much less salt than canned vegetables.  · Choose low-sodium snacks like sodium-free pretzels, crackers, or air-popped popcorn.  · Dont add salt to your food when youre cooking. Instead, season your foods with pepper, lemon, garlic, or onion.  · When you eat out, ask that your food be cooked without added salt.  · Avoid eating fried foods as these often have a great deal of salt.  If youre told to limit fluids  You may need to limit how much fluid you have to help prevent swelling. This includes anything that is liquid at room temperature, such as ice cream and soup. If your doctor tells you to limit fluid, try these tips:  · Measure drinks in a measuring cup before you drink them. This will help you meet daily goals.  · Chill drinks to make them more refreshing.  · Suck on frozen lemon wedges to quench thirst.  · Only drink when youre thirsty.  · Chew sugarless gum or suck on hard candy to keep your mouth moist.  · Weigh yourself daily to know if your body's fluid content is rising.  My sodium goal  Your healthcare provider may give you a sodium goal to meet each day. This includes sodium found in food as well as salt that you add. My goal is to eat no more than ___________ mg of sodium per day.     When to call your doctor  Call your doctor right away if you have any symptoms of worsening heart failure. These can include:  · Sudden weight gain  · Increased swelling of your legs or ankles  · Trouble breathing when youre resting or at  night  · Increase in the number of pillows you have to sleep on  · Chest pain, pressure, discomfort, or pain in the jaw, neck, or back   Date Last Reviewed: 3/21/2016  © 4128-8573 [employer]. [address] [place] [address], [place] [address]. All rights reserved. This information is not intended as a substitute for professional medical care. Always follow your healthcare professional's instructions.              Heart Failure: Medicines to Help Your Heart    You have a condition called heart failure (also known as congestive heart failure, or CHF). Your doctor will likely prescribe medicines for heart failure and any underlying health problems you have. Most heart failure patients take one or more types of medicinen. Your healthcare provider will work to find the combination of medicines that works best for you.  Heart failure medicines  Here are the most common heart failure medicines:  · ACE inhibitors lower blood pressure and decrease strain on the heart. This makes it easier for the heart to pump. Angiotensin receptor blockers have similar effects. These are prescribed for some patients instead of ACE inhibitors.  · Beta-blockers relieve stress on the heart. They also improve symptoms. They may also improve the heart's pumping action over time.  · Diuretics (also called water pills) help rid your body of excess water. This can help rid your body of swelling (edema). Having less fluid to pump means your heart doesnt have to work as hard. Some diuretics make your body lose a mineral called potassium. Your doctor will tell you if you need to take supplements or eat more foods high in potassium.  · Digoxin helps your heart pump with more strength. This helps your heart pump more blood with each beat. So, more oxygen-rich blood travels to the rest of the body.  · Aldosterone antagonists help alter hormones and decrease strain on the heart.  · Hydralazine and nitrates are two separate medicines used together  to treat heart failure. They may come in one combination pill. They lower blood pressure and decrease how hard the heart has to pump.  Medicines for related conditions  Controlling other heart problems helps keep heart failure under control, too. Depending on other heart problems you have, medicines may be prescribed to:  · Lower blood pressure (antihypertensives).  · Lower cholesterol levels (statins).  · Prevent blood clots (anticoagulants or aspirin).  · Keep the heartbeat steady (antiarrhythmics).  Date Last Reviewed: 3/5/2016  © 1921-0110 [employer]. [address] [place] [address]. All rights reserved. This information is not intended as a substitute for professional medical care. Always follow your healthcare professional's instructions.              Heart Failure: Procedures That May Help    The heart is a muscle that pumps oxygen-rich blood to all parts of the body. When you have heart failure, the heart is not able to pump as well as it should. Blood and fluid may back up into the lungs (congestive heart failure), and some parts of the body dont get enough oxygen-rich blood to work normally. These problems lead to the symptoms of heart failure.     Certain procedures may help the heart pump better in some cases of heart failure. Some procedures are done to treat health problems that may have caused the heart failure such as coronary artery disease or heart rhythm problems. For more serious heart failure, other options are available.  Treating artery and valve problems  If you have coronary artery disease or valve disease, procedures may be done to improve blood flow. This helps the heart pump better, which can improve heart failure symptoms. First, your doctor may do a cardiac catheterization to help detect clogged blood vessels or valve damage. During this procedure, a  thin tube (catheter) in inserted into a blood vessel and guided to the heart. There a dye is injected and a  special type of X-ray (angiogram) is taken of the blood vessels. Procedures to open a blocked artery or fix damaged valves can also be done using catheterization.  · Angioplasty uses a balloon-tipped instrument at the end of the catheter. The balloon is inflated to widen the narrowed artery. In many cases, a stent is expanded to further support the narrowed artery. A stent is a metal mesh tube.  · Valve surgery repairs or replacement of faulty valves can also be done during catheterization so blood can flow properly through the chambers of the heart.  Bypass surgery is another option to help treat blocked arteries. It uses a healthy blood vessel from elsewhere in the body. The healthy blood vessel is attached above and below the blocked area so that blood can flow around the blocked artery.  Treating heart rhythm problems  A device may be placed in the chest to help a weak heart maintain a healthy, heartbeat so the heart can pump more effectively:  · Pacemaker. A pacemaker is an implanted device that regulates your heartbeat electronically. It monitors your heart's rhythm and generates a painless electric impulse that helps the heart beat in a regular rhythm. A pacemaker is programmed to meet your specific heart rhythm needs.  · Biventricular pacing/cardiac resynchronization therapy. A type of pacemaker that paces both pumping chambers of the heart at the same time to coordinate contractions and to improve the heart's function. Some people with heart failure are candidates for this therapy.  · Implantable cardioverter defibrillator. A device similar to a pacemaker that senses when the heart is beating too fast and delivers an electrical shock to convert the fast rhythm to a normal rhythm. This can be a life saving device.  In severe cases  In more serious cases of heart failure when other treatments no longer work, other options may include:  · Ventricular assist devices (VADs). These are mechanical devices used to  take over the pumping function for one or both of the heart's ventricles, or pumping chambers. A VAD may be necessary when heart failure progresses to the point that medicines and other treatments no longer help. In some cases, a VAD may be used as a bridge to transplant.  · Heart transplant. This is replacing the diseased heart with a healthy one from a donor. This is an option for a few people who are very sick. A heart transplant is very serious and not an option for all patients. Your doctor can tell you more.  Date Last Reviewed: 3/20/2016  © 1431-3218 [employer]. [address] [place] [address]. All rights reserved. This information is not intended as a substitute for professional medical care. Always follow your healthcare professional's instructions.              Heart Failure: Tracking Your Weight  You have a condition called heart failure. When you have heart failure, a sudden weight gain or a steady rise in weight is a warning sign that your body is retaining too much water and salt. This could mean your heart failure is getting worse. If left untreated, it can cause problems for your lungs and result in shortness of breath. Weighing yourself each day is the best way to know if youre retaining water. If your weight goes up quickly, call your doctor. You will be given instructions on how to get rid of the excess water. You will likely need medicines and to avoid salt. This will help your heart work better.  Call your doctor if you gain more than 2 pounds in 1 day, more than 5 pounds in 1 week, or whatever weight gain you were told to report by your doctor. This is often a sign of worsening heart failure and needs to be evaluated and treated. Your doctor will tell you what to do next.   Tips for weighing yourself    · Weigh yourself at the same time each morning, wearing the same clothes. Weigh yourself after urinating and before eating.  · Use the same scale each day. Make sure  the numbers are easy to read. Put the scale on a flat, hard surface -- not on a rug or carpet.  · Do not stop weighing yourself. If you forget one day, weigh again the next morning.  How to use your weight chart  · Keep your weight chart near the scale. Write your weight on the chart as soon as you get off the scale.  · Fill in the month and the start date on the chart. Then write down your weight each day. Your chart will look like this:    · If you miss a day, leave the space blank. Weigh yourself the next day and write your weight in the next space.  · Take your weight chart with you when you go to see your doctor.  Date Last Reviewed: 3/20/2016  © 6157-0108 [employer]. [address] [place] [address], [place] [address]. All rights reserved. This information is not intended as a substitute for professional medical care. Always follow your healthcare professional's instructions.              Heart Failure: Warning Signs of a Flare-Up  You have a condition called heart failure. Once you have heart failure, flare-ups can happen. Below are signs that can mean your heart failure is getting worse. If you notice any of these warning signs, call your healthcare provider.  Swelling    · Your feet, ankles, or lower legs get puffier.  · You notice skin changes on your lower legs.  · Your shoes feel too tight.  · Your clothes are tighter in the waist.  · You have trouble getting rings on or off your fingers.  Shortness of breath  · You have to breathe harder even when youre doing your normal activities or when youre resting.  · You are short of breath walking up stairs or even short distances.  · You wake up at night short of breath or coughing.  · You need to use more pillows or sit up to sleep.  · You wake up tired or restless.  Other warning signs  · You feel weaker, dizzy, or more tired.  · You have chest pain or changes in your heartbeat.  · You have a cough that wont go away.  · You cant remember things or  dont feel like eating.  Tracking your weight  Gaining weight is often the first warning sign that heart failure is getting worse. Gaining even a few pounds can be a sign that your body is retaining excess water and salt. Weighing yourself each day in the morning after you urinate and before you eat, is the best way to know if you're retaining water. Get a scale that is easy to read and make sure you wear the same clothes and use the same scale every time you weigh. Your healthcare provider will show you how to track your weight. Call your doctor if you gain more than 2 pounds in 1 day, 5 pounds in 1 week, or whatever weight gain you were told to report by your doctor. This is often a sign of worsening heart failure and needs to be evaluated and treated before it compromises your breathing. Your doctor will tell you what to do next.    Date Last Reviewed: 3/15/2016  © 9018-0415 [employer]. [address] [place] [address]. All rights reserved. This information is not intended as a substitute for professional medical care. Always follow your healthcare professional's instructions.              Coumadin Discharge Instructions                         Chronic Kindey Disease Education             Diabetes Discharge Instructions                                   [facility] Sign-Up     Activating your [facility] account is as easy as 1-2-3!     1) Visit [email or web address], select Sign Up Now, enter this activation code and your date of birth, then select Next.  [email or web address]  Expires: 4/30/2017  4:18 PM      2) Create a username and password to use when you visit [facility] in the future and select a security question in case you lose your password and select Next.    3) Enter your e-mail address and click Sign Up!    Additional Information  If you have questions, please e-mail [email or web address] or call [phone] to talk to our [facility] staff. Remember, [facility] is NOT to be used for urgent  needs. For medical emergencies, dial 911.          [facility] complies with applicable Federal civil rights laws and does not discriminate on the basis of race, color, national origin, age, disability, or sex.

## 2017-04-12 NOTE — PROGRESS NOTES
Pt asleep sitting up in stretcher, awakes to stimulation.  Patient c/o needed more medication to complete scan.  Left message for pts ED nurse, awaiting return call.

## 2017-04-12 NOTE — CONSULTS
PM&R consult received.    Reason for consult:  assess rehabilitation needs    Reviewed patient history and current admission.  Full consult to follow.    MAXI Galloway, FNP-C  Physical Medicine & Rehabilitation   04/12/2017  Spectralink: [id number]

## 2017-04-12 NOTE — ED NOTES
LOC: The patient is awake, alert and aware of environment with an appropriate affect, the patient is oriented x 3 and speaking appropriately.  APPEARANCE: Patient resting comfortably and in no acute distress, patient is clean and well groomed, patient's clothing is properly fastened.  SKIN: The skin is warm and dry, color consistent with ethnicity, patient has normal skin turgor and moist mucus membranes, skin intact, no breakdown or bruising noted.  MUSCULOSKELETAL: Patient moving right leg/arm spontaneously, weakness to left arm and leg  RESPIRATORY: Airway is open and patent, respirations are spontaneous, patient has a normal effort and rate, no accessory muscle use noted, bilateral breath sounds clear.  CARDIAC: Patient has a normal rate and regular rhythm, no periphreal edema noted, capillary refill < 3 seconds.  ABDOMEN: Soft and non tender to palpation, no distention noted, normoactive bowel sounds present in all four quadrants.  NEURO:  See neuro eval

## 2017-04-12 NOTE — ED NOTES
Report called to Nelly on 7th floor.  Called MRI to let them know to send pt to the floor from MRI.  V/U.

## 2017-04-12 NOTE — ASSESSMENT & PLAN NOTE
71 year-old lady that presents with right facial palsy with UMN pattern, jak-hypoesthesia of left face, LUE and LLE, and weakness of LUE and LLE. Concerning for brainstem ischemia.      Antithrombotics for secondary stroke prevention: Anticoagulants:  Warfarin INR adjusted target, Statins for secondary stroke prevention and hyperlipidemia, if present: Atorvastatin- 40 mg oral daily, Aggressive risk factor modification: Hypertension, Smoking, Diabetes and High Cholesterol, Rehab Efforts: Physical Therapy, Occupational Therapy, Speech and Language Pathology and Physical Medicine and Rehabilitation, Diagnostics: Ordered/Pending HgbA1C to assess blood glucose levels, Lipid Profile to assess cholesterol levels, MRA head to assess vasculature, MRA neck/arch to assess vasculature, MRI head without contrast to assess brain parenchyma, Trans-thoracic cardiac echo to assess cardiac function/status, TSH to assess thyroid function and VTE Prophylaxis: Coumadin

## 2017-04-12 NOTE — ED PROVIDER NOTES
Encounter Date: 4/12/2017    SCRIBE #1 NOTE: I, Tamar Stephenson, am scribing for, and in the presence of,  Dr. Gupta. I have scribed the following portions of the note - the EKG reading and the Resident attestation.       History     Chief Complaint   Patient presents with    Extremity Weakness     sent from  clinic, my L arm not working right, and r leg pain, last normal 11pm last night     Review of patient's allergies indicates:   Allergen Reactions    Latex, natural rubber Dermatitis and Rash    Adhesive Itching and Rash     Allergy to adhesive in nicotine patch.    Sulfa (sulfonamide antibiotics) Rash     HPI Comments: Ms. Barney is a 72 yo F with a PMH of stroke- 2013, HLD, A-fib on coumadin, peripheral neuropathy, tobacco abuse, DM type II, and CKD IV who presents to the ED from her PCP for concern of stroke. She complains of left sided lower extremity pain (tingling and burning) and numbness that started last night ~9pm that moved up to her left arm. She denies any trauma and endorses difficulty in walking that started 2 days ago requiring her to use her walker. She denies any fever, chills, nausea, vomiting, and LE edema. She is on 3L NC at home for COPD and her vital signs are stable. ROM is limited on the left side due numbness and tingling. Pt is alert and oriented x3 and lives at home with her boyfriend and daughter. She has right sided facial droop and decrease left sided hand . She still actively smoking tobacco. Of note, Pt was recently admitted on 03/13/17 for PNA.  Also, Pt most recent INR was sub therapeutic at 1.4.     The history is provided by the patient.     Past Medical History:   Diagnosis Date    Anemia in chronic kidney disease 3/21/2017    Anxiety     Atherosclerotic cerebrovascular disease 7/25/2012    Chronic respiratory failure with hypoxia 8/11/2014    CKD (chronic kidney disease), stage IV 6/23/2016    CRLD (chronic restrictive lung disease) 8/11/2014    Diabetic  peripheral neuropathy 7/24/2012    Diabetic retinopathy     Diverticulosis of colon     DJD (degenerative joint disease) 7/24/2012    Fatty liver     SUSAN (generalized anxiety disorder) 11/15/2012    Heart attack     History of PSVT (paroxysmal supraventricular tachycardia) 4/1/2014    Cardioverted on 2008     Iron deficiency 11/14/2014    Keloid cicatrix     Long term (current) use of anticoagulants 4/15/2014    Mixed hyperlipidemia 1/4/2016    Multiple lung nodules 8/30/2016    Obesity, Class I, BMI 30-34.9 7/24/2012    On home oxygen therapy 6/22/2013    3L NC    Paroxysmal atrial fibrillation 1/4/2016    Renovascular hypertension 1/4/2016    Right-sided heart failure 10/26/2014     1 - Normal left ventricular systolic function (EF 65-70%).    2 - Biatrial enlargement.    3 - Right ventricular enlargement with normal systolic function.    4 - Trivial aortic stenosis, MARY = 1.87 cm2, peak velocity = 1.7 m/s, mean gradient = 6.0 mmHg.    5 - The mitral valve is markedly sclerotic with moderately restricted leaflet mobility.    6 - Mild mitral stenosis, MVA = 2.2 cm2.    7 - Trivial to mild mitral regurgitation.    8 - Trivial to mild tricuspid regurgitation.    9 - Increased central venous pressure.    10 - Pulmonary hypertension. The estimated PA systolic pressure is 63 mmHg.    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     Vitamin D deficiency disease 7/24/2012     Past Surgical History:   Procedure Laterality Date    ABDOMINAL SURGERY      ANKLE SURGERY      CATARACT EXTRACTION  8/17/00    right eye    CATARACT EXTRACTION  6/26/00    left eye    COLONOSCOPY      HYSTERECTOMY       Family History   Problem Relation Age of Onset    Diabetes Mother     Stroke Mother     Hypertension Mother      Melanoma Mother     COPD Sister     Stroke Sister     Diabetes Sister     Hypertension Sister     COPD Brother     Diabetes Brother     Hypertension Brother     Heart disease Maternal Grandfather     No Known Problems Father     No Known Problems Maternal Aunt     No Known Problems Maternal Uncle     No Known Problems Paternal Aunt     Cancer Paternal Uncle     No Known Problems Maternal Grandmother     No Known Problems Paternal Grandmother     No Known Problems Paternal Grandfather     Psoriasis Neg Hx     Lupus Neg Hx     Eczema Neg Hx     Kidney disease Neg Hx     Heart attack Neg Hx     Amblyopia Neg Hx     Blindness Neg Hx     Cataracts Neg Hx     Glaucoma Neg Hx     Macular degeneration Neg Hx     Retinal detachment Neg Hx     Strabismus Neg Hx     Thyroid disease Neg Hx      Social History   Substance Use Topics    Smoking status: Light Tobacco Smoker     Packs/day: 0.50     Years: 20.00     Types: Cigarettes     Last attempt to quit: 2/15/2016    Smokeless tobacco: Never Used      Comment:  on Smoking program    Alcohol use No     Review of Systems   Constitutional: Positive for activity change. Negative for appetite change, chills, diaphoresis, fatigue and fever.   HENT: Negative for hearing loss and sore throat.    Eyes: Negative for pain and visual disturbance.   Respiratory: Negative for cough and shortness of breath.    Cardiovascular: Negative for chest pain, palpitations and leg swelling.   Gastrointestinal: Negative for abdominal distention, abdominal pain, nausea and vomiting.   Genitourinary: Positive for frequency. Negative for dysuria.   Musculoskeletal: Positive for gait problem. Negative for arthralgias and neck pain.   Skin: Negative for color change and rash.   Neurological: Positive for weakness and numbness. Negative for dizziness and facial asymmetry.   Psychiatric/Behavioral: The patient is not nervous/anxious.        Physical Exam   Initial Vitals   BP  Pulse Resp Temp SpO2   04/12/17 1213 04/12/17 1213 04/12/17 1213 04/12/17 1213 04/12/17 1213   150/60 78 18 98.1 °F (36.7 °C) 97 %     Physical Exam    Constitutional: She appears well-developed and well-nourished. She is not diaphoretic.   Mild distress    HENT:   Head: Normocephalic and atraumatic.   Mouth/Throat: No oropharyngeal exudate.    Right sided facial droop    Eyes: EOM are normal. Pupils are equal, round, and reactive to light. No scleral icterus.   Neck: Normal range of motion. Neck supple.   Cardiovascular: Normal rate, regular rhythm, normal heart sounds and intact distal pulses.   No murmur heard.  Pulses:       Radial pulses are 2+ on the right side, and 2+ on the left side.        Dorsalis pedis pulses are 1+ on the right side, and 1+ on the left side.   Pulmonary/Chest: She has wheezes.   Abdominal: Soft. Bowel sounds are normal. She exhibits no distension. There is no tenderness.   Musculoskeletal: She exhibits tenderness. She exhibits no edema.   Decrease range in motion of LLE    Neurological: She is alert and oriented to person, place, and time.   Left upper and lower extremity weakness secondary to pain.   Left lower extremity: 2/5   Left upper extremity: 4/5    Decrease to light sensation of LLE        Skin: Skin is warm and dry.   Psychiatric: She has a normal mood and affect.         ED Course   Procedures  Labs Reviewed   CBC W/ AUTO DIFFERENTIAL - Abnormal; Notable for the following:        Result Value    MCH 32.6 (*)     Gran # 7.8 (*)     Mono # 1.5 (*)     All other components within normal limits   COMPREHENSIVE METABOLIC PANEL - Abnormal; Notable for the following:     Glucose 158 (*)     BUN, Bld 43 (*)     Creatinine 1.6 (*)     Albumin 3.3 (*)     eGFR if  36.8 (*)     eGFR if non  32.0 (*)     All other components within normal limits   PROTIME-INR - Abnormal; Notable for the following:     Prothrombin Time 17.2 (*)     INR 1.7 (*)     All other  components within normal limits   TROPONIN I - Abnormal; Notable for the following:     Troponin I 0.031 (*)     All other components within normal limits   B-TYPE NATRIURETIC PEPTIDE - Abnormal; Notable for the following:      (*)     All other components within normal limits   URINALYSIS - Abnormal; Notable for the following:     Appearance, UA Hazy (*)     Occult Blood UA 2+ (*)     Leukocytes, UA 2+ (*)     All other components within normal limits   URINALYSIS MICROSCOPIC - Abnormal; Notable for the following:     RBC, UA 9 (*)     WBC, UA 19 (*)     Hyaline Casts, UA 4 (*)     All other components within normal limits   POCT GLUCOSE - Abnormal; Notable for the following:     POCT Glucose 180 (*)     All other components within normal limits   APTT   TSH   LIPID PANEL   POCT GLUCOSE MONITORING CONTINUOUS     EKG Readings: (Independently Interpreted)   Sinus rhythm at 75. With PAC's. No ischemic changes.       X-Rays:   Independently Interpreted Readings:   Other Readings:  CT head: no intracranial bleed    Medical Decision Making:   History:   Old Medical Records: I decided to obtain old medical records.  Initial Assessment:   Ms. Barney is a 70 yo F with extensive cardiovascular PMH who presents with ascending left sided pain and weakness that started last night.   Differential Diagnosis:   Stroke vs diabetic neuropathy vs nerve radiculopathy vs compartment syndrome   Independently Interpreted Test(s):   I have ordered and independently interpreted EKG Reading(s) - see prior notes  Clinical Tests:   Lab Tests: Ordered and Reviewed  Radiological Study: Ordered and Reviewed  Medical Tests: Ordered and Reviewed  ED Management:  - stroke work up   - CXR   - CT head   - EKG     Other:   I have discussed this case with another health care provider.  - concern for possible stroke despite non classical sign due to history of A-fib and sub therapeutic INR.    - CBC unremarkable               Scribe Attestation:    Scribe #1: I performed the above scribed service and the documentation accurately describes the services I performed. I attest to the accuracy of the note.    Attending Attestation:   Physician Attestation Statement for Resident:  As the supervising MD   Physician Attestation Statement: I have personally seen and examined this patient.   I agree with the above history. -: 72 yo female presenting with left sided paraesthesias. Initially starting with left leg two days ago progressed to left arm this morning. Concern for CVA.    As the supervising MD I agree with the above PE.   -: On exam, pt has decreased sensation to left upper and lower extremities. Weak hand  on left. 4/5 strength of LLE. Right sided facial droop.    As the supervising MD I agree with the above treatment, course, plan, and disposition.   -: Obtain labs and head CT. Consult vascular neurology.   I have reviewed and agree with the residents interpretation of the following: lab data, x-rays, CT scans and EKG.  I have reviewed the following: old records at this facility, x-ray reports, CT reports and EKG reports.          Physician Attestation for Scribe:  Physician Attestation Statement for Scribe #1: I, Dr. Gupta, reviewed documentation, as scribed by Tamar Stephenson in my presence, and it is both accurate and complete.         Attending ED Notes:   2:32 PM  Vascular neurology at bedside and believe symptoms secondary to ischemic stroke. Will admit for further treatment and evaluation.           ED Course     Clinical Impression:   The primary encounter diagnosis was Arterial ischemic stroke, vertebrobasilar, brainstem, acute. Diagnoses of CVA (cerebral vascular accident), Arterial ischemic stroke, vertebrobasilar, brainstem, acute, right, Diabetic peripheral neuropathy, and Paroxysmal atrial fibrillation were also pertinent to this visit.    Disposition:   Disposition: Admitted       Paulino Edouard MD  Resident  04/14/17 0911        Shahida Gupta MD  04/14/17 1278

## 2017-04-12 NOTE — PROGRESS NOTES
"Pt asleep in stretcher sitting up.  When stimulated pt c/o "taking to long".  Pt informed that we are waiting for return call from nurse.  Pt agreed to try MRI scan.   "

## 2017-04-12 NOTE — ASSESSMENT & PLAN NOTE
- Stroke risk factor  - On Diltiazem at home, currently EKG in NSR and HR in 60s, will hold due to permissive HTN.   - Continue coumadin to target INR

## 2017-04-12 NOTE — ED TRIAGE NOTES
Pt report difficulty walking x 2 days.  She has had to use a walker that she usually does not need to.  She states that today she saw her regular MD and was sent to ER for eval of stroke.  Pt with left leg and arm weakness.  Also with pain to left leg.

## 2017-04-12 NOTE — ED NOTES
"Explained to patient that she will be going to MRI.  She states " I need to be knocked out".  Informed pt that she has ativan ordered for when she leaves.  She states "oh, that will be great".  Sister and boyfriend at bedside.  "

## 2017-04-13 PROBLEM — R20.0 SENSORY LOSS: Status: RESOLVED | Noted: 2017-04-12 | Resolved: 2017-04-13

## 2017-04-13 PROBLEM — M79.605 PAIN OF LEFT LOWER EXTREMITY: Status: ACTIVE | Noted: 2017-04-13

## 2017-04-13 PROBLEM — R20.0 SENSORY LOSS: Status: RESOLVED | Noted: 2017-04-13 | Resolved: 2017-04-13

## 2017-04-13 LAB
ALBUMIN SERPL BCP-MCNC: 2.9 G/DL
ALP SERPL-CCNC: 64 U/L
ALT SERPL W/O P-5'-P-CCNC: 13 U/L
ANION GAP SERPL CALC-SCNC: 8 MMOL/L
APTT BLDCRRT: 27.7 SEC
AST SERPL-CCNC: 18 U/L
BASOPHILS # BLD AUTO: 0.03 K/UL
BASOPHILS NFR BLD: 0.4 %
BILIRUB SERPL-MCNC: 0.3 MG/DL
BUN SERPL-MCNC: 33 MG/DL
CALCIUM SERPL-MCNC: 9.1 MG/DL
CHLORIDE SERPL-SCNC: 104 MMOL/L
CK MB SERPL-MCNC: 2 NG/ML
CK MB SERPL-RTO: 2.1 %
CK SERPL-CCNC: 94 U/L
CO2 SERPL-SCNC: 29 MMOL/L
CREAT SERPL-MCNC: 1.4 MG/DL
DIFFERENTIAL METHOD: ABNORMAL
EOSINOPHIL # BLD AUTO: 0.2 K/UL
EOSINOPHIL NFR BLD: 1.9 %
ERYTHROCYTE [DISTWIDTH] IN BLOOD BY AUTOMATED COUNT: 13.1 %
EST. GFR  (AFRICAN AMERICAN): 43.3 ML/MIN/1.73 M^2
EST. GFR  (NON AFRICAN AMERICAN): 37.6 ML/MIN/1.73 M^2
ESTIMATED PA SYSTOLIC PRESSURE: 48.16
GLUCOSE SERPL-MCNC: 160 MG/DL
HCT VFR BLD AUTO: 37.3 %
HGB BLD-MCNC: 12.6 G/DL
INR PPP: 1.8
LYMPHOCYTES # BLD AUTO: 2 K/UL
LYMPHOCYTES NFR BLD: 24.4 %
MAGNESIUM SERPL-MCNC: 1.9 MG/DL
MCH RBC QN AUTO: 32.1 PG
MCHC RBC AUTO-ENTMCNC: 33.8 %
MCV RBC AUTO: 95 FL
MITRAL VALVE MOBILITY: ABNORMAL
MITRAL VALVE STENOSIS: ABNORMAL
MONOCYTES # BLD AUTO: 0.9 K/UL
MONOCYTES NFR BLD: 11.2 %
NEUTROPHILS # BLD AUTO: 5.1 K/UL
NEUTROPHILS NFR BLD: 61.9 %
PHOSPHATE SERPL-MCNC: 4 MG/DL
PLATELET # BLD AUTO: 223 K/UL
PMV BLD AUTO: 10.1 FL
POCT GLUCOSE: 161 MG/DL (ref 70–110)
POCT GLUCOSE: 175 MG/DL (ref 70–110)
POTASSIUM SERPL-SCNC: 4.2 MMOL/L
PROT SERPL-MCNC: 6.9 G/DL
PROTHROMBIN TIME: 17.7 SEC
RBC # BLD AUTO: 3.93 M/UL
RETIRED EF AND QEF - SEE NOTES: 65 (ref 55–65)
SODIUM SERPL-SCNC: 141 MMOL/L
TRICUSPID VALVE REGURGITATION: ABNORMAL
TROPONIN I SERPL DL<=0.01 NG/ML-MCNC: 0.03 NG/ML
WBC # BLD AUTO: 8.31 K/UL

## 2017-04-13 PROCEDURE — 99222 1ST HOSP IP/OBS MODERATE 55: CPT | Mod: ,,, | Performed by: NURSE PRACTITIONER

## 2017-04-13 PROCEDURE — 87086 URINE CULTURE/COLONY COUNT: CPT

## 2017-04-13 PROCEDURE — 97161 PT EVAL LOW COMPLEX 20 MIN: CPT

## 2017-04-13 PROCEDURE — 84484 ASSAY OF TROPONIN QUANT: CPT

## 2017-04-13 PROCEDURE — 83735 ASSAY OF MAGNESIUM: CPT

## 2017-04-13 PROCEDURE — 63600175 PHARM REV CODE 636 W HCPCS: Performed by: PSYCHIATRY & NEUROLOGY

## 2017-04-13 PROCEDURE — 20600001 HC STEP DOWN PRIVATE ROOM

## 2017-04-13 PROCEDURE — G8997 SWALLOW GOAL STATUS: HCPCS | Mod: CH

## 2017-04-13 PROCEDURE — 84100 ASSAY OF PHOSPHORUS: CPT

## 2017-04-13 PROCEDURE — G8987 SELF CARE CURRENT STATUS: HCPCS | Mod: CL

## 2017-04-13 PROCEDURE — 85730 THROMBOPLASTIN TIME PARTIAL: CPT

## 2017-04-13 PROCEDURE — 36415 COLL VENOUS BLD VENIPUNCTURE: CPT

## 2017-04-13 PROCEDURE — 93306 TTE W/DOPPLER COMPLETE: CPT

## 2017-04-13 PROCEDURE — 99232 SBSQ HOSP IP/OBS MODERATE 35: CPT | Mod: ,,, | Performed by: PSYCHIATRY & NEUROLOGY

## 2017-04-13 PROCEDURE — 97535 SELF CARE MNGMENT TRAINING: CPT

## 2017-04-13 PROCEDURE — 97166 OT EVAL MOD COMPLEX 45 MIN: CPT

## 2017-04-13 PROCEDURE — G8998 SWALLOW D/C STATUS: HCPCS | Mod: CH

## 2017-04-13 PROCEDURE — 83036 HEMOGLOBIN GLYCOSYLATED A1C: CPT

## 2017-04-13 PROCEDURE — 87077 CULTURE AEROBIC IDENTIFY: CPT

## 2017-04-13 PROCEDURE — 80053 COMPREHEN METABOLIC PANEL: CPT

## 2017-04-13 PROCEDURE — 85025 COMPLETE CBC W/AUTO DIFF WBC: CPT

## 2017-04-13 PROCEDURE — 25000003 PHARM REV CODE 250: Performed by: NURSE PRACTITIONER

## 2017-04-13 PROCEDURE — 85610 PROTHROMBIN TIME: CPT

## 2017-04-13 PROCEDURE — G8988 SELF CARE GOAL STATUS: HCPCS | Mod: CJ

## 2017-04-13 PROCEDURE — 27000221 HC OXYGEN, UP TO 24 HOURS

## 2017-04-13 PROCEDURE — G8996 SWALLOW CURRENT STATUS: HCPCS | Mod: CH

## 2017-04-13 PROCEDURE — 25000003 PHARM REV CODE 250: Performed by: PSYCHIATRY & NEUROLOGY

## 2017-04-13 PROCEDURE — 94761 N-INVAS EAR/PLS OXIMETRY MLT: CPT

## 2017-04-13 PROCEDURE — 25000242 PHARM REV CODE 250 ALT 637 W/ HCPCS: Performed by: PSYCHIATRY & NEUROLOGY

## 2017-04-13 PROCEDURE — 99900035 HC TECH TIME PER 15 MIN (STAT)

## 2017-04-13 PROCEDURE — 82553 CREATINE MB FRACTION: CPT

## 2017-04-13 PROCEDURE — 87088 URINE BACTERIA CULTURE: CPT

## 2017-04-13 PROCEDURE — 97530 THERAPEUTIC ACTIVITIES: CPT

## 2017-04-13 PROCEDURE — 93306 TTE W/DOPPLER COMPLETE: CPT | Mod: 26,,, | Performed by: INTERNAL MEDICINE

## 2017-04-13 PROCEDURE — 94640 AIRWAY INHALATION TREATMENT: CPT

## 2017-04-13 PROCEDURE — 92523 SPEECH SOUND LANG COMPREHEN: CPT

## 2017-04-13 PROCEDURE — 92610 EVALUATE SWALLOWING FUNCTION: CPT

## 2017-04-13 PROCEDURE — 87186 SC STD MICRODIL/AGAR DIL: CPT

## 2017-04-13 RX ORDER — MORPHINE SULFATE 2 MG/ML
2 INJECTION, SOLUTION INTRAMUSCULAR; INTRAVENOUS EVERY 4 HOURS PRN
Status: DISCONTINUED | OUTPATIENT
Start: 2017-04-13 | End: 2017-04-18 | Stop reason: HOSPADM

## 2017-04-13 RX ORDER — GABAPENTIN 300 MG/1
300 CAPSULE ORAL 3 TIMES DAILY
Status: DISCONTINUED | OUTPATIENT
Start: 2017-04-13 | End: 2017-04-18 | Stop reason: HOSPADM

## 2017-04-13 RX ORDER — ACETAMINOPHEN 325 MG/1
650 TABLET ORAL ONCE
Status: COMPLETED | OUTPATIENT
Start: 2017-04-13 | End: 2017-04-13

## 2017-04-13 RX ORDER — FUROSEMIDE 20 MG/1
20 TABLET ORAL DAILY
Status: DISCONTINUED | OUTPATIENT
Start: 2017-04-13 | End: 2017-04-17

## 2017-04-13 RX ORDER — ALPRAZOLAM 0.5 MG/1
0.5 TABLET ORAL 2 TIMES DAILY PRN
Status: DISCONTINUED | OUTPATIENT
Start: 2017-04-13 | End: 2017-04-18 | Stop reason: HOSPADM

## 2017-04-13 RX ADMIN — GABAPENTIN 300 MG: 300 CAPSULE ORAL at 01:04

## 2017-04-13 RX ADMIN — GABAPENTIN 300 MG: 300 CAPSULE ORAL at 09:04

## 2017-04-13 RX ADMIN — IPRATROPIUM BROMIDE AND ALBUTEROL SULFATE 3 ML: .5; 3 SOLUTION RESPIRATORY (INHALATION) at 12:04

## 2017-04-13 RX ADMIN — WARFARIN SODIUM 2.5 MG: 2.5 TABLET ORAL at 05:04

## 2017-04-13 RX ADMIN — MORPHINE SULFATE 2 MG: 2 INJECTION, SOLUTION INTRAMUSCULAR; INTRAVENOUS at 11:04

## 2017-04-13 RX ADMIN — LEVOTHYROXINE SODIUM 50 MCG: 50 TABLET ORAL at 07:04

## 2017-04-13 RX ADMIN — Medication 3 ML: at 01:04

## 2017-04-13 RX ADMIN — Medication 3 ML: at 07:04

## 2017-04-13 RX ADMIN — IPRATROPIUM BROMIDE AND ALBUTEROL SULFATE 3 ML: .5; 3 SOLUTION RESPIRATORY (INHALATION) at 07:04

## 2017-04-13 RX ADMIN — ALPRAZOLAM 0.5 MG: 0.5 TABLET ORAL at 09:04

## 2017-04-13 RX ADMIN — FUROSEMIDE 20 MG: 20 TABLET ORAL at 01:04

## 2017-04-13 RX ADMIN — MORPHINE SULFATE 2 MG: 2 INJECTION, SOLUTION INTRAMUSCULAR; INTRAVENOUS at 05:04

## 2017-04-13 RX ADMIN — FLUTICASONE PROPIONATE 2 SPRAY: 50 SPRAY, METERED NASAL at 09:04

## 2017-04-13 RX ADMIN — MORPHINE SULFATE 2 MG: 2 INJECTION, SOLUTION INTRAMUSCULAR; INTRAVENOUS at 09:04

## 2017-04-13 RX ADMIN — ACETAMINOPHEN 650 MG: 325 TABLET ORAL at 07:04

## 2017-04-13 RX ADMIN — ATORVASTATIN CALCIUM 40 MG: 20 TABLET, FILM COATED ORAL at 09:04

## 2017-04-13 RX ADMIN — Medication 3 ML: at 09:04

## 2017-04-13 NOTE — SUBJECTIVE & OBJECTIVE
Neurologic Chief Complaint: right facial droop and left sided weakness    Subjective:     Interval History: Patient is seen for follow-up neurological assessment and treatment recommendations: Left lower extremity pain overnight. Weakness in left upper extremity improved, right facial droop also improved.    HPI, Past Medical, Family, and Social History remains the same as documented in the initial encounter.     Review of Systems   Constitutional: Negative for chills and diaphoresis.   HENT: Negative for congestion and ear pain.    Eyes: Positive for visual disturbance. Negative for pain and itching.   Respiratory: Negative for chest tightness and shortness of breath.    Cardiovascular: Negative for chest pain and palpitations.   Gastrointestinal: Negative for abdominal distention and abdominal pain.   Genitourinary: Negative for dysuria and flank pain.   Musculoskeletal: Positive for gait problem. Negative for neck pain.   Skin: Negative for color change and rash.   Neurological: Negative for seizures, light-headedness and headaches.   Psychiatric/Behavioral: Negative for agitation and hallucinations.     Scheduled Meds:   albuterol-ipratropium 2.5mg-0.5mg/3mL  3 mL Nebulization Q6H WAKE    atorvastatin  40 mg Oral Daily    fluticasone  2 spray Each Nare Daily    furosemide  20 mg Oral Daily    gabapentin  300 mg Oral TID    levothyroxine  50 mcg Oral Before breakfast    sodium chloride 0.9%  3 mL Intravenous Q8H    warfarin  2.5 mg Oral Daily     Continuous Infusions:   sodium chloride 0.9%       PRN Meds:alprazolam, dextrose 50%, hydrALAZINE, insulin aspart, lorazepam, morphine, ondansetron, sodium chloride 0.9%    Objective:     Vital Signs (Most Recent):  Temp: 98.1 °F (36.7 °C) (04/13/17 0800)  Pulse: 79 (04/13/17 0800)  Resp: 18 (04/13/17 0800)  BP: (!) 147/62 (04/13/17 0800)  SpO2: (!) 94 % (04/13/17 0800)  BP Location: Right arm    Vital Signs Range (Last 24H):  Temp:  [97.6 °F (36.4 °C)-98.9 °F  (37.2 °C)]   Pulse:  [72-95]   Resp:  [17-22]   BP: (110-150)/(40-76)   SpO2:  [93 %-98 %]   BP Location: Right arm    Physical Exam   Constitutional: She appears well-developed and well-nourished.   HENT:   Head: Normocephalic and atraumatic.   Mouth/Throat: Oropharynx is clear and moist.   Eyes: Conjunctivae and EOM are normal. Pupils are equal, round, and reactive to light.   Neck: Normal range of motion. Neck supple.   Cardiovascular: Normal rate and normal heart sounds.    Pulmonary/Chest: Effort normal and breath sounds normal.   Abdominal: Soft. Bowel sounds are normal.   Musculoskeletal: She exhibits edema (left ankle) and tenderness (left lower extremity).   Neurological: GCS eye subscore is 4 - spontaneous. GCS verbal subscore is 5 - oriented. GCS motor subscore is 6 - obeys commands.   Skin: Skin is warm and dry.   Psychiatric: She has a normal mood and affect. Her behavior is normal. Judgment normal.   Nursing note and vitals reviewed.      Neurological Exam:   LOC: alert and follows requests  Language: No aphasia  Speech: No dysarthria  Orientation: Person, Place, Time  Memory: Recent memory intact, Remote memory intact, Age correct, Month correct  Visual Fields (CN II): Hemianopsia  left  EOM (CN III, IV, VI): Full/intact  Pupils (CN III, IV, VI): PERRL  Facial Sensation (CN V): Symmetric, Corneal reflex intact  Facial Movement (CN VII): right facial palsy with UMN pattern  Hearing (CN VIII): intact bilaterally  Shoulder/Neck (CN XI): SCM-Left: Normal ; SCM-Right: Normal ; Shoulder Shrug: Normal/Symetric  Tongue (CN XII): to midline  Reflexes: flexor plantar responses bilaterally and 2+ throughout  Motor*: Arm Left:  Paretic:  4/5, Leg Left:   Paretic:  2/5, Arm Right:   Normal (5/5), Leg Right:   Normal (5/5)  Cerebellar*: Normal limb, Normal gait  , Normal stance  Sensation: intact to light touch, temperature and vibration  Tone: Arm-Left: normal; Leg-Left: normal; Arm-Right: normal; Leg-Right:  normal    NIH Stroke Scale:    Level of Consciousness: 0 - alert  LOC Questions: 0 - answers both correctly  LOC Commands: 0 - performs both correctly  Best Gaze: 0 - normal  Visual: 1 - partial hemianopia  Facial Palsy: 1 - minor  Motor Left Arm: 1 - drift  Motor Right Arm: 0 - no drift  Motor Left Leg: 3 - no effort against gravity  Motor Right Le - no drift  Limb Ataxia: 0 - absent  Sensory: 0 - normal  Best Language: 0 - no aphasia  Dysarthria: 0 - normal articulation  Extinction and Inattention: 0 - no neglect  NIH Stroke Scale Total: 6  [place] Coma Scale:  Best Eye Response: 4 - spontaneous  Best Motor Response: 6 - obeys commands  Best Verbal Response: 5 - oriented  [place] Coma Scale Total: 15  Modified [place] Scale:   Timeline:  Modified [place] Score: 1 - no significant disability    [id number] Scale:   Age: 1 - 65-74 years old  CHF History: 1 - yes  HTN History: 1 - yes  Stroke/TIA/Thromboembolism History: 2 - yes  Vascular Disease History: 1 - yes  Diabetes Mellitus in History: 1 - yes  Female: 1 - yes  [id number] Scale Total: 8      Laboratory:  CMP:   Recent Labs  Lab 17  0453   CALCIUM 9.1   ALBUMIN 2.9*   PROT 6.9      K 4.2   CO2 29      BUN 33*   CREATININE 1.4   ALKPHOS 64   ALT 13   AST 18   BILITOT 0.3     CBC:   Recent Labs  Lab 17  0453   WBC 8.31   RBC 3.93*   HGB 12.6   HCT 37.3      MCV 95   MCH 32.1*   MCHC 33.8     Coagulation:   Recent Labs  Lab 17  0453   INR 1.8*   APTT 27.7     Hgb A1C: No results for input(s): HGBA1C in the last 168 hours.  TSH:   Recent Labs  Lab 17  1259   TSH 1.113       Diagnostic Results:  I have personally reviewed: MRI Head. Date: 17 and MRA Head/Neck. Date: 17 no Neck MRA due to kidney function. Awaiting carotid US doppler.   Findings: No acute ischemia

## 2017-04-13 NOTE — ASSESSMENT & PLAN NOTE
71 year-old lady that presents with right facial palsy with UMN pattern, jak-hypoesthesia of left face, LUE and LLE, and weakness of LUE and LLE. Concerning for brainstem ischemia.      Antithrombotics for secondary stroke prevention: Anticoagulants:  Warfarin INR adjusted target, Statins for secondary stroke prevention and hyperlipidemia, if present: Atorvastatin- 40 mg oral daily, Aggressive risk factor modification: Hypertension, Smoking, Diabetes and High Cholesterol, Rehab Efforts: Physical Therapy, Occupational Therapy, Speech and Language Pathology and Physical Medicine and Rehabilitation,   Diagnostics: HgbA1C 8.6. Lipid Profile WNL.     Trans-thoracic cardiac echo with reduced EF.    VTE Prophylaxis: Coumadin  - MRI brain and MRA head with no acute abnormalities. Can't rule out small lesions in brainstem.

## 2017-04-13 NOTE — PLAN OF CARE
Problem: Patient Care Overview  Goal: Plan of Care Review  Outcome: Ongoing (interventions implemented as appropriate)  Plan of care reviewed with pt at bedside. Safety precautions initiated. Bed locked in lowest position, call bell within reach, Bed alarm on. [id number].

## 2017-04-13 NOTE — PLAN OF CARE
Problem: Physical Therapy Goal  Goal: Physical Therapy Goal  Goals to be met by: 17     Patient will increase functional independence with mobility by performin. Supine to sit with Modified [place]  2. Sit to supine with Modified [place]  3. Sit to stand transfer with Minimal Assistance  4. Bed to chair transfer with Minimal Assistance using Rolling Walker or appropriate AD.  5. Gait x 10 feet with Minimal Assistance using Rolling Walker or appropriate AD.   6. Lower extremity exercise program x15-20 reps with assistance as needed.            PT evaluation completed. POC and goals established.     Fanny Novak, PT, DPT  2017

## 2017-04-13 NOTE — PLAN OF CARE
Problem: SLP Goal  Goal: SLP Goal   Pt safe to continue regular diet and thin liquids.      JOSE Medina, CCC-SLP  4/13/2017

## 2017-04-13 NOTE — PROGRESS NOTES
[facility]  Vascular Neurology  Comprehensive Stroke Center  Progress Note      Neurologic Chief Complaint: right facial droop and left sided weakness    Subjective:     Interval History: Patient is seen for follow-up neurological assessment and treatment recommendations: Left lower extremity pain overnight. Weakness in left upper extremity improved, right facial droop also improved.    HPI, Past Medical, Family, and Social History remains the same as documented in the initial encounter.     Review of Systems   Constitutional: Negative for chills and diaphoresis.   HENT: Negative for congestion and ear pain.    Eyes: Positive for visual disturbance. Negative for pain and itching.   Respiratory: Negative for chest tightness and shortness of breath.    Cardiovascular: Negative for chest pain and palpitations.   Gastrointestinal: Negative for abdominal distention and abdominal pain.   Genitourinary: Negative for dysuria and flank pain.   Musculoskeletal: Positive for gait problem. Negative for neck pain.   Skin: Negative for color change and rash.   Neurological: Negative for seizures, light-headedness and headaches.   Psychiatric/Behavioral: Negative for agitation and hallucinations.     Scheduled Meds:   albuterol-ipratropium 2.5mg-0.5mg/3mL  3 mL Nebulization Q6H WAKE    atorvastatin  40 mg Oral Daily    fluticasone  2 spray Each Nare Daily    furosemide  20 mg Oral Daily    gabapentin  300 mg Oral TID    levothyroxine  50 mcg Oral Before breakfast    sodium chloride 0.9%  3 mL Intravenous Q8H    warfarin  2.5 mg Oral Daily     Continuous Infusions:   sodium chloride 0.9%       PRN Meds:alprazolam, dextrose 50%, hydrALAZINE, insulin aspart, lorazepam, morphine, ondansetron, sodium chloride 0.9%    Objective:     Vital Signs (Most Recent):  Temp: 98.1 °F (36.7 °C) (04/13/17 0800)  Pulse: 79 (04/13/17 0800)  Resp: 18 (04/13/17 0800)  BP: (!) 147/62 (04/13/17 0800)  SpO2: (!) 94 % (04/13/17  0800)  BP Location: Right arm    Vital Signs Range (Last 24H):  Temp:  [97.6 °F (36.4 °C)-98.9 °F (37.2 °C)]   Pulse:  [72-95]   Resp:  [17-22]   BP: (110-150)/(40-76)   SpO2:  [93 %-98 %]   BP Location: Right arm    Physical Exam   Constitutional: She appears well-developed and well-nourished.   HENT:   Head: Normocephalic and atraumatic.   Mouth/Throat: Oropharynx is clear and moist.   Eyes: Conjunctivae and EOM are normal. Pupils are equal, round, and reactive to light.   Neck: Normal range of motion. Neck supple.   Cardiovascular: Normal rate and normal heart sounds.    Pulmonary/Chest: Effort normal and breath sounds normal.   Abdominal: Soft. Bowel sounds are normal.   Musculoskeletal: She exhibits edema (left ankle) and tenderness (left lower extremity).   Neurological: GCS eye subscore is 4 - spontaneous. GCS verbal subscore is 5 - oriented. GCS motor subscore is 6 - obeys commands.   Skin: Skin is warm and dry.   Psychiatric: She has a normal mood and affect. Her behavior is normal. Judgment normal.   Nursing note and vitals reviewed.      Neurological Exam:   LOC: alert and follows requests  Language: No aphasia  Speech: No dysarthria  Orientation: Person, Place, Time  Memory: Recent memory intact, Remote memory intact, Age correct, Month correct  Visual Fields (CN II): Hemianopsia  left  EOM (CN III, IV, VI): Full/intact  Pupils (CN III, IV, VI): PERRL  Facial Sensation (CN V): Symmetric, Corneal reflex intact  Facial Movement (CN VII): right facial palsy with UMN pattern  Hearing (CN VIII): intact bilaterally  Shoulder/Neck (CN XI): SCM-Left: Normal ; SCM-Right: Normal ; Shoulder Shrug: Normal/Symetric  Tongue (CN XII): to midline  Reflexes: flexor plantar responses bilaterally and 2+ throughout  Motor*: Arm Left:  Paretic:  4/5, Leg Left:   Paretic:  2/5, Arm Right:   Normal (5/5), Leg Right:   Normal (5/5)  Cerebellar*: Normal limb, Normal gait  , Normal stance  Sensation: intact to light touch,  temperature and vibration  Tone: Arm-Left: normal; Leg-Left: normal; Arm-Right: normal; Leg-Right: normal    NIH Stroke Scale:    Level of Consciousness: 0 - alert  LOC Questions: 0 - answers both correctly  LOC Commands: 0 - performs both correctly  Best Gaze: 0 - normal  Visual: 1 - partial hemianopia  Facial Palsy: 1 - minor  Motor Left Arm: 1 - drift  Motor Right Arm: 0 - no drift  Motor Left Leg: 3 - no effort against gravity  Motor Right Le - no drift  Limb Ataxia: 0 - absent  Sensory: 0 - normal  Best Language: 0 - no aphasia  Dysarthria: 0 - normal articulation  Extinction and Inattention: 0 - no neglect  NIH Stroke Scale Total: 6  M[place]us Coma Scale:  Best Eye Response: 4 - spontaneous  Best Motor Response: 6 - obeys commands  Best Verbal Response: 5 - oriented  [place]ick Coma Scale Total: 15  Modified Waldwick Scale:   Timeline:  Modified Waldwick Score: 1 - no significant disability    [id number] Scale:   Age: 1 - 65-74 years old  CHF History: 1 - yes  HTN History: 1 - yes  Stroke/TIA/Thromboembolism History: 2 - yes  Vascular Disease History: 1 - yes  Diabetes Mellitus in History: 1 - yes  Female: 1 - yes  [id number] Scale Total: 8      Laboratory:  CMP:   Recent Labs  Lab 17  0453   CALCIUM 9.1   ALBUMIN 2.9*   PROT 6.9      K 4.2   CO2 29      BUN 33*   CREATININE 1.4   ALKPHOS 64   ALT 13   AST 18   BILITOT 0.3     CBC:   Recent Labs  Lab 17  0453   WBC 8.31   RBC 3.93*   HGB 12.6   HCT 37.3      MCV 95   MCH 32.1*   MCHC 33.8     Coagulation:   Recent Labs  Lab 17  0453   INR 1.8*   APTT 27.7     Hgb A1C: No results for input(s): HGBA1C in the last 168 hours.  TSH:   Recent Labs  Lab 17  1259   TSH 1.113       Diagnostic Results:  I have personally reviewed: MRI Head. Date: 17 and MRA Head/Neck. Date: 17 no Neck MRA due to kidney function. Awaiting carotid US doppler.   Findings: No acute ischemia    Assessment/Plan:     17 - MRI not showing  any evident diffuse restriction. MRA head without any abnormalities. Awaiting carotid US. HF with reduced EF. Patient complaining of left lower extremity pain that began overnight US of lower extremity and xray pending. Left arm weakness improved, sensation on left side also improved. Working with PT/OT.     * Arterial ischemic stroke, vertebrobasilar, brainstem, acute  71 year-old lady that presents with right facial palsy with UMN pattern, jak-hypoesthesia of left face, LUE and LLE, and weakness of LUE and LLE. Concerning for brainstem ischemia.      Antithrombotics for secondary stroke prevention: Anticoagulants:  Warfarin INR adjusted target, Statins for secondary stroke prevention and hyperlipidemia, if present: Atorvastatin- 40 mg oral daily, Aggressive risk factor modification: Hypertension, Smoking, Diabetes and High Cholesterol, Rehab Efforts: Physical Therapy, Occupational Therapy, Speech and Language Pathology and Physical Medicine and Rehabilitation,   Diagnostics: HgbA1C 8.6. Lipid Profile WNL.     Trans-thoracic cardiac echo with reduced EF.    VTE Prophylaxis: Coumadin  - MRI brain and MRA head with no acute abnormalities. Can't rule out small lesions in brainstem.     Paroxysmal atrial fibrillation  - Stroke risk factor  - On Diltiazem at home, currently EKG in NSR and HR in 60s, will hold due to permissive HTN. Will re-start tomorrow.   - Continue coumadin to target INR    Acquired hypothyroidism  - Continue home levothyroxine     Diabetic peripheral neuropathy  - Decrease sensation bilaterally  - Will start gabapentin    Tobacco abuse  - Stroke risk factor   - Counseled on cessation     Mixed hyperlipidemia  - Stroke risk factor  - Atorvastatin 40    Type 2 diabetes mellitus with stage 4 chronic kidney disease  - Stroke risk factor  - A1c 8.7  - SSI Q4  - Diabetic counseling     Chronic bronchitis  - Chronic smoker  - Duonebs Q6  - Home O2 at 3%, will continue.     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 Tylenol PRN and Morphine PRN for pain 7-10.       Chauncey Adames MD  Comprehensive Stroke Center  Department of Vascular Neurology   [facility]

## 2017-04-13 NOTE — ASSESSMENT & PLAN NOTE
- Stroke risk factor  - On Diltiazem at home, currently EKG in NSR and HR in 60s, will hold due to permissive HTN. Will re-start tomorrow.   - Continue coumadin to target INR

## 2017-04-13 NOTE — PT/OT/SLP EVAL
Occupational Therapy  Evaluation    Savita Polo   MRN: [id number]   Admitting Diagnosis: Arterial ischemic stroke, vertebrobasilar, brainstem, acute    OT Date of Treatment: 04/13/17   OT Start Time: 0556  OT Stop Time: 0626  OT Total Time (min): 30 min    Billable Minutes:  Evaluation 15  Self Care/Home Management 15    Diagnosis: Arterial ischemic stroke, vertebrobasilar, brainstem, acute     Past Medical History:   Diagnosis Date    Anemia in chronic kidney disease 3/21/2017    Anxiety     Atherosclerotic cerebrovascular disease 7/25/2012    Chronic respiratory failure with hypoxia 8/11/2014    CKD (chronic kidney disease), stage IV 6/23/2016    CRLD (chronic restrictive lung disease) 8/11/2014    Diabetic peripheral neuropathy 7/24/2012    Diabetic retinopathy     Diverticulosis of colon     DJD (degenerative joint disease) 7/24/2012    Fatty liver     SUSAN (generalized anxiety disorder) 11/15/2012    Heart attack     History of PSVT (paroxysmal supraventricular tachycardia) 4/1/2014    Cardioverted on 2008     Iron deficiency 11/14/2014    Keloid cicatrix     Long term (current) use of anticoagulants 4/15/2014    Mixed hyperlipidemia 1/4/2016    Multiple lung nodules 8/30/2016    Obesity, Class I, BMI 30-34.9 7/24/2012    On home oxygen therapy 6/22/2013    3L NC    Paroxysmal atrial fibrillation 1/4/2016    Renovascular hypertension 1/4/2016    Right-sided heart failure 10/26/2014     1 - Normal left ventricular systolic function (EF 65-70%).    2 - Biatrial enlargement.    3 - Right ventricular enlargement with normal systolic function.    4 - Trivial aortic stenosis, MARY = 1.87 cm2, peak velocity = 1.7 m/s, mean gradient = 6.0 mmHg.    5 - The mitral valve is markedly sclerotic with moderately restricted leaflet mobility.    6 - Mild mitral stenosis, MVA = 2.2 cm2.    7 - Trivial to mild mitral regurgitation.    8 - Trivial to mild tricuspid regurgitation.    9 - Increased central  "venous pressure.    10 - Pulmonary hypertension. The estimated PA systolic pressure is 63 mmHg.    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     Vitamin D deficiency disease 7/24/2012      Past Surgical History:   Procedure Laterality Date    ABDOMINAL SURGERY      ANKLE SURGERY      CATARACT EXTRACTION  8/17/00    right eye    CATARACT EXTRACTION  6/26/00    left eye    COLONOSCOPY      HYSTERECTOMY         Referring physician: Zacarias  Date referred to OT: 4/12  General Precautions: Standard, aspiration, fall, NPO; vision--blind left eye  Orthopedic Precautions: N/A  Braces: N/A    Do you have any cultural, spiritual, Scientologist conflicts, given your current situation?: Mandaeism     Patient History:  Prior level of function:    Patient resides in [place] with boyfriend and dtg (who is currently out of town) in 2 story apt with one step to enter, bedroom on the first floor.  PTA pateint independent with ADLs, not driving.  DME:  cane, rolling walker, 3 in 1 commode, shower chair, hospital bed, grab bars in bathroom.  Hobbies:  sewing, gardening, jessica, puzzles, embroidery.  Role/Responsibilities:  mother, grandmother, great grandmother.     Subjective:  Communicated with nurse prior to session.  Patient:  "I noticed something 2 days ago.  I had headaches in the back of my head and I couldn't get rid of them."  Pain Rating:10/10  Location:  Left LE  Addressed by:  Repositioning; nurse aware    Pain Rating Post-Intervention: 10/10    Objective:  Patient found with: peripheral IV, oxygen, telemetry    Cognitive Exam:  Oriented to: Person, Place, Time and Situation  Follows Commands/attention: Follows one-step commands  Communication: clear/fluent  Memory:  No Deficits noted  Safety awareness/insight to disability: " impaired  Coping skills/emotional control: impaired    Visual/perceptual:  Blind left eye    Physical Exam:  Postural examination/scapula alignment: Rounded shoulder  Skin integrity: Visible skin intact  Edema: None noted     Sensation:   Impaired:  Tingling left UE/LE    Upper Extremity Range of Motion:  Right Upper Extremity: WNL  Left Upper Extremity: WNL    Upper Extremity Strength:  Right Upper Extremity: WNL  Left Upper Extremity: 3/5    Functional Mobility:  Bed Mobility:  Rolling/Turning to Left: Stand by assistance  Rolling/Turning Right: Stand by assistance  Scooting/Bridging: Stand by Assistance  Supine to Sit: Stand by Assistance  Sit to Supine: Stand by Assistance    Transfers:  Sit <> Stand Assistance: Moderate Assistance  Sit <> Stand Assistive Device: No Assistive Device  Bed <> Chair Technique: Stand Pivot  Bed <> Chair Transfer Assistance: Moderate Assistance    Activities of Daily Living:  Feeding Level of Assistance:  (NPO)  UE Dressing Level of Assistance: Minimum assistance (to guide around back)  LE Dressing Level of Assistance: Maximum assistance  Grooming Position: Standing  Grooming Level of Assistance: Moderate assistance  Toileting Where Assessed: Bedside commode  Toileting Level of Assistance: Moderate assistance      Additional Treatment:   Patient education provided for stroke warning signs, prevention guidelines and personal risk factors.  Patient requiring further education.  Patient education provided on role of OT and need for rehab upon discharge.  Patient alert and oriented x 3; able to follow 4/4 one step commands.  Patient attentive and interactive throughout the session.  Patient able to identify 5/5 body parts.  Able to name 5/5 objects.  Able to sequence 7/7 days of the week and 12/12 months of the year.  Addressed oral motor ex while seated EOB.  Continued education, patient/ family training recommended.  Patient's functional status and disposition recommendation discussed  "with stroke team in daily rounds.  White board updated in patient's room.  OT asked if there were any other questions; patient had no further questions.    AM-PAC 6 CLICK ADL  How much help from another person does this patient currently need?  1 = Unable, Total/Dependent Assistance  2 = A lot, Maximum/Moderate Assistance  3 = A little, Minimum/Contact Guard/Supervision  4 = None, Modified [employer]roleum/Independent    Putting on and taking off regular lower body clothing? : 2  Bathing (including washing, rinsing, drying)?: 2  Toileting, which includes using toilet, bedpan, or urinal? : 2  Putting on and taking off regular upper body clothing?: 3  Taking care of personal grooming such as brushing teeth?: 2  Eating meals?: 1  Total Score: 12    AM-PAC Raw Score CMS "G-Code Modifier Level of Impairment Assistance   6 % Total / Unable   7 - 9 CM 80 - 100% Maximal Assist   10 - 14 CL 60 - 80% Moderate Assist   15 - 19 CK 40 - 60% Moderate Assist   20 - 22 CJ 20 - 40% Minimal Assist   23 CI 1-20% SBA / CGA   24 CH 0% Independent/ Mod I       Patient left supine with all lines intact, call button in reach and bed alarm on    Assessment:  Savita Polo is a 71 y.o. female with a medical diagnosis of Arterial ischemic stroke, vertebrobasilar, brainstem, acute and presents with performance deficits of physical skills including impaired  balance, mobility, strength, dexterity, fine motor coordination, gross motor coordination, sensation  and endurance;  demonstrating performance deficits of psychosocial skills including impairments of coping strategies which are skills necessary to successfully and appropriately participate in everyday tasks and social situations.  These performance deficits have resulted in activity limitations including but not limited to:   bed mobility, transfers, ascending/ descending stairs, walking short and long distances, walking around obstacles, transitional movement patterns (kneeling, " bending); eating, upper body dressing, lower body dressing, brushing teeth, toileting, bathing, carrying objects, gardening and assisting others in self care.   Patient's role as mother, grandmother, great grandmother and independent caretaker for self has been affected. Patient will benefit from skilled OT services to maximize level of independence with self-care skills and functional mobility.  Will benefit from rehab.    Rehab identified problem list/impairments: Rehab identified problem list/impairments: weakness, gait instability, decreased upper extremity function, decreased ROM, impaired coordination, decreased lower extremity function, impaired balance, impaired endurance, impaired sensation, visual deficits, decreased safety awareness, impaired fine motor, impaired self care skills, impaired cognition, impaired functional mobilty, decreased coordination, pain    Rehab potential is good.    Activity tolerance: Good    Discharge recommendations: Discharge Facility/Level Of Care Needs: rehabilitation facility     Barriers to discharge:  left sided weakness, pain    Equipment recommendations: none     GOALS:   Occupational Therapy Goals        Problem: Occupational Therapy Goal    Goal Priority Disciplines Outcome Interventions   Occupational Therapy Goal     OT, PT/OT     Description:  Goals set 4/13 to be addressed for 7 days with expiration date, 4/20:  Patient will increase functional independence with ADLs by performing:    Patient will demonstrate rolling to the right with modified independence.  Not met   Patient will demonstrate rolling to the left with modified independence.   Not met  Patient will demonstrate supine -sit with modified independence.   Not met  Patient will demonstrate stand pivot transfers with SBA.   Not met  Patient will demonstrate grooming while standing with SBA.   Not met  Patient will demonstrate upper body dressing with SBA.   Not met  Patient will demonstrate lower body  dressing with SBA.   Not met  Patient will demonstrate toileting with CGA.   Not met  Patient will demonstrate bathing while seated EOB with CGA.   Not met  Patient's family / caregiver will demonstrate independence and safety with assisting patient with self-care skills and functional mobility.     Not met  Patient and/or patient's family will verbalize understanding of stroke prevention guidelines, personal risk factors and stroke warning signs via teachback method.  Not met                     PLAN:  Patient to be seen 6 x/week to address the above listed problems via self-care/home management, cognitive retraining, sensory integration, therapeutic activities, therapeutic exercises, neuromuscular re-education  Plan of Care expires: 05/11/17  Plan of Care reviewed with: patient    OT G-codes  Functional Assessment Tool Used: FIM  Score: 3  Functional Limitation: Self care (grooming standing)  Self Care Current Status (): CL  Self Care Goal Status (): RITA Sparrow  04/13/2017

## 2017-04-13 NOTE — PLAN OF CARE
Visit with pt to discuss d/c plans, off floor for test but Mr Higgins (boyfriend) able to provide information.  Pt lives with boyfriend of 40+ years, states pt is aware of recs for inpatient rehab but adamantly declines.  Pt would like to receive home health services if needed at time of d/c.  Mr Higgins states he is available for pt 24/7 and can assist with ADL's.  Mr Higgins is active, currently drives and independent.  States he will continue to encourage rehab as he knows this may be best plan for pt to return to prestroke level of functioning but is also supportive of home health.  States pt had a previous hospitalization after which skilled was recommended and opted for home health therapy, was motivated and pleased with her progress.       04/13/17 1547   Discharge Assessment   Assessment Type Discharge Planning Assessment   Confirmed/corrected address and phone number on facesheet? Yes   Assessment information obtained from? Patient   Communicated expected length of stay with patient/caregiver yes   Prior to hospitilization cognitive status: Alert/Oriented   Prior to hospitalization functional status: Independent   Current cognitive status: Alert/Oriented   Current Functional Status: Assistive Equipment;Needs Assistance   Lives With significant other  (boyfriend of 40 years)   Able to Return to Prior Arrangements yes   How many people do you have in your home that can help with your care after discharge? 1   Who are your caregiver(s) and their phone number(s)? (Ronaldo Driscoll [phone] boyfriend)   Readmission Within The Last 30 Days unable to assess   Patient currently being followed by outpatient case management? No   Patient currently receives home health services? No   Does the patient currently use HME? No   Patient currently receives private duty nursing? N/A   Patient currently receives any other outside agency services? No   Equipment Currently Used at Home none   Do you have any problems affording any of  your prescribed medications? No   Is the patient taking medications as prescribed? yes   Do you have any financial concerns preventing you from receiving the healthcare you need? No   Does the patient have transportation to healthcare appointments? Yes   Transportation Available car;family or friend will provide   On Dialysis? No   Does the patient receive services at the Coumadin Clinic? No   Are there any open cases? No   Discharge Plan A Rehab   Discharge Plan B Home with family;Home Health   Patient/Family In Agreement With Plan yes

## 2017-04-13 NOTE — PLAN OF CARE
Problem: Occupational Therapy Goal  Goal: Occupational Therapy Goal  Goals set 4/13 to be addressed for 7 days with expiration date, 4/20:  Patient will increase functional independence with ADLs by performing:    Patient will demonstrate rolling to the right with modified independence. Not met   Patient will demonstrate rolling to the left with modified independence. Not met  Patient will demonstrate supine -sit with modified independence. Not met  Patient will demonstrate stand pivot transfers with SBA. Not met  Patient will demonstrate grooming while standing with SBA. Not met  Patient will demonstrate upper body dressing with SBA. Not met  Patient will demonstrate lower body dressing with SBA. Not met  Patient will demonstrate toileting with CGA. Not met  Patient will demonstrate bathing while seated EOB with CGA. Not met  Patients family / caregiver will demonstrate independence and safety with assisting patient with self-care skills and functional mobility. Not met  Patient and/or patients family will verbalize understanding of stroke prevention guidelines, personal risk factors and stroke warning signs via teachback method. Not met   OT evaluation completed.   RITA Fontenot  4/13/2017

## 2017-04-13 NOTE — CONSULTS
Consult Note  Physical Medicine & Rehab    Consult Requested By:  Willi Schmitt MD      Admit Date:  4/12/2017  Admitting Diagnosis:  CVA (cerebral vascular accident) [I63.9], Arterial ischemic stroke, vertebrobasilar, brainstem, acute [I63.219, I63.22]    Reason for Consult:  assess rehabilitation needs    SUBJECTIVE:   History of Present Illness:  Savita Polo is a 71-year-old female with PMHx of HTN, HLD, DMII with diabetic neuropathy and retinopathy, CVA, a-fib (on Coumadin), CHF, COPD, CAD, and MI.  Patient presented to [facility] on 4/12/17 with right facial droop and left sided numbness and weakness with associated gait instability.  On arrival, found to have subtherapeutic INR of 1.7.  CTH without acute pathology.  Not tPA candidate 2/2 outside of treatment window.  MRI and MRA without acute abnormalities; however, presentation concerning for brainstem ischemia.          Current Functional Status: Evaluated by OT, PT evaluation pending.  Bed mobility SBA.  Sit to stand modA and transfers modA.  UBD Xavi and LBD modA.    Functional History: Patient lives in [place] with her boyfriend and daughter in a 2 story town home with one step to enter.  Prior to admission, she was independent with ADLs and ambulated with walker.  DME: hospital bed, SC, RW.    Review of Systems  Constitutional: No fever or chills.  No malaise or fatigue.  Skin: No rashes or lesions.   Eyes: No visual changes.  ENT: No nasal congestion, sore throat, or ear pain.  No difficulty swallowing.   Respiratory: No shortness of breath or wheezing.  No cough.  Cardiovascular: No chest pain or palpitations.  No edema.   Gl: No nausea or vomiting.  No abdominal pain.  No incontinence of bowel.  No constipation.   : No incontinence of bladder.   Musculoskeletal: Positive arthralgias.  Positive bone pain.  Positive muscle weakness.  Neurological: No seizures or tremors.  Positive weakness.  Positive sensory impairment.   Behavioral/Psych: No  changes in mood or hallucinations.    Past Medical History:   Diagnosis Date    Anemia in chronic kidney disease 3/21/2017    Anxiety     Atherosclerotic cerebrovascular disease 7/25/2012    Chronic respiratory failure with hypoxia 8/11/2014    CKD (chronic kidney disease), stage IV 6/23/2016    CRLD (chronic restrictive lung disease) 8/11/2014    Diabetic peripheral neuropathy 7/24/2012    Diabetic retinopathy     Diverticulosis of colon     DJD (degenerative joint disease) 7/24/2012    Fatty liver     SUSAN (generalized anxiety disorder) 11/15/2012    Heart attack     History of PSVT (paroxysmal supraventricular tachycardia) 4/1/2014    Cardioverted on 2008     Iron deficiency 11/14/2014    Keloid cicatrix     Long term (current) use of anticoagulants 4/15/2014    Mixed hyperlipidemia 1/4/2016    Multiple lung nodules 8/30/2016    Obesity, Class I, BMI 30-34.9 7/24/2012    On home oxygen therapy 6/22/2013    3L NC    Paroxysmal atrial fibrillation 1/4/2016    Renovascular hypertension 1/4/2016    Right-sided heart failure 10/26/2014     1 - Normal left ventricular systolic function (EF 65-70%).    2 - Biatrial enlargement.    3 - Right ventricular enlargement with normal systolic function.    4 - Trivial aortic stenosis, MARY = 1.87 cm2, peak velocity = 1.7 m/s, mean gradient = 6.0 mmHg.    5 - The mitral valve is markedly sclerotic with moderately restricted leaflet mobility.    6 - Mild mitral stenosis, MVA = 2.2 cm2.    7 - Trivial to mild mitral regurgitation.    8 - Trivial to mild tricuspid regurgitation.    9 - Increased central venous pressure.    10 - Pulmonary hypertension. The estimated PA systolic pressure is 63 mmHg.    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     Vitamin D deficiency  disease 7/24/2012     Past Surgical History:   Procedure Laterality Date    ABDOMINAL SURGERY      ANKLE SURGERY      CATARACT EXTRACTION  8/17/00    right eye    CATARACT EXTRACTION  6/26/00    left eye    COLONOSCOPY      HYSTERECTOMY       Family History   Problem Relation Age of Onset    Diabetes Mother     Stroke Mother     Hypertension Mother     Melanoma Mother     COPD Sister     Stroke Sister     Diabetes Sister     Hypertension Sister     COPD Brother     Diabetes Brother     Hypertension Brother     Heart disease Maternal Grandfather     No Known Problems Father     No Known Problems Maternal Aunt     No Known Problems Maternal Uncle     No Known Problems Paternal Aunt     Cancer Paternal Uncle     No Known Problems Maternal Grandmother     No Known Problems Paternal Grandmother     No Known Problems Paternal Grandfather     Psoriasis Neg Hx     Lupus Neg Hx     Eczema Neg Hx     Kidney disease Neg Hx     Heart attack Neg Hx     Amblyopia Neg Hx     Blindness Neg Hx     Cataracts Neg Hx     Glaucoma Neg Hx     Macular degeneration Neg Hx     Retinal detachment Neg Hx     Strabismus Neg Hx     Thyroid disease Neg Hx      Social History   Substance Use Topics    Smoking status: Light Tobacco Smoker     Packs/day: 0.50     Years: 20.00     Types: Cigarettes     Last attempt to quit: 2/15/2016    Smokeless tobacco: Never Used      Comment:  on Smoking program    Alcohol use No     Review of patient's allergies indicates:   Allergen Reactions    Latex, natural rubber Dermatitis and Rash    Adhesive Itching and Rash     Allergy to adhesive in nicotine patch.    Sulfa (sulfonamide antibiotics) Rash        Scheduled Medications:   albuterol-ipratropium 2.5mg-0.5mg/3mL  3 mL Nebulization Q6H WAKE    atorvastatin  40 mg Oral Daily    fluticasone  2 spray Each Nare Daily    furosemide  20 mg Oral Daily    gabapentin  300 mg Oral TID    levothyroxine  50 mcg Oral  Before breakfast    sodium chloride 0.9%  3 mL Intravenous Q8H    warfarin  2.5 mg Oral Daily     PRN Medications:  alprazolam, dextrose 50%, hydrALAZINE, insulin aspart, lorazepam, morphine, ondansetron, sodium chloride 0.9%    OBJECTIVE:     Vital Signs (Most Recent)  Temp: 98.1 °F (36.7 °C) (17 0800)  Pulse: 84 (17 1253)  Resp: 16 (17 1253)  BP: (!) 147/62 (17 0800)  SpO2: (!) 93 % (17 1253)    Vital Signs Range (Last 24H):  Temp:  [97.6 °F (36.4 °C)-98.9 °F (37.2 °C)]   Pulse:  [72-95]   Resp:  [16-22]   BP: (119-147)/(62-76)   SpO2:  [93 %-98 %]     Physical Exam:  Vital signs reviewed.   General appearance:  No apparent distress.  Appears well-developed and well-nourished.  Skin:  Color and texture normal.  Warm and dry.  No jaundice.  No visible rashes or visible lesions.  HEENT:  Normocephalic and atraumatic.  No scleral icterus.  No difficulty hearing.  No nasal discharge.  No dysphonia.  Pulmonary:  Normal respiratory effort.  No cough. Tolerating O2 via NC.   Cardiac:  Normal heart rate.  Extremities: No calf tenderness.  Distal pulses intact.  No edema.    Abdomen:  Soft, non-tender and non-distended.  Musculoskeletal:  Moves all extremities spontaneously.  ROM: normal.    Neurologic:  -  Mental Status:  AAOx3.  Follows commands.  Answers correct age and .  -  Speech and language:  no aphasia or dysarthria.    -  Motor:  Facial droop.  Left pronator drift. RUE: /5.  LUE: /5.  RLE: 5/5.  LLE: 3-/5.  -  Sensory:  Intact to light touch and pin prick.  Behavioral/Psych: Calm and cooperative.    Diagnostic Results:  X-Ray: Reviewed  US: Reviewed  CT: Reviewed  MRI: Reviewed  Labs: Reviewed    ASSESSMENT/PLAN:      Savita Polo is a 71 y.o. female admitted on 2017 for right facial droop and left sided numbness and weakness with associated gait instability.  Subtherapeutic INR of 1.7 on admission.  CTH without acute pathology.  Not tPA candidate 2/2 outside of  treatment window.  MRI and MRA without acute abnormalities; however, presentation concerning for brainstem ischemia.          PM&R consulted to assess rehabilitation needs.    Functional status:  Evaluated by OT, PT evaluation pending.  Bed mobility SBA.  Sit to stand modA and transfers modA.  UBD Xavi and LBD modA.  Cognitive/Speech/Language status:  Cognitive-linguistic impairment, mild memory deficits.  Nutrition/Swallow Status:  Passed bedside swallow evaluation.  Speech recommended regular diet and thin liquids.    -  Reviewed discharge options with patient (inpatient rehab, SNF, home health therapy, and outpatient therapy).  Encourage participation with therapy.  -  Recommendations  · PT and OT evaluate and treat.   · SLP cognitive and speech evaluate and treat.   · Mobility, OOB in chair at least 3 hours per day, and early ambulation as appropriate.  · Establish consistent sleep-wake cycle and monitor for sleep disturbances.  · Monitor for bowel and bladder dysfunction.   · Monitor for skin breakdown and pressure ulcers.  Turn patient every 2 hours and repositioning/weight shifting every 20-30 minutes when sitting.  Pressure relief/heel protector boots and proper mattress/overlay and chair cushioning.   · DVT prophylaxis:  Coumadin scheduled.     Patient with goals for inpatient rehab; however, she is adamant about returning home at discharge.  Will follow patient's progress for further recommendations.    Thank you for consult.    MAXI Galloway, FNP-C    Physical Medicine & Rehabilitation   04/13/2017  Spectralink: [id number]    Collaborating Physician : Boogie Gamez MD

## 2017-04-13 NOTE — PT/OT/SLP EVAL
Speech Language Pathology Evaluation    Savita Polo   MRN: [id number]   Admitting Diagnosis: Arterial ischemic stroke, vertebrobasilar, brainstem, acute    Diet recommendations: Solid Diet Level: Regular  Liquid Diet Level: Thin     SLP Treatment Date: 04/13/17  Speech Start Time: 1018     Speech Stop Time: 1043     Speech Total (min): 25 min       TREATMENT BILLABLE MINUTES:  Eval 17  and Eval Swallow and Oral Function 8    Diagnosis: Arterial ischemic stroke, vertebrobasilar, brainstem, acute      Past Medical History:   Diagnosis Date    Anemia in chronic kidney disease 3/21/2017    Anxiety     Atherosclerotic cerebrovascular disease 7/25/2012    Chronic respiratory failure with hypoxia 8/11/2014    CKD (chronic kidney disease), stage IV 6/23/2016    CRLD (chronic restrictive lung disease) 8/11/2014    Diabetic peripheral neuropathy 7/24/2012    Diabetic retinopathy     Diverticulosis of colon     DJD (degenerative joint disease) 7/24/2012    Fatty liver     SUSAN (generalized anxiety disorder) 11/15/2012    Heart attack     History of PSVT (paroxysmal supraventricular tachycardia) 4/1/2014    Cardioverted on 2008     Iron deficiency 11/14/2014    Keloid cicatrix     Long term (current) use of anticoagulants 4/15/2014    Mixed hyperlipidemia 1/4/2016    Multiple lung nodules 8/30/2016    Obesity, Class I, BMI 30-34.9 7/24/2012    On home oxygen therapy 6/22/2013    3L NC    Paroxysmal atrial fibrillation 1/4/2016    Renovascular hypertension 1/4/2016    Right-sided heart failure 10/26/2014     1 - Normal left ventricular systolic function (EF 65-70%).    2 - Biatrial enlargement.    3 - Right ventricular enlargement with normal systolic function.    4 - Trivial aortic stenosis, MARY = 1.87 cm2, peak velocity = 1.7 m/s, mean gradient = 6.0 mmHg.    5 - The mitral valve is markedly sclerotic with moderately restricted leaflet mobility.    6 - Mild mitral stenosis, MVA = 2.2 cm2.    7 -  "Trivial to mild mitral regurgitation.    8 - Trivial to mild tricuspid regurgitation.    9 - Increased central venous pressure.    10 - Pulmonary hypertension. The estimated PA systolic pressure is 63 mmHg.    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 : Yes  Keep patient NPO?: No   General Precautions: Standard, fall, NPO          Social Hx: Patient lives with daughter    Prior diet: regular/thin    Occupational/hobbies/homemaking: retired    Subjective:  "Not as clear" when asked about her cognition  Patient goals: go home    Pain Ratin/10              Pain Rating Post-Intervention: 0/10    Objective:        Oral Musculature Evaluation  Oral Musculature: right weakness  Dentition: present and adequate  Mucosal Quality: good  Mandibular Strength and Mobility: WFL  Oral Labial Strength and Mobility: WFL  Lingual Strength and Mobility: WFL  Volitional Cough: strong  Voice Prior to PO Intake: clear     Cognitive Status:  Behavioral Observations: alert and appropriate-  Memory and Orientation: 0x4; recalled up to 5 digits/words; She recalled 0/3 objects after a delay and with cues 1/3. Pt reported premorbid memory problems from her previous stroke; good recall of recent events but lost her train of thought at times   Attention: fair. Pt tangential at times  Problem Solving: wfl for everyday task and able to generate multiple solutions. Able to compare/contrast items and complete categorizational task; " named 16 items during word fluency  Executive Function: wfl    Language: yes    Auditory Comprehension: wfl for complex questions and commands.     Verbal Expression: wfl and able to express wants and needs. No word finding deficits noted. Named 6/6 pictured objects    Motor Speech: wfl    Voice: wfl    Augmentative Alternative Communication: no    Reading: tbd    Writing: tbd    Visual-Spatial: no obvious deficits noted    Bedside Swallow Eval:  Consistencies Assessed: Thin liquids 1/2 cup via a straw and Solids observed pt eating breakfast; eggs;potatoes and a biscuit  Oral Phase: WFL  Pharyngeal Phase: no overt clinical signs/symptoms of pharyngeal dysphagia    Additional Treatment:     Pt with some oral stasis but pt independently used liquids to clear oral stasis. White board updated. Pt reported premorbid deficits in memory but felt she was not back to her baseline after her previous stroke. Pt stated she is happy to have therapy in her house but she was not interested in rehab.      FIM:  Social Interaction: 7 Complete [place]--The patient interacts appropriately with staff, other patients, and family members (e.g., controls temper, accepts criticism, is aware that words and actions have an impact on others), and does not require medication for   Problem Solvin Complete [place]--The patient consistently recognizes problems when present, makes appropriate decisions, initiates and carries out a sequence of steps to solve complex problems until the task is completed, and self-corrects if errors are made.   Comprehension: 7 Complete [place]--The patient understand complex or abstract directions and conversation, and understand either spoken or written language (not necessarily English).   Expression: 7 Complete [place]--The patient expresses complex or abstract ideas clearly and fluently (not necessarily in English).   Memory: 5 Supervision-The patient requires prompting (e.g., cueing,  repetition, reminders) only under stressful or unfamiliar conditions, but no more than 10% of the time.     Assessment:  Savita Polo is a 71 y.o. female with a SLP diagnosis of Cognitive-Linguistic Impairment. She present with mild memory deficits. Pt close to baseline.          Discharge recommendations: Discharge Facility/Level Of Care Needs: rehabilitation facility     Goals:   SLP Goals        Problem: SLP Goal    Goal Priority Disciplines Outcome   SLP Goal     SLP    Description:  Goals to be met 4/20  1. Pt will participate in eval of reading/writing and visual spatial deficits  2 Pt will participate in further eval of math/time  3 Pt will recall memory strategies to help with daily recall                Plan:   Patient to be seen Therapy Frequency: 5 x/week   Plan of Care expires:    Plan of Care reviewed with: patient  SLP Follow-up?: Yes              JOSE Medina, CCC-SLP  04/13/2017

## 2017-04-13 NOTE — PLAN OF CARE
RD provided patient with cardiac medical nutrition therapy education and handout. Patient verbalized understanding. Explained she is knowledgeable of the diet and continued to iterate her education of vitamin K food restrictions with coumadin. RD assessed knowledge by asking questions to reinforce knowledge base and patient noted correct points such as limiting salt (patient noted she NEVER adds salt) and limiting sat/trans fats in diet - which she claims to eat very little fried foods and olive oil to bake with in her toaster oven.     Patient eating breakfast during education - good appetite, no nutrition related complaints.

## 2017-04-13 NOTE — ASSESSMENT & PLAN NOTE
- Began overnight  - Getting venous ultrasound, and Xray  - Starting gabapentin 300 mg TID. Tylenol PRN and Morphine PRN for pain 7-10.

## 2017-04-14 LAB
ALBUMIN SERPL BCP-MCNC: 3 G/DL
ALP SERPL-CCNC: 80 U/L
ALT SERPL W/O P-5'-P-CCNC: 13 U/L
ANION GAP SERPL CALC-SCNC: 10 MMOL/L
AST SERPL-CCNC: 18 U/L
BASOPHILS # BLD AUTO: 0.04 K/UL
BASOPHILS NFR BLD: 0.4 %
BILIRUB SERPL-MCNC: 0.3 MG/DL
BUN SERPL-MCNC: 30 MG/DL
CALCIUM SERPL-MCNC: 9.2 MG/DL
CHLORIDE SERPL-SCNC: 103 MMOL/L
CO2 SERPL-SCNC: 27 MMOL/L
CREAT SERPL-MCNC: 1.4 MG/DL
DIFFERENTIAL METHOD: ABNORMAL
EOSINOPHIL # BLD AUTO: 0.2 K/UL
EOSINOPHIL NFR BLD: 1.9 %
ERYTHROCYTE [DISTWIDTH] IN BLOOD BY AUTOMATED COUNT: 13.2 %
EST. GFR  (AFRICAN AMERICAN): 43.3 ML/MIN/1.73 M^2
EST. GFR  (NON AFRICAN AMERICAN): 37.6 ML/MIN/1.73 M^2
ESTIMATED AVG GLUCOSE: 186 MG/DL
GLUCOSE SERPL-MCNC: 124 MG/DL
HBA1C MFR BLD HPLC: 8.1 %
HCT VFR BLD AUTO: 41.6 %
HGB BLD-MCNC: 13.3 G/DL
INR PPP: 1.5
LYMPHOCYTES # BLD AUTO: 2.8 K/UL
LYMPHOCYTES NFR BLD: 25.5 %
MCH RBC QN AUTO: 31.8 PG
MCHC RBC AUTO-ENTMCNC: 32 %
MCV RBC AUTO: 100 FL
MONOCYTES # BLD AUTO: 1.1 K/UL
MONOCYTES NFR BLD: 9.8 %
NEUTROPHILS # BLD AUTO: 6.7 K/UL
NEUTROPHILS NFR BLD: 62.1 %
PLATELET # BLD AUTO: 223 K/UL
PMV BLD AUTO: 10 FL
POCT GLUCOSE: 155 MG/DL (ref 70–110)
POCT GLUCOSE: 202 MG/DL (ref 70–110)
POCT GLUCOSE: 208 MG/DL (ref 70–110)
POTASSIUM SERPL-SCNC: 4.3 MMOL/L
PROT SERPL-MCNC: 7.4 G/DL
PROTHROMBIN TIME: 15.7 SEC
RBC # BLD AUTO: 4.18 M/UL
SODIUM SERPL-SCNC: 140 MMOL/L
WBC # BLD AUTO: 10.8 K/UL

## 2017-04-14 PROCEDURE — 20600001 HC STEP DOWN PRIVATE ROOM

## 2017-04-14 PROCEDURE — 25000003 PHARM REV CODE 250: Performed by: PSYCHIATRY & NEUROLOGY

## 2017-04-14 PROCEDURE — 99233 SBSQ HOSP IP/OBS HIGH 50: CPT | Mod: ,,, | Performed by: PSYCHIATRY & NEUROLOGY

## 2017-04-14 PROCEDURE — 97530 THERAPEUTIC ACTIVITIES: CPT

## 2017-04-14 PROCEDURE — 25000003 PHARM REV CODE 250: Performed by: NURSE PRACTITIONER

## 2017-04-14 PROCEDURE — 85025 COMPLETE CBC W/AUTO DIFF WBC: CPT

## 2017-04-14 PROCEDURE — 97535 SELF CARE MNGMENT TRAINING: CPT

## 2017-04-14 PROCEDURE — 63600175 PHARM REV CODE 636 W HCPCS: Performed by: PSYCHIATRY & NEUROLOGY

## 2017-04-14 PROCEDURE — 80053 COMPREHEN METABOLIC PANEL: CPT

## 2017-04-14 PROCEDURE — 27000221 HC OXYGEN, UP TO 24 HOURS

## 2017-04-14 PROCEDURE — 36415 COLL VENOUS BLD VENIPUNCTURE: CPT

## 2017-04-14 PROCEDURE — 99496 TRANSJ CARE MGMT HIGH F2F 7D: CPT | Mod: S$PBB,,, | Performed by: INTERNAL MEDICINE

## 2017-04-14 PROCEDURE — 94640 AIRWAY INHALATION TREATMENT: CPT

## 2017-04-14 PROCEDURE — 25000242 PHARM REV CODE 250 ALT 637 W/ HCPCS: Performed by: PSYCHIATRY & NEUROLOGY

## 2017-04-14 PROCEDURE — 85610 PROTHROMBIN TIME: CPT

## 2017-04-14 RX ORDER — LEVALBUTEROL INHALATION SOLUTION 0.63 MG/3ML
0.63 SOLUTION RESPIRATORY (INHALATION) EVERY 8 HOURS
Status: DISCONTINUED | OUTPATIENT
Start: 2017-04-15 | End: 2017-04-17

## 2017-04-14 RX ORDER — DILTIAZEM HYDROCHLORIDE 120 MG/1
240 CAPSULE, COATED, EXTENDED RELEASE ORAL DAILY
Status: DISCONTINUED | OUTPATIENT
Start: 2017-04-14 | End: 2017-04-14

## 2017-04-14 RX ORDER — DILTIAZEM HYDROCHLORIDE 120 MG/1
240 CAPSULE, COATED, EXTENDED RELEASE ORAL DAILY
Status: DISCONTINUED | OUTPATIENT
Start: 2017-04-14 | End: 2017-04-18 | Stop reason: HOSPADM

## 2017-04-14 RX ORDER — AMOXICILLIN 250 MG
1 CAPSULE ORAL DAILY PRN
Status: DISCONTINUED | OUTPATIENT
Start: 2017-04-14 | End: 2017-04-18 | Stop reason: HOSPADM

## 2017-04-14 RX ORDER — METOPROLOL TARTRATE 1 MG/ML
5 INJECTION, SOLUTION INTRAVENOUS EVERY 6 HOURS PRN
Status: DISCONTINUED | OUTPATIENT
Start: 2017-04-14 | End: 2017-04-18 | Stop reason: HOSPADM

## 2017-04-14 RX ADMIN — ATORVASTATIN CALCIUM 40 MG: 20 TABLET, FILM COATED ORAL at 10:04

## 2017-04-14 RX ADMIN — FLUTICASONE PROPIONATE 2 SPRAY: 50 SPRAY, METERED NASAL at 10:04

## 2017-04-14 RX ADMIN — MORPHINE SULFATE 2 MG: 2 INJECTION, SOLUTION INTRAMUSCULAR; INTRAVENOUS at 10:04

## 2017-04-14 RX ADMIN — LEVALBUTEROL 0.63 MG: 0.63 SOLUTION RESPIRATORY (INHALATION) at 11:04

## 2017-04-14 RX ADMIN — GABAPENTIN 300 MG: 300 CAPSULE ORAL at 09:04

## 2017-04-14 RX ADMIN — INSULIN ASPART 1 UNITS: 100 INJECTION, SOLUTION INTRAVENOUS; SUBCUTANEOUS at 12:04

## 2017-04-14 RX ADMIN — WARFARIN SODIUM 2.5 MG: 2.5 TABLET ORAL at 05:04

## 2017-04-14 RX ADMIN — FUROSEMIDE 20 MG: 20 TABLET ORAL at 10:04

## 2017-04-14 RX ADMIN — Medication 3 ML: at 05:04

## 2017-04-14 RX ADMIN — INSULIN ASPART 2 UNITS: 100 INJECTION, SOLUTION INTRAVENOUS; SUBCUTANEOUS at 05:04

## 2017-04-14 RX ADMIN — GABAPENTIN 300 MG: 300 CAPSULE ORAL at 05:04

## 2017-04-14 RX ADMIN — IPRATROPIUM BROMIDE AND ALBUTEROL SULFATE 3 ML: .5; 3 SOLUTION RESPIRATORY (INHALATION) at 03:04

## 2017-04-14 RX ADMIN — ALPRAZOLAM 0.5 MG: 0.5 TABLET ORAL at 09:04

## 2017-04-14 RX ADMIN — MORPHINE SULFATE 2 MG: 2 INJECTION, SOLUTION INTRAMUSCULAR; INTRAVENOUS at 09:04

## 2017-04-14 RX ADMIN — GABAPENTIN 300 MG: 300 CAPSULE ORAL at 03:04

## 2017-04-14 RX ADMIN — Medication 3 ML: at 09:04

## 2017-04-14 RX ADMIN — DILTIAZEM HYDROCHLORIDE 240 MG: 120 CAPSULE, EXTENDED RELEASE ORAL at 02:04

## 2017-04-14 RX ADMIN — Medication 3 ML: at 02:04

## 2017-04-14 RX ADMIN — IPRATROPIUM BROMIDE AND ALBUTEROL SULFATE 3 ML: .5; 3 SOLUTION RESPIRATORY (INHALATION) at 08:04

## 2017-04-14 RX ADMIN — LEVOTHYROXINE SODIUM 50 MCG: 50 TABLET ORAL at 05:04

## 2017-04-14 RX ADMIN — INSULIN ASPART 4 UNITS: 100 INJECTION, SOLUTION INTRAVENOUS; SUBCUTANEOUS at 01:04

## 2017-04-14 RX ADMIN — INSULIN ASPART 4 UNITS: 100 INJECTION, SOLUTION INTRAVENOUS; SUBCUTANEOUS at 06:04

## 2017-04-14 NOTE — PLAN OF CARE
POC reviewed with pt; able to verbalize acceptance and understanding.  VS stable.  AAOX4.  Pt requires 1 - 2 assists to put pt on bedside commode.  C/o pain in LLE; controlled with prn morphine.  Remains free from falls, skin breakdown, and injury.  Call light in reach, side rails up x3, nonskid socks on, bed alarm set, bed in lowest position with wheels locked.  On 3L NC; pt on home O2.  accuchecks done every 6 hours.  Glasses on.  Will continue to monitor.

## 2017-04-14 NOTE — PT/OT/SLP PROGRESS
Physical Therapy      Savita Polo  MRN: [id number]    Patient not seen today secondary to HOLD per RN in am and pm due to tachycardia  . Will follow-up and attempt to complete PT tx as appropriate.    Helen Dave, PT

## 2017-04-14 NOTE — PLAN OF CARE
Problem: Occupational Therapy Goal  Goal: Occupational Therapy Goal  Goals set 4/13 to be addressed for 7 days with expiration date, 4/20:  Patient will increase functional independence with ADLs by performing:    Patient will demonstrate rolling to the right with modified independence. Not met   Patient will demonstrate rolling to the left with modified independence. Not met  Patient will demonstrate supine -sit with modified independence. Not met  Patient will demonstrate stand pivot transfers with SBA. Not met  Patient will demonstrate grooming while standing with SBA. Not met  Patient will demonstrate upper body dressing with SBA. Not met  Patient will demonstrate lower body dressing with SBA. Not met  Patient will demonstrate toileting with CGA. Not met  Patient will demonstrate bathing while seated EOB with CGA. Not met  Patients family / caregiver will demonstrate independence and safety with assisting patient with self-care skills and functional mobility. Not met  Patient and/or patients family will verbalize understanding of stroke prevention guidelines, personal risk factors and stroke warning signs via teachback method. Not met        Goals remain appropriate.  RITA Fontenot  4/14/2017

## 2017-04-14 NOTE — PT/OT/SLP PROGRESS
"Occupational Therapy  Treatment    Savita Polo   MRN: [id number]   Admitting Diagnosis: Arterial ischemic stroke, vertebrobasilar, brainstem, acute    OT Date of Treatment: 04/14/17   OT Start Time: 1024  OT Stop Time: 1102  OT Total Time (min): 38 min    Billable Minutes:  Self Care/Home Management 30 and Therapeutic Activity 8    General Precautions: Standard, aspiration, fall  Orthopedic Precautions: N/A  Braces: N/A    Do you have any cultural, spiritual, Shinto conflicts, given your current situation?: Buddhist    Subjective:  Communicated with nurse prior to session.  Patient:  "Remember, I am relaxed now.  I don't want to mess up my pain medicine."  "I am having a little loss of memory."  "I want to show you I can do it.   I am not going to the rehab place."  Patient did not rate pain; but did complain of pain to left LE--hypersensitivity to touch   Location:  (Hypersensitivity left LE)  Pain Addressed: Reposition    Objective:  Patient found with: oxygen, peripheral IV, telemetry, SCD   Family not present.    Functional Mobility:  Bed Mobility:  Rolling/Turning to Left: Supervision  Rolling/Turning Right: Supervision  Scooting/Bridging: Supervision  Supine to Sit: Stand by Assistance  Sit to Supine: Stand by Assistance    Transfers:   Sit <> Stand Assistance: Moderate Assistance  Sit <> Stand Assistive Device: No Assistive Device  Bed <> Chair Technique: Stand Pivot  Bed <> Chair Transfer Assistance: Moderate Assistance    Activities of Daily Living:  UE Dressing Level of Assistance: Minimum assistance with assistance to guide around back.  LE Dressing Level of Assistance: Maximum assistance to initiate socks over left foot, and for standing balance when managing clothing over hips  Grooming Position: Standing  Grooming Level of Assistance: Moderate assistance; with assistance for standing balance  Toileting Where Assessed: Bedside commode  Toileting Level of Assistance: Moderate assistance   "   Additional Treatment:   Patient education provided for stroke warning signs, prevention guidelines and personal risk factors. Patient requiring further education. Patient education provided on role of OT and need for rehab upon discharge. Patient alert and oriented x 3; able to follow 4/4 one step commands. Patient attentive and interactive throughout the session.  B UE AROM performed one set x 10 rep while seated EOB. Continued education, patient/ family training recommended. Patient's functional status and disposition recommendation discussed with stroke team in daily rounds. White board updated in patient's room. OT asked if there were any other questions; patient had no further questions.  Family not present to initiate family training.    AM-PAC 6 CLICK ADL   How much help from another person does this patient currently need?   1 = Unable, Total/Dependent Assistance  2 = A lot, Maximum/Moderate Assistance  3 = A little, Minimum/Contact Guard/Supervision  4 = None, Modified [place]/Independent    Putting on and taking off regular lower body clothing? : 2  Bathing (including washing, rinsing, drying)?: 2  Toileting, which includes using toilet, bedpan, or urinal? : 2  Putting on and taking off regular upper body clothing?: 3  Taking care of personal grooming such as brushing teeth?: 2  Eating meals?: 3  Total Score: 14     AM-PAC Raw Score CMS G-Code Modifier Level of Impairment Assistance   6 % Total / Unable   7 - 9 CM 80 - 100% Maximal Assist   10 - 14 CL 60 - 80% Moderate Assist   15 - 19 CK 40 - 60% Moderate Assist   20 - 22 CJ 20 - 40% Minimal Assist   23 CI 1-20% SBA / CGA   24 CH 0% Independent/ Mod I     Patient left supine with all lines intact, call button in reach and bed alarm on    Assessment:  Savita Polo is a 71 y.o. female with a medical diagnosis of Arterial ischemic stroke, vertebrobasilar, brainstem, acute and presents with performance deficits of physical skills  including impaired balance, mobility, strength, dexterity, fine motor coordination, gross motor coordination, sensation and endurance; demonstrating performance deficits of psychosocial skills including impairments of coping strategies which are skills necessary to successfully and appropriately participate in everyday tasks and social situations. These performance deficits have resulted in activity limitations including but not limited to: bed mobility, transfers, ascending/ descending stairs, walking short and long distances, walking around obstacles, transitional movement patterns (kneeling, bending); eating, upper body dressing, lower body dressing, brushing teeth, toileting, bathing, carrying objects, gardening and assisting others in self care. Patient's role as mother, grandmother, great grandmother and independent caretaker for self has been affected. Patient will benefit from skilled OT services to maximize level of independence with self-care skills and functional mobility. Will benefit from rehab; however currently refusing.    Rehab identified problem list/impairments: Rehab identified problem list/impairments: weakness, impaired self care skills, impaired balance, decreased coordination, impaired endurance, impaired functional mobilty, impaired sensation, gait instability, decreased lower extremity function, impaired fine motor    Rehab potential is good.    Activity tolerance: Good    Discharge recommendations: Discharge Facility/Level Of Care Needs: rehabilitation facility; however, patient refusing rehab and requesting to return home with HH.  Family not present during the session to begin family training.       Barriers to discharge: Barriers to Discharge:  (pain, left sided weakness)    Equipment recommendations: 3-in-1 commode, bath bench, wheelchair     GOALS:   Occupational Therapy Goals        Problem: Occupational Therapy Goal    Goal Priority Disciplines Outcome Interventions   Occupational  Therapy Goal     OT, PT/OT     Description:  Goals set 4/13 to be addressed for 7 days with expiration date, 4/20:  Patient will increase functional independence with ADLs by performing:    Patient will demonstrate rolling to the right with modified independence.  Not met   Patient will demonstrate rolling to the left with modified independence.   Not met  Patient will demonstrate supine -sit with modified independence.   Not met  Patient will demonstrate stand pivot transfers with SBA.   Not met  Patient will demonstrate grooming while standing with SBA.   Not met  Patient will demonstrate upper body dressing with SBA.   Not met  Patient will demonstrate lower body dressing with SBA.   Not met  Patient will demonstrate toileting with CGA.   Not met  Patient will demonstrate bathing while seated EOB with CGA.   Not met  Patient's family / caregiver will demonstrate independence and safety with assisting patient with self-care skills and functional mobility.     Not met  Patient and/or patient's family will verbalize understanding of stroke prevention guidelines, personal risk factors and stroke warning signs via teachback method.  Not met                     Plan:  Patient to be seen 6 x/week to address the above listed problems via self-care/home management, neuromuscular re-education, therapeutic activities, therapeutic exercises, sensory integration, cognitive retraining  Plan of Care expires: 05/11/17  Plan of Care reviewed with: patient         Senait RITA [place]  04/14/2017

## 2017-04-14 NOTE — PROGRESS NOTES
[facility]  Vascular Neurology  Comprehensive Stroke Center  Progress Note      Neurologic Chief Complaint: right facial droop and left sided weakness    Subjective:     Interval History: Patient is seen for follow-up neurological assessment and treatment recommendations: No issues overnight, still with LLE pain    HPI, Past Medical, Family, and Social History remains the same as documented in the initial encounter.     Review of Systems   Constitutional: Negative for chills and diaphoresis.   Eyes: Positive for visual disturbance.   Musculoskeletal: Positive for gait problem.   Neurological: Positive for weakness.     Scheduled Meds:   albuterol-ipratropium 2.5mg-0.5mg/3mL  3 mL Nebulization Q6H WAKE    atorvastatin  40 mg Oral Daily    fluticasone  2 spray Each Nare Daily    furosemide  20 mg Oral Daily    gabapentin  300 mg Oral TID    levothyroxine  50 mcg Oral Before breakfast    sodium chloride 0.9%  3 mL Intravenous Q8H    warfarin  2.5 mg Oral Daily     Continuous Infusions:   sodium chloride 0.9%       PRN Meds:alprazolam, dextrose 50%, hydrALAZINE, insulin aspart, lorazepam, morphine, ondansetron, sodium chloride 0.9%    Objective:     Vital Signs (Most Recent):  Temp: 97.9 °F (36.6 °C) (04/14/17 0755)  Pulse: (!) 124 (04/14/17 0827)  Resp: 16 (04/14/17 0827)  BP: 127/65 (04/14/17 0755)  SpO2: (!) 93 % (04/14/17 0827)  BP Location: Right arm    Vital Signs Range (Last 24H):  Temp:  [97.4 °F (36.3 °C)-98.3 °F (36.8 °C)]   Pulse:  []   Resp:  [16-20]   BP: (124-145)/(58-80)   SpO2:  [93 %-96 %]   BP Location: Right arm    Physical Exam   Constitutional: She appears well-developed and well-nourished.   HENT:   Head: Atraumatic.   Musculoskeletal: She exhibits edema (left ankle) and tenderness (left lower extremity).   Skin: Skin is warm and dry.   Nursing note and vitals reviewed.      Neurological Exam:   LOC: alert and follows requests  Language: No aphasia  Speech: No  dysarthria  Orientation: Person, Place, Time  Motor*: Arm Left:  Paretic:  4/5, Leg Left:   Paretic:  2/5, Arm Right:   Normal (5/5), Leg Right:   Normal (5/5)  Tone: Arm-Left: normal; Leg-Left: normal; Arm-Right: normal; Leg-Right: normal       NIH Stroke Scale:    Level of Consciousness: 0 - alert  LOC Questions: 0 - answers both correctly  LOC Commands: 0 - performs both correctly  Best Gaze: 0 - normal  Visual: 1 - partial hemianopia  Facial Palsy: 1 - minor  Motor Left Arm: 1 - drift  Motor Right Arm: 0 - no drift  Motor Left Leg: 3 - no effort against gravity  Motor Right Le - no drift  Limb Ataxia: 0 - absent  Sensory: 0 - normal  Best Language: 0 - no aphasia  Dysarthria: 0 - normal articulation  Extinction and Inattention: 0 - no neglect  NIH Stroke Scale Total: 6      Laboratory:  CMP:     Recent Labs  Lab 17  0438   CALCIUM 9.2   ALBUMIN 3.0*   PROT 7.4      K 4.3   CO2 27      BUN 30*   CREATININE 1.4   ALKPHOS 80   ALT 13   AST 18   BILITOT 0.3     CBC:     Recent Labs  Lab 17  0438   WBC 10.80   RBC 4.18   HGB 13.3   HCT 41.6      *   MCH 31.8*   MCHC 32.0     Coagulation:     Recent Labs  Lab 17  0453 17  0438   INR 1.8* 1.5*   APTT 27.7  --      Hgb A1C:     Recent Labs  Lab 17  1938   HGBA1C 8.1*     TSH:     Recent Labs  Lab 17  1259   TSH 1.113       Diagnostic Results:  CT head w/o contrast 17 results:  No evidence of acute hemorrhage or major vascular distribution infarct.    Moderate chronic microvascular ischemic changes similar to prior exam.    MRI/MRA Brain w/o contrast 17 results:  No evidence of acute intracranial pathology.    Generalized cerebellar volume loss and age advanced chronic microvascular ischemic changes.    Motion limited MR angiogram of the intracranial vasculature appears within normal limits.    Duy Carotid US 17 results:  1 - 39 % right ICA stenosis.   Hyperechoic plaque in the right  internal carotid artery.   Antegrade flow in the right vertebral artery.   LEFT SIDE:  40 - 59% left ICA stenosis.   Heterogeneous plaque in the left internal carotid artery.   Antegrade flow in the left vertebral artery.     Assessment/Plan:     4/13/17 - MRI not showing any evident diffuse restriction. MRA head without any abnormalities. Awaiting carotid US. HF with reduced EF. Patient complaining of left lower extremity pain that began overnight US and Xray negative of LLE.  Left arm weakness improved, sensation on left side also improved. Working with PT/OT.   4-15-17 Carotid US L ICA up to 50% stenosis      * Arterial ischemic stroke, vertebrobasilar, brainstem, acute  71 year-old lady that presents with right facial palsy with UMN pattern, jak-hypoesthesia of left face, LUE and LLE, and weakness of LUE and LLE. Concerning for brainstem ischemia.        Antithrombotics Anticoagulants:  Warfarin INR adjusted target,   Statins: Atorvastatin- 40 mg oral daily,   Aggressive risk factor modification: Hypertension, Smoking, Diabetes and High Cholesterol,   Rehab Efforts: Physical Therapy, Occupational Therapy, Speech and Language Pathology and Physical Medicine and Rehabilitation,   Diagnostics: HgbA1C 8.6. Lipid Profile WNL.     Trans-thoracic cardiac echo with reduced EF.    VTE Prophylaxis: Coumadin  - MRI brain and MRA head with no acute abnormalities. Can't rule out small lesions in brainstem.     Acquired hypothyroidism  - Continue home levothyroxine     Diabetic peripheral neuropathy  - Decrease sensation bilaterally  - Will start gabapentin    Tobacco abuse  - Stroke risk factor   - Counseled on cessation     Paroxysmal atrial fibrillation  - Stroke risk factor  - On Diltiazem at home, currently EKG in NSR and HR in 60s, will hold due to permissive HTN. Will re-start tomorrow.   - Continue coumadin to target INR    Mixed hyperlipidemia  - Stroke risk factor  - Atorvastatin 40    Type 2 diabetes mellitus with  stage 4 chronic kidney disease  - Stroke risk factor  - A1c 8.7  - SSI Q4  - Diabetic counseling     Chronic bronchitis  - Chronic smoker  - Duonebs Q6  - Home O2 at 3%, will continue.     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 Tylenol PRN and Morphine PRN for pain 7-10.       Trini Daniel NP  Comprehensive Stroke Center  Department of Vascular Neurology   [facility]

## 2017-04-14 NOTE — NURSING
Pt lying in bed supine. Pt AAOx3, respirations even and unlabored. Bed in lowest position and locked. Siderails upx3, call bell in reach. NADN. Pt voiced no complaints at this time. WCTM.

## 2017-04-14 NOTE — NURSING
Spoke to Trini Daniel NP about pt HR sustaining 115-120's. No new orders at this time. Stated just to monitor pt. WCTM.

## 2017-04-14 NOTE — ASSESSMENT & PLAN NOTE
71 year-old lady that presents with right facial palsy with UMN pattern, jak-hypoesthesia of left face, LUE and LLE, and weakness of LUE and LLE. Concerning for brainstem ischemia.        Antithrombotics Anticoagulants:  Warfarin INR adjusted target,   Statins: Atorvastatin- 40 mg oral daily,   Aggressive risk factor modification: Hypertension, Smoking, Diabetes and High Cholesterol,   Rehab Efforts: Physical Therapy, Occupational Therapy, Speech and Language Pathology and Physical Medicine and Rehabilitation,   Diagnostics: HgbA1C 8.6. Lipid Profile WNL.     Trans-thoracic cardiac echo with reduced EF.    VTE Prophylaxis: Coumadin  - MRI brain and MRA head with no acute abnormalities. Can't rule out small lesions in brainstem.

## 2017-04-14 NOTE — SUBJECTIVE & OBJECTIVE
Neurologic Chief Complaint: right facial droop and left sided weakness    Subjective:     Interval History: Patient is seen for follow-up neurological assessment and treatment recommendations: No issues overnight, still with LLE pain    HPI, Past Medical, Family, and Social History remains the same as documented in the initial encounter.     Review of Systems   Constitutional: Negative for chills and diaphoresis.   Eyes: Positive for visual disturbance.   Musculoskeletal: Positive for gait problem.   Neurological: Positive for weakness.     Scheduled Meds:   albuterol-ipratropium 2.5mg-0.5mg/3mL  3 mL Nebulization Q6H WAKE    atorvastatin  40 mg Oral Daily    fluticasone  2 spray Each Nare Daily    furosemide  20 mg Oral Daily    gabapentin  300 mg Oral TID    levothyroxine  50 mcg Oral Before breakfast    sodium chloride 0.9%  3 mL Intravenous Q8H    warfarin  2.5 mg Oral Daily     Continuous Infusions:   sodium chloride 0.9%       PRN Meds:alprazolam, dextrose 50%, hydrALAZINE, insulin aspart, lorazepam, morphine, ondansetron, sodium chloride 0.9%    Objective:     Vital Signs (Most Recent):  Temp: 97.9 °F (36.6 °C) (04/14/17 0755)  Pulse: (!) 124 (04/14/17 0827)  Resp: 16 (04/14/17 0827)  BP: 127/65 (04/14/17 0755)  SpO2: (!) 93 % (04/14/17 0827)  BP Location: Right arm    Vital Signs Range (Last 24H):  Temp:  [97.4 °F (36.3 °C)-98.3 °F (36.8 °C)]   Pulse:  []   Resp:  [16-20]   BP: (124-145)/(58-80)   SpO2:  [93 %-96 %]   BP Location: Right arm    Physical Exam   Constitutional: She appears well-developed and well-nourished.   HENT:   Head: Atraumatic.   Musculoskeletal: She exhibits edema (left ankle) and tenderness (left lower extremity).   Skin: Skin is warm and dry.   Nursing note and vitals reviewed.      Neurological Exam:   LOC: alert and follows requests  Language: No aphasia  Speech: No dysarthria  Orientation: Person, Place, Time  Motor*: Arm Left:  Paretic:  4/5, Leg Left:   Paretic:   2/5, Arm Right:   Normal (5/5), Leg Right:   Normal (5/5)  Tone: Arm-Left: normal; Leg-Left: normal; Arm-Right: normal; Leg-Right: normal       NIH Stroke Scale:    Level of Consciousness: 0 - alert  LOC Questions: 0 - answers both correctly  LOC Commands: 0 - performs both correctly  Best Gaze: 0 - normal  Visual: 1 - partial hemianopia  Facial Palsy: 1 - minor  Motor Left Arm: 1 - drift  Motor Right Arm: 0 - no drift  Motor Left Leg: 3 - no effort against gravity  Motor Right Le - no drift  Limb Ataxia: 0 - absent  Sensory: 0 - normal  Best Language: 0 - no aphasia  Dysarthria: 0 - normal articulation  Extinction and Inattention: 0 - no neglect  NIH Stroke Scale Total: 6      Laboratory:  CMP:     Recent Labs  Lab 17   CALCIUM 9.2   ALBUMIN 3.0*   PROT 7.4      K 4.3   CO2 27      BUN 30*   CREATININE 1.4   ALKPHOS 80   ALT 13   AST 18   BILITOT 0.3     CBC:     Recent Labs  Lab 17   WBC 10.80   RBC 4.18   HGB 13.3   HCT 41.6      *   MCH 31.8*   MCHC 32.0     Coagulation:     Recent Labs  Lab 17  0453 17   INR 1.8* 1.5*   APTT 27.7  --      Hgb A1C:     Recent Labs  Lab 17  1938   HGBA1C 8.1*     TSH:     Recent Labs  Lab 17  1259   TSH 1.113       Diagnostic Results:  CT head w/o contrast 17 results:  No evidence of acute hemorrhage or major vascular distribution infarct.    Moderate chronic microvascular ischemic changes similar to prior exam.    MRI/MRA Brain w/o contrast 17 results:  No evidence of acute intracranial pathology.    Generalized cerebellar volume loss and age advanced chronic microvascular ischemic changes.    Motion limited MR angiogram of the intracranial vasculature appears within normal limits.    Duy Carotid US 17 results:  1 - 39 % right ICA stenosis.   Hyperechoic plaque in the right internal carotid artery.   Antegrade flow in the right vertebral artery.   LEFT SIDE:  40 - 59% left ICA  stenosis.   Heterogeneous plaque in the left internal carotid artery.   Antegrade flow in the left vertebral artery.

## 2017-04-14 NOTE — NURSING
"Called and spoke to Trini Daniel NP about pt HR. Pt asymptomatic at this time. Denied CP, blurred vision or SOB. Notified her Cardizem had been given at 1415. Stated "let's give it a little longer to work." No new orders at this time. WCTM.  "

## 2017-04-15 LAB
INR PPP: 1.3
POCT GLUCOSE: 110 MG/DL (ref 70–110)
POCT GLUCOSE: 154 MG/DL (ref 70–110)
POCT GLUCOSE: 169 MG/DL (ref 70–110)
POCT GLUCOSE: 184 MG/DL (ref 70–110)
POCT GLUCOSE: 195 MG/DL (ref 70–110)
PROTHROMBIN TIME: 13.8 SEC

## 2017-04-15 PROCEDURE — 25000003 PHARM REV CODE 250: Performed by: NURSE PRACTITIONER

## 2017-04-15 PROCEDURE — 94640 AIRWAY INHALATION TREATMENT: CPT

## 2017-04-15 PROCEDURE — 20600001 HC STEP DOWN PRIVATE ROOM

## 2017-04-15 PROCEDURE — 85610 PROTHROMBIN TIME: CPT

## 2017-04-15 PROCEDURE — 36415 COLL VENOUS BLD VENIPUNCTURE: CPT

## 2017-04-15 PROCEDURE — 99233 SBSQ HOSP IP/OBS HIGH 50: CPT | Mod: GC,,, | Performed by: PSYCHIATRY & NEUROLOGY

## 2017-04-15 PROCEDURE — 25000003 PHARM REV CODE 250: Performed by: PSYCHIATRY & NEUROLOGY

## 2017-04-15 PROCEDURE — 27000221 HC OXYGEN, UP TO 24 HOURS

## 2017-04-15 PROCEDURE — 97116 GAIT TRAINING THERAPY: CPT

## 2017-04-15 PROCEDURE — 63600175 PHARM REV CODE 636 W HCPCS: Performed by: PSYCHIATRY & NEUROLOGY

## 2017-04-15 PROCEDURE — 97530 THERAPEUTIC ACTIVITIES: CPT

## 2017-04-15 PROCEDURE — 25000003 PHARM REV CODE 250: Performed by: PHYSICIAN ASSISTANT

## 2017-04-15 PROCEDURE — 94761 N-INVAS EAR/PLS OXIMETRY MLT: CPT

## 2017-04-15 RX ORDER — ACETAMINOPHEN 325 MG/1
650 TABLET ORAL EVERY 6 HOURS PRN
Status: DISCONTINUED | OUTPATIENT
Start: 2017-04-15 | End: 2017-04-18 | Stop reason: HOSPADM

## 2017-04-15 RX ORDER — WARFARIN 2.5 MG/1
2.5 TABLET ORAL ONCE
Status: COMPLETED | OUTPATIENT
Start: 2017-04-15 | End: 2017-04-15

## 2017-04-15 RX ORDER — RAMELTEON 8 MG/1
8 TABLET ORAL ONCE AS NEEDED
Status: COMPLETED | OUTPATIENT
Start: 2017-04-15 | End: 2017-04-15

## 2017-04-15 RX ADMIN — RAMELTEON 8 MG: 8 TABLET, FILM COATED ORAL at 09:04

## 2017-04-15 RX ADMIN — INSULIN ASPART 2 UNITS: 100 INJECTION, SOLUTION INTRAVENOUS; SUBCUTANEOUS at 05:04

## 2017-04-15 RX ADMIN — Medication 3 ML: at 10:04

## 2017-04-15 RX ADMIN — DILTIAZEM HYDROCHLORIDE 240 MG: 120 CAPSULE, EXTENDED RELEASE ORAL at 09:04

## 2017-04-15 RX ADMIN — FUROSEMIDE 20 MG: 20 TABLET ORAL at 09:04

## 2017-04-15 RX ADMIN — LEVOTHYROXINE SODIUM 50 MCG: 50 TABLET ORAL at 05:04

## 2017-04-15 RX ADMIN — FLUTICASONE PROPIONATE 2 SPRAY: 50 SPRAY, METERED NASAL at 09:04

## 2017-04-15 RX ADMIN — LEVALBUTEROL 0.63 MG: 0.63 SOLUTION RESPIRATORY (INHALATION) at 08:04

## 2017-04-15 RX ADMIN — LEVALBUTEROL 0.63 MG: 0.63 SOLUTION RESPIRATORY (INHALATION) at 11:04

## 2017-04-15 RX ADMIN — Medication 3 ML: at 01:04

## 2017-04-15 RX ADMIN — GABAPENTIN 300 MG: 300 CAPSULE ORAL at 02:04

## 2017-04-15 RX ADMIN — INSULIN ASPART 2 UNITS: 100 INJECTION, SOLUTION INTRAVENOUS; SUBCUTANEOUS at 01:04

## 2017-04-15 RX ADMIN — ALPRAZOLAM 0.5 MG: 0.5 TABLET ORAL at 09:04

## 2017-04-15 RX ADMIN — WARFARIN SODIUM 2.5 MG: 2.5 TABLET ORAL at 06:04

## 2017-04-15 RX ADMIN — STANDARDIZED SENNA CONCENTRATE AND DOCUSATE SODIUM 1 TABLET: 8.6; 5 TABLET, FILM COATED ORAL at 05:04

## 2017-04-15 RX ADMIN — MORPHINE SULFATE 2 MG: 2 INJECTION, SOLUTION INTRAMUSCULAR; INTRAVENOUS at 11:04

## 2017-04-15 RX ADMIN — Medication 3 ML: at 09:04

## 2017-04-15 RX ADMIN — WARFARIN SODIUM 2.5 MG: 2.5 TABLET ORAL at 02:04

## 2017-04-15 RX ADMIN — MORPHINE SULFATE 2 MG: 2 INJECTION, SOLUTION INTRAMUSCULAR; INTRAVENOUS at 09:04

## 2017-04-15 RX ADMIN — MORPHINE SULFATE 2 MG: 2 INJECTION, SOLUTION INTRAMUSCULAR; INTRAVENOUS at 06:04

## 2017-04-15 RX ADMIN — Medication 3 ML: at 02:04

## 2017-04-15 RX ADMIN — GABAPENTIN 300 MG: 300 CAPSULE ORAL at 05:04

## 2017-04-15 RX ADMIN — ATORVASTATIN CALCIUM 40 MG: 20 TABLET, FILM COATED ORAL at 09:04

## 2017-04-15 RX ADMIN — MORPHINE SULFATE 2 MG: 2 INJECTION, SOLUTION INTRAMUSCULAR; INTRAVENOUS at 02:04

## 2017-04-15 RX ADMIN — LEVALBUTEROL 0.63 MG: 0.63 SOLUTION RESPIRATORY (INHALATION) at 03:04

## 2017-04-15 RX ADMIN — GABAPENTIN 300 MG: 300 CAPSULE ORAL at 09:04

## 2017-04-15 RX ADMIN — INSULIN ASPART 1 UNITS: 100 INJECTION, SOLUTION INTRAVENOUS; SUBCUTANEOUS at 01:04

## 2017-04-15 RX ADMIN — ALPRAZOLAM 0.5 MG: 0.5 TABLET ORAL at 11:04

## 2017-04-15 RX ADMIN — ACETAMINOPHEN 650 MG: 325 TABLET ORAL at 09:04

## 2017-04-15 RX ADMIN — Medication 3 ML: at 05:04

## 2017-04-15 NOTE — PT/OT/SLP PROGRESS
"Physical Therapy  Treatment    Savita Polo   MRN: [id number]   Admitting Diagnosis: Arterial ischemic stroke, vertebrobasilar, brainstem, acute    PT Received On: 04/15/17  PT Start Time: 1327     PT Stop Time: 1410    PT Total Time (min): 43 min       Billable Minutes:  Gait Training 10 and Therapeutic Activity 33    Treatment Type: Treatment  PT/PTA: PT           General Precautions: Standard, aspiration, fall    Subjective:  Communicated with RN (Ana) prior to session. SPT (Emily) present for session.    "I am getting out of here. I will have the girls come and see me for home health." pt states "I am going to walk today. It may be painful and I will curse you, but I am going to walk."    Pain Rating: 10/10 (L LE)  Pain Addressed: Reposition, Distraction, Cessation of Activity, Nurse notified  Pain Rating Post-Intervention: 10/10    Objective:   Patient found with: telemetry, oxygen    Functional Mobility:  Bed Mobility:   Rolling/Turning Right: Modified independent  Scooting/Bridging: Modified Independent  Supine to Sit: Supervision  Sit to Supine: Supervision    Transfers:  Sit <> Stand Assistance: Stand By Assistance, Contact Guard Assistance (from EOB; from bedside chair)  Sit <> Stand Assistive Device: No Assistive Device, Rolling Walker  Bed <> Chair Technique: Stand Pivot  Bed <> Chair Assistance: Stand By Assistance  Bed <> Chair Assistive Device: Rolling Walker    Gait:   Gait Distance: x135 feet in room and hallway; Pt req increased time 2/2 pain to L LE; VCs for progression of task 2/2 pt stopping and req frequent redirection to task/encouragement to continue despite pain. PT instructing pt on appropriate WB to L LE as able to avoid secondary impairments. O2 tank en tow.  Assistance 1: Supervision, Stand by Assistance, Contact Guard Assistance  Gait Assistive Device: Rolling walker  Gait Pattern: swing-to gait  Gait Deviation(s): decreased renata, decreased step length, foot flat, toe clawing, " lateral lean, forward lean    Balance:   Static Sit: FAIR+: Able to take MINIMAL challenges from all directions  Dynamic Sit: FAIR+: Maintains balance through MINIMAL excursions of active trunk motion  Static Stand: FAIR+: Takes MINIMAL challenges from all directions  Dynamic stand: GOOD-: Needs SUPERVISION only during gait and able to self right with moderate LOB and use of RW    Therapeutic Activities and Exercises:  PT entered pt's room to find pt resting quietly; agreeable to PT. Pt came to sitting EOB and prepared for hallway ambulation; pt able to don robe with mod (I) and good mnmgnt of L UE. Pt req increased time for transfers and transitions; pt able to maintain standing balance without B UE support for doffing of robe following ambulation trial/gait training.     PT and pt discussed mobility needs and progression with therapy; pt repeatedly declined rehab recommendation and requesting HH therapy.    Pt ambulated in hallway as above; upon return to room, pt requesting linens changed. Pt instructed on sitting chair for linens to be changed; pt req increased time to come to sitting with good safety awareness. Pt transferred from chair to bedside with SBA/supervision from PT and use of RW. Pt returned to supine position with supervision for proper technique. Pt req increased time for positioning for comfort 2/2 L LE pain. RN notified of pt's mobility needs; RN to order pt a RW for use in room. RN made aware of pt's current status and requests for pain medication. Questions/concerns addressed within PT scope of practice.     AM-PAC 6 CLICK MOBILITY  How much help from another person does this patient currently need?   1 = Unable, Total/Dependent Assistance  2 = A lot, Maximum/Moderate Assistance  3 = A little, Minimum/Contact Guard/Supervision  4 = None, Modified [place]/Independent    Turning over in bed (including adjusting bedclothes, sheets and blankets)?: 4  Sitting down on and standing up from a  chair with arms (e.g., wheelchair, bedside commode, etc.): 4  Moving from lying on back to sitting on the side of the bed?: 4  Moving to and from a bed to a chair (including a wheelchair)?: 4  Need to walk in hospital room?: 3  Climbing 3-5 steps with a railing?: 3  Total Score: 22    AM-PAC Raw Score CMS G-Code Modifier Level of Impairment Assistance   6 % Total / Unable   7 - 9 CM 80 - 100% Maximal Assist   10 - 14 CL 60 - 80% Moderate Assist   15 - 19 CK 40 - 60% Moderate Assist   20 - 22 CJ 20 - 40% Minimal Assist   23 CI 1-20% SBA / CGA   24 CH 0% Independent/ Mod I     Patient left HOB elevated with all lines intact, call button in reach, bed alarm on and RN notified.    Assessment:  Savita Polo is a 71 y.o. female with a medical diagnosis of Arterial ischemic stroke, vertebrobasilar, brainstem, acute and presents with significant improvement in functional mobility and activity tolerance; however, req increased time and frequent re-direction to task 2/2 increased pain with weightbearing. Pt tolerated gait training x135 feet this session with Supervision-CGA using RW. Pt mod (I) for bed mobility and req increased time for positioning with comfort. Pt appropriate for discharge home with HH per patient's request; however, Rehab recommended to address L sided weakness and apparent central pain. Patient will benefit from continued PT services to address:    Rehab identified problem list/impairments: Rehab identified problem list/impairments: weakness, impaired endurance, impaired sensation, impaired self care skills, impaired functional mobilty, impaired cognition, impaired balance, gait instability, decreased lower extremity function, pain, decreased safety awareness    Rehab potential is good.    Activity tolerance: Good    Discharge recommendations: Discharge Facility/Level Of Care Needs: home with home health  (Rehab recommended; however, pt refusing)    Barriers to discharge: Barriers to  Discharge: None (pain)    Equipment recommendations: Equipment Needed After Discharge: 3-in-1 commode, bath bench, wheelchair     GOALS:   Physical Therapy Goals        Problem: Physical Therapy Goal    Goal Priority Disciplines Outcome Goal Variances Interventions   Physical Therapy Goal     PT/OT, PT Ongoing (interventions implemented as appropriate)     Description:  Goals to be met by: 17     Patient will increase functional independence with mobility by performin. Supine to sit with Modified [place]  2. Sit to supine with Modified [place]  3. Sit to stand transfer with supervision using AD or No AD (Min A met 15)  4. Bed to chair transfer with Supervision using Rolling Walker or appropriate AD (Min A met /15)  5. Gait x150 feet with Supervision using Rolling Walker or appropriate AD. (x10 feet with min A met /15)  6. Lower extremity exercise program x15-20 reps with assistance as needed.                PLAN:    Patient to be seen 6 x/week  to address the above listed problems via gait training, therapeutic activities, therapeutic exercises, neuromuscular re-education  Plan of Care expires: 17  Plan of Care reviewed with: patient     Angelika Cifuentes, PT, DPT  [phone]  4/15/2017

## 2017-04-15 NOTE — PLAN OF CARE
Problem: Patient Care Overview  Goal: Plan of Care Review  Outcome: Ongoing (interventions implemented as appropriate)  Plan of care reviewed with pt at bedside. Safety precautions initiated. Bed locked in lowest position, call bell within reach, bed alarm on. [id number]. Will continue to monitor.

## 2017-04-15 NOTE — PROGRESS NOTES
[facility]  Vascular Neurology  Comprehensive Stroke Center  Progress Note      Neurologic Chief Complaint: right facial droop and left sided weakness    Subjective:     Interval History: Patient is seen for follow-up neurological assessment and treatment recommendations: No issues overnight. Doing better, still with weakness in LLE and right facial droop. Pain also improved on LLE.     HPI, Past Medical, Family, and Social History remains the same as documented in the initial encounter.     Review of Systems  Scheduled Meds:   atorvastatin  40 mg Oral Daily    diltiaZEM  240 mg Oral Daily    fluticasone  2 spray Each Nare Daily    furosemide  20 mg Oral Daily    gabapentin  300 mg Oral TID    levalbuterol  0.63 mg Nebulization Q8H    levothyroxine  50 mcg Oral Before breakfast    sodium chloride 0.9%  3 mL Intravenous Q8H    warfarin  2.5 mg Oral Daily     Continuous Infusions:   sodium chloride 0.9%       PRN Meds:alprazolam, dextrose 50%, hydrALAZINE, insulin aspart, lorazepam, metoprolol, morphine, ondansetron, senna-docusate 8.6-50 mg, sodium chloride 0.9%    Objective:     Vital Signs (Most Recent):  Temp: 96.9 °F (36.1 °C) (04/15/17 0800)  Pulse: 85 (04/15/17 1100)  Resp: 18 (04/15/17 0842)  BP: (!) 115/58 (04/15/17 0800)  SpO2: (!) 93 % (04/15/17 0842)  BP Location: Right arm    Vital Signs Range (Last 24H):  Temp:  [96.9 °F (36.1 °C)-98.8 °F (37.1 °C)]   Pulse:  []   Resp:  [14-18]   BP: (110-134)/(54-80)   SpO2:  [89 %-95 %]   BP Location: Right arm    Physical Exam  Constitutional: She appears well-developed and well-nourished.   HENT:   Head: Atraumatic.   Musculoskeletal: She exhibits  tenderness (left lower extremity), improved.   Skin: Skin is warm and dry.   Nursing note and vitals reviewed.    Neurological Exam:   LOC: alert and follows requests  Language: No aphasia  Speech: No dysarthria  Orientation: Person, Place, Time  Motor*: Arm Left: Paretic: 4/5, Leg  Left: Paretic: 2/5, Arm Right: Normal (5/5), Leg Right: Normal (5/5)  Tone: Arm-Left: normal; Leg-Left: normal; Arm-Right: normal; Leg-Right: normal     Stroke Scales   NIH Stroke Scale:  Level of Consciousness: 0 - alert  LOC Questions: 0 - answers both correctly  LOC Commands: 0 - performs both correctly  Best Gaze: 0 - normal  Visual: 1 - partial hemianopia  Facial Palsy: 1 - minor  Motor Left Arm: 1 - drift  Motor Right Arm: 0 - no drift  Motor Left Leg: 3 - no effort against gravity  Motor Right Le - no drift  Limb Ataxia: 0 - absent  Sensory: 0 - normal  Best Language: 0 - no aphasia  Dysarthria: 0 - normal articulation  Extinction and Inattention: 0 - no neglect  NIH Stroke Scale Total: 6    Laboratory:  CMP: No results for input(s): GLUCOSE, CALCIUM, ALBUMIN, PROT, NA, K, CO2, CL, BUN, CREATININE, ALKPHOS, ALT, AST, BILITOT in the last 24 hours.  CBC:   Recent Labs  Lab 17  0438   WBC 10.80   RBC 4.18   HGB 13.3   HCT 41.6      *   MCH 31.8*   MCHC 32.0     Lipid Panel:   Recent Labs  Lab 17  1259   CHOL 143   LDLCALC 69.4   HDL 58   TRIG 78     Hgb A1C:   Recent Labs  Lab 17  1938   HGBA1C 8.1*       Diagnostic Results:  CT head w/o contrast 17 results:  No evidence of acute hemorrhage or major vascular distribution infarct.  Moderate chronic microvascular ischemic changes similar to prior exam.     MRI/MRA Brain w/o contrast 17 results:  No evidence of acute intracranial pathology.  Generalized cerebellar volume loss and age advanced chronic microvascular ischemic changes.  Motion limited MR angiogram of the intracranial vasculature appears within normal limits.     Duy Carotid US   17 results:  1 - 39 % right ICA stenosis.   Hyperechoic plaque in the right internal carotid artery.   Antegrade flow in the right vertebral artery.   LEFT SIDE:  40 - 59% left ICA stenosis.   Heterogeneous plaque in the left internal carotid artery.   Antegrade flow in the  left vertebral artery.     Assessment/Plan:     4/13/17 - MRI not showing any evident diffuse restriction. MRA head without any abnormalities. Awaiting carotid US. HF with reduced EF. Patient complaining of left lower extremity pain that began overnight US and Xray negative of LLE.  Left arm weakness improved, sensation on left side also improved. Working with PT/OT.   4/15/17 Carotid US L ICA up to 50% stenosis. Symptoms improving. Pain in left leg also improving. Continues to work with PT/OT. Will discuss PT/OT needs for discharge.         * Arterial ischemic stroke, vertebrobasilar, brainstem, acute  71 year-old lady that presents with right facial palsy with UMN pattern, jak-hypoesthesia of left face, LUE and LLE, and weakness of LUE and LLE. Concerning for brainstem ischemia.        Antithrombotics Anticoagulants:  Warfarin INR adjusted target,   Statins: Atorvastatin- 40 mg oral daily,   Aggressive risk factor modification: Hypertension, Smoking, Diabetes and High Cholesterol,   Rehab Efforts: Physical Therapy, Occupational Therapy, Speech and Language Pathology and Physical Medicine and Rehabilitation,   Diagnostics: HgbA1C 8.6. Lipid Profile WNL.     Trans-thoracic cardiac echo with reduced EF.    VTE Prophylaxis: Coumadin  - MRI brain and MRA head with no acute abnormalities. Can't rule out small lesions in brainstem.     Paroxysmal atrial fibrillation  - Stroke risk factor  - On Diltiazem at home, currently EKG in NSR and HR in 60s, will hold due to permissive HTN. Will re-start tomorrow.   - Continue coumadin to target INR. Today 1.3. Will give 5 mg today.     Acquired hypothyroidism  - Continue home levothyroxine     Diabetic peripheral neuropathy  - Decrease sensation bilaterally  - Continue gabapentin    Tobacco abuse  - Stroke risk factor   - Counseled on cessation     Mixed hyperlipidemia  - Stroke risk factor  - Atorvastatin 40    Chronic bronchitis  - Chronic smoker  - Duonebs Q6  - Home O2 at 3%,  will continue.     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     Pain of left lower extremity  - Began overnight  - Normal US and xray.   - Continue gabapentin 300 mg TID. Tylenol PRN and Morphine PRN for pain 7-10.       Chauncey Adames MD  Comprehensive Stroke Center  Department of Vascular Neurology   [facility]

## 2017-04-15 NOTE — ASSESSMENT & PLAN NOTE
- Began overnight  - Normal US and xray.   - Continue gabapentin 300 mg TID. Tylenol PRN and Morphine PRN for pain 7-10.

## 2017-04-15 NOTE — ASSESSMENT & PLAN NOTE
- Stroke risk factor  - On Diltiazem at home, currently EKG in NSR and HR in 60s, will hold due to permissive HTN. Will re-start tomorrow.   - Continue coumadin to target INR. Today 1.3. Will give 5 mg today.

## 2017-04-15 NOTE — PLAN OF CARE
Problem: Physical Therapy Goal  Goal: Physical Therapy Goal  Goals to be met by: 17     Patient will increase functional independence with mobility by performin. Supine to sit with Modified [place]  2. Sit to supine with Modified [place]  3. Sit to stand transfer with supervision using AD or No AD (Min A met 4/15)  4. Bed to chair transfer with Supervision using Rolling Walker or appropriate AD (Min A met 4/15)  5. Gait x150 feet with Supervision using Rolling Walker or appropriate AD. (x10 feet with min A met 4/15)  6. Lower extremity exercise program x15-20 reps with assistance as needed.    Outcome: Ongoing (interventions implemented as appropriate)     Goals updated and appropriate to reflect pt's current mobility needs. Pt progressing well with mobility.     Angelika Cifuentes, PT, DPT  [phone]  4/15/2017

## 2017-04-15 NOTE — SUBJECTIVE & OBJECTIVE
Neurologic Chief Complaint: right facial droop and left sided weakness    Subjective:     Interval History: Patient is seen for follow-up neurological assessment and treatment recommendations: No issues overnight. Doing better, still with weakness in LLE and right facial droop. Pain also improved on LLE.     HPI, Past Medical, Family, and Social History remains the same as documented in the initial encounter.     Review of Systems  Scheduled Meds:   atorvastatin  40 mg Oral Daily    diltiaZEM  240 mg Oral Daily    fluticasone  2 spray Each Nare Daily    furosemide  20 mg Oral Daily    gabapentin  300 mg Oral TID    levalbuterol  0.63 mg Nebulization Q8H    levothyroxine  50 mcg Oral Before breakfast    sodium chloride 0.9%  3 mL Intravenous Q8H    warfarin  2.5 mg Oral Daily     Continuous Infusions:   sodium chloride 0.9%       PRN Meds:alprazolam, dextrose 50%, hydrALAZINE, insulin aspart, lorazepam, metoprolol, morphine, ondansetron, senna-docusate 8.6-50 mg, sodium chloride 0.9%    Objective:     Vital Signs (Most Recent):  Temp: 96.9 °F (36.1 °C) (04/15/17 0800)  Pulse: 85 (04/15/17 1100)  Resp: 18 (04/15/17 0842)  BP: (!) 115/58 (04/15/17 0800)  SpO2: (!) 93 % (04/15/17 0842)  BP Location: Right arm    Vital Signs Range (Last 24H):  Temp:  [96.9 °F (36.1 °C)-98.8 °F (37.1 °C)]   Pulse:  []   Resp:  [14-18]   BP: (110-134)/(54-80)   SpO2:  [89 %-95 %]   BP Location: Right arm    Physical Exam  Constitutional: She appears well-developed and well-nourished.   HENT:   Head: Atraumatic.   Musculoskeletal: She exhibits  tenderness (left lower extremity), improved.   Skin: Skin is warm and dry.   Nursing note and vitals reviewed.    Neurological Exam:   LOC: alert and follows requests  Language: No aphasia  Speech: No dysarthria  Orientation: Person, Place, Time  Motor*: Arm Left: Paretic: 4/5, Leg Left: Paretic: 2/5, Arm Right: Normal (5/5), Leg Right: Normal (5/5)  Tone: Arm-Left: normal; Leg-Left:  normal; Arm-Right: normal; Leg-Right: normal     Stroke Scales   NIH Stroke Scale:  Level of Consciousness: 0 - alert  LOC Questions: 0 - answers both correctly  LOC Commands: 0 - performs both correctly  Best Gaze: 0 - normal  Visual: 1 - partial hemianopia  Facial Palsy: 1 - minor  Motor Left Arm: 1 - drift  Motor Right Arm: 0 - no drift  Motor Left Leg: 3 - no effort against gravity  Motor Right Le - no drift  Limb Ataxia: 0 - absent  Sensory: 0 - normal  Best Language: 0 - no aphasia  Dysarthria: 0 - normal articulation  Extinction and Inattention: 0 - no neglect  NIH Stroke Scale Total: 6    Laboratory:  CMP: No results for input(s): GLUCOSE, CALCIUM, ALBUMIN, PROT, NA, K, CO2, CL, BUN, CREATININE, ALKPHOS, ALT, AST, BILITOT in the last 24 hours.  CBC:   Recent Labs  Lab 17  0438   WBC 10.80   RBC 4.18   HGB 13.3   HCT 41.6      *   MCH 31.8*   MCHC 32.0     Lipid Panel:   Recent Labs  Lab 17  1259   CHOL 143   LDLCALC 69.4   HDL 58   TRIG 78     Hgb A1C:   Recent Labs  Lab 17  1938   HGBA1C 8.1*       Diagnostic Results:  CT head w/o contrast 17 results:  No evidence of acute hemorrhage or major vascular distribution infarct.  Moderate chronic microvascular ischemic changes similar to prior exam.     MRI/MRA Brain w/o contrast 17 results:  No evidence of acute intracranial pathology.  Generalized cerebellar volume loss and age advanced chronic microvascular ischemic changes.  Motion limited MR angiogram of the intracranial vasculature appears within normal limits.     Duy Carotid US   17 results:  1 - 39 % right ICA stenosis.   Hyperechoic plaque in the right internal carotid artery.   Antegrade flow in the right vertebral artery.   LEFT SIDE:  40 - 59% left ICA stenosis.   Heterogeneous plaque in the left internal carotid artery.   Antegrade flow in the left vertebral artery.

## 2017-04-16 LAB
ALBUMIN SERPL BCP-MCNC: 2.7 G/DL
ALP SERPL-CCNC: 63 U/L
ALT SERPL W/O P-5'-P-CCNC: 13 U/L
ANION GAP SERPL CALC-SCNC: 10 MMOL/L
AST SERPL-CCNC: 17 U/L
BACTERIA UR CULT: NORMAL
BASOPHILS # BLD AUTO: 0.06 K/UL
BASOPHILS NFR BLD: 0.7 %
BILIRUB SERPL-MCNC: 0.4 MG/DL
BUN SERPL-MCNC: 36 MG/DL
CALCIUM SERPL-MCNC: 8.6 MG/DL
CHLORIDE SERPL-SCNC: 100 MMOL/L
CO2 SERPL-SCNC: 25 MMOL/L
CREAT SERPL-MCNC: 1.4 MG/DL
DIFFERENTIAL METHOD: ABNORMAL
EOSINOPHIL # BLD AUTO: 0.2 K/UL
EOSINOPHIL NFR BLD: 2.3 %
ERYTHROCYTE [DISTWIDTH] IN BLOOD BY AUTOMATED COUNT: 13.2 %
EST. GFR  (AFRICAN AMERICAN): 43.3 ML/MIN/1.73 M^2
EST. GFR  (NON AFRICAN AMERICAN): 37.6 ML/MIN/1.73 M^2
GLUCOSE SERPL-MCNC: 146 MG/DL
HCT VFR BLD AUTO: 38 %
HGB BLD-MCNC: 12.6 G/DL
INR PPP: 1.4
LYMPHOCYTES # BLD AUTO: 2.4 K/UL
LYMPHOCYTES NFR BLD: 26.4 %
MCH RBC QN AUTO: 32.2 PG
MCHC RBC AUTO-ENTMCNC: 33.2 %
MCV RBC AUTO: 97 FL
MONOCYTES # BLD AUTO: 0.9 K/UL
MONOCYTES NFR BLD: 9.3 %
NEUTROPHILS # BLD AUTO: 5.6 K/UL
NEUTROPHILS NFR BLD: 61 %
PLATELET # BLD AUTO: 209 K/UL
PMV BLD AUTO: 9.7 FL
POCT GLUCOSE: 124 MG/DL (ref 70–110)
POCT GLUCOSE: 161 MG/DL (ref 70–110)
POCT GLUCOSE: 173 MG/DL (ref 70–110)
POCT GLUCOSE: 248 MG/DL (ref 70–110)
POTASSIUM SERPL-SCNC: 4.8 MMOL/L
PROT SERPL-MCNC: 6.8 G/DL
PROTHROMBIN TIME: 14 SEC
RBC # BLD AUTO: 3.91 M/UL
SODIUM SERPL-SCNC: 135 MMOL/L
WBC # BLD AUTO: 9.17 K/UL

## 2017-04-16 PROCEDURE — 97535 SELF CARE MNGMENT TRAINING: CPT

## 2017-04-16 PROCEDURE — 97530 THERAPEUTIC ACTIVITIES: CPT

## 2017-04-16 PROCEDURE — 25000003 PHARM REV CODE 250: Performed by: NURSE PRACTITIONER

## 2017-04-16 PROCEDURE — 85610 PROTHROMBIN TIME: CPT

## 2017-04-16 PROCEDURE — 25000003 PHARM REV CODE 250: Performed by: PSYCHIATRY & NEUROLOGY

## 2017-04-16 PROCEDURE — 97116 GAIT TRAINING THERAPY: CPT

## 2017-04-16 PROCEDURE — 99233 SBSQ HOSP IP/OBS HIGH 50: CPT | Mod: GC,,, | Performed by: PSYCHIATRY & NEUROLOGY

## 2017-04-16 PROCEDURE — 27000221 HC OXYGEN, UP TO 24 HOURS

## 2017-04-16 PROCEDURE — 63600175 PHARM REV CODE 636 W HCPCS: Performed by: PSYCHIATRY & NEUROLOGY

## 2017-04-16 PROCEDURE — 80053 COMPREHEN METABOLIC PANEL: CPT

## 2017-04-16 PROCEDURE — 36415 COLL VENOUS BLD VENIPUNCTURE: CPT

## 2017-04-16 PROCEDURE — 85025 COMPLETE CBC W/AUTO DIFF WBC: CPT

## 2017-04-16 PROCEDURE — 94640 AIRWAY INHALATION TREATMENT: CPT

## 2017-04-16 PROCEDURE — 25000003 PHARM REV CODE 250: Performed by: PHYSICIAN ASSISTANT

## 2017-04-16 PROCEDURE — 20600001 HC STEP DOWN PRIVATE ROOM

## 2017-04-16 RX ORDER — CIPROFLOXACIN 500 MG/1
500 TABLET ORAL EVERY 24 HOURS
Status: DISCONTINUED | OUTPATIENT
Start: 2017-04-16 | End: 2017-04-17

## 2017-04-16 RX ORDER — RAMELTEON 8 MG/1
8 TABLET ORAL ONCE AS NEEDED
Status: DISCONTINUED | OUTPATIENT
Start: 2017-04-16 | End: 2017-04-16

## 2017-04-16 RX ORDER — WARFARIN SODIUM 5 MG/1
5 TABLET ORAL DAILY
Status: DISCONTINUED | OUTPATIENT
Start: 2017-04-16 | End: 2017-04-18 | Stop reason: HOSPADM

## 2017-04-16 RX ORDER — RAMELTEON 8 MG/1
8 TABLET ORAL NIGHTLY PRN
Status: DISCONTINUED | OUTPATIENT
Start: 2017-04-16 | End: 2017-04-18 | Stop reason: HOSPADM

## 2017-04-16 RX ADMIN — MORPHINE SULFATE 2 MG: 2 INJECTION, SOLUTION INTRAMUSCULAR; INTRAVENOUS at 11:04

## 2017-04-16 RX ADMIN — LEVALBUTEROL 0.63 MG: 0.63 SOLUTION RESPIRATORY (INHALATION) at 11:04

## 2017-04-16 RX ADMIN — INSULIN ASPART 4 UNITS: 100 INJECTION, SOLUTION INTRAVENOUS; SUBCUTANEOUS at 01:04

## 2017-04-16 RX ADMIN — MORPHINE SULFATE 2 MG: 2 INJECTION, SOLUTION INTRAMUSCULAR; INTRAVENOUS at 10:04

## 2017-04-16 RX ADMIN — ACETAMINOPHEN 650 MG: 325 TABLET ORAL at 08:04

## 2017-04-16 RX ADMIN — ALPRAZOLAM 0.5 MG: 0.5 TABLET ORAL at 05:04

## 2017-04-16 RX ADMIN — WARFARIN SODIUM 5 MG: 5 TABLET ORAL at 05:04

## 2017-04-16 RX ADMIN — Medication 3 ML: at 01:04

## 2017-04-16 RX ADMIN — FUROSEMIDE 20 MG: 20 TABLET ORAL at 10:04

## 2017-04-16 RX ADMIN — ACETAMINOPHEN 650 MG: 325 TABLET ORAL at 05:04

## 2017-04-16 RX ADMIN — CIPROFLOXACIN HYDROCHLORIDE 500 MG: 500 TABLET, FILM COATED ORAL at 05:04

## 2017-04-16 RX ADMIN — LEVALBUTEROL 0.63 MG: 0.63 SOLUTION RESPIRATORY (INHALATION) at 05:04

## 2017-04-16 RX ADMIN — Medication 3 ML: at 08:04

## 2017-04-16 RX ADMIN — ATORVASTATIN CALCIUM 40 MG: 20 TABLET, FILM COATED ORAL at 10:04

## 2017-04-16 RX ADMIN — GABAPENTIN 300 MG: 300 CAPSULE ORAL at 01:04

## 2017-04-16 RX ADMIN — DILTIAZEM HYDROCHLORIDE 240 MG: 120 CAPSULE, EXTENDED RELEASE ORAL at 12:04

## 2017-04-16 RX ADMIN — LEVALBUTEROL 0.63 MG: 0.63 SOLUTION RESPIRATORY (INHALATION) at 09:04

## 2017-04-16 RX ADMIN — Medication 3 ML: at 05:04

## 2017-04-16 RX ADMIN — INSULIN ASPART 2 UNITS: 100 INJECTION, SOLUTION INTRAVENOUS; SUBCUTANEOUS at 06:04

## 2017-04-16 RX ADMIN — STANDARDIZED SENNA CONCENTRATE AND DOCUSATE SODIUM 1 TABLET: 8.6; 5 TABLET, FILM COATED ORAL at 05:04

## 2017-04-16 RX ADMIN — FLUTICASONE PROPIONATE 2 SPRAY: 50 SPRAY, METERED NASAL at 10:04

## 2017-04-16 RX ADMIN — MORPHINE SULFATE 2 MG: 2 INJECTION, SOLUTION INTRAMUSCULAR; INTRAVENOUS at 05:04

## 2017-04-16 RX ADMIN — GABAPENTIN 300 MG: 300 CAPSULE ORAL at 08:04

## 2017-04-16 RX ADMIN — LEVOTHYROXINE SODIUM 50 MCG: 50 TABLET ORAL at 05:04

## 2017-04-16 RX ADMIN — RAMELTEON 8 MG: 8 TABLET, FILM COATED ORAL at 08:04

## 2017-04-16 RX ADMIN — GABAPENTIN 300 MG: 300 CAPSULE ORAL at 05:04

## 2017-04-16 NOTE — PLAN OF CARE
Problem: Physical Therapy Goal  Goal: Physical Therapy Goal  Goals to be met by: 17     Patient will increase functional independence with mobility by performin. Supine to sit with Modified [place]  2. Sit to supine with Modified [place]  3. Sit to stand transfer with supervision using AD or No AD (Min A met 4/15)  4. Bed to chair transfer with Supervision using Rolling Walker or appropriate AD (Min A met 4/15)  5. Gait x150 feet with Supervision using Rolling Walker or appropriate AD. (x10 feet with min A met 4/15)  6. Lower extremity exercise program x15-20 reps with assistance as needed.    Outcome: Ongoing (interventions implemented as appropriate)  Pt progressing towards goals.      CHAZ MORAN, PT  2017

## 2017-04-16 NOTE — PT/OT/SLP PROGRESS
Physical Therapy  Treatment    Savita Polo   MRN: [id number]   Admitting Diagnosis: Arterial ischemic stroke, vertebrobasilar, brainstem, acute    PT Received On: 04/16/17  PT Start Time: 0824     PT Stop Time: 0902    PT Total Time (min): 38 min       Billable Minutes:  Gait [id number] and Therapeutic Activity 23    Treatment Type: Treatment  PT/PTA: PT     PTA Visit Number: 0       General Precautions: Standard, aspiration, fall  Orthopedic Precautions: N/A   Braces: N/A    Do you have any cultural, spiritual, Jehovah's witness conflicts, given your current situation?: none stated    Subjective:  Communicated with RN prior to session.  Pt agreeable to therapy session.     Pain Rating: other (see comments) (pt did not rate pain)  Location - Side: Left  Location - Orientation: generalized  Location: leg  Pain Addressed: Reposition, Distraction, Pre-medicate for activity  Pain Rating Post-Intervention: other (see comments) (pt did not rate pain)    Objective:   Patient found with: telemetry, oxygen    Functional Mobility:  Bed Mobility:   Supine to Sit: Supervision    Transfers:  Sit <> Stand Assistance: Stand By Assistance  Sit <> Stand Assistive Device: Rolling Walker  Toilet Transfer Technique: Stand Pivot  Toilet Transfer Assistance: Contact Guard Assistance (BSC over toilet)  Toilet Transfer Assistive Device: Rolling Walker, bedside commode    Gait:   Gait Distance: ~125ft with max vc's for sequenceing, AD management, attending to task and keeping B hands on RW during amb, 5 standing rest breaks 2* pain in L LE.   Assistance 1: Stand by Assistance, Contact Guard Assistance  Gait Assistive Device: Rolling walker  Gait Pattern: reciprocal  Gait Deviation(s): decreased renata, decreased step length, decreased stride length, decreased weight-shifting ability    Balance:   Static Sit: GOOD-: Takes MODERATE challenges from all directions but inconsistently  Dynamic Sit: GOOD-: Maintains balance through MODERATE  excursions of active trunk movement,     Static Stand: FAIR+: Takes MINIMAL challenges from all directions  Dynamic stand: FAIR: Needs CONTACT GUARD during gait     Therapeutic Activities and Exercises:  Pt educated safety with transfers and amb with RW.  Pt performed toileting and stood at toilet with CGA with RW.  Pt required assist with donromulo petersonander.   Pt educated on importance of 24hr S for safety upon d/c.  Pt significant other entered room at end of session and ensured pt will have 24hr assist upon d/c.     AM-PAC 6 CLICK MOBILITY  How much help from another person does this patient currently need?   1 = Unable, Total/Dependent Assistance  2 = A lot, Maximum/Moderate Assistance  3 = A little, Minimum/Contact Guard/Supervision  4 = None, Modified [place]/Independent    Turning over in bed (including adjusting bedclothes, sheets and blankets)?: 4  Sitting down on and standing up from a chair with arms (e.g., wheelchair, bedside commode, etc.): 3  Moving from lying on back to sitting on the side of the bed?: 3  Moving to and from a bed to a chair (including a wheelchair)?: 3  Need to walk in hospital room?: 3  Climbing 3-5 steps with a railing?: 3  Total Score: 19    AM-PAC Raw Score CMS G-Code Modifier Level of Impairment Assistance   6 % Total / Unable   7 - 9 CM 80 - 100% Maximal Assist   10 - 14 CL 60 - 80% Moderate Assist   15 - 19 CK 40 - 60% Moderate Assist   20 - 22 CJ 20 - 40% Minimal Assist   23 CI 1-20% SBA / CGA   24 CH 0% Independent/ Mod I     Patient left up in chair with all lines intact, call button in reach, RN notified and significant other present.    Assessment:  Savita Polo is a 71 y.o. female with a medical diagnosis of Arterial ischemic stroke, vertebrobasilar, brainstem, acute and presents with decreased safety awareness, balance, endurance and overall functional mobility. Pt performed bed mobility S and transfers CGA/SBA with RW. Pt amb  ~125ft SBA/CGA with RW, max  vc's for sequenceing, AD management, attending to task and keeping B hands on RW during amb, 5 standing rest breaks 2* pain in L LE. Pt will continue to benefit from skilled PT to improve deficits and increase overall functional mobility. Pt will require 24hr S upon d/c to ensure safety.     Rehab identified problem list/impairments: Rehab identified problem list/impairments: weakness, impaired balance, decreased safety awareness, impaired endurance, impaired functional mobilty, gait instability, decreased lower extremity function    Rehab potential is good.    Activity tolerance: Good    Discharge recommendations: Discharge Facility/Level Of Care Needs: home with home health (24hr S for safety)     Barriers to discharge: Barriers to Discharge: None    Equipment recommendations: Equipment Needed After Discharge: none     GOALS:   Physical Therapy Goals        Problem: Physical Therapy Goal    Goal Priority Disciplines Outcome Goal Variances Interventions   Physical Therapy Goal     PT/OT, PT Ongoing (interventions implemented as appropriate)     Description:  Goals to be met by: 17     Patient will increase functional independence with mobility by performin. Supine to sit with Modified [place]  2. Sit to supine with Modified [place]  3. Sit to stand transfer with supervision using AD or No AD (Min A met 4/15)  4. Bed to chair transfer with Supervision using Rolling Walker or appropriate AD (Min A met 4/15)  5. Gait x150 feet with Supervision using Rolling Walker or appropriate AD. (x10 feet with min A met 4/15)  6. Lower extremity exercise program x15-20 reps with assistance as needed.                  PLAN:    Patient to be seen 6 x/week  to address the above listed problems via gait training, therapeutic activities, neuromuscular re-education, therapeutic exercises  Plan of Care expires: 17  Plan of Care reviewed with: patient, significant other         CHAZ BURNS,  PT  04/16/2017

## 2017-04-16 NOTE — ASSESSMENT & PLAN NOTE
71 year-old lady that presents with right facial palsy with UMN pattern, jak-hypoesthesia of left face, LUE and LLE, and weakness of LUE and LLE. Concerning for brainstem ischemia however negative.      Antithrombotics Anticoagulants:  Warfarin INR adjusted target, INR goal 2-3  Statins: Atorvastatin- 40 mg oral daily,   Aggressive risk factor modification: Hypertension, Smoking, Diabetes and High Cholesterol,   Rehab Efforts: Physical Therapy, Occupational Therapy, Speech and Language Pathology and Physical Medicine and Rehabilitation,   Diagnostics: HgbA1C 8.6. Lipid Profile WNL.     Trans-thoracic cardiac echo with reduced EF.    VTE Prophylaxis: Coumadin  - MRI brain and MRA head with no acute abnormalities. Can't rule out small lesions in brainstem.

## 2017-04-16 NOTE — PLAN OF CARE
Problem: Occupational Therapy Goal  Goal: Occupational Therapy Goal  Goals set 4/13 to be addressed for 7 days with expiration date, 4/20:  Patient will increase functional independence with ADLs by performing:    Patient will demonstrate rolling to the right with modified independence. GOAL MET 04/16/17  Patient will demonstrate rolling to the left with modified independence. GOAL MET 04/16/17  Patient will demonstrate supine -sit with modified independence. Not met  Patient will demonstrate stand pivot transfers with SBA. Not met  Patient will demonstrate grooming while standing with SBA. Not met  Patient will demonstrate upper body dressing with SBA. Not met  Patient will demonstrate lower body dressing with SBA. Not met  Patient will demonstrate toileting with CGA. Not met  Patient will demonstrate bathing while seated EOB with CGA. Not met  Patients family / caregiver will demonstrate independence and safety with assisting patient with self-care skills and functional mobility. Not met  Patient and/or patients family will verbalize understanding of stroke prevention guidelines, personal risk factors and stroke warning signs via teachback method. Not met        Outcome: Ongoing (interventions implemented as appropriate)  Pt was agreeable to OT and was noted to make progress towards her goals in therapy.  Pt's goals remain appropriate at this time, and she was noted to reach 2 goals during today's session (rolling R and L).  She will continue to benefit from skilled OT services in order to assist her with increasing her safety and level of independence with self care and mobility tasks.         Paula Lamar, OT  4/16/2017

## 2017-04-16 NOTE — PROGRESS NOTES
[facility]  Vascular Neurology  Comprehensive Stroke Center  Progress Note      Neurologic Chief Complaint: right facial droop and left sided weakness    Subjective:     Interval History: Patient is seen for follow-up neurological assessment and treatment recommendations:   No issues overnight. Doing better, still with weakness in LLE and right facial droop. Pain also improved on LLE.     HPI, Past Medical, Family, and Social History remains the same as documented in the initial encounter.     Review of Systems    Scheduled Meds:   atorvastatin  40 mg Oral Daily    diltiaZEM  240 mg Oral Daily    fluticasone  2 spray Each Nare Daily    furosemide  20 mg Oral Daily    gabapentin  300 mg Oral TID    levalbuterol  0.63 mg Nebulization Q8H    levothyroxine  50 mcg Oral Before breakfast    sodium chloride 0.9%  3 mL Intravenous Q8H    warfarin  5 mg Oral Daily     Continuous Infusions:   sodium chloride 0.9%       PRN Meds:acetaminophen, alprazolam, dextrose 50%, hydrALAZINE, insulin aspart, lorazepam, metoprolol, morphine, ondansetron, senna-docusate 8.6-50 mg, sodium chloride 0.9%    Objective:     Vital Signs (Most Recent):  Temp: 97.1 °F (36.2 °C) (04/16/17 1230)  Pulse: 88 (04/16/17 1500)  Resp: 14 (04/16/17 1230)  BP: (!) 120/59 (04/16/17 1230)  SpO2: (!) 94 % (04/16/17 1230)  BP Location: Right arm    Vital Signs Range (Last 24H):  Temp:  [97.1 °F (36.2 °C)-98.4 °F (36.9 °C)]   Pulse:  []   Resp:  [14-18]   BP: (105-144)/(55-74)   SpO2:  [88 %-97 %]   BP Location: Right arm    Physical Exam    Constitutional: She appears well-developed and well-nourished.   HENT:   Head: Atraumatic.   Musculoskeletal: She exhibits  tenderness (left lower extremity), improved.   Skin: Skin is warm and dry.   Nursing note and vitals reviewed.    Neurological Exam:   LOC: alert and follows requests  Language: No aphasia  Speech: No dysarthria  Orientation: Person, Place, Time  Motor*: Arm Left:  Paretic: 4/5, Leg Left: Paretic: 4/5, Arm Right: Normal (5/5), Leg Right: Normal (5/5)  Tone: Arm-Left: normal; Leg-Left: normal; Arm-Right: normal; Leg-Right: normal     NIH Stroke Scale:    Level of Consciousness: 0 - alert  LOC Questions: 0 - answers both correctly  LOC Commands: 0 - performs both correctly  Best Gaze: 0 - normal  Visual: 0 - no visual loss  Facial Palsy: 1 - minor  Motor Left Arm: 1 - drift  Motor Right Arm: 0 - no drift  Motor Left Le - drift  Motor Right Le - no drift  Limb Ataxia: 0 - absent  Sensory: 0 - normal  Best Language: 0 - no aphasia  Dysarthria: 0 - normal articulation  Extinction and Inattention: 0 - no neglect  NIH Stroke Scale Total: 3       Laboratory:  CMP:     Recent Labs  Lab 17  0912   CALCIUM 8.6*   ALBUMIN 2.7*   PROT 6.8   *   K 4.8   CO2 25      BUN 36*   CREATININE 1.4   ALKPHOS 63   ALT 13   AST 17   BILITOT 0.4     CBC:     Recent Labs  Lab 17  0912   WBC 9.17   RBC 3.91*   HGB 12.6   HCT 38.0      MCV 97   MCH 32.2*   MCHC 33.2     Lipid Panel:     Recent Labs  Lab 17  1259   CHOL 143   LDLCALC 69.4   HDL 58   TRIG 78     Hgb A1C:     Recent Labs  Lab 17  1938   HGBA1C 8.1*       Diagnostic Results:  CT head w/o contrast 17 results:  No evidence of acute hemorrhage or major vascular distribution infarct.  Moderate chronic microvascular ischemic changes similar to prior exam.     MRI/MRA Brain w/o contrast 17 results:  No evidence of acute intracranial pathology.  Generalized cerebellar volume loss and age advanced chronic microvascular ischemic changes.  Motion limited MR angiogram of the intracranial vasculature appears within normal limits.     Duy Carotid US   17 results:  1 - 39 % right ICA stenosis.   Hyperechoic plaque in the right internal carotid artery.   Antegrade flow in the right vertebral artery.   LEFT SIDE:  40 - 59% left ICA stenosis.   Heterogeneous plaque in the left internal carotid  artery.   Antegrade flow in the left vertebral artery.     Assessment/Plan:     4/13/17 - MRI not showing any evident diffuse restriction. MRA head without any abnormalities. Awaiting carotid US. HF with reduced EF. Patient complaining of left lower extremity pain that began overnight US and Xray negative of LLE.  Left arm weakness improved, sensation on left side also improved. Working with PT/OT.   4/15/17 Carotid US L ICA up to 50% stenosis. Symptoms improving. Pain in left leg also improving. Continues to work with PT/OT. Will discuss PT/OT needs for discharge.         * Arterial ischemic stroke, vertebrobasilar, brainstem, acute  71 year-old lady that presents with right facial palsy with UMN pattern, jak-hypoesthesia of left face, LUE and LLE, and weakness of LUE and LLE. Concerning for brainstem ischemia however negative.      Antithrombotics Anticoagulants:  Warfarin INR adjusted target, INR goal 2-3  Statins: Atorvastatin- 40 mg oral daily,   Aggressive risk factor modification: Hypertension, Smoking, Diabetes and High Cholesterol,   Rehab Efforts: Physical Therapy, Occupational Therapy, Speech and Language Pathology and Physical Medicine and Rehabilitation,   Diagnostics: HgbA1C 8.6. Lipid Profile WNL.     Trans-thoracic cardiac echo with reduced EF.    VTE Prophylaxis: Coumadin  - MRI brain and MRA head with no acute abnormalities. Can't rule out small lesions in brainstem.     Acquired hypothyroidism  - Continue home levothyroxine     Diabetic peripheral neuropathy  - Decrease sensation bilaterally  - Continue gabapentin    Vitamin D deficiency  Vit D level 19  - Vit D supplementation    Tobacco abuse  - Stroke risk factor   - Counseled on cessation     Paroxysmal atrial fibrillation  - Stroke risk factor  - diltiazem  - Continue coumadin to target INR. Today 1.4.     Mixed hyperlipidemia  - Stroke risk factor  - Atorvastatin 40    Type 2 diabetes mellitus with stage 4 chronic kidney disease  - Stroke  risk factor  - A1c 8.7  - SSI Q4  - Diabetic counseling     Urinary tract infection with hematuria  - enteroccocus faecalis  - start cipro 500mg qd - 5 day course    Chronic bronchitis  - Chronic smoker  - Duonebs Q6  - Home O2 at 3L, will continue.     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     Pain of left lower extremity  - Normal US and xray.   - Continue gabapentin 300 mg TID. Tylenol PRN and Morphine PRN for pain 7-10.       Dispo: discharge home with  tomorrow if stable. Patient not amenable to discharge to [facility] as recommended     Stan Thompson MD  Comprehensive Stroke Center  Department of Vascular Neurology   [facility]

## 2017-04-16 NOTE — SUBJECTIVE & OBJECTIVE
Neurologic Chief Complaint: right facial droop and left sided weakness    Subjective:     Interval History: Patient is seen for follow-up neurological assessment and treatment recommendations:   No issues overnight. Doing better, still with weakness in LLE and right facial droop. Pain also improved on LLE.     HPI, Past Medical, Family, and Social History remains the same as documented in the initial encounter.     Review of Systems    Scheduled Meds:   atorvastatin  40 mg Oral Daily    diltiaZEM  240 mg Oral Daily    fluticasone  2 spray Each Nare Daily    furosemide  20 mg Oral Daily    gabapentin  300 mg Oral TID    levalbuterol  0.63 mg Nebulization Q8H    levothyroxine  50 mcg Oral Before breakfast    sodium chloride 0.9%  3 mL Intravenous Q8H    warfarin  5 mg Oral Daily     Continuous Infusions:   sodium chloride 0.9%       PRN Meds:acetaminophen, alprazolam, dextrose 50%, hydrALAZINE, insulin aspart, lorazepam, metoprolol, morphine, ondansetron, senna-docusate 8.6-50 mg, sodium chloride 0.9%    Objective:     Vital Signs (Most Recent):  Temp: 97.1 °F (36.2 °C) (04/16/17 1230)  Pulse: 88 (04/16/17 1500)  Resp: 14 (04/16/17 1230)  BP: (!) 120/59 (04/16/17 1230)  SpO2: (!) 94 % (04/16/17 1230)  BP Location: Right arm    Vital Signs Range (Last 24H):  Temp:  [97.1 °F (36.2 °C)-98.4 °F (36.9 °C)]   Pulse:  []   Resp:  [14-18]   BP: (105-144)/(55-74)   SpO2:  [88 %-97 %]   BP Location: Right arm    Physical Exam    Constitutional: She appears well-developed and well-nourished.   HENT:   Head: Atraumatic.   Musculoskeletal: She exhibits  tenderness (left lower extremity), improved.   Skin: Skin is warm and dry.   Nursing note and vitals reviewed.    Neurological Exam:   LOC: alert and follows requests  Language: No aphasia  Speech: No dysarthria  Orientation: Person, Place, Time  Motor*: Arm Left: Paretic: 4/5, Leg Left: Paretic: 4/5, Arm Right: Normal (5/5), Leg Right: Normal (5/5)  Tone: Arm-Left:  normal; Leg-Left: normal; Arm-Right: normal; Leg-Right: normal     NIH Stroke Scale:    Level of Consciousness: 0 - alert  LOC Questions: 0 - answers both correctly  LOC Commands: 0 - performs both correctly  Best Gaze: 0 - normal  Visual: 0 - no visual loss  Facial Palsy: 1 - minor  Motor Left Arm: 1 - drift  Motor Right Arm: 0 - no drift  Motor Left Le - drift  Motor Right Le - no drift  Limb Ataxia: 0 - absent  Sensory: 0 - normal  Best Language: 0 - no aphasia  Dysarthria: 0 - normal articulation  Extinction and Inattention: 0 - no neglect  NIH Stroke Scale Total: 3       Laboratory:  CMP:     Recent Labs  Lab 17  0912   CALCIUM 8.6*   ALBUMIN 2.7*   PROT 6.8   *   K 4.8   CO2 25      BUN 36*   CREATININE 1.4   ALKPHOS 63   ALT 13   AST 17   BILITOT 0.4     CBC:     Recent Labs  Lab 17  0912   WBC 9.17   RBC 3.91*   HGB 12.6   HCT 38.0      MCV 97   MCH 32.2*   MCHC 33.2     Lipid Panel:     Recent Labs  Lab 17  1259   CHOL 143   LDLCALC 69.4   HDL 58   TRIG 78     Hgb A1C:     Recent Labs  Lab 17  1938   HGBA1C 8.1*       Diagnostic Results:  CT head w/o contrast 17 results:  No evidence of acute hemorrhage or major vascular distribution infarct.  Moderate chronic microvascular ischemic changes similar to prior exam.     MRI/MRA Brain w/o contrast 17 results:  No evidence of acute intracranial pathology.  Generalized cerebellar volume loss and age advanced chronic microvascular ischemic changes.  Motion limited MR angiogram of the intracranial vasculature appears within normal limits.     Duy Carotid US   17 results:  1 - 39 % right ICA stenosis.   Hyperechoic plaque in the right internal carotid artery.   Antegrade flow in the right vertebral artery.   LEFT SIDE:  40 - 59% left ICA stenosis.   Heterogeneous plaque in the left internal carotid artery.   Antegrade flow in the left vertebral artery.

## 2017-04-16 NOTE — PT/OT/SLP PROGRESS
"Occupational Therapy  Treatment    Savita Polo   MRN: [id number]   Admitting Diagnosis: Arterial ischemic stroke, vertebrobasilar, brainstem, acute    OT Date of Treatment: 17   OT Start Time: 1121  OT Stop Time: 1204  OT Total Time (min): 43 min    Billable Minutes:  Self Care/Home Management 38 min and Therapeutic Activity 5 min    General Precautions: Standard, aspiration, fall  Orthopedic Precautions: N/A  Braces: N/A    Do you have any cultural, spiritual, Buddhism conflicts, given your current situation?: Scientology    Subjective:  Communicated with nurse prior to session.  "Lets go ahead and do it now while my foot isn't hurting.  They just gave me some medicine for pain."  Referring to bathing and washing L foot    Pain Ratin/10  Location - Side: Left   Location: foot  Pain Addressed: Pre-medicate for activity, Distraction  Pain Rating Post-Intervention: 1/10    Objective:  Patient found with: telemetry, oxygen     Functional Mobility:  Bed Mobility:  Rolling/Turning to Left: Modified independent  Rolling/Turning Right: Modified independent  Scooting/Bridging: Modified Independent  Sit to Supine: Supervision, With side rail    Transfers:   Sit <> Stand Assistance: Contact Guard Assistance, Stand By Assistance (CGA for first attempt - SBA for final attempt)  Sit <> Stand Assistive Device: Rolling Walker  Bed <> Chair Technique: Stand Pivot  Bed <> Chair Transfer Assistance: Contact Guard Assistance  Bed <> Chair Assistive Device: Rolling Walker    Functional Ambulation: Pt was able to stand and take steps within room using RW with CGA-SBA to reach bedside chair, sink and bed.  No LOB noted during ambulation - noted with LOB 2x while standing for grooming and using B hands, but pt able to recover quickly and place L hand on RW for stability.     Activities of Daily Living:  Feeding Level of Assistance: Activity did not occur    UE Dressing Level of Assistance: Minimum assistance (min assist to tie " gown straps - pt donned/doffed gown while seated in bedside chair)    LE Dressing Level of Assistance: Moderate assistance (pt able to doff B socks but needed assist to carlin B socks - able to insert toe in R sock but needed assist to pull all the way on - performed sitting in bedside chair)    Grooming Position: Standing at sink  Grooming Level of Assistance: Stand by assistance, Contact guard assistance (pt able to wash face and hands at sink with RW - CGA needed 2x due to LOB while pt using B hands at task - pt immediately reached for RW with L hand during both accounts of LOB)     Toileting Level of Assistance: Activity did not occur     Bathing Where Assessed: Sitting sinkside  Bathing Level of Assistance: Minimum assistance (pt bathed 9/10 body parts - needed assist to dry/wash between toes and buttocks as she stood and held onto RW)      Balance:   Static Sit: GOOD: Takes MODERATE challenges from all directions  Dynamic Sit: GOOD: Maintains balance through MODERATE excursions of active trunk movement  Static Stand: FAIR+: Takes MINIMAL challenges from all directions  Dynamic stand: FAIR+: Needs CLOSE SUPERVISION during gait and is able to right self with minor LOB    Therapeutic Activities and Exercises:  · Pt completed ADLs and func mobility as noted above  · Pt able to complete self care tasks with good timing/rate for completiong - min delay with completion during bathing as pt maintained conversation and frequently stopped to talk during this task.   · OT discussed safety during ADLs upon discharge home as pt reported she was going home tomorrow - pt states she has all DME needs in place (verified by boyfriend who arrived at this time).  OT emphasized importance of having 24 hours supervision upon discharge home for safety until pt returns to full PLOF.      AM-PAC 6 CLICK ADL   How much help from another person does this patient currently need?   1 = Unable, Total/Dependent Assistance  2 = A lot,  Maximum/Moderate Assistance  3 = A little, Minimum/Contact Guard/Supervision  4 = None, Modified [place]/Independent    Putting on and taking off regular lower body clothing? : 2  Bathing (including washing, rinsing, drying)?: 3  Toileting, which includes using toilet, bedpan, or urinal? : 2  Putting on and taking off regular upper body clothing?: 3  Taking care of personal grooming such as brushing teeth?: 3  Eating meals?: 3  Total Score: 16     AM-PAC Raw Score CMS G-Code Modifier Level of Impairment Assistance   6 % Total / Unable   7 - 9 CM 80 - 100% Maximal Assist   10 - 14 CL 60 - 80% Moderate Assist   15 - 19 CK 40 - 60% Moderate Assist   20 - 22 CJ 20 - 40% Minimal Assist   23 CI 1-20% SBA / CGA   24 CH 0% Independent/ Mod I     Patient left HOB elevated with all lines intact, call button in reach and boyfriend present    ASSESSMENT:  Savita Polo is a 71 y.o. female with a medical diagnosis of Arterial ischemic stroke, vertebrobasilar, brainstem, acute and presents with decreased endurance, decreased standing balance, decreased safety awareness, and decreased self care skills.Pt was agreeable to OT and was noted to make progress towards her goals in therapy.  Pt's goals remain appropriate at this time, and she was noted to reach 2 goals during today's session (rolling R and L).   Pt requires occasional redirection during tasks as she is easily distracted and requires supervision upon discharge for safety.  OT discussed safety with ADLs and mobility upon discharge with patient.  Pt has all DME needs in place and will have supervision at discharge.  OT emphasized importance of 24 hours supervision/assistance for safe mobility and ADLs initially until she returns to baseline. She will continue to benefit from skilled OT services in order to assist her with increasing her safety and level of independence with self care and mobility tasks.  OT recommends HHOT with 24 hours supervision to meet  her post acute therapy needs in order to work towards safety and independence in her home setting.         Rehab identified problem list/impairments: Rehab identified problem list/impairments: weakness, impaired endurance, impaired sensation, impaired self care skills, gait instability, impaired functional mobilty, impaired balance, decreased coordination, decreased safety awareness, pain, impaired cardiopulmonary response to activity, decreased lower extremity function, decreased upper extremity function    Rehab potential is good.    Activity tolerance: Good    Discharge recommendations: Discharge Facility/Level Of Care Needs: home health OT  With 24 hours supervision/assistance    Barriers to discharge: Barriers to Discharge: None    Equipment recommendations: none (pt has all needs in place at home - verified by her live in boyfriend)     GOALS:   Occupational Therapy Goals        Problem: Occupational Therapy Goal    Goal Priority Disciplines Outcome Interventions   Occupational Therapy Goal     OT, PT/OT Ongoing (interventions implemented as appropriate)    Description:  Goals set 4/13 to be addressed for 7 days with expiration date, 4/20:  Patient will increase functional independence with ADLs by performing:    Patient will demonstrate rolling to the right with modified independence.  GOAL MET 04/16/17  Patient will demonstrate rolling to the left with modified independence.   GOAL MET 04/16/17  Patient will demonstrate supine -sit with modified independence.   Not met  Patient will demonstrate stand pivot transfers with SBA.   Not met  Patient will demonstrate grooming while standing with SBA.   Not met  Patient will demonstrate upper body dressing with SBA.   Not met  Patient will demonstrate lower body dressing with SBA.   Not met  Patient will demonstrate toileting with CGA.   Not met  Patient will demonstrate bathing while seated EOB with CGA.   Not met  Patient's family / caregiver will demonstrate  independence and safety with assisting patient with self-care skills and functional mobility.     Not met  Patient and/or patient's family will verbalize understanding of stroke prevention guidelines, personal risk factors and stroke warning signs via teachback method.  Not met                      Plan:  Patient to be seen 6 x/week to address the above listed problems via self-care/home management, therapeutic activities, neuromuscular re-education, cognitive retraining, therapeutic exercises  Plan of Care expires: 05/11/17  Plan of Care reviewed with: patient, significant other         Paula Lamar OT  04/16/2017

## 2017-04-16 NOTE — ASSESSMENT & PLAN NOTE
- Normal US and xray.   - Continue gabapentin 300 mg TID. Tylenol PRN and Morphine PRN for pain 7-10.

## 2017-04-17 PROBLEM — J96.92 RESPIRATORY FAILURE WITH HYPERCAPNIA: Status: ACTIVE | Noted: 2017-03-13

## 2017-04-17 PROBLEM — J96.21 ACUTE ON CHRONIC RESPIRATORY FAILURE WITH HYPOXIA AND HYPERCAPNIA: Status: ACTIVE | Noted: 2017-04-17

## 2017-04-17 PROBLEM — J96.90 RESPIRATORY FAILURE: Status: ACTIVE | Noted: 2017-04-17

## 2017-04-17 PROBLEM — J96.22 ACUTE ON CHRONIC RESPIRATORY FAILURE WITH HYPERCAPNIA: Status: ACTIVE | Noted: 2017-04-17

## 2017-04-17 PROBLEM — J96.02 ACUTE RESPIRATORY FAILURE WITH HYPERCAPNIA: Status: ACTIVE | Noted: 2017-04-17

## 2017-04-17 PROBLEM — E87.5 HYPERKALEMIA: Status: ACTIVE | Noted: 2017-04-17

## 2017-04-17 LAB
ALBUMIN SERPL BCP-MCNC: 2.7 G/DL
ALLENS TEST: ABNORMAL
ALP SERPL-CCNC: 86 U/L
ALT SERPL W/O P-5'-P-CCNC: 18 U/L
ANION GAP SERPL CALC-SCNC: 9 MMOL/L
ANION GAP SERPL CALC-SCNC: 9 MMOL/L
AST SERPL-CCNC: 23 U/L
BASOPHILS # BLD AUTO: 0.04 K/UL
BASOPHILS NFR BLD: 0.3 %
BILIRUB SERPL-MCNC: 0.3 MG/DL
BNP SERPL-MCNC: 296 PG/ML
BUN SERPL-MCNC: 41 MG/DL
BUN SERPL-MCNC: 46 MG/DL
CALCIUM SERPL-MCNC: 8.5 MG/DL
CALCIUM SERPL-MCNC: 8.8 MG/DL
CHLORIDE SERPL-SCNC: 101 MMOL/L
CHLORIDE SERPL-SCNC: 99 MMOL/L
CO2 SERPL-SCNC: 26 MMOL/L
CO2 SERPL-SCNC: 27 MMOL/L
CREAT SERPL-MCNC: 1.5 MG/DL
CREAT SERPL-MCNC: 1.8 MG/DL
DELSYS: ABNORMAL
DIFFERENTIAL METHOD: ABNORMAL
EOSINOPHIL # BLD AUTO: 0.1 K/UL
EOSINOPHIL NFR BLD: 1 %
EP: 8
EP: 8
ERYTHROCYTE [DISTWIDTH] IN BLOOD BY AUTOMATED COUNT: 13.1 %
ERYTHROCYTE [SEDIMENTATION RATE] IN BLOOD BY WESTERGREN METHOD: 19 MM/H
EST. GFR  (AFRICAN AMERICAN): 32 ML/MIN/1.73 M^2
EST. GFR  (AFRICAN AMERICAN): 39.8 ML/MIN/1.73 M^2
EST. GFR  (NON AFRICAN AMERICAN): 27.7 ML/MIN/1.73 M^2
EST. GFR  (NON AFRICAN AMERICAN): 34.6 ML/MIN/1.73 M^2
FIO2: 50
FIO2: 50
GLUCOSE SERPL-MCNC: 273 MG/DL
GLUCOSE SERPL-MCNC: 432 MG/DL
HCO3 UR-SCNC: 27.7 MMOL/L (ref 24–28)
HCO3 UR-SCNC: 29.4 MMOL/L (ref 24–28)
HCO3 UR-SCNC: 31.5 MMOL/L (ref 24–28)
HCO3 UR-SCNC: 33 MMOL/L (ref 24–28)
HCT VFR BLD AUTO: 38.8 %
HGB BLD-MCNC: 12.5 G/DL
INR PPP: 1.6
IP: 15
IP: 18
LYMPHOCYTES # BLD AUTO: 1.8 K/UL
LYMPHOCYTES NFR BLD: 13.8 %
MCH RBC QN AUTO: 31.6 PG
MCHC RBC AUTO-ENTMCNC: 32.2 %
MCV RBC AUTO: 98 FL
MIN VOL: 15
MODE: ABNORMAL
MODE: ABNORMAL
MONOCYTES # BLD AUTO: 0.8 K/UL
MONOCYTES NFR BLD: 6 %
NEUTROPHILS # BLD AUTO: 10.4 K/UL
NEUTROPHILS NFR BLD: 78.6 %
PCO2 BLDA: 62.9 MMHG (ref 35–45)
PCO2 BLDA: 66.1 MMHG (ref 35–45)
PCO2 BLDA: 68.1 MMHG (ref 35–45)
PCO2 BLDA: 71.1 MMHG (ref 35–45)
PH SMN: 7.24 [PH] (ref 7.35–7.45)
PH SMN: 7.25 [PH] (ref 7.35–7.45)
PH SMN: 7.28 [PH] (ref 7.35–7.45)
PH SMN: 7.29 [PH] (ref 7.35–7.45)
PLATELET # BLD AUTO: 214 K/UL
PMV BLD AUTO: 10.1 FL
PO2 BLDA: 59 MMHG (ref 80–100)
PO2 BLDA: 61 MMHG (ref 80–100)
PO2 BLDA: 63 MMHG (ref 80–100)
PO2 BLDA: 66 MMHG (ref 80–100)
POC BE: 0 MMOL/L
POC BE: 2 MMOL/L
POC BE: 5 MMOL/L
POC BE: 6 MMOL/L
POC SATURATED O2: 85 % (ref 95–100)
POC SATURATED O2: 85 % (ref 95–100)
POC SATURATED O2: 88 % (ref 95–100)
POC SATURATED O2: 88 % (ref 95–100)
POC TCO2: 30 MMOL/L (ref 23–27)
POC TCO2: 31 MMOL/L (ref 23–27)
POC TCO2: 34 MMOL/L (ref 23–27)
POC TCO2: 35 MMOL/L (ref 23–27)
POCT GLUCOSE: 260 MG/DL (ref 70–110)
POCT GLUCOSE: 418 MG/DL (ref 70–110)
POCT GLUCOSE: 427 MG/DL (ref 70–110)
POTASSIUM SERPL-SCNC: 5.3 MMOL/L
POTASSIUM SERPL-SCNC: 5.8 MMOL/L
PROT SERPL-MCNC: 7 G/DL
PROTHROMBIN TIME: 15.8 SEC
RBC # BLD AUTO: 3.95 M/UL
SAMPLE: ABNORMAL
SITE: ABNORMAL
SODIUM SERPL-SCNC: 135 MMOL/L
SODIUM SERPL-SCNC: 136 MMOL/L
SP02: 90
SP02: 92
SPONT RATE: 19
WBC # BLD AUTO: 13.18 K/UL

## 2017-04-17 PROCEDURE — 99232 SBSQ HOSP IP/OBS MODERATE 35: CPT | Mod: ,,, | Performed by: NURSE PRACTITIONER

## 2017-04-17 PROCEDURE — 27000221 HC OXYGEN, UP TO 24 HOURS

## 2017-04-17 PROCEDURE — 87070 CULTURE OTHR SPECIMN AEROBIC: CPT

## 2017-04-17 PROCEDURE — 25000003 PHARM REV CODE 250: Performed by: PHYSICIAN ASSISTANT

## 2017-04-17 PROCEDURE — 20600001 HC STEP DOWN PRIVATE ROOM

## 2017-04-17 PROCEDURE — 25000003 PHARM REV CODE 250: Performed by: PSYCHIATRY & NEUROLOGY

## 2017-04-17 PROCEDURE — 99233 SBSQ HOSP IP/OBS HIGH 50: CPT | Mod: GC,,, | Performed by: PSYCHIATRY & NEUROLOGY

## 2017-04-17 PROCEDURE — 25000003 PHARM REV CODE 250: Performed by: NURSE PRACTITIONER

## 2017-04-17 PROCEDURE — 25000242 PHARM REV CODE 250 ALT 637 W/ HCPCS: Performed by: PSYCHIATRY & NEUROLOGY

## 2017-04-17 PROCEDURE — 83880 ASSAY OF NATRIURETIC PEPTIDE: CPT

## 2017-04-17 PROCEDURE — 87205 SMEAR GRAM STAIN: CPT

## 2017-04-17 PROCEDURE — 93005 ELECTROCARDIOGRAM TRACING: CPT

## 2017-04-17 PROCEDURE — 82803 BLOOD GASES ANY COMBINATION: CPT

## 2017-04-17 PROCEDURE — 63600175 PHARM REV CODE 636 W HCPCS: Performed by: PSYCHIATRY & NEUROLOGY

## 2017-04-17 PROCEDURE — 99900035 HC TECH TIME PER 15 MIN (STAT)

## 2017-04-17 PROCEDURE — 36600 WITHDRAWAL OF ARTERIAL BLOOD: CPT

## 2017-04-17 PROCEDURE — 85025 COMPLETE CBC W/AUTO DIFF WBC: CPT

## 2017-04-17 PROCEDURE — 63600175 PHARM REV CODE 636 W HCPCS: Performed by: NURSE PRACTITIONER

## 2017-04-17 PROCEDURE — 80048 BASIC METABOLIC PNL TOTAL CA: CPT

## 2017-04-17 PROCEDURE — 94660 CPAP INITIATION&MGMT: CPT

## 2017-04-17 PROCEDURE — 94640 AIRWAY INHALATION TREATMENT: CPT

## 2017-04-17 PROCEDURE — 93010 ELECTROCARDIOGRAM REPORT: CPT | Mod: ,,, | Performed by: INTERNAL MEDICINE

## 2017-04-17 PROCEDURE — 80053 COMPREHEN METABOLIC PANEL: CPT

## 2017-04-17 PROCEDURE — 94761 N-INVAS EAR/PLS OXIMETRY MLT: CPT

## 2017-04-17 PROCEDURE — 99222 1ST HOSP IP/OBS MODERATE 55: CPT | Mod: ,,, | Performed by: INTERNAL MEDICINE

## 2017-04-17 PROCEDURE — 63600175 PHARM REV CODE 636 W HCPCS: Performed by: STUDENT IN AN ORGANIZED HEALTH CARE EDUCATION/TRAINING PROGRAM

## 2017-04-17 PROCEDURE — 25000003 PHARM REV CODE 250: Performed by: STUDENT IN AN ORGANIZED HEALTH CARE EDUCATION/TRAINING PROGRAM

## 2017-04-17 PROCEDURE — 27000190 HC CPAP FULL FACE MASK W/VALVE

## 2017-04-17 PROCEDURE — 36415 COLL VENOUS BLD VENIPUNCTURE: CPT

## 2017-04-17 PROCEDURE — 85610 PROTHROMBIN TIME: CPT

## 2017-04-17 RX ORDER — CEFEPIME HYDROCHLORIDE 1 G/50ML
1 INJECTION, SOLUTION INTRAVENOUS
Status: DISCONTINUED | OUTPATIENT
Start: 2017-04-17 | End: 2017-04-18

## 2017-04-17 RX ORDER — INSULIN ASPART 100 [IU]/ML
2 INJECTION, SOLUTION INTRAVENOUS; SUBCUTANEOUS
Status: DISCONTINUED | OUTPATIENT
Start: 2017-04-17 | End: 2017-04-18 | Stop reason: HOSPADM

## 2017-04-17 RX ORDER — FUROSEMIDE 10 MG/ML
40 INJECTION INTRAMUSCULAR; INTRAVENOUS ONCE
Status: COMPLETED | OUTPATIENT
Start: 2017-04-17 | End: 2017-04-17

## 2017-04-17 RX ORDER — LEVALBUTEROL INHALATION SOLUTION 0.63 MG/3ML
0.63 SOLUTION RESPIRATORY (INHALATION) ONCE
Status: DISCONTINUED | OUTPATIENT
Start: 2017-04-17 | End: 2017-04-17

## 2017-04-17 RX ORDER — IPRATROPIUM BROMIDE 0.5 MG/2.5ML
0.5 SOLUTION RESPIRATORY (INHALATION) EVERY 8 HOURS
Status: DISCONTINUED | OUTPATIENT
Start: 2017-04-17 | End: 2017-04-17

## 2017-04-17 RX ORDER — IPRATROPIUM BROMIDE AND ALBUTEROL SULFATE 2.5; .5 MG/3ML; MG/3ML
3 SOLUTION RESPIRATORY (INHALATION) EVERY 4 HOURS
Status: DISCONTINUED | OUTPATIENT
Start: 2017-04-17 | End: 2017-04-18 | Stop reason: HOSPADM

## 2017-04-17 RX ORDER — INSULIN ASPART 100 [IU]/ML
2 INJECTION, SOLUTION INTRAVENOUS; SUBCUTANEOUS
Status: DISCONTINUED | OUTPATIENT
Start: 2017-04-18 | End: 2017-04-17

## 2017-04-17 RX ORDER — METHYLPREDNISOLONE SOD SUCC 125 MG
125 VIAL (EA) INJECTION EVERY 6 HOURS
Status: DISCONTINUED | OUTPATIENT
Start: 2017-04-17 | End: 2017-04-17

## 2017-04-17 RX ADMIN — INSULIN ASPART 6 UNITS: 100 INJECTION, SOLUTION INTRAVENOUS; SUBCUTANEOUS at 12:04

## 2017-04-17 RX ADMIN — METHYLPREDNISOLONE SODIUM SUCCINATE 125 MG: 125 INJECTION, POWDER, FOR SOLUTION INTRAMUSCULAR; INTRAVENOUS at 05:04

## 2017-04-17 RX ADMIN — IPRATROPIUM BROMIDE AND ALBUTEROL SULFATE 3 ML: .5; 3 SOLUTION RESPIRATORY (INHALATION) at 11:04

## 2017-04-17 RX ADMIN — GABAPENTIN 300 MG: 300 CAPSULE ORAL at 09:04

## 2017-04-17 RX ADMIN — GABAPENTIN 300 MG: 300 CAPSULE ORAL at 04:04

## 2017-04-17 RX ADMIN — IPRATROPIUM BROMIDE AND ALBUTEROL SULFATE 3 ML: .5; 3 SOLUTION RESPIRATORY (INHALATION) at 03:04

## 2017-04-17 RX ADMIN — Medication 1000 MG: at 08:04

## 2017-04-17 RX ADMIN — IPRATROPIUM BROMIDE AND ALBUTEROL SULFATE 3 ML: .5; 3 SOLUTION RESPIRATORY (INHALATION) at 07:04

## 2017-04-17 RX ADMIN — CEFEPIME HYDROCHLORIDE 1 G: 1 INJECTION, POWDER, FOR SOLUTION INTRAMUSCULAR; INTRAVENOUS at 09:04

## 2017-04-17 RX ADMIN — FUROSEMIDE 40 MG: 10 INJECTION, SOLUTION INTRAMUSCULAR; INTRAVENOUS at 03:04

## 2017-04-17 RX ADMIN — FLUTICASONE PROPIONATE 2 SPRAY: 50 SPRAY, METERED NASAL at 10:04

## 2017-04-17 RX ADMIN — MORPHINE SULFATE 2 MG: 2 INJECTION, SOLUTION INTRAMUSCULAR; INTRAVENOUS at 04:04

## 2017-04-17 RX ADMIN — MORPHINE SULFATE 2 MG: 2 INJECTION, SOLUTION INTRAMUSCULAR; INTRAVENOUS at 11:04

## 2017-04-17 RX ADMIN — INSULIN DETEMIR 13 UNITS: 100 INJECTION, SOLUTION SUBCUTANEOUS at 08:04

## 2017-04-17 RX ADMIN — Medication 3 ML: at 11:04

## 2017-04-17 RX ADMIN — INSULIN ASPART 2 UNITS: 100 INJECTION, SOLUTION INTRAVENOUS; SUBCUTANEOUS at 06:04

## 2017-04-17 RX ADMIN — WARFARIN SODIUM 5 MG: 5 TABLET ORAL at 04:04

## 2017-04-17 RX ADMIN — ACETAMINOPHEN 650 MG: 325 TABLET ORAL at 04:04

## 2017-04-17 RX ADMIN — IPRATROPIUM BROMIDE 0.5 MG: 0.5 SOLUTION RESPIRATORY (INHALATION) at 08:04

## 2017-04-17 RX ADMIN — FUROSEMIDE 20 MG: 20 TABLET ORAL at 08:04

## 2017-04-17 RX ADMIN — CALCIUM GLUCONATE 500 MG: 94 INJECTION, SOLUTION INTRAVENOUS at 10:04

## 2017-04-17 RX ADMIN — LEVALBUTEROL 0.63 MG: 0.63 SOLUTION RESPIRATORY (INHALATION) at 04:04

## 2017-04-17 RX ADMIN — INSULIN ASPART 10 UNITS: 100 INJECTION, SOLUTION INTRAVENOUS; SUBCUTANEOUS at 06:04

## 2017-04-17 RX ADMIN — SODIUM CHLORIDE 1000 ML: 0.9 INJECTION, SOLUTION INTRAVENOUS at 10:04

## 2017-04-17 RX ADMIN — CEFEPIME HYDROCHLORIDE 1 G: 1 INJECTION, POWDER, FOR SOLUTION INTRAMUSCULAR; INTRAVENOUS at 08:04

## 2017-04-17 RX ADMIN — ALPRAZOLAM 0.5 MG: 0.5 TABLET ORAL at 04:04

## 2017-04-17 RX ADMIN — INSULIN ASPART 4 UNITS: 100 INJECTION, SOLUTION INTRAVENOUS; SUBCUTANEOUS at 11:04

## 2017-04-17 RX ADMIN — LEVOTHYROXINE SODIUM 50 MCG: 50 TABLET ORAL at 04:04

## 2017-04-17 RX ADMIN — SODIUM POLYSTYRENE SULFONATE 15 G: 15 SUSPENSION ORAL; RECTAL at 10:04

## 2017-04-17 RX ADMIN — RAMELTEON 8 MG: 8 TABLET, FILM COATED ORAL at 11:04

## 2017-04-17 RX ADMIN — GABAPENTIN 300 MG: 300 CAPSULE ORAL at 01:04

## 2017-04-17 RX ADMIN — Medication 3 ML: at 06:04

## 2017-04-17 RX ADMIN — ALPRAZOLAM 0.5 MG: 0.5 TABLET ORAL at 03:04

## 2017-04-17 RX ADMIN — ATORVASTATIN CALCIUM 40 MG: 20 TABLET, FILM COATED ORAL at 08:04

## 2017-04-17 RX ADMIN — DILTIAZEM HYDROCHLORIDE 240 MG: 120 CAPSULE, EXTENDED RELEASE ORAL at 12:04

## 2017-04-17 RX ADMIN — Medication 3 ML: at 01:04

## 2017-04-17 NOTE — PROGRESS NOTES
[facility]  Vascular Neurology  Comprehensive Stroke Center  Progress Note      Neurologic Chief Complaint: right facial droop and left sided weakness    Subjective:     Interval History: Hypoxia and hypercapnia with SOB this AM. Also RICHA and hyperkalemia. Started on BiPAP, gave 1L NS, started BS abx, and gave kayexalate and CaGluconate. Patient with no complaints and wants to eat. Ucx grew E coli.     HPI, Past Medical, Family, and Social History remains the same as documented in the initial encounter.     Review of Systems   Constitutional: Negative for fatigue.   HENT: Negative for congestion and voice change.    Eyes: Negative for photophobia.   Respiratory: Positive for wheezing. Negative for cough, chest tightness and shortness of breath.    Cardiovascular: Negative for chest pain, palpitations and leg swelling.   Gastrointestinal: Negative for abdominal distention and abdominal pain.   Endocrine: Negative for polyuria.   Genitourinary: Negative for difficulty urinating.   Musculoskeletal: Negative for back pain.   Skin: Negative for rash.   Neurological: Negative for dizziness.   Psychiatric/Behavioral: Negative for confusion.     Scheduled Meds:   atorvastatin  40 mg Oral Daily    ceFEPime (MAXIPIME) IVPB  1 g Intravenous Q12H    diltiaZEM  240 mg Oral Daily    fluticasone  2 spray Each Nare Daily    furosemide  20 mg Oral Daily    gabapentin  300 mg Oral TID    ipratropium  0.5 mg Nebulization Q8H    levalbuterol  0.63 mg Nebulization Q8H    levalbuterol  0.63 mg Nebulization Once    levothyroxine  50 mcg Oral Before breakfast    sodium chloride 0.9%  3 mL Intravenous Q8H    vancomycin (VANCOCIN) IVPB  15 mg/kg Intravenous Q24H    warfarin  5 mg Oral Daily     Continuous Infusions:   sodium chloride 0.9%       PRN Meds:acetaminophen, alprazolam, dextrose 50%, hydrALAZINE, insulin aspart, lorazepam, metoprolol, morphine, ondansetron, ramelteon, senna-docusate 8.6-50 mg,  sodium chloride 0.9%    Objective:     Vital Signs (Most Recent):  Temp: 97.5 °F (36.4 °C) (04/17/17 0810)  Pulse: 68 (04/17/17 1100)  Resp: 20 (04/17/17 0810)  BP: (!) 119/58 (04/17/17 0810)  SpO2: (!) 92 % (04/17/17 0901)  BP Location: Left arm    Vital Signs Range (Last 24H):  Temp:  [96.7 °F (35.9 °C)-97.6 °F (36.4 °C)]   Pulse:  []   Resp:  [14-36]   BP: (110-130)/(58-74)   SpO2:  [78 %-94 %]   BP Location: Left arm    Physical Exam   Constitutional: She appears well-developed and well-nourished.   HENT:   Head: Normocephalic and atraumatic.   Mouth/Throat: Oropharynx is clear and moist.   Eyes: Conjunctivae and EOM are normal. Pupils are equal, round, and reactive to light.   Neck: Normal range of motion. Neck supple.   Cardiovascular: Normal rate and normal heart sounds.    Pulmonary/Chest: Effort normal and breath sounds normal.   Abdominal: Soft. Bowel sounds are normal.   Musculoskeletal: She exhibits tenderness (left lower extremity). She exhibits no edema (left ankle).   Neurological: GCS eye subscore is 4 - spontaneous. GCS verbal subscore is 5 - oriented. GCS motor subscore is 6 - obeys commands.   Skin: Skin is warm and dry. No erythema.   Psychiatric: She has a normal mood and affect. Her behavior is normal. Judgment normal.   Nursing note and vitals reviewed.      Neurological Exam:   LOC: alert and follows requests  Language: No aphasia  Speech: No dysarthria  Orientation: Person, Place, Time  Motor*: Arm Left: Paretic: 4/5, Leg Left: Paretic: 4/5, Arm Right: Normal (5/5), Leg Right: Normal (5/5)  Tone: Arm-Left: normal; Leg-Left: normal; Arm-Right: normal; Leg-Right: normal     [place] Coma Scale:  Best Eye Response: 4 - spontaneous  Best Motor Response: 6 - obeys commands  Best Verbal Response: 5 - oriented        NIH Stroke Scale:  Level of Consciousness: 0 - alert  LOC Questions: 0 - answers both correctly  LOC Commands: 0 - performs both correctly  Best Gaze: 0 - normal  Visual: 0 - no  visual loss  Facial Palsy: 1 - minor  Motor Left Arm: 1 - drift  Motor Right Arm: 0 - no drift  Motor Left Le - drift  Motor Right Le - no drift  Limb Ataxia: 0 - absent  Sensory: 0 - normal  Best Language: 0 - no aphasia  Dysarthria: 0 - normal articulation  Extinction and Inattention: 0 - no neglect  NIH Stroke Scale Total: 3      Laboratory:  BMP:   Recent Labs  Lab 17  0458      K 5.8*      CO2 26   BUN 41*   CREATININE 1.5*   CALCIUM 8.5*         Assessment/Plan:     17 - MRI not showing any evident diffuse restriction. MRA head without any abnormalities. Awaiting carotid US. HF with reduced EF. Patient complaining of left lower extremity pain that began overnight US and Xray negative of LLE.  Left arm weakness improved, sensation on left side also improved. Working with PT/OT.   4/15/17 Carotid US L ICA up to 50% stenosis. Symptoms improving. Pain in left leg also improving. Continues to work with PT/OT. Will discuss PT/OT needs for discharge.   17 This AM patient was sitting up in bed, became SOB, and O2 sat dropped. Placed on BiPAP and ABG noted to have hypercapnia. Also hyperkalemic to 5.8. ECG with small peaking T waves. Started CaGluconate, kayexalate. CXR with multiple bilateral opacities, started Vanc and cefepime. RICHA also noted this AM, gave 1L NS and will recheck BMP.       * Arterial ischemic stroke, vertebrobasilar, brainstem, acute  71 year-old lady that presents with right facial palsy with UMN pattern, jak-hypoesthesia of left face, LUE and LLE, and weakness of LUE and LLE. Concerning for brainstem ischemia however negative.      Antithrombotics Anticoagulants:  Warfarin INR adjusted target, INR goal 2-3  Statins: Atorvastatin- 40 mg oral daily,   Aggressive risk factor modification: Hypertension, Smoking, Diabetes and High Cholesterol,   Rehab Efforts: Physical Therapy, Occupational Therapy, Speech and Language Pathology and Physical Medicine and  Rehabilitation,   Diagnostics: HgbA1C 8.6. Lipid Profile WNL.     Trans-thoracic cardiac echo with reduced EF.    VTE Prophylaxis: Coumadin  - MRI brain and MRA head with no acute abnormalities. Can't rule out small lesions in brainstem.     Acquired hypothyroidism  - Continue home levothyroxine     Diabetic peripheral neuropathy  - Decrease sensation bilaterally  - Continue gabapentin    Vitamin D deficiency  Vit D level 19  - Vit D supplementation    Tobacco abuse  - Stroke risk factor   - Counseled on cessation     Paroxysmal atrial fibrillation  - Stroke risk factor  - diltiazem  - Continue coumadin to target INR. Today 1.6    Mixed hyperlipidemia  - Stroke risk factor  - Atorvastatin 40    Type 2 diabetes mellitus with stage 4 chronic kidney disease  - Stroke risk factor  - A1c 8.7  - SSI Q4  - Diabetic counseling     Urinary tract infection with hematuria  - Ucx grew enteroccocus faecalis  - Now on cefepime and vanc bc patient now has leukocytosis and new bilateral pulmonary opacities    Chronic bronchitis  - Chronic smoker  - Duonebs Q6  - Home O2 at 3L --> New O2 requirements today (4/17) started on BiPAP - CXR with new B pulm opacities, started on cefepime and vancomycin  - ABGs with hypercapnea somewhat above baseline (50-60s PCO2) - repeating     Hemianopia, homonymous, left  - Present since 2013  - Secondary to previous CVA    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   - Continue gabapentin 300 mg TID. Tylenol PRN and Morphine PRN for pain 7-10.     Acute respiratory failure with hypercapnia  - patient was SOB and hypoxic to 60-70% this AM --> Placed on BiPAP and ABGs demonstrated hypercapnia to 60s-71  - CXR with multiple B pulmonary opacities, started on cefepime and vancomycin    - repeating CXR this afternoon and ABG  - Pt currently NPO     Hyperkalemia  - up to 5.8 this AM and has not been a problem before  - ECG demonstrated possible some peaking of T waves but very subtle, gave Ca gluconate  - Pt currenly already receiving insulin   - repeating BMP and will further address based on this    RICHA  - Cr up to 1.5 slightly above baseline  - Pt appeared not to be volume overloaded - gave 1L NS, no history of CHF  - rechecking BMP now    Diet: NPO  PPx: SCDs and warfarin  Dispo: Resolution of respiratory failure and hyperkalemia    Seth Mcpherson MD  Comprehensive Stroke Center  Department of Vascular Neurology   [facility]

## 2017-04-17 NOTE — ASSESSMENT & PLAN NOTE
- Chronic smoker  - Duonebs Q6  - Home O2 at 3L --> New O2 requirements today (4/17) started on BiPAP - CXR with new B pulm opacities, started on cefepime and vancomycin  - ABGs with hypercapnea somewhat above baseline (50-60s PCO2) - repeating

## 2017-04-17 NOTE — ASSESSMENT & PLAN NOTE
- Likely secondary to brainstem CVA - MRI negative  - continue PT/OT, will follow up recs - rec OP rehab but pt want Home with home health

## 2017-04-17 NOTE — SUBJECTIVE & OBJECTIVE
Neurologic Chief Complaint: right facial droop and left sided weakness    Subjective:     Interval History: Hypoxia and hypercapnia with SOB this AM. Also RICHA and hyperkalemia. Started on BiPAP, gave 1L NS, started BS abx, and gave kayexalate and CaGluconate. Patient with no complaints and wants to eat. Ucx grew E coli.     HPI, Past Medical, Family, and Social History remains the same as documented in the initial encounter.     Review of Systems   Constitutional: Negative for fatigue.   HENT: Negative for congestion and voice change.    Eyes: Negative for photophobia.   Respiratory: Positive for wheezing. Negative for cough, chest tightness and shortness of breath.    Cardiovascular: Negative for chest pain, palpitations and leg swelling.   Gastrointestinal: Negative for abdominal distention and abdominal pain.   Endocrine: Negative for polyuria.   Genitourinary: Negative for difficulty urinating.   Musculoskeletal: Negative for back pain.   Skin: Negative for rash.   Neurological: Negative for dizziness.   Psychiatric/Behavioral: Negative for confusion.     Scheduled Meds:   atorvastatin  40 mg Oral Daily    ceFEPime (MAXIPIME) IVPB  1 g Intravenous Q12H    diltiaZEM  240 mg Oral Daily    fluticasone  2 spray Each Nare Daily    furosemide  20 mg Oral Daily    gabapentin  300 mg Oral TID    ipratropium  0.5 mg Nebulization Q8H    levalbuterol  0.63 mg Nebulization Q8H    levalbuterol  0.63 mg Nebulization Once    levothyroxine  50 mcg Oral Before breakfast    sodium chloride 0.9%  3 mL Intravenous Q8H    vancomycin (VANCOCIN) IVPB  15 mg/kg Intravenous Q24H    warfarin  5 mg Oral Daily     Continuous Infusions:   sodium chloride 0.9%       PRN Meds:acetaminophen, alprazolam, dextrose 50%, hydrALAZINE, insulin aspart, lorazepam, metoprolol, morphine, ondansetron, ramelteon, senna-docusate 8.6-50 mg, sodium chloride 0.9%    Objective:     Vital Signs (Most Recent):  Temp: 97.5 °F (36.4 °C) (04/17/17  0810)  Pulse: 68 (04/17/17 1100)  Resp: 20 (04/17/17 0810)  BP: (!) 119/58 (04/17/17 0810)  SpO2: (!) 92 % (04/17/17 0901)  BP Location: Left arm    Vital Signs Range (Last 24H):  Temp:  [96.7 °F (35.9 °C)-97.6 °F (36.4 °C)]   Pulse:  []   Resp:  [14-36]   BP: (110-130)/(58-74)   SpO2:  [78 %-94 %]   BP Location: Left arm    Physical Exam   Constitutional: She appears well-developed and well-nourished.   HENT:   Head: Normocephalic and atraumatic.   Mouth/Throat: Oropharynx is clear and moist.   Eyes: Conjunctivae and EOM are normal. Pupils are equal, round, and reactive to light.   Neck: Normal range of motion. Neck supple.   Cardiovascular: Normal rate and normal heart sounds.    Pulmonary/Chest: Effort normal and breath sounds normal.   Abdominal: Soft. Bowel sounds are normal.   Musculoskeletal: She exhibits tenderness (left lower extremity). She exhibits no edema (left ankle).   Neurological: GCS eye subscore is 4 - spontaneous. GCS verbal subscore is 5 - oriented. GCS motor subscore is 6 - obeys commands.   Skin: Skin is warm and dry. No erythema.   Psychiatric: She has a normal mood and affect. Her behavior is normal. Judgment normal.   Nursing note and vitals reviewed.      Neurological Exam:   LOC: alert and follows requests  Language: No aphasia  Speech: No dysarthria  Orientation: Person, Place, Time  Motor*: Arm Left: Paretic: 4/5, Leg Left: Paretic: 4/5, Arm Right: Normal (5/5), Leg Right: Normal (5/5)  Tone: Arm-Left: normal; Leg-Left: normal; Arm-Right: normal; Leg-Right: normal     [place] Coma Scale:  Best Eye Response: 4 - spontaneous  Best Motor Response: 6 - obeys commands  Best Verbal Response: 5 - oriented        NIH Stroke Scale:  Level of Consciousness: 0 - alert  LOC Questions: 0 - answers both correctly  LOC Commands: 0 - performs both correctly  Best Gaze: 0 - normal  Visual: 0 - no visual loss  Facial Palsy: 1 - minor  Motor Left Arm: 1 - drift  Motor Right Arm: 0 - no drift  Motor  Left Le - drift  Motor Right Le - no drift  Limb Ataxia: 0 - absent  Sensory: 0 - normal  Best Language: 0 - no aphasia  Dysarthria: 0 - normal articulation  Extinction and Inattention: 0 - no neglect  NIH Stroke Scale Total: 3      Laboratory:  BMP:   Recent Labs  Lab 17  0458      K 5.8*      CO2 26   BUN 41*   CREATININE 1.5*   CALCIUM 8.5*

## 2017-04-17 NOTE — PROGRESS NOTES
Consult Note  Physical Medicine & Rehab    Consult Requested By:  Pao Viveros MD      Admit Date:  4/12/2017  Admitting Diagnosis:  CVA (cerebral vascular accident) [I63.9], Arterial ischemic stroke, vertebrobasilar, brainstem, acute [I63.219, I63.22]    Reason for Consult:  assess rehabilitation needs    SUBJECTIVE:   Interval History (04/17/2017):  Patient is seen for follow-up rehab evaluation and recommendations.  Improved functional over the weekend.  Now on BiPap for respiratory distress.  Unable to participate with therapy today.  Functional status:  Bed mobility SV.  Sit to stand SBA and RW and transfers CGA and RW.  Ambulated 125 ft SBA-CGA and RW.  UBD Xavi and LBD modA.  Barriers for discharge/rehab admission: not ready for discharge.     HPI, Past Medical, Surgical, Family, and Social History remains the same as documented in the initial encounter.    Review of Systems  Constitutional: No fever or chills.  No malaise or fatigue.  Skin: No rashes or lesions.   Eyes: No visual changes.  ENT: No nasal congestion, sore throat, or ear pain.  No difficulty swallowing.   Respiratory: No shortness of breath or wheezing.  No cough.  Cardiovascular: No chest pain or palpitations.  No edema.   Gl: No nausea or vomiting.  No abdominal pain.  No incontinence of bowel.  No constipation.   : No incontinence of bladder.   Musculoskeletal: Positive arthralgias.  Positive bone pain.  Positive muscle weakness.  Neurological: No seizures or tremors.  Positive weakness.  Positive sensory impairment.   Behavioral/Psych: No changes in mood or hallucinations.    Past Medical History:   Diagnosis Date    Anemia in chronic kidney disease 3/21/2017    Anxiety     Atherosclerotic cerebrovascular disease 7/25/2012    Chronic respiratory failure with hypoxia 8/11/2014    CKD (chronic kidney disease), stage IV 6/23/2016    CRLD (chronic restrictive lung disease) 8/11/2014    Diabetic peripheral neuropathy 7/24/2012     Diabetic retinopathy     Diverticulosis of colon     DJD (degenerative joint disease) 7/24/2012    Fatty liver     SUSAN (generalized anxiety disorder) 11/15/2012    Heart attack     History of PSVT (paroxysmal supraventricular tachycardia) 4/1/2014    Cardioverted on 2008     Iron deficiency 11/14/2014    Keloid cicatrix     Long term (current) use of anticoagulants 4/15/2014    Mixed hyperlipidemia 1/4/2016    Multiple lung nodules 8/30/2016    Obesity, Class I, BMI 30-34.9 7/24/2012    On home oxygen therapy 6/22/2013    3L NC    Paroxysmal atrial fibrillation 1/4/2016    Renovascular hypertension 1/4/2016    Right-sided heart failure 10/26/2014     1 - Normal left ventricular systolic function (EF 65-70%).    2 - Biatrial enlargement.    3 - Right ventricular enlargement with normal systolic function.    4 - Trivial aortic stenosis, MARY = 1.87 cm2, peak velocity = 1.7 m/s, mean gradient = 6.0 mmHg.    5 - The mitral valve is markedly sclerotic with moderately restricted leaflet mobility.    6 - Mild mitral stenosis, MVA = 2.2 cm2.    7 - Trivial to mild mitral regurgitation.    8 - Trivial to mild tricuspid regurgitation.    9 - Increased central venous pressure.    10 - Pulmonary hypertension. The estimated PA systolic pressure is 63 mmHg.    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     Vitamin D deficiency disease 7/24/2012     Past Surgical History:   Procedure Laterality Date    ABDOMINAL SURGERY      ANKLE SURGERY      CATARACT EXTRACTION  8/17/00    right eye    CATARACT EXTRACTION  6/26/00    left eye    COLONOSCOPY      HYSTERECTOMY       Family History   Problem Relation Age of Onset    Diabetes Mother     Stroke Mother     Hypertension Mother     Melanoma Mother     COPD Sister      Stroke Sister     Diabetes Sister     Hypertension Sister     COPD Brother     Diabetes Brother     Hypertension Brother     Heart disease Maternal Grandfather     No Known Problems Father     No Known Problems Maternal Aunt     No Known Problems Maternal Uncle     No Known Problems Paternal Aunt     Cancer Paternal Uncle     No Known Problems Maternal Grandmother     No Known Problems Paternal Grandmother     No Known Problems Paternal Grandfather     Psoriasis Neg Hx     Lupus Neg Hx     Eczema Neg Hx     Kidney disease Neg Hx     Heart attack Neg Hx     Amblyopia Neg Hx     Blindness Neg Hx     Cataracts Neg Hx     Glaucoma Neg Hx     Macular degeneration Neg Hx     Retinal detachment Neg Hx     Strabismus Neg Hx     Thyroid disease Neg Hx      Social History   Substance Use Topics    Smoking status: Light Tobacco Smoker     Packs/day: 0.50     Years: 20.00     Types: Cigarettes     Last attempt to quit: 2/15/2016    Smokeless tobacco: Never Used      Comment:  on Smoking program    Alcohol use No     Review of patient's allergies indicates:   Allergen Reactions    Latex, natural rubber Dermatitis and Rash    Adhesive Itching and Rash     Allergy to adhesive in nicotine patch.    Sulfa (sulfonamide antibiotics) Rash        Scheduled Medications:   atorvastatin  40 mg Oral Daily    ceFEPime (MAXIPIME) IVPB  1 g Intravenous Q12H    diltiaZEM  240 mg Oral Daily    fluticasone  2 spray Each Nare Daily    furosemide  20 mg Oral Daily    gabapentin  300 mg Oral TID    ipratropium  0.5 mg Nebulization Q8H    levalbuterol  0.63 mg Nebulization Q8H    levalbuterol  0.63 mg Nebulization Once    levothyroxine  50 mcg Oral Before breakfast    sodium chloride 0.9%  3 mL Intravenous Q8H    vancomycin (VANCOCIN) IVPB  15 mg/kg Intravenous Q24H    warfarin  5 mg Oral Daily     PRN Medications:  acetaminophen, alprazolam, dextrose 50%, hydrALAZINE, insulin aspart, lorazepam,  metoprolol, morphine, ondansetron, ramelteon, senna-docusate 8.6-50 mg, sodium chloride 0.9%    OBJECTIVE:     Vital Signs (Most Recent)  Temp: 97.7 °F (36.5 °C) (17 1229)  Pulse: 70 (17 1229)  Resp: (!) 21 (17 1229)  BP: 133/63 (17 1229)  SpO2: (!) 93 % (17 1229)    Vital Signs Range (Last 24H):  Temp:  [96.7 °F (35.9 °C)-97.7 °F (36.5 °C)]   Pulse:  []   Resp:  [16-36]   BP: (110-133)/(58-74)   SpO2:  [78 %-94 %]     Physical Exam:  Vital signs reviewed.   General appearance:  No apparent distress.  Appears well-developed and well-nourished.  Skin:  Color and texture normal.  Warm and dry.  No jaundice.  No visible rashes or visible lesions.  HEENT:  Normocephalic and atraumatic.  No scleral icterus.  No difficulty hearing.  No nasal discharge.  No dysphonia.  Pulmonary:  Increased respiratory effort.  Tolerating BiPap.    Cardiac:  Normal heart rate.  Extremities: No calf tenderness.  Distal pulses intact.  No edema.    Abdomen:  Soft, non-tender and non-distended.  Musculoskeletal:  Moves all extremities spontaneously.  ROM: normal.    Neurologic:  Sleeping.  Opens eyes to voice.  Follows commands.  Answers correct age and .  Left weakness.   Behavioral/Psych: Calm and cooperative.    Diagnostic Results:  X-Ray: Reviewed  US: Reviewed  CT: Reviewed  MRI: Reviewed  Labs: Reviewed    ASSESSMENT/PLAN:      Savita Polo is a 71 y.o. female admitted on 2017 for right facial droop and left sided numbness and weakness with associated gait instability.  Subtherapeutic INR of 1.7 on admission.  CTH without acute pathology.  Not tPA candidate 2/2 outside of treatment window.  MRI and MRA without acute abnormalities; however, presentation concerning for brainstem ischemia.          PM&R consulted to assess rehabilitation needs.    Functional status:  Bed mobility SV.  Sit to stand SBA and RW and transfers CGA and RW.  Ambulated 125 ft SBA-CGA and RW.  UBD Xavi and LBD  modA.  Cognitive/Speech/Language status:  Cognitive-linguistic impairment, mild memory deficits.  Nutrition/Swallow Status:  Passed bedside swallow evaluation.  Speech recommended regular diet and thin liquids.    -  Reviewed discharge options with patient (inpatient rehab, SNF, home health therapy, and outpatient therapy).  Encourage participation with therapy.  -  Recommendations  · PT and OT evaluate and treat.   · SLP cognitive and speech evaluate and treat.   · Mobility, OOB in chair at least 3 hours per day, and early ambulation as appropriate.  · Establish consistent sleep-wake cycle and monitor for sleep disturbances.  · Monitor for bowel and bladder dysfunction.   · Monitor for skin breakdown and pressure ulcers.  Turn patient every 2 hours and repositioning/weight shifting every 20-30 minutes when sitting.  Pressure relief/heel protector boots and proper mattress/overlay and chair cushioning.   · DVT prophylaxis:  Coumadin scheduled.     Patient approved for [facility] inpatient rehab; however, patient progressed with therapy over weekend and prefers home with home health.  Will sign off.  Please call with questions/concerns or re-consult if situation changes.    Thank you for consult.    MAXI Galloway, FNP-C    Physical Medicine & Rehabilitation   04/17/2017  Spectralink: [id number]    Collaborating Physician : Boogie Gamez MD

## 2017-04-17 NOTE — PROGRESS NOTES
Pt blood glucose checked. 427. Checked again 418. md on call paged to notify. Will cont to monitor.

## 2017-04-17 NOTE — PT/OT/SLP PROGRESS
Occupational Therapy  Not Seen/ on HOLD      Savita Polo  MRN: [id number]    Patient not seen today secondary to patient on HOLD.  Per nurse patient de-sat to 58% and now on BiPAP. Will follow-up 4/18.    RITA Hyman  4/17/2017

## 2017-04-17 NOTE — PROGRESS NOTES
Subjective:       Patient ID: Savita Polo is a 71 y.o. female.    Chief Complaint: Hypertension    HPI: She returns for management of hypertension.  She has had hypertension for over a year.  Current treatment has included medications outlined in medication list.  She denies chest pain or shortness of breath.  No palpitations.  Denies left arm or neck pain.  She woke up this morning with the acute onset of left-sided weakness.  She fell trying to get out of bed    Past medical history: Hypertension, diabetes, CVA, hypothyroidism, COPD,Atrial fibrillation, chronic renal insufficiency, hyperlipidemia, diverticulitis, lung nodule, anemia, avascular necrosis of hip, status post hysterectomy, ankle surgery. She had a colonoscopy February 2015      Medications: Albuterol MDI 2 puffs q.i.d. p.r.n., Advair 500/50 one puff b.i.d.,novolog insulin,levemir insulin 28 units daily, Synthroid 0.075 mg daily,  Cardizem  mg daily, Coumadin as monitored by Coumadin clinic, Pravachol 40 mg daily, home oxygen , Lasix 20 mg twice a day , Lopressor 25 mg twice a day      Allergies: sulfa      Review of Systems   Constitutional: Negative for chills, fatigue, fever and unexpected weight change.   Respiratory: Negative for chest tightness and shortness of breath.    Cardiovascular: Negative for chest pain and palpitations.   Gastrointestinal: Negative for abdominal pain and blood in stool.   Neurological: Negative for dizziness, syncope, numbness and headaches.       Objective:      Physical Exam   HENT:   Right Ear: External ear normal.   Left Ear: External ear normal.   Nose: Nose normal.   Mouth/Throat: Oropharynx is clear and moist.   Eyes: Pupils are equal, round, and reactive to light.   Neck: Normal range of motion.   Cardiovascular: Normal rate and regular rhythm.    No murmur heard.  Pulmonary/Chest: Breath sounds normal.   Abdominal: She exhibits no distension. There is no hepatosplenomegaly. There is no tenderness.    Lymphadenopathy:     She has no cervical adenopathy.     She has no axillary adenopathy.   Neurological: She has normal reflexes. No cranial nerve deficit or sensory deficit.   Left hemiparesis       Assessment:     assessment and plan: 1.  Hypertension: Continue Cardizem  2.  CVA: Patient transferred to emergency room for further evaluation      Plan:       As above

## 2017-04-17 NOTE — PT/OT/SLP PROGRESS
Physical Therapy  HOLD/Patient Not Seen    Savita Polo  MRN: [id number]     PT attempted to see pt for treatment this date; however, pt with decline in respiratory status and req bi-pap this date. Not appropriate for PT intervention. PT will follow-up 4/18 per POC.    Angelika Cifuentes, PT, DPT  [phone]  4/17/2017

## 2017-04-17 NOTE — PT/OT/SLP PROGRESS
Speech Language Pathology      Savita Polo  MRN: [id number]    Patient not seen today secondary to decline in resp function/on hold per nursing.  Pt. On bipap  . Will follow-up 4/18  Xin Nagy MA, CCC-SLP

## 2017-04-17 NOTE — ASSESSMENT & PLAN NOTE
- up to 5.8 this AM and has not been a problem before  - ECG demonstrated possible some peaking of T waves but very subtle, gave Ca gluconate  - Pt currenly already receiving insulin   - repeating BMP and will further address based on this

## 2017-04-17 NOTE — NURSING
Called to room r/t pt c/o not being able to breathe and feeling nauseated, per PCT. Upon arrival pt was found in the room with PCT and staff RN with O2 75% ON 5LNC after being put on bedpan. Pt O2 quickly began to decrease to 58% on 5LNC. Pt could not maintain O2 above 64% on 8LNC. NP notified Pt put on non-re breather 11L, 76%.    STAT ABG,AB XRAY, respiratory tx initiated. NP, Charge Nurse, RT, RN at bedside. Pt reported feeling anxious when trying to explain when she started experiencing SOB, daily Xanax given. Pt is now on BiPAP, O2 93%, 22 RR, sleeping with no apparent distress.

## 2017-04-17 NOTE — ASSESSMENT & PLAN NOTE
- Ucx grew enteroccocus faecalis  - Now on cefepime and vanc bc patient now has leukocytosis and new bilateral pulmonary opacities

## 2017-04-17 NOTE — SUBJECTIVE & OBJECTIVE
Past Medical History:   Diagnosis Date    Anemia in chronic kidney disease 3/21/2017    Anxiety     Atherosclerotic cerebrovascular disease 7/25/2012    Chronic respiratory failure with hypoxia 8/11/2014    CKD (chronic kidney disease), stage IV 6/23/2016    CRLD (chronic restrictive lung disease) 8/11/2014    Diabetic peripheral neuropathy 7/24/2012    Diabetic retinopathy     Diverticulosis of colon     DJD (degenerative joint disease) 7/24/2012    Fatty liver     SUSAN (generalized anxiety disorder) 11/15/2012    Heart attack     History of PSVT (paroxysmal supraventricular tachycardia) 4/1/2014    Cardioverted on 2008     Iron deficiency 11/14/2014    Keloid cicatrix     Long term (current) use of anticoagulants 4/15/2014    Mixed hyperlipidemia 1/4/2016    Multiple lung nodules 8/30/2016    Obesity, Class I, BMI 30-34.9 7/24/2012    On home oxygen therapy 6/22/2013    3L NC    Paroxysmal atrial fibrillation 1/4/2016    Renovascular hypertension 1/4/2016    Right-sided heart failure 10/26/2014     1 - Normal left ventricular systolic function (EF 65-70%).    2 - Biatrial enlargement.    3 - Right ventricular enlargement with normal systolic function.    4 - Trivial aortic stenosis, MARY = 1.87 cm2, peak velocity = 1.7 m/s, mean gradient = 6.0 mmHg.    5 - The mitral valve is markedly sclerotic with moderately restricted leaflet mobility.    6 - Mild mitral stenosis, MVA = 2.2 cm2.    7 - Trivial to mild mitral regurgitation.    8 - Trivial to mild tricuspid regurgitation.    9 - Increased central venous pressure.    10 - Pulmonary hypertension. The estimated PA systolic pressure is 63 mmHg.    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    Sulfa (sulfonamide antibiotics) Rash       No current facility-administered medications on file prior to encounter.      Current Outpatient Prescriptions on File Prior to Encounter   Medication Sig    albuterol (PROAIR HFA) 90 mcg/actuation inhaler Inhale 2 puffs into the lungs every 6 (six) hours as needed for Wheezing or Shortness of Breath.    albuterol-ipratropium 2.5mg-0.5mg/3mL (DUO-NEB) 0.5 mg-3 mg(2.5 mg base)/3 mL nebulizer solution Take 3 mLs by nebulization every 4 (four) hours as needed for Wheezing. Rescue    alprazolam (XANAX) 0.5 MG tablet TAKE 1 TABLET BY MOUTH 2 TIMES A DAY    cetirizine (ZYRTEC) 10 MG tablet Take 1 tablet (10 mg total) by mouth once daily.    diltiaZEM (CARDIZEM CD) 240 MG 24 hr capsule Take 1 capsule (240 mg total) by mouth once daily.    ergocalciferol (ERGOCALCIFEROL) 50,000 unit Cap Take 1 capsule (50,000 Units total) by mouth every 7 days.    FERROUS SULFATE ORAL Take 1 tablet by mouth once daily.    fluticasone (FLONASE) 50 mcg/actuation nasal spray 1 spray by Each Nare route once daily.    fluticasone-salmeterol 500-50 mcg/dose (ADVAIR DISKUS) 500-50 mcg/dose DsDv diskus inhaler Inhale 1 puff into the lungs 2 (two) times daily. Controller    furosemide (LASIX) 20 MG tablet take 1 tablet by mouth twice a day    insulin aspart (NOVOLOG FLEXPEN) 100 unit/mL InPn pen 26 units AC    insulin detemir (LEVEMIR FLEXTOUCH) 100 unit/mL (3 mL) SubQ InPn pen Inject 26 Units into the skin every evening.    levothyroxine (SYNTHROID) 50 MCG tablet Take 1 tablet (50 mcg total) by mouth  "before breakfast.    metoprolol tartrate (LOPRESSOR) 25 MG tablet take 1 tablet by mouth twice a day    ondansetron (ZOFRAN) 4 MG tablet Take 1 tablet (4 mg total) by mouth every 8 (eight) hours as needed for Nausea.    pen needle, diabetic, safety (NOVOFINE AUTOCOVER) 30 gauge x 1/3" Ndle Uses 4 a day    pravastatin (PRAVACHOL) 40 MG tablet take 1 tablet by mouth once daily    warfarin (COUMADIN) 2.5 MG tablet Take 2.5 mg by mouth once daily. Except take 5 mg on Wed     Family History     Problem Relation (Age of Onset)    COPD Sister, Brother    Cancer Paternal Uncle    Diabetes Mother, Sister, Brother    Heart disease Maternal Grandfather    Hypertension Mother, Sister, Brother    Melanoma Mother    No Known Problems Father, Maternal Aunt, Maternal Uncle, Paternal Aunt, Maternal Grandmother, Paternal Grandmother, Paternal Grandfather    Stroke Mother, Sister        Social History Main Topics    Smoking status: Light Tobacco Smoker     Packs/day: 0.50     Years: 20.00     Types: Cigarettes     Last attempt to quit: 2/15/2016    Smokeless tobacco: Never Used      Comment:  on Smoking program    Alcohol use No    Drug use: No    Sexual activity: Not on file     Review of Systems   Constitutional: Negative for chills and fever.   HENT: Negative for sore throat.    Eyes: Negative for pain and visual disturbance.   Respiratory: Positive for shortness of breath. Negative for cough, chest tightness and wheezing.    Cardiovascular: Negative for chest pain, palpitations and leg swelling.   Gastrointestinal: Negative for abdominal pain and nausea.   Genitourinary: Negative for dysuria and hematuria.   Musculoskeletal: Negative for arthralgias, back pain and myalgias.   Skin: Positive for pallor. Negative for rash.   Neurological: Negative for dizziness, speech difficulty and light-headedness.   Psychiatric/Behavioral: Negative for agitation, confusion, decreased concentration and hallucinations.     Objective: "     Vital Signs (Most Recent):  Temp: 97.1 °F (36.2 °C) (04/17/17 1420)  Pulse: 84 (04/17/17 1518)  Resp: 18 (04/17/17 1518)  BP: 121/67 (04/17/17 1420)  SpO2: 96 % (04/17/17 1420) Vital Signs (24h Range):  Temp:  [97.1 °F (36.2 °C)-97.7 °F (36.5 °C)] 97.1 °F (36.2 °C)  Pulse:  [] 84  Resp:  [17-36] 18  SpO2:  [78 %-96 %] 96 %  BP: (110-133)/(58-74) 121/67     Weight: 68 kg (150 lb)  Body mass index is 32.46 kg/(m^2).    Physical Exam   Constitutional: She is oriented to person, place, and time. She appears well-developed and well-nourished. No distress.   BiPAP mask in place   HENT:   Mouth/Throat: Oropharynx is clear and moist.   Eyes: No scleral icterus.   Neck: No JVD present. Carotid bruit is not present.   Cardiovascular: Normal rate, regular rhythm and normal heart sounds.  Exam reveals no friction rub.    No murmur heard.  Pulses:       Dorsalis pedis pulses are 2+ on the right side, and 2+ on the left side.        Posterior tibial pulses are 2+ on the right side, and 2+ on the left side.   Pulmonary/Chest: Effort normal. No accessory muscle usage. No respiratory distress. She has no wheezes. She has rales (Bibasilar).   Abdominal: Soft. Normal appearance and bowel sounds are normal. She exhibits no distension. There is no hepatosplenomegaly. There is no tenderness.   Musculoskeletal: She exhibits no edema.   Neurological: She is alert and oriented to person, place, and time.   Skin: Skin is warm. No rash noted. No erythema. No pallor.   Psychiatric: She has a normal mood and affect. Her behavior is normal. Thought content normal.       Significant Labs:   A1C:   Recent Labs  Lab 03/13/17  0620 03/29/17  0825 04/13/17  1938   HGBA1C 7.8* 8.7* 8.1*     ABGs:   Recent Labs  Lab 04/17/17  1315 on BiPAP at +18/8 and FIO2 50%   PH 7.251*   PCO2 62.9*   HCO3 27.7   POCSATURATED 88*   BE 0     CBC:   Recent Labs  Lab 04/16/17  0912 04/17/17  0458   WBC 9.17 13.18*   HGB 12.6 12.5   HCT 38.0 38.8    214      CMP:   Recent Labs  Lab 04/16/17  0912 04/17/17  0458 04/17/17  1622   * 136 135*   K 4.8 5.8* 5.3*    101 99   CO2 25 26 27   * 273* 432*   BUN 36* 41* 46*   CREATININE 1.4 1.5* 1.8*   CALCIUM 8.6* 8.5* 8.8   PROT 6.8 7.0  --    ALBUMIN 2.7* 2.7*  --    BILITOT 0.4 0.3  --    ALKPHOS 63 86  --    AST 17 23  --    ALT 13 18  --    ANIONGAP 10 9 9   EGFRNONAA 37.6* 34.6* 27.7*     BNP   Date Value Ref Range Status   04/17/2017 296 (H) 0 - 99 pg/mL Final     Comment:     Values of less than 100 pg/ml are consistent with non-CHF populations.   04/12/2017 125 (H) 0 - 99 pg/mL Final     Comment:     Values of less than 100 pg/ml are consistent with non-CHF populations.   03/12/2017 370 (H) 0 - 99 pg/mL Final     Comment:     Values of less than 100 pg/ml are consistent with non-CHF populations.   11/11/2014 901 (H) 0 - 99 pg/mL Final     Comment:     Values of less than 100 pg/ml are consistent with non-CHF populations.   09/19/2014 86 0 - 99 pg/mL Final     Comment:     Values of less than 100 pg/ml are consistent with non-CHF populations.   07/12/2014 175 (H) 0 - 99 pg/mL Final     Comment:     Values of less than 100 pg/ml are consistent with non-CHF populations.      Significant Imaging: CXR: I have reviewed all pertinent results/findings within the past 24 hours and my personal findings are:  diffuse bilateral patchy opacities worse in right lower lobe and left upper lobe; No pleural effusions noted  EKG: I have reviewed all pertinent results/findings within the past 24 hours and my personal findings are: No acute ischemic changes noted; Normal sinus rhythm with no blocks or condction defects

## 2017-04-17 NOTE — PROGRESS NOTES
At bedside to do respiratory treatment. Patient eating. Came back and did treatment a few minutes later. Patient not on Bipap. Offered to put patient back on Bipap but patient relates doctor and nurse agreed patient could have a break from the Bipap. Will contact nurse for further instructions.

## 2017-04-17 NOTE — ASSESSMENT & PLAN NOTE
- patient was SOB and hypoxic to 60-70% this AM --> Placed on BiPAP and ABGs demonstrated hypercapnia to 60s-71  - CXR with multiple B pulmonary opacities, started on cefepime and vancomycin   - repeating CXR this afternoon and ABG  - Pt currently NPO

## 2017-04-18 VITALS
DIASTOLIC BLOOD PRESSURE: 60 MMHG | BODY MASS INDEX: 32.36 KG/M2 | WEIGHT: 150 LBS | RESPIRATION RATE: 18 BRPM | TEMPERATURE: 98 F | SYSTOLIC BLOOD PRESSURE: 136 MMHG | HEIGHT: 57 IN | HEART RATE: 82 BPM | OXYGEN SATURATION: 92 %

## 2017-04-18 LAB
ALBUMIN SERPL BCP-MCNC: 2.7 G/DL
ALLENS TEST: ABNORMAL
ALP SERPL-CCNC: 65 U/L
ALT SERPL W/O P-5'-P-CCNC: 24 U/L
ANION GAP SERPL CALC-SCNC: 12 MMOL/L
ANISOCYTOSIS BLD QL SMEAR: SLIGHT
AST SERPL-CCNC: 27 U/L
BASOPHILS # BLD AUTO: 0.03 K/UL
BASOPHILS NFR BLD: 0.2 %
BILIRUB SERPL-MCNC: 0.3 MG/DL
BUN SERPL-MCNC: 50 MG/DL
CALCIUM SERPL-MCNC: 8.9 MG/DL
CHLORIDE SERPL-SCNC: 102 MMOL/L
CO2 SERPL-SCNC: 21 MMOL/L
CREAT SERPL-MCNC: 1.7 MG/DL
DELSYS: ABNORMAL
DIFFERENTIAL METHOD: ABNORMAL
EOSINOPHIL # BLD AUTO: 0 K/UL
EOSINOPHIL NFR BLD: 0 %
ERYTHROCYTE [DISTWIDTH] IN BLOOD BY AUTOMATED COUNT: 12.7 %
EST. GFR  (AFRICAN AMERICAN): 34.2 ML/MIN/1.73 M^2
EST. GFR  (NON AFRICAN AMERICAN): 29.7 ML/MIN/1.73 M^2
GLUCOSE SERPL-MCNC: 291 MG/DL
HCO3 UR-SCNC: 31.6 MMOL/L (ref 24–28)
HCT VFR BLD AUTO: 36.5 %
HGB BLD-MCNC: 12.1 G/DL
HYPOCHROMIA BLD QL SMEAR: ABNORMAL
INR PPP: 2.4
LYMPHOCYTES # BLD AUTO: 1.2 K/UL
LYMPHOCYTES NFR BLD: 7.1 %
MCH RBC QN AUTO: 32.4 PG
MCHC RBC AUTO-ENTMCNC: 33.2 %
MCV RBC AUTO: 98 FL
MODE: ABNORMAL
MONOCYTES # BLD AUTO: 0.4 K/UL
MONOCYTES NFR BLD: 2.4 %
NEUTROPHILS # BLD AUTO: 15.7 K/UL
NEUTROPHILS NFR BLD: 90.3 %
OVALOCYTES BLD QL SMEAR: ABNORMAL
PCO2 BLDA: 66.1 MMHG (ref 35–45)
PH SMN: 7.29 [PH] (ref 7.35–7.45)
PLATELET # BLD AUTO: 190 K/UL
PMV BLD AUTO: 10.3 FL
PO2 BLDA: 26 MMHG (ref 40–60)
POC BE: 5 MMOL/L
POC SATURATED O2: 38 % (ref 95–100)
POC TCO2: 34 MMOL/L (ref 24–29)
POCT GLUCOSE: 218 MG/DL (ref 70–110)
POCT GLUCOSE: 265 MG/DL (ref 70–110)
POCT GLUCOSE: 383 MG/DL (ref 70–110)
POCT GLUCOSE: 404 MG/DL (ref 70–110)
POIKILOCYTOSIS BLD QL SMEAR: SLIGHT
POLYCHROMASIA BLD QL SMEAR: ABNORMAL
POTASSIUM SERPL-SCNC: 4.8 MMOL/L
PROT SERPL-MCNC: 7 G/DL
PROTHROMBIN TIME: 23.6 SEC
RBC # BLD AUTO: 3.74 M/UL
SAMPLE: ABNORMAL
SITE: ABNORMAL
SODIUM SERPL-SCNC: 135 MMOL/L
WBC # BLD AUTO: 17.57 K/UL

## 2017-04-18 PROCEDURE — 99233 SBSQ HOSP IP/OBS HIGH 50: CPT | Mod: GC,,, | Performed by: PSYCHIATRY & NEUROLOGY

## 2017-04-18 PROCEDURE — 85025 COMPLETE CBC W/AUTO DIFF WBC: CPT

## 2017-04-18 PROCEDURE — 97530 THERAPEUTIC ACTIVITIES: CPT

## 2017-04-18 PROCEDURE — 85610 PROTHROMBIN TIME: CPT

## 2017-04-18 PROCEDURE — 80053 COMPREHEN METABOLIC PANEL: CPT

## 2017-04-18 PROCEDURE — 27100171 HC OXYGEN HIGH FLOW UP TO 24 HOURS

## 2017-04-18 PROCEDURE — G8988 SELF CARE GOAL STATUS: HCPCS | Mod: CJ

## 2017-04-18 PROCEDURE — G8989 SELF CARE D/C STATUS: HCPCS | Mod: CJ

## 2017-04-18 PROCEDURE — 36600 WITHDRAWAL OF ARTERIAL BLOOD: CPT

## 2017-04-18 PROCEDURE — 25000003 PHARM REV CODE 250: Performed by: PSYCHIATRY & NEUROLOGY

## 2017-04-18 PROCEDURE — 36415 COLL VENOUS BLD VENIPUNCTURE: CPT

## 2017-04-18 PROCEDURE — 82803 BLOOD GASES ANY COMBINATION: CPT

## 2017-04-18 PROCEDURE — 63600175 PHARM REV CODE 636 W HCPCS: Performed by: PSYCHIATRY & NEUROLOGY

## 2017-04-18 PROCEDURE — 99232 SBSQ HOSP IP/OBS MODERATE 35: CPT | Mod: ,,, | Performed by: HOSPITALIST

## 2017-04-18 PROCEDURE — 92507 TX SP LANG VOICE COMM INDIV: CPT

## 2017-04-18 PROCEDURE — 94761 N-INVAS EAR/PLS OXIMETRY MLT: CPT

## 2017-04-18 PROCEDURE — 27000221 HC OXYGEN, UP TO 24 HOURS

## 2017-04-18 PROCEDURE — 25000242 PHARM REV CODE 250 ALT 637 W/ HCPCS: Performed by: PSYCHIATRY & NEUROLOGY

## 2017-04-18 PROCEDURE — 63600175 PHARM REV CODE 636 W HCPCS: Performed by: STUDENT IN AN ORGANIZED HEALTH CARE EDUCATION/TRAINING PROGRAM

## 2017-04-18 PROCEDURE — 25000003 PHARM REV CODE 250: Performed by: NURSE PRACTITIONER

## 2017-04-18 PROCEDURE — 99900035 HC TECH TIME PER 15 MIN (STAT)

## 2017-04-18 PROCEDURE — 94640 AIRWAY INHALATION TREATMENT: CPT

## 2017-04-18 PROCEDURE — 97535 SELF CARE MNGMENT TRAINING: CPT

## 2017-04-18 RX ORDER — LEVOFLOXACIN 750 MG/1
750 TABLET ORAL DAILY
Qty: 7 TABLET | Refills: 0 | Status: SHIPPED | OUTPATIENT
Start: 2017-04-18 | End: 2017-04-25

## 2017-04-18 RX ORDER — TRAMADOL HYDROCHLORIDE 50 MG/1
50 TABLET ORAL EVERY 6 HOURS PRN
Qty: 36 TABLET | Refills: 0 | Status: SHIPPED | OUTPATIENT
Start: 2017-04-18 | End: 2017-04-18

## 2017-04-18 RX ORDER — PREDNISONE 20 MG/1
40 TABLET ORAL DAILY
Qty: 6 TABLET | Refills: 0 | Status: SHIPPED | OUTPATIENT
Start: 2017-04-18 | End: 2017-04-28

## 2017-04-18 RX ORDER — TRAMADOL HYDROCHLORIDE 50 MG/1
50 TABLET ORAL EVERY 6 HOURS PRN
Qty: 36 TABLET | Refills: 0 | Status: SHIPPED | OUTPATIENT
Start: 2017-04-18 | End: 2017-04-27 | Stop reason: ALTCHOICE

## 2017-04-18 RX ORDER — PREDNISONE 20 MG/1
40 TABLET ORAL DAILY
Status: DISCONTINUED | OUTPATIENT
Start: 2017-04-18 | End: 2017-04-18 | Stop reason: HOSPADM

## 2017-04-18 RX ORDER — FUROSEMIDE 10 MG/ML
40 INJECTION INTRAMUSCULAR; INTRAVENOUS ONCE
Status: COMPLETED | OUTPATIENT
Start: 2017-04-18 | End: 2017-04-18

## 2017-04-18 RX ADMIN — IPRATROPIUM BROMIDE AND ALBUTEROL SULFATE 3 ML: .5; 3 SOLUTION RESPIRATORY (INHALATION) at 08:04

## 2017-04-18 RX ADMIN — Medication 1000 MG: at 09:04

## 2017-04-18 RX ADMIN — LEVOTHYROXINE SODIUM 50 MCG: 50 TABLET ORAL at 05:04

## 2017-04-18 RX ADMIN — ATORVASTATIN CALCIUM 40 MG: 20 TABLET, FILM COATED ORAL at 09:04

## 2017-04-18 RX ADMIN — INSULIN ASPART 4 UNITS: 100 INJECTION, SOLUTION INTRAVENOUS; SUBCUTANEOUS at 09:04

## 2017-04-18 RX ADMIN — ALPRAZOLAM 0.5 MG: 0.5 TABLET ORAL at 05:04

## 2017-04-18 RX ADMIN — PREDNISONE 40 MG: 20 TABLET ORAL at 11:04

## 2017-04-18 RX ADMIN — INSULIN ASPART 2 UNITS: 100 INJECTION, SOLUTION INTRAVENOUS; SUBCUTANEOUS at 11:04

## 2017-04-18 RX ADMIN — INSULIN ASPART 10 UNITS: 100 INJECTION, SOLUTION INTRAVENOUS; SUBCUTANEOUS at 11:04

## 2017-04-18 RX ADMIN — GABAPENTIN 300 MG: 300 CAPSULE ORAL at 05:04

## 2017-04-18 RX ADMIN — FLUTICASONE PROPIONATE 2 SPRAY: 50 SPRAY, METERED NASAL at 09:04

## 2017-04-18 RX ADMIN — INSULIN ASPART 6 UNITS: 100 INJECTION, SOLUTION INTRAVENOUS; SUBCUTANEOUS at 05:04

## 2017-04-18 RX ADMIN — CEFEPIME HYDROCHLORIDE 1 G: 1 INJECTION, POWDER, FOR SOLUTION INTRAMUSCULAR; INTRAVENOUS at 11:04

## 2017-04-18 RX ADMIN — DILTIAZEM HYDROCHLORIDE 240 MG: 120 CAPSULE, EXTENDED RELEASE ORAL at 09:04

## 2017-04-18 RX ADMIN — Medication 3 ML: at 05:04

## 2017-04-18 RX ADMIN — ACETAMINOPHEN 650 MG: 325 TABLET ORAL at 05:04

## 2017-04-18 RX ADMIN — FUROSEMIDE 40 MG: 10 INJECTION, SOLUTION INTRAMUSCULAR; INTRAVENOUS at 11:04

## 2017-04-18 RX ADMIN — IPRATROPIUM BROMIDE AND ALBUTEROL SULFATE 3 ML: .5; 3 SOLUTION RESPIRATORY (INHALATION) at 03:04

## 2017-04-18 RX ADMIN — INSULIN ASPART 2 UNITS: 100 INJECTION, SOLUTION INTRAVENOUS; SUBCUTANEOUS at 09:04

## 2017-04-18 RX ADMIN — IPRATROPIUM BROMIDE AND ALBUTEROL SULFATE 3 ML: .5; 3 SOLUTION RESPIRATORY (INHALATION) at 12:04

## 2017-04-18 NOTE — PT/OT/SLP PROGRESS
"Speech Language Pathology  Treatment    Savita Polo   MRN: [id number]   Admitting Diagnosis: Arterial ischemic stroke, vertebrobasilar, brainstem, acute    Diet recommendations: Solid Diet Level: Regular  Liquid Diet Level: Thin HOB to 90 degrees    SLP Treatment Date: 17  Speech Start Time: 1431     Speech Stop Time: 1442     Speech Total (min): 11 min       TREATMENT BILLABLE MINUTES:  Speech Therapy Individual 11    Has the patient been evaluated by SLP for swallowing? : Yes  Keep patient NPO?: No   General Precautions: Standard, aspiration, fall          Subjective:  " I still feel a little fuzzy" referring to her thinking    Pain Ratin/10              Pain Rating Post-Intervention: 0/10    Objective:      Pt seen bedside with her significant other present. Pt reported she was being discharged home today.Discussed that home health speech tx is ordered. Pt remains hyperverbal with redirection needed at time. Reviewed memory and word finding strategies. Significant other reported that pt often looses her train of thought frequently. Discussed cognitive task to help with focus/concentration. Encouraged family to redirect when needed. Pt and significant other expressed understanding of information. She denied any trouble with swallowing.                                    Assessment:  Savita Polo is a 71 y.o. female with a medical diagnosis of Arterial ischemic stroke, vertebrobasilar, brainstem, acute and presents with memory and word finding deficits. Pt with these deficits premorbidly but pt reporting she is not back to baseline. Progress towards goals.     Discharge recommendations: Discharge Facility/Level Of Care Needs: home health speech therapy     Goals:   SLP Goals        Problem: SLP Goal    Goal Priority Disciplines Outcome   SLP Goal     SLP    Description:  Goals to be met   1. Pt will participate in eval of reading/writing and visual spatial deficits  2 Pt will participate in " further eval of math/time  3 Pt will recall memory strategies to help with daily recall                Plan:   Patient to be seen Therapy Frequency: 5 x/week   Plan of Care expires:    Plan of Care reviewed with: patient, significant other  SLP Follow-up?: Yes              JOSE Medina, CCC-SLP  04/18/2017

## 2017-04-18 NOTE — SUBJECTIVE & OBJECTIVE
Interval History: NAEON. Pt was off of BiPAP for dinner last night with desats into 70s. Back on BiPAP overnight after finishing dinner. This morning, she is on nasal cannula while eating breakfast with sats in mid to high 90s. She denies SOB currently and reports that she feels her breathing is at baseline. She reports expectorating yellow sputum yesterday for culture (normal sputum is white) but denies increase in sputum production or worsening cough. Reports slight, intermittent CP, denies worsening confusion, f/c, or palpitations.      Review of Systems  Objective:     Vital Signs (Most Recent):  Temp: 97.4 °F (36.3 °C) (04/18/17 0715)  Pulse: 87 (04/18/17 0847)  Resp: 17 (04/18/17 0847)  BP: (!) 117/54 (04/18/17 0715)  SpO2: (!) 94 % (04/18/17 0847) Vital Signs (24h Range):  Temp:  [97.1 °F (36.2 °C)-98.6 °F (37 °C)] 97.4 °F (36.3 °C)  Pulse:  [68-90] 87  Resp:  [16-24] 17  SpO2:  [76 %-100 %] 94 %  BP: (117-141)/(54-73) 117/54     Weight: 68 kg (150 lb)  Body mass index is 32.46 kg/(m^2).    Intake/Output Summary (Last 24 hours) at 04/18/17 0935  Last data filed at 04/17/17 1709   Gross per 24 hour   Intake              900 ml   Output              725 ml   Net              175 ml      Physical Exam   Constitutional: She is oriented to person, place, and time. She appears well-developed and well-nourished. No distress.   HENT:   Mouth/Throat: Oropharynx is clear and moist.   Eyes: No scleral icterus.   Neck: No JVD present. Carotid bruit is not present.   Cardiovascular: Normal rate, regular rhythm, normal heart sounds and intact distal pulses.  Exam reveals no friction rub.    No murmur heard.  Pulmonary/Chest: Effort normal. No accessory muscle usage. No respiratory distress. She has wheezes (Scattered expiratory wheezes bilaterally). She has rales (bibasliar).   Abdominal: Soft. Normal appearance and bowel sounds are normal. She exhibits no distension. There is no hepatosplenomegaly. There is no tenderness.    Musculoskeletal: She exhibits no edema.   Neurological: She is alert and oriented to person, place, and time.   Skin: Skin is warm. No rash noted. No erythema. No pallor.   Psychiatric: She has a normal mood and affect. Her behavior is normal. Thought content normal.       Significant Labs:   ABGs:   Recent Labs  Lab 04/18/17  0726   PH 7.288*   PCO2 66.1*   HCO3 31.6*   POCSATURATED 38*   BE 5     Coagulation:   Recent Labs  Lab 04/18/17  0426   INR 2.4*     Respiratory Culture:   Recent Labs  Lab 04/17/17  1650   GSRESP <10 epithelial cells per low power field.   Rare WBC's  Moderate Gram positive cocci  Few Gram positive rods  Rare Gram negative rods   RESPIRATORYC Normal respiratory kalia     All pertinent labs within the past 24 hours have been reviewed.    Significant Imaging: I have reviewed all pertinent imaging results/findings within the past 24 hours.

## 2017-04-18 NOTE — SUBJECTIVE & OBJECTIVE
Modified Sagar Scale:   Timeline:  Modified Sagar Score: 3 - moderate disability         Physical Exam   Constitutional: She appears well-developed and well-nourished.   HENT:   Head: Normocephalic and atraumatic.   Mouth/Throat: Oropharynx is clear and moist.   Eyes: Conjunctivae and EOM are normal. Pupils are equal, round, and reactive to light.   Neck: Normal range of motion. Neck supple.   Cardiovascular: Normal rate and normal heart sounds.   Pulmonary/Chest: Effort normal and breath sounds normal.   Abdominal: Soft. Bowel sounds are normal.   Musculoskeletal: She exhibits tenderness (left lower extremity). She exhibits no edema (left ankle).   Neurological: GCS eye subscore is 4 - spontaneous. GCS verbal subscore is 5 - oriented. GCS motor subscore is 6 - obeys commands.   Skin: Skin is warm and dry. No erythema.   Psychiatric: She has a normal mood and affect. Her behavior is normal. Judgment normal.   Nursing note and vitals reviewed.      NIH Stroke Scale:  Level of Consciousness: 0 - alert  LOC Questions: 0 - answers both correctly  LOC Commands: 0 - performs both correctly  Best Gaze: 0 - normal  Visual: 0 - no visual loss  Facial Palsy: 1 - minor  Motor Left Arm: 1 - drift  Motor Right Arm: 0 - no drift  Motor Left Le - drift  Motor Right Le - no drift  Limb Ataxia: 0 - absent  Sensory: 0 - normal  Best Language: 0 - no aphasia  Dysarthria: 0 - normal articulation  Extinction and Inattention: 0 - no neglect  NIH Stroke Scale Total: 3

## 2017-04-18 NOTE — ASSESSMENT & PLAN NOTE
Patient with acute on chronic mixed hypoxic and hypercapnic respiratory failure. Patient with no significant improvement in hypercapnic respiratory failure despite BiPAP with persistent hypercapnia and ph in 7.25-7.28 range on serial blood gasses. Clinically, patient has no signs of CO2 narcosis and normal mentation and in no respiratory distress and breathing comfortably even when off of BiPAP.  - Continues to have bibasilar crackles and CXR suggestive of improving pulmonary. She reports good UOP after receiving lasix yesterday. Another one time dose of lasix 40 IV ordered.    - She received methylpred 125 x1 yesterday (likely cause of her increasing leukocytosis). She has been afebrile with normal respiratory kalia on sputum culture. Vanc and cefepime stopped.   - Meets criteria for COPD exacerbation (increased SOB and increased putum purulence). Treat with prednisone 40 PO for 5 days and levaquin 750 PO daily for 7 days. Continue scheduled duonebs while inpatient.  - Currently breathing well with sats >88% on 3L O2 (home settings). Ok to discontinue BiPAP.   - Stable for discharge today.

## 2017-04-18 NOTE — ASSESSMENT & PLAN NOTE
- Stroke risk factor  - continued diltiazem  - Continue coumadin to target INR. Today 2.4, therapeutic

## 2017-04-18 NOTE — ASSESSMENT & PLAN NOTE
- up to 5.8 this AM and has not been a problem before  - ECG demonstrated possible some peaking of T waves but very subtle, gave Ca gluconate  - Pt ariadnaly was receiving insulin and got lasix IV and kayexalate  - BMPs with decreasing K levels to 4.8 on day of d/c  - most likely 2/2 kayexelate and lasix

## 2017-04-18 NOTE — ASSESSMENT & PLAN NOTE
- Normal US and xray.   - Continue gabapentin 300 mg TID. Tylenol PRN and Morphine PRN for pain 7-10.   - Ultram rx on d/c with close follow up with PCP encouraged, no refills.

## 2017-04-18 NOTE — PT/OT/SLP DISCHARGE
Occupational Therapy Discharge Summary    Savita Polo  MRN: [id number]   Arterial ischemic stroke, vertebrobasilar, brainstem, acute   Patient Discharged from acute Occupational Therapy on 4/18.  Please refer to prior OT note dated on 4/18 for functional status.     Assessment:   Patient appropriate for care in another setting.  GOALS:   Occupational Therapy Goals     Not on file      Multidisciplinary Problems (Resolved)        Problem: Occupational Therapy Goal    Goal Priority Disciplines Outcome Interventions   Occupational Therapy Goal   (Resolved)     OT, PT/OT Outcome(s) achieved    Description:  Goals set 4/18 to be addressed for 7 days with expiration date, 4/25:  Patient will increase functional independence with ADLs by performing:    Patient will demonstrate stand pivot transfers with modified independence.   Not met  Patient will demonstrate grooming while standing with modified independence.  Not met  Patient will demonstrate upper body dressing with modified independence.   Not met  Patient will demonstrate lower body dressing with modified independence. Not met  Patient will demonstrate toileting with modified independence.  Not met  Patient will demonstrate bathing while seated EOB with modified independence.  Not met  Patient's family / caregiver will demonstrate independence and safety with assisting patient with self-care skills and functional mobility.     Not met                    Reasons for Discontinuation of Therapy Services  Transfer to alternate level of care.      Plan:  Patient Discharged to: Home with Home Health Service.  RITA Fontenot  4/18/2017

## 2017-04-18 NOTE — ASSESSMENT & PLAN NOTE
Patient with acute on chronic mixed hypoxic and hypercapnic respiratory failure. Patient with no significant improvement in hypercapnic respiratory failure despite BiPAP with persistent hypercapnia and ph in 7.25-7.27 range on serial ABGs today. Clinically, patient has no signs of CO2 narcosis and normal mentation and in no respiratory distress and breathing comfortably even when I took patient off of BiPAP to speak with her.     · Unclear as to cause of acute decompensation. CXR shows diffuse bilateral infiltrates that would favor acute pulmonary edema but unclear what would have caused acute pulmonary edema as patient with normal systolic and diastolic function on recent 2 D echo and no significant cardiac valvular problems. Patient on EKG this am with no signs to suggest cardiac ischemia and patient denies any chest pain or anginal equivalent. BNP is mildly elevated at 296 so that would favor possible pulmonary edema so recommend trial of IV Lasix 40 mg IV x 1 dose and get repeat CXR and ABG in the am.    · Other possibility is acute pneumonia as patient did have slight increase in WBC from 9,000 to 13,000 today but patient with no fever or tachycardia and would expect patient to be showing clinical signs of sepsis with degree of abnormalities seen on CXR. I would recommend for now to treat empirically for hospital acquired pneumonia with IV Vancomycin and Cefepime. I do not believe blood cultures warranted at this time. Patient with no sputum production so sputum culture would be useless.     · I highly doubt acute PE as patient with no tachycardia or chest pain. Patient having calf pain but ultrasound of veins of legs on this admit negative for DVT. Clinical picture not typical for PE. I also doubt COPD exacerbation as patient with no active wheezing on exam and has good air movement with no increased sputum production so I do not believe giving patient steroids at this time would benefit patient but can  continue with respiratory nebulized treatments as she has been receiving in the hospital.     · Spoke with primary service and explained that if patient denies any signs of respiratory distress or becomes confused or disoriented that would suggest CO2 narcosis would get another ABG and have very low threshold to transfer patient to the ICU. For now do not recommend any further ABGs today unless respiratory status declines or patient develops increasing confusion or altered mental status but would get repeat ABG in the am.    · Recommend to continue BiPAP at current settings of +18/8 but could try and wean oxygen with goal to keep oxygen sats of > 88% with goal to get patient back to baseline of 3-4 liters of oxygen.    Medicine consult team to re-evaluate patient in the am to reassess patient of continuing need for antibiotics or further need for IV Lasix.

## 2017-04-18 NOTE — CONSULTS
[facility] Hospital Medicine  Consult Note    Patient Name: Savita Polo  MRN: [id number]  Admission Date: 4/12/2017  Hospital Length of Stay: 5 days  Attending Physician: Pao Viveros MD   Primary Care Provider: Nadiya Nava MD     [facility] Medicine Team: Networked reference to record PCT  Vida Hayward MD      Patient information was obtained from patient and past medical records.     Inpatient consult to Hospital Medicine - Stroke Comanagement  Consult performed by: VIDA HAYWARD  Consult ordered by: BRINDA CURTIS  Reason for consult: Acute respiratory failure        Subjective:     Principal Problem: Arterial ischemic stroke, vertebrobasilar, brainstem, acute    Chief Complaint:   Chief Complaint   Patient presents with    Extremity Weakness     sent from  clinic, my L arm not working right, and r leg pain, last normal 11pm last night        HPI: 70 y/o female with past medical history of prior CVA, COPD with chronic hypoxic respiratory failure on home oxygen at 3 liters continuous with recent COPD exacerbation in late March 2017 who still occasionally smokes cigrettes, paroxysmal atrial fibrillation on Coumadin, Type 2 diabetes with CKD Stage IV and polyneuropathy on home insulin therapy who was admitted to Stroke service on 4/12 with stroke symptoms of left sided weakness and right facial palsy. Patient underwent imaging that was negative fo acute stroke but felt patient likely with an acute vertebrobasilar territory infarct and treated for an acute stroke. Patient had sub therapeutic INR on admit and stroke felt cardio embolic. Patient's left sided symptoms have greatly improved since admit and patient was planning on going home today when overnight patient developed respiratory distress requiring placement on BiPAP and 50% FIO2. Medicine consulted for acute on chronic respiratory failure.  Patient reports she has been feeling okay until this am when she  was turning over she noted she became SOB and was having difficulty catching her breath. Patient had been on 3-4 liters of oxygen here in hospital since admit with oxygen sats > 90%. As noted patient is on 3 liters at home of oxygen. ABG was done that showed 7.27/71/50/33 94% on NRB mask that had been placed on patient due to respiratory distress as patient was having obvious distress onset of SOB. Patient was placed on BiPAP at +18/8 and FIO2 of 50% after ABG returned. CXR was done that showed bilateral patchy infiltrates with detrimental  change from CXR on admit on 4/12. Medicine called this am and patient empirically started on IV Vancomycin and Cefepime for possible hospital acquired pneumonia and I evaluated patient this afternoon. I evaluated patient at 1;30 pm this afternoon and upon entering room patient was on BiPAP and in no distress and was actually talking to the nurse and ordering her lunch. I removed patient's BiPAP mask to talk to patient and she was off BiPAP for about 15 minutes and the whole time I spoke with patient she exhibited no signs of increased work of breathing. She was able to speak in full sentences without having to stop and catch her breath. Patient was awake and alert and oriented x 4 and in mentating normally. Patient denies any cough, wheezing, orthopnea or PND. Patient denies any lower extremity swelling or chest pain and currently denied any SOB and was more interested in being able to take off her mask she she could eat her lunch.   When I was in room with patient for some reason she had IVF's hanging on pole of NS at 200 cc/hr but there was no order for IVF's and according to records patient has not been receiving IVF's during hospital stay so unsure as to why IVF's were connected to patient but they were immediately stopped. 1 liter bag was mostly full so patient clearly had not gotten much of any fluids.       Past Medical History:   Diagnosis Date    Anemia in chronic  kidney disease 3/21/2017    Anxiety     Atherosclerotic cerebrovascular disease 7/25/2012    Chronic respiratory failure with hypoxia 8/11/2014    CKD (chronic kidney disease), stage IV 6/23/2016    CRLD (chronic restrictive lung disease) 8/11/2014    Diabetic peripheral neuropathy 7/24/2012    Diabetic retinopathy     Diverticulosis of colon     DJD (degenerative joint disease) 7/24/2012    Fatty liver     SUSAN (generalized anxiety disorder) 11/15/2012    Heart attack     History of PSVT (paroxysmal supraventricular tachycardia) 4/1/2014    Cardioverted on 2008     Iron deficiency 11/14/2014    Keloid cicatrix     Long term (current) use of anticoagulants 4/15/2014    Mixed hyperlipidemia 1/4/2016    Multiple lung nodules 8/30/2016    Obesity, Class I, BMI 30-34.9 7/24/2012    On home oxygen therapy 6/22/2013    3L NC    Paroxysmal atrial fibrillation 1/4/2016    Renovascular hypertension 1/4/2016    Right-sided heart failure 10/26/2014     1 - Normal left ventricular systolic function (EF 65-70%).    2 - Biatrial enlargement.    3 - Right ventricular enlargement with normal systolic function.    4 - Trivial aortic stenosis, MARY = 1.87 cm2, peak velocity = 1.7 m/s, mean gradient = 6.0 mmHg.    5 - The mitral valve is markedly sclerotic with moderately restricted leaflet mobility.    6 - Mild mitral stenosis, MVA = 2.2 cm2.    7 - Trivial to mild mitral regurgitation.    8 - Trivial to mild tricuspid regurgitation.    9 - Increased central venous pressure.    10 - Pulmonary hypertension. The estimated PA systolic pressure is 63 mmHg.    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    Sulfa (sulfonamide antibiotics) Rash       No current facility-administered medications on file prior to encounter.      Current Outpatient Prescriptions on File Prior to Encounter   Medication Sig    albuterol (PROAIR HFA) 90 mcg/actuation inhaler Inhale 2 puffs into the lungs every 6 (six) hours as needed for Wheezing or Shortness of Breath.    albuterol-ipratropium 2.5mg-0.5mg/3mL (DUO-NEB) 0.5 mg-3 mg(2.5 mg base)/3 mL nebulizer solution Take 3 mLs by nebulization every 4 (four) hours as needed for Wheezing. Rescue    alprazolam (XANAX) 0.5 MG tablet TAKE 1 TABLET BY MOUTH 2 TIMES A DAY    cetirizine (ZYRTEC) 10 MG tablet Take 1 tablet (10 mg total) by mouth once daily.    diltiaZEM (CARDIZEM CD) 240 MG 24 hr capsule Take 1 capsule (240 mg total) by mouth once daily.    ergocalciferol (ERGOCALCIFEROL) 50,000 unit Cap Take 1 capsule (50,000 Units total) by mouth every 7 days.    FERROUS SULFATE ORAL Take 1 tablet by mouth once daily.    fluticasone (FLONASE) 50 mcg/actuation nasal spray 1 spray by Each Nare route once daily.    fluticasone-salmeterol 500-50 mcg/dose (ADVAIR DISKUS) 500-50 mcg/dose DsDv diskus inhaler Inhale 1 puff into the lungs 2 (two) times daily. Controller    furosemide (LASIX) 20 MG tablet take 1 tablet by mouth twice a day    insulin aspart (NOVOLOG FLEXPEN) 100 unit/mL InPn pen 26 units AC    insulin detemir (LEVEMIR FLEXTOUCH) 100 unit/mL (3 mL) SubQ InPn pen Inject 26 Units into the skin every evening.    levothyroxine (SYNTHROID) 50 MCG tablet Take 1 tablet (50 mcg total) by mouth before breakfast.    metoprolol tartrate (LOPRESSOR) 25 MG  "tablet take 1 tablet by mouth twice a day    ondansetron (ZOFRAN) 4 MG tablet Take 1 tablet (4 mg total) by mouth every 8 (eight) hours as needed for Nausea.    pen needle, diabetic, safety (NOVOFINE AUTOCOVER) 30 gauge x 1/3" Ndle Uses 4 a day    pravastatin (PRAVACHOL) 40 MG tablet take 1 tablet by mouth once daily    warfarin (COUMADIN) 2.5 MG tablet Take 2.5 mg by mouth once daily. Except take 5 mg on Wed     Family History     Problem Relation (Age of Onset)    COPD Sister, Brother    Cancer Paternal Uncle    Diabetes Mother, Sister, Brother    Heart disease Maternal Grandfather    Hypertension Mother, Sister, Brother    Melanoma Mother    No Known Problems Father, Maternal Aunt, Maternal Uncle, Paternal Aunt, Maternal Grandmother, Paternal Grandmother, Paternal Grandfather    Stroke Mother, Sister        Social History Main Topics    Smoking status: Light Tobacco Smoker     Packs/day: 0.50     Years: 20.00     Types: Cigarettes     Last attempt to quit: 2/15/2016    Smokeless tobacco: Never Used      Comment:  on Smoking program    Alcohol use No    Drug use: No    Sexual activity: Not on file     Review of Systems   Constitutional: Negative for chills and fever.   HENT: Negative for sore throat.    Eyes: Negative for pain and visual disturbance.   Respiratory: Positive for shortness of breath. Negative for cough, chest tightness and wheezing.    Cardiovascular: Negative for chest pain, palpitations and leg swelling.   Gastrointestinal: Negative for abdominal pain and nausea.   Genitourinary: Negative for dysuria and hematuria.   Musculoskeletal: Negative for arthralgias, back pain and myalgias.   Skin: Positive for pallor. Negative for rash.   Neurological: Negative for dizziness, speech difficulty and light-headedness.   Psychiatric/Behavioral: Negative for agitation, confusion, decreased concentration and hallucinations.     Objective:     Vital Signs (Most Recent):  Temp: 97.1 °F (36.2 °C) " (04/17/17 1420)  Pulse: 84 (04/17/17 1518)  Resp: 18 (04/17/17 1518)  BP: 121/67 (04/17/17 1420)  SpO2: 96 % (04/17/17 1420) Vital Signs (24h Range):  Temp:  [97.1 °F (36.2 °C)-97.7 °F (36.5 °C)] 97.1 °F (36.2 °C)  Pulse:  [] 84  Resp:  [17-36] 18  SpO2:  [78 %-96 %] 96 %  BP: (110-133)/(58-74) 121/67     Weight: 68 kg (150 lb)  Body mass index is 32.46 kg/(m^2).    Physical Exam   Constitutional: She is oriented to person, place, and time. She appears well-developed and well-nourished. No distress.   BiPAP mask in place   HENT:   Mouth/Throat: Oropharynx is clear and moist.   Eyes: No scleral icterus.   Neck: No JVD present. Carotid bruit is not present.   Cardiovascular: Normal rate, regular rhythm and normal heart sounds.  Exam reveals no friction rub.    No murmur heard.  Pulses:       Dorsalis pedis pulses are 2+ on the right side, and 2+ on the left side.        Posterior tibial pulses are 2+ on the right side, and 2+ on the left side.   Pulmonary/Chest: Effort normal. No accessory muscle usage. No respiratory distress. She has no wheezes. She has rales (Bibasilar).   Abdominal: Soft. Normal appearance and bowel sounds are normal. She exhibits no distension. There is no hepatosplenomegaly. There is no tenderness.   Musculoskeletal: She exhibits no edema.   Neurological: She is alert and oriented to person, place, and time.   Skin: Skin is warm. No rash noted. No erythema. No pallor.   Psychiatric: She has a normal mood and affect. Her behavior is normal. Thought content normal.       Significant Labs:   A1C:   Recent Labs  Lab 03/13/17  0620 03/29/17  0825 04/13/17  1938   HGBA1C 7.8* 8.7* 8.1*     ABGs:   Recent Labs  Lab 04/17/17  1315 on BiPAP at +18/8 and FIO2 50%   PH 7.251*   PCO2 62.9*   HCO3 27.7   POCSATURATED 88*   BE 0     CBC:   Recent Labs  Lab 04/16/17 0912 04/17/17  0458   WBC 9.17 13.18*   HGB 12.6 12.5   HCT 38.0 38.8    214     CMP:   Recent Labs  Lab 04/16/17  0912  04/17/17  0458 04/17/17  1622   * 136 135*   K 4.8 5.8* 5.3*    101 99   CO2 25 26 27   * 273* 432*   BUN 36* 41* 46*   CREATININE 1.4 1.5* 1.8*   CALCIUM 8.6* 8.5* 8.8   PROT 6.8 7.0  --    ALBUMIN 2.7* 2.7*  --    BILITOT 0.4 0.3  --    ALKPHOS 63 86  --    AST 17 23  --    ALT 13 18  --    ANIONGAP 10 9 9   EGFRNONAA 37.6* 34.6* 27.7*     BNP   Date Value Ref Range Status   04/17/2017 296 (H) 0 - 99 pg/mL Final     Comment:     Values of less than 100 pg/ml are consistent with non-CHF populations.   04/12/2017 125 (H) 0 - 99 pg/mL Final     Comment:     Values of less than 100 pg/ml are consistent with non-CHF populations.   03/12/2017 370 (H) 0 - 99 pg/mL Final     Comment:     Values of less than 100 pg/ml are consistent with non-CHF populations.   11/11/2014 901 (H) 0 - 99 pg/mL Final     Comment:     Values of less than 100 pg/ml are consistent with non-CHF populations.   09/19/2014 86 0 - 99 pg/mL Final     Comment:     Values of less than 100 pg/ml are consistent with non-CHF populations.   07/12/2014 175 (H) 0 - 99 pg/mL Final     Comment:     Values of less than 100 pg/ml are consistent with non-CHF populations.      Significant Imaging: CXR: I have reviewed all pertinent results/findings within the past 24 hours and my personal findings are:  diffuse bilateral patchy opacities worse in right lower lobe and left upper lobe; No pleural effusions noted  EKG: I have reviewed all pertinent results/findings within the past 24 hours and my personal findings are: No acute ischemic changes noted; Normal sinus rhythm with no blocks or condction defects    Assessment/Plan:     Acute on chronic respiratory failure with hypoxia and hypercapnia  Patient with acute on chronic mixed hypoxic and hypercapnic respiratory failure. Patient with no significant improvement in hypercapnic respiratory failure despite BiPAP with persistent hypercapnia and ph in 7.25-7.27 range on serial ABGs today. Clinically,  patient has no signs of CO2 narcosis and normal mentation and in no respiratory distress and breathing comfortably even when I took patient off of BiPAP to speak with her.     · Unclear as to cause of acute decompensation. CXR shows diffuse bilateral infiltrates that would favor acute pulmonary edema but unclear what would have caused acute pulmonary edema as patient with normal systolic and diastolic function on recent 2 D echo and no significant cardiac valvular problems. Patient on EKG this am with no signs to suggest cardiac ischemia and patient denies any chest pain or anginal equivalent. BNP is mildly elevated at 296 so that would favor possible pulmonary edema so recommend trial of IV Lasix 40 mg IV x 1 dose and get repeat CXR and ABG in the am.    · Other possibility is acute pneumonia as patient did have slight increase in WBC from 9,000 to 13,000 today but patient with no fever or tachycardia and would expect patient to be showing clinical signs of sepsis with degree of abnormalities seen on CXR. I would recommend for now to treat empirically for hospital acquired pneumonia with IV Vancomycin and Cefepime. I do not believe blood cultures warranted at this time. Patient with no sputum production so sputum culture would be useless.     · I highly doubt acute PE as patient with no tachycardia or chest pain. Patient having calf pain but ultrasound of veins of legs on this admit negative for DVT. Clinical picture not typical for PE. I also doubt COPD exacerbation as patient with no active wheezing on exam and has good air movement with no increased sputum production so I do not believe giving patient steroids at this time would benefit patient but can continue with respiratory nebulized treatments as she has been receiving in the hospital.     · Spoke with primary service and explained that if patient denies any signs of respiratory distress or becomes confused or disoriented that would suggest CO2 narcosis  would get another ABG and have very low threshold to transfer patient to the ICU. For now do not recommend any further ABGs today unless respiratory status declines or patient develops increasing confusion or altered mental status but would get repeat ABG in the am.    · Recommend to continue BiPAP at current settings of +18/8 but could try and wean oxygen with goal to keep oxygen sats of > 88% with goal to get patient back to baseline of 3-4 liters of oxygen.    Medicine consult team to re-evaluate patient in the am to reassess patient of continuing need for antibiotics or further need for IV Lasix.              I did discuss advance directives and code status with patient today. Patient stated if she was a vegetable and could no longer recognize her family that she would not want to be sustained on machines but when I directly asked patient that if during current hospital stay if her heart were to stop would she want chest compressions and medications to restart her heart she was very clear that yes she wanted that done. When I discussed possibly need for intubation but was very clear that she she would want to be intubated. Patient is a full code as per patient wishes at this time and would be okay with CPR and intubation.     VTE Risk Mitigation         Ordered     warfarin (COUMADIN) tablet 5 mg  Daily     Route:  Oral        04/16/17 0879        Thank you for your consult. Medicine consult team to follow-up with patient tomorrow. Please contact us if you have any additional questions.    Vida Murphy MD  Department of Hospital Medicine   [facility]

## 2017-04-18 NOTE — ASSESSMENT & PLAN NOTE
- Ucx grew enteroccocus faecalis  - Gave cefepime and vanc as patient had worsening CXR and leukocytosis. CXR improved with lasix and leukocytosis was most likely 2/2 to dose of IV steroids  - On discharge prescribing 7 days of oral levofloxacin

## 2017-04-18 NOTE — PLAN OF CARE
Problem: SLP Goal  Goal: SLP Goal  Goals to be met 4/20  1. Pt will participate in eval of reading/writing and visual spatial deficits  2 Pt will participate in further eval of math/time  3 Pt will recall memory strategies to help with daily recall    Pt and family education provided re: memory and word finding strategies.      JOSE Medina, CCC-SLP  4/18/2017

## 2017-04-18 NOTE — PLAN OF CARE
Problem: Physical Therapy Goal  Goal: Physical Therapy Goal  Goals to be met by: 17     Patient will increase functional independence with mobility by performin. Supine to sit with Modified Jennings  2. Sit to supine with Modified Jennings  3. Sit to stand transfer with supervision using AD or No AD (Min A met 4/15)  4. Bed to chair transfer with Supervision using Rolling Walker or appropriate AD (Min A met 15)  5. Gait x150 feet with Supervision using Rolling Walker or appropriate AD. (x10 feet with min A met 4/15)  6. Lower extremity exercise program x15-20 reps with assistance as needed.    Outcome: Outcomes achieved     Pt has made good progress toward the above goals. D/C Acute PT services 2017. D/C home with HH services and support of SO.     Angelika Cifuentes, PT, DPT  [phone]  2017

## 2017-04-18 NOTE — PT/OT/SLP PROGRESS
"Occupational Therapy  Treatment / Goals Revised    Savita Polo   MRN: [id number]   Admitting Diagnosis: Arterial ischemic stroke, vertebrobasilar, brainstem, acute    OT Date of Treatment: 17   OT Start Time: 745  OT Stop Time: 814  OT Total Time (min): 29 min    Billable Minutes:  Self Care/Home Management 29    General Precautions: Standard, aspiration, fall  Orthopedic Precautions: N/A  Braces: N/A    Do you have any cultural, spiritual, Catholic conflicts, given your current situation?: Protestant    Subjective:  Communicated with nurse prior to session.  Patient:  "I have been exercising to stay strong.  I don't want my legs to stiffen.  I want to walk and go home."  Pain Ratin/10   Pain Rating Post-Intervention: 0/10    Objective:  Patient found with: BIPAP, peripheral IV, oxygen, pulse ox (continuous), telemetry   Family not present.    Functional Mobility:  Bed Mobility:  Rolling/Turning to Left: Modified independent  Rolling/Turning Right: Modified independent  Scooting/Bridging: Modified Independent  Supine to Sit: Modified Independent  Sit to Supine: Modified Independent    Transfers:   Sit <> Stand Assistance: Stand By Assistance  Sit <> Stand Assistive Device: No Assistive Device  Bed <> Chair Technique: Stand Pivot  Bed <> Chair Transfer Assistance: Contact Guard Assistance  Bed <> Chair Assistive Device: No Assistive Device    Activities of Daily Living:  UE Dressing Level of Assistance: Stand by assistance  LE Dressing Level of Assistance: Stand by assistance  Grooming Position: Standing  Grooming Level of Assistance: Stand by assistance  Toileting Where Assessed: Bed level  Toileting Level of Assistance: Stand by assistance     Additional Treatment:   Patient education provided on role of OT and need for HH upon discharge. Patient alert and oriented x 3; able to follow 4/4 one step commands. Patient attentive and interactive throughout the session. B UE AROM performed one set x 10 rep " while seated EOB.  Patient's functional status and disposition recommendation discussed with stroke team in daily rounds. White board updated in patient's room. OT asked if there were any other questions; patient had no further questions. Family not present to initiate family training.    AM-PAC 6 CLICK ADL   How much help from another person does this patient currently need?   1 = Unable, Total/Dependent Assistance  2 = A lot, Maximum/Moderate Assistance  3 = A little, Minimum/Contact Guard/Supervision  4 = None, Modified [place]/Independent    Putting on and taking off regular lower body clothing? : 3  Bathing (including washing, rinsing, drying)?: 3  Toileting, which includes using toilet, bedpan, or urinal? : 3  Putting on and taking off regular upper body clothing?: 3  Taking care of personal grooming such as brushing teeth?: 3  Eating meals?: 3  Total Score: 18     AM-PAC Raw Score CMS G-Code Modifier Level of Impairment Assistance   6 % Total / Unable   7 - 9 CM 80 - 100% Maximal Assist   10 - 14 CL 60 - 80% Moderate Assist   15 - 19 CK 40 - 60% Moderate Assist   20 - 22 CJ 20 - 40% Minimal Assist   23 CI 1-20% SBA / CGA   24 CH 0% Independent/ Mod I     Patient left supine with all lines intact    ASSESSMENT:  Savita Polo is a 71 y.o. female with a medical diagnosis of Arterial ischemic stroke, vertebrobasilar, brainstem, acute and presents with performance deficits of physical skills including impaired balance, mobility, strength, dexterity, fine motor coordination, gross motor coordination, sensation and endurance; demonstrating performance deficits of psychosocial skills including impairments of coping strategies which are skills necessary to successfully and appropriately participate in everyday tasks and social situations. These performance deficits have resulted in activity limitations including but not limited to: bed mobility, transfers, ascending/ descending stairs, walking short  and long distances, walking around obstacles, transitional movement patterns (kneeling, bending); eating, upper body dressing, lower body dressing, brushing teeth, toileting, bathing, carrying objects, gardening and assisting others in self care. Patient's role as mother, grandmother, great grandmother and independent caretaker for self has been affected. Patient will benefit from skilled OT services to maximize level of independence with self-care skills and functional mobility. Will benefit from HH.    Rehab identified problem list/impairments: Rehab identified problem list/impairments: weakness, impaired endurance, impaired self care skills, impaired functional mobilty, impaired cardiopulmonary response to activity    Rehab potential is good.    Activity tolerance: Good    Discharge recommendations: Discharge Facility/Level Of Care Needs: home health OT     Barriers to discharge: Barriers to Discharge: None    Equipment recommendations: none     GOALS:   Occupational Therapy Goals        Problem: Occupational Therapy Goal    Goal Priority Disciplines Outcome Interventions   Occupational Therapy Goal     OT, PT/OT Ongoing (interventions implemented as appropriate)    Description:  Goals set 4/18 to be addressed for 7 days with expiration date, 4/25:  Patient will increase functional independence with ADLs by performing:    Patient will demonstrate stand pivot transfers with modified independence.   Not met  Patient will demonstrate grooming while standing with modified independence.  Not met  Patient will demonstrate upper body dressing with modified independence.   Not met  Patient will demonstrate lower body dressing with modified independence. Not met  Patient will demonstrate toileting with modified independence.  Not met  Patient will demonstrate bathing while seated EOB with modified independence.  Not met  Patient's family / caregiver will demonstrate independence and safety with assisting patient with  self-care skills and functional mobility.     Not met                      Plan:  Patient to be seen 6 x/week to address the above listed problems via self-care/home management, therapeutic activities, neuromuscular re-education, cognitive retraining, therapeutic exercises  Plan of Care expires: 05/11/17  Plan of Care reviewed with: patient         Senait RITA [place]ar[place]  04/18/2017

## 2017-04-18 NOTE — PLAN OF CARE
[facility]    HOME HEALTH ORDERS  FACE TO FACE ENCOUNTER    Patient Name: Savita Polo  YOB: 1945    PCP: Nadiya Nava MD   PCP Address: [address] / [place] [address]  PCP Phone Number: [phone]  PCP Fax: [phone]    Encounter Date: 04/18/2017    Admit to Home Health    Diagnoses:  Active Hospital Problems    Diagnosis  POA    *Arterial ischemic stroke, vertebrobasilar, brainstem, acute [I63.219, I63.22]  Yes    Acute on chronic respiratory failure with hypoxia and hypercapnia [J96.21, J96.22]  Yes    Hyperkalemia [E87.5]  No    Pain of left lower extremity [M79.605]  Yes    Chronic bronchitis [J42]  Yes    Hemianopia, homonymous, left [H53.462]  Yes    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]  Yes    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 [G81.04, I67.9]  Yes    Urinary tract infection with hematuria [N39.0, R31.9]  Yes    Type 2 diabetes mellitus with stage 4 chronic kidney disease [E11.22, N18.4]  Yes     Chronic    Mixed hyperlipidemia [E78.2]  Yes     Chronic    Paroxysmal atrial fibrillation [I48.0]  Yes    Tobacco abuse [Z72.0]  Yes     Chronic    Type 2 diabetes mellitus with diabetic polyneuropathy, with long-term current use of insulin [E11.42, Z79.4]  Not Applicable    Vitamin D deficiency [E55.9]  Yes    Acquired hypothyroidism [E03.9]  Yes      Resolved Hospital Problems    Diagnosis Date Resolved POA    Sensory loss [R20.0] 04/13/2017 Yes       Future Appointments  Date Time Provider Department Center   5/2/2017 9:30 AM Abad Kruger MD [facility] PSYCH [facility]   5/4/2017 2:00 PM Samina Godoy MD [facility] NEPHRO [facility]   7/26/2017 1:30 PM Jose Elias Butterfield OD [facility] OPTOMTY [place]           I have seen and examined this patient face to face today. My clinical findings that support the need for the home health skilled services and home bound status are the  following:  Weakness/numbness causing balance and gait disturbance due to Stroke and COPD Exacerbation making it taxing to leave home.  Requiring assistive device to leave home due to unsteady gait caused by  Stroke and Weakness/Debility.  Medical restrictions requiring assistance of another human to leave home due to  Dyspnea on exertion (SOB) and Home oxygen requirement.    Allergies:  Review of patient's allergies indicates:   Allergen Reactions    Latex, natural rubber Dermatitis and Rash    Adhesive Itching and Rash     Allergy to adhesive in nicotine patch.    Sulfa (sulfonamide antibiotics) Rash       Diet: regular diet    Activities: activity as tolerated    Nursing:   SN to complete comprehensive assessment including routine vital signs. Instruct on disease process and s/s of complications to report to MD. Review/verify medication list sent home with the patient at time of discharge  and instruct patient/caregiver as needed. Frequency may be adjusted depending on start of care date.    Notify MD if SBP > 160 or < 90; DBP > 90 or < 50; HR > 120 or < 50; Temp > 101      CONSULTS:    Physical Therapy to evaluate and treat. Evaluate for home safety and equipment needs; Establish/upgrade home exercise program. Perform / instruct on therapeutic exercises, gait training, transfer training, and Range of Motion.  Occupational Therapy to evaluate and treat. Evaluate home environment for safety and equipment needs. Perform/Instruct on transfers, ADL training, ROM, and therapeutic exercises.  Speech Therapy  to evaluate and treat for  Language and Swallowing.    MISCELLANEOUS CARE:  Home Oxygen:  Oxygen at 5 L/min nasal canula to be used:  As needed for SOB and and at bedtime..    WOUND CARE ORDERS  n/a      Medications: Review discharge medications with patient and family and provide education.       Savita Polo   Home Medication Instructions SARTHAK:[id number]    Printed on:04/18/17 5235   Medication  "Information                      albuterol (PROAIR HFA) 90 mcg/actuation inhaler  Inhale 2 puffs into the lungs every 6 (six) hours as needed for Wheezing or Shortness of Breath.             albuterol-ipratropium 2.5mg-0.5mg/3mL (DUO-NEB) 0.5 mg-3 mg(2.5 mg base)/3 mL nebulizer solution  Take 3 mLs by nebulization every 4 (four) hours as needed for Wheezing. Rescue             alprazolam (XANAX) 0.5 MG tablet  TAKE 1 TABLET BY MOUTH 2 TIMES A DAY             cetirizine (ZYRTEC) 10 MG tablet  Take 1 tablet (10 mg total) by mouth once daily.             diltiaZEM (CARDIZEM CD) 240 MG 24 hr capsule  Take 1 capsule (240 mg total) by mouth once daily.             ergocalciferol (ERGOCALCIFEROL) 50,000 unit Cap  Take 1 capsule (50,000 Units total) by mouth every 7 days.             FERROUS SULFATE ORAL  Take 1 tablet by mouth once daily.             fluticasone (FLONASE) 50 mcg/actuation nasal spray  1 spray by Each Nare route once daily.             fluticasone-salmeterol 500-50 mcg/dose (ADVAIR DISKUS) 500-50 mcg/dose DsDv diskus inhaler  Inhale 1 puff into the lungs 2 (two) times daily. Controller             furosemide (LASIX) 20 MG tablet  take 1 tablet by mouth twice a day             insulin aspart (NOVOLOG FLEXPEN) 100 unit/mL InPn pen  26 units AC             insulin detemir (LEVEMIR FLEXTOUCH) 100 unit/mL (3 mL) SubQ InPn pen  Inject 26 Units into the skin every evening.             levoFLOXacin (LEVAQUIN) 750 MG tablet  Take 1 tablet (750 mg total) by mouth once daily.  Take for 7 days.            levothyroxine (SYNTHROID) 50 MCG tablet  Take 1 tablet (50 mcg total) by mouth before breakfast.             metoprolol tartrate (LOPRESSOR) 25 MG tablet  take 1 tablet by mouth twice a day             ondansetron (ZOFRAN) 4 MG tablet  Take 1 tablet (4 mg total) by mouth every 8 (eight) hours as needed for Nausea.             pen needle, diabetic, safety (NOVOFINE AUTOCOVER) 30 gauge x 1/3" Ndle  Uses 4 a day          "    pravastatin (PRAVACHOL) 40 MG tablet  take 1 tablet by mouth once daily             predniSONE (DELTASONE) 20 MG tablet  Take 2 tablets (40 mg total) by mouth once daily.  Take for 3 days.            tramadol (ULTRAM) 50 mg tablet  Take 1 tablet (50 mg total) by mouth every 6 (six) hours as needed for Pain. Please only take when needed and when pain not relieved by Tylenol.             warfarin (COUMADIN) 2.5 MG tablet  Take 2.5 mg by mouth once daily. Except take 5 mg on Wed                     I certify that this patient is confined to her home and needs physical therapy, speech therapy and occupational therapy.

## 2017-04-18 NOTE — PROGRESS NOTES
[facility] Hospital Medicine  Progress Note    Patient Name: Savita Polo  MRN: [id number]  Patient Class: IP- Inpatient   Admission Date: 4/12/2017  Length of Stay: 6 days  Attending Physician: Pao Viveros MD  Primary Care Provider: Nadiya Nava MD    [facility] Medicine Team: Networked reference to record PCT  Te Pastrana MD    Subjective:     Principal Problem:Arterial ischemic stroke, vertebrobasilar, brainstem, acute    HPI:  72 y/o female with past medical history of prior CVA, COPD with chronic hypoxic respiratory failure on home oxygen at 3 liters continuous with recent COPD exacerbation in late March 2017 who still occasionally smokes cigrettes, paroxysmal atrial fibrillation on Coumadin, Type 2 diabetes with CKD Stage IV and polyneuropathy on home insulin therapy who was admitted to Stroke service on 4/12 with stroke symptoms of left sided weakness and right facial palsy. Patient underwent imaging that was negative fo acute stroke but felt patient likely with an acute vertebrobasilar territory infarct and treated for an acute stroke. Patient had sub therapeutic INR on admit and stroke felt cardio embolic. Patient's left sided symptoms have greatly improved since admit and patient was planning on going home today when overnight patient developed respiratory distress requiring placement on BiPAP and 50% FIO2. Medicine consulted for acute on chronic respiratory failure.  Patient reports she has been feeling okay until this am when she was turning over she noted she became SOB and was having difficulty catching her breath. Patient had been on 3-4 liters of oxygen here in hospital since admit with oxygen sats > 90%. As noted patient is on 3 liters at home of oxygen. ABG was done that showed 7.27/71/50/33 94% on NRB mask that had been placed on patient due to respiratory distress as patient was having obvious distress onset of SOB. Patient was placed on BiPAP at +18/8 and  FIO2 of 50% after ABG returned. CXR was done that showed bilateral patchy infiltrates with detrimental  change from CXR on admit on 4/12. Medicine called this am and patient empirically started on IV Vancomycin and Cefepime for possible hospital acquired pneumonia and I evaluated patient this afternoon. I evaluated patient at 1;30 pm this afternoon and upon entering room patient was on BiPAP and in no distress and was actually talking to the nurse and ordering her lunch. I removed patient's BiPAP mask to talk to patient and she was off BiPAP for about 15 minutes and the whole time I spoke with patient she exhibited no signs of increased work of breathing. She was able to speak in full sentences without having to stop and catch her breath. Patient was awake and alert and oriented x 4 and in mentating normally. Patient denies any cough, wheezing, orthopnea or PND. Patient denies any lower extremity swelling or chest pain and currently denied any SOB and was more interested in being able to take off her mask she she could eat her lunch.   When I was in room with patient for some reason she had IVF's hanging on pole of NS at 200 cc/hr but there was no order for IVF's and according to records patient has not been receiving IVF's during hospital stay so unsure as to why IVF's were connected to patient but they were immediately stopped. 1 liter bag was mostly full so patient clearly had not gotten much of any fluids.        Interval History: NAEON. Pt was off of BiPAP for dinner last night with desats into 70s. Back on BiPAP overnight after finishing dinner. This morning, she is on nasal cannula while eating breakfast with sats in mid to high 90s. She denies SOB currently and reports that she feels her breathing is at baseline. She reports expectorating yellow sputum yesterday for culture (normal sputum is white) but denies increase in sputum production or worsening cough. Reports slight, intermittent CP, denies worsening  confusion, f/c, or palpitations.      Review of Systems  Objective:     Vital Signs (Most Recent):  Temp: 97.4 °F (36.3 °C) (04/18/17 0715)  Pulse: 87 (04/18/17 0847)  Resp: 17 (04/18/17 0847)  BP: (!) 117/54 (04/18/17 0715)  SpO2: (!) 94 % (04/18/17 0847) Vital Signs (24h Range):  Temp:  [97.1 °F (36.2 °C)-98.6 °F (37 °C)] 97.4 °F (36.3 °C)  Pulse:  [68-90] 87  Resp:  [16-24] 17  SpO2:  [76 %-100 %] 94 %  BP: (117-141)/(54-73) 117/54     Weight: 68 kg (150 lb)  Body mass index is 32.46 kg/(m^2).    Intake/Output Summary (Last 24 hours) at 04/18/17 0935  Last data filed at 04/17/17 1709   Gross per 24 hour   Intake              900 ml   Output              725 ml   Net              175 ml      Physical Exam   Constitutional: She is oriented to person, place, and time. She appears well-developed and well-nourished. No distress.   HENT:   Mouth/Throat: Oropharynx is clear and moist.   Eyes: No scleral icterus.   Neck: No JVD present. Carotid bruit is not present.   Cardiovascular: Normal rate, regular rhythm, normal heart sounds and intact distal pulses.  Exam reveals no friction rub.    No murmur heard.  Pulmonary/Chest: Effort normal. No accessory muscle usage. No respiratory distress. She has wheezes (Scattered expiratory wheezes bilaterally). She has rales (bibasliar).   Abdominal: Soft. Normal appearance and bowel sounds are normal. She exhibits no distension. There is no hepatosplenomegaly. There is no tenderness.   Musculoskeletal: She exhibits no edema.   Neurological: She is alert and oriented to person, place, and time.   Skin: Skin is warm. No rash noted. No erythema. No pallor.   Psychiatric: She has a normal mood and affect. Her behavior is normal. Thought content normal.       Significant Labs:   ABGs:   Recent Labs  Lab 04/18/17 0726   PH 7.288*   PCO2 66.1*   HCO3 31.6*   POCSATURATED 38*   BE 5     Coagulation:   Recent Labs  Lab 04/18/17  0426   INR 2.4*     Respiratory Culture:   Recent Labs  Lab  04/17/17  1650   GSRESP <10 epithelial cells per low power field.   Rare WBC's  Moderate Gram positive cocci  Few Gram positive rods  Rare Gram negative rods   RESPIRATORYC Normal respiratory kalia     All pertinent labs within the past 24 hours have been reviewed.    Significant Imaging: I have reviewed all pertinent imaging results/findings within the past 24 hours.    Assessment/Plan:      Acute on chronic respiratory failure with hypoxia and hypercapnia  Patient with acute on chronic mixed hypoxic and hypercapnic respiratory failure. Patient with no significant improvement in PCO2 despite BiPAP with persistent hypercapnia and pH in 7.25-7.28 range on serial blood gasses. Clinically, patient has no signs of CO2 narcosis and normal mentation and in no respiratory distress and breathing comfortably even when off of BiPAP.  - Continues to have bibasilar crackles and CXR suggestive of improving pulmonary edema. She reports good UOP after receiving lasix yesterday. Another one time dose of lasix 40 IV ordered today. Can resume home dose on discharge.   - She received methylprednisolone 125 x1 yesterday (likely cause of her increasing leukocytosis). She has been afebrile with normal respiratory kalia on sputum culture and no focal consolidation on CXR. Low suspicion for pneumonia at this time. Vanc and cefepime stopped.   - Meets criteria for COPD exacerbation (increased SOB and increased putum purulence). Treat with prednisone 40 PO for 5 days and levaquin 750 PO daily for 7 days (will also cover enterococcal UTI). Continue scheduled duonebs while inpatient.  - Currently breathing well with sats >88% on 3L O2 (home settings). Ok to discontinue BiPAP.   - Stable for discharge today.    VTE Risk Mitigation         Ordered     warfarin (COUMADIN) tablet 5 mg  Daily     Route:  Oral        04/16/17 0837          Te Pastrana MD  Department of Hospital Medicine   [facility]

## 2017-04-18 NOTE — DISCHARGE SUMMARY
[facility]  Vascular Neurology  Comprehensive Stroke Center  Discharge Summary     Summary:     Admit Date: 4/12/2017 12:14 PM    Discharge Date and Time:  04/18/2017 1:24 PM    Attending Physician: Pao Viveros MD     Discharge Provider: Seth Mcpherson MD    History of Present Illness: Ms. Centeno is a 71 year-old right-handed female with PHM of CVA in 2013, HLD, afib on coumadin, DM 2, tobacco abuse, peripheral neuropathy and CKD IV. Night before presentation around 9 pm patient noticed that she was having some trouble walking, denies any falls but mentions being off balance towards the left, she also felt some numbness in the left side of her body. She went to bed and in the morning she woke up with nausea and vomiting, she then realized she had a right facial droop and was weak on the left upper and lower extremity. Patient lives at home with boyfriend and daughter, she is a chronic smoker, and mentioned being compliant with all her medications. INR 1.7. Patient also mentions having trouble looking to her left side since previous stroke in 2013.           Hospital Course (synopsis of major diagnoses, care, treatment, and services provided during the course of the hospital stay): 4/13/17 - MRI not showing any evident diffuse restriction. MRA head without any abnormalities. Awaiting carotid US. HF with reduced EF. Patient complaining of left lower extremity pain that began overnight US and Xray negative of LLE.  Left arm weakness improved, sensation on left side also improved. Working with PT/OT.   4/15/17 Carotid US L ICA up to 50% stenosis. Symptoms improving. Pain in left leg also improving. Continues to work with PT/OT. Will discuss PT/OT needs for discharge.   4/17/17 This AM patient was sitting up in bed, became SOB, and O2 sat dropped. Placed on BiPAP and ABG noted to have hypercapnia. Also hyperkalemic to 5.8. ECG with small peaking T waves. Started CaGluconate, kayexalate.  CXR with multiple bilateral opacities, started Vanc and cefepime. Diuresed lasix 40mg IV.     17 Patient doing well. Was able to be quickly weaned off BiPAP. CXR improved with previous day's Lasix IV 40. Received another dose lasix. Prescribed levofloxacin to take home for E coli UTI and if she had a possible pulmonary infection. Patient did well with PT/OT and is being discharged home with home health. Tolerated being weaned to 3L O2 well. Os saturations were 90-92% which is baseline for her at home.       Modified Galloway Scale:   Timeline:  Modified Sagar Score: 3 - moderate disability         Physical Exam   Constitutional: She appears well-developed and well-nourished.   HENT:   Head: Normocephalic and atraumatic.   Mouth/Throat: Oropharynx is clear and moist.   Eyes: Conjunctivae and EOM are normal. Pupils are equal, round, and reactive to light.   Neck: Normal range of motion. Neck supple.   Cardiovascular: Normal rate and normal heart sounds.   Pulmonary/Chest: Effort normal and breath sounds normal.   Abdominal: Soft. Bowel sounds are normal.   Musculoskeletal: She exhibits tenderness (left lower extremity). She exhibits no edema (left ankle).   Neurological: GCS eye subscore is 4 - spontaneous. GCS verbal subscore is 5 - oriented. GCS motor subscore is 6 - obeys commands.   Skin: Skin is warm and dry. No erythema.   Psychiatric: She has a normal mood and affect. Her behavior is normal. Judgment normal.   Nursing note and vitals reviewed.      NIH Stroke Scale:  Level of Consciousness: 0 - alert  LOC Questions: 0 - answers both correctly  LOC Commands: 0 - performs both correctly  Best Gaze: 0 - normal  Visual: 0 - no visual loss  Facial Palsy: 1 - minor  Motor Left Arm: 1 - drift  Motor Right Arm: 0 - no drift  Motor Left Le - drift  Motor Right Le - no drift  Limb Ataxia: 0 - absent  Sensory: 0 - normal  Best Language: 0 - no aphasia  Dysarthria: 0 - normal articulation  Extinction and  Inattention: 0 - no neglect  NIH Stroke Scale Total: 3    Assessment/Plan:     Interventions: None    Complications: None    Research Candidate?:  No    Neurological deficit at discharge: Weakness: left, Sensory Loss: left     Disposition: Home-Health Care [facility]    Final Active Diagnoses:    Diagnosis Date Noted POA    PRINCIPAL PROBLEM:  Arterial ischemic stroke, vertebrobasilar, brainstem, acute [I63.219, I63.22] 04/12/2017 Yes    Acute on chronic respiratory failure with hypoxia and hypercapnia [J96.21, J96.22] 04/17/2017 Yes    Hyperkalemia [E87.5] 04/17/2017 No    Pain of left lower extremity [M79.605] 04/13/2017 Yes    Chronic bronchitis [J42] 04/12/2017 Yes    Hemianopia, homonymous, left [H53.462] 04/12/2017 Yes    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] 04/12/2017 Yes    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 [G81.04, I67.9] 04/12/2017 Yes    Urinary tract infection with hematuria [N39.0, R31.9] 06/23/2016 Yes    Type 2 diabetes mellitus with stage 4 chronic kidney disease [E11.22, N18.4] 02/01/2016 Yes     Chronic    Mixed hyperlipidemia [E78.2] 01/04/2016 Yes     Chronic    Paroxysmal atrial fibrillation [I48.0] 01/04/2016 Yes    Tobacco abuse [Z72.0] 04/24/2014 Yes     Chronic    Type 2 diabetes mellitus with diabetic polyneuropathy, with long-term current use of insulin [E11.42, Z79.4] 07/24/2012 Not Applicable    Vitamin D deficiency [E55.9] 07/24/2012 Yes    Acquired hypothyroidism [E03.9] 06/29/2012 Yes      Problems Resolved During this Admission:    Diagnosis Date Noted Date Resolved POA    Sensory loss [R20.0] 04/13/2017 04/13/2017 Yes     * Arterial ischemic stroke, vertebrobasilar, brainstem, acute  71 year-old lady that presents with right facial palsy with UMN pattern, jak-hypoesthesia of left face, LUE and LLE, and weakness of LUE and LLE. Concerning for brainstem ischemia however negative.      Antithrombotics Anticoagulants:  Warfarin INR  adjusted target, INR goal 2-3  Statins: Atorvastatin- 40 mg oral daily,   Aggressive risk factor modification: Hypertension, Smoking, Diabetes and High Cholesterol,   Rehab Efforts: Physical Therapy, Occupational Therapy, Speech and Language Pathology and Physical Medicine and Rehabilitation,   Diagnostics: HgbA1C 8.6. Lipid Profile WNL.     Trans-thoracic cardiac echo with reduced EF.    VTE Prophylaxis: Coumadin  - MRI brain and MRA head with no acute abnormalities. Couldn't rule out small lesions in brainstem.   - Possible that patient had vertebrobasilar stroke.     Acquired hypothyroidism  - Continue home levothyroxine     Type 2 diabetes mellitus with diabetic polyneuropathy, with long-term current use of insulin  - Continued gabapentin    Vitamin D deficiency  Vit D level 19  - Vit D supplementation    Tobacco abuse  - Stroke risk factor   - Counseled on cessation     Paroxysmal atrial fibrillation  - Stroke risk factor  - continued diltiazem  - Continue coumadin to target INR. Today 2.4, therapeutic    Mixed hyperlipidemia  - Stroke risk factor  - Atorvastatin 40mg qD     Type 2 diabetes mellitus with stage 4 chronic kidney disease  - Stroke risk factor  - A1c 8.7  - SSI Q4  - Diabetic counseling     Urinary tract infection with hematuria  - Ucx grew enteroccocus faecalis  - Gave cefepime and vanc as patient had worsening CXR and leukocytosis. CXR improved with lasix and leukocytosis was most likely 2/2 to dose of IV steroids  - On discharge prescribing 7 days of oral levofloxacin    Chronic bronchitis  - Chronic smoker  - Duonebs Q6  - Home O2 at 3L --> New O2 requirements 4/17 started on BiPAP - CXR with new B pulm opacities, started on cefepime and vancomycin  - ABGs with hypercapnea somewhat above baseline (50-60s PCO2) - repeating   - ABGs showed improvement in pCO2 with BiPAP  - CXR improved with diuresis and prescribed levofloxacin on discharge for possible pulmonary infxn as well as UTI  - O2 status  much improved on day of discharge, pt only requiring 3-4L of O2 NC with no mental status changes throughout.     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   - Continue gabapentin 300 mg TID. Tylenol PRN and Morphine PRN for pain 7-10.   - Ultram rx on d/c with close follow up with PCP encouraged, no refills.     Acute on chronic respiratory failure with hypoxia and hypercapnia  - patient was SOB and hypoxic to 60-70% this 4/17--> Placed on BiPAP and ABGs demonstrated hypercapnia to 60s-71  - CXR with multiple B pulmonary opacities, started on cefepime and vancomycin   - CXR improved with diuresis and prescribed levofloxacin on discharge for possible pulmonary infxn as well as UTI  - O2 status much improved on day of discharge, pt only requiring 3-4L of O2 NC with no mental status changes throughout.   - leukocytosis most likely 2/2 IV steroids     Hyperkalemia  - up to 5.8 this AM and has not been a problem before  - ECG demonstrated possible some peaking of T waves but very subtle, gave Ca gluconate  - Pt viryenly was receiving insulin and got lasix IV and kayexalate  - BMPs with decreasing K levels to 4.8 on day of d/c  - most likely 2/2 kayexelate and lasix       Recommendations:     Post-discharge complication risks: Falls, Pneumonia, Urinary tract infections    Stroke Education given to: patient and family    Follow-up in Stroke Clinic in 30 days    Discharge Plan:  Statin: Atorvastatin 40mg  Antocoagulant: Warfarin    Follow Up:  Follow-up Information     Follow up with [facility]  VASCULAR NEUROLOGY In 4 weeks.    Specialty:  Vascular Neurology    Contact information:    [address]  [facility] [address]  [phone]        Follow up with Nadiya Nava MD In 1 week.    Specialty:  Internal Medicine    Contact information:    [address]  [facility] [address]  [phone]          Patient Instructions:     Diet Cardiac   Order Comments: See Stroke Patient Education Guide Booklet for details.     Call 911 for any of the following:   Order Comments: Call 911  right away if any of the following warning signs come on suddenly, even if the symptoms only last for a few minutes. With stroke, timing is very important.   - Warning Signs of Stroke:  - Weakness: You may feel a sudden weakness, tingling or loss of feeling on one side of your face or body.  - Vision Problems: You may have sudden double vision or trouble seeing in one or both eyes.  - Speech Problems: You may have sudden trouble talking, slured speech, or problems understanding others.  - Headache: You may have sudden, severe headache.  - Movement Problems: You may experience dizziness, a feeling of spinning, a loss of balance, a feeling of falling or blackouts.     Call MD for:  increased confusion or weakness     Call MD for:  persistent dizziness, light-headedness, or visual disturbances     Call MD for:  worsening rash     Call MD for:  severe persistent headache     Call MD for:  difficulty breathing or increased cough     Call MD for:  redness, tenderness, or signs of infection (pain, swelling, redness, odor or green/yellow discharge around incision site)     Call MD for:  persistent nausea and vomiting or diarrhea     Call MD for:  severe uncontrolled pain     Call MD for:  temperature >100.4       Medications:  Reconciled Home Medications:   Current Discharge Medication List      START taking these medications    Details   levoFLOXacin (LEVAQUIN) 750 MG tablet Take 1 tablet (750 mg total) by mouth once  daily.  Qty: 7 tablet, Refills: 0      predniSONE (DELTASONE) 20 MG tablet Take 2 tablets (40 mg total) by mouth once daily.  Qty: 6 tablet, Refills: 0      tramadol (ULTRAM) 50 mg tablet Take 1 tablet (50 mg total) by mouth every 6 (six) hours as needed for Pain. Please only take when needed and when pain not relieved by Tylenol.  Qty: 36 tablet, Refills: 0         CONTINUE these medications which have NOT CHANGED    Details   albuterol (PROAIR HFA) 90 mcg/actuation inhaler Inhale 2 puffs into the lungs every 6 (six) hours as needed for Wheezing or Shortness of Breath.  Qty: 2 Inhaler, Refills: 6    Associated Diagnoses: CRLD (chronic restrictive lung disease)      albuterol-ipratropium 2.5mg-0.5mg/3mL (DUO-NEB) 0.5 mg-3 mg(2.5 mg base)/3 mL nebulizer solution Take 3 mLs by nebulization every 4 (four) hours as needed for Wheezing. Rescue  Qty: 200 vial, Refills: 12    Associated Diagnoses: CRLD (chronic restrictive lung disease)      alprazolam (XANAX) 0.5 MG tablet TAKE 1 TABLET BY MOUTH 2 TIMES A DAY  Qty: 60 tablet, Refills: 3      cetirizine (ZYRTEC) 10 MG tablet Take 1 tablet (10 mg total) by mouth once daily.  Qty: 90 tablet, Refills: 6    Associated Diagnoses: CRLD (chronic restrictive lung disease); Chronic respiratory failure with hypoxia; Multiple lung nodules      diltiaZEM (CARDIZEM CD) 240 MG 24 hr capsule Take 1 capsule (240 mg total) by mouth once daily.  Qty: 90 capsule, Refills: 4    Associated Diagnoses: Paroxysmal atrial fibrillation      ergocalciferol (ERGOCALCIFEROL) 50,000 unit Cap Take 1 capsule (50,000 Units total) by mouth every 7 days.  Qty: 4 capsule, Refills: 12      FERROUS SULFATE ORAL Take 1 tablet by mouth once daily.      fluticasone (FLONASE) 50 mcg/actuation nasal spray 1 spray by Each Nare route once daily.  Qty: 16 g, Refills: 12    Associated Diagnoses: CRLD (chronic restrictive lung disease)      fluticasone-salmeterol 500-50 mcg/dose (ADVAIR DISKUS) 500-50 mcg/dose DsDv  "diskus inhaler Inhale 1 puff into the lungs 2 (two) times daily. Controller  Qty: 60 each, Refills: 6    Associated Diagnoses: CRLD (chronic restrictive lung disease)      furosemide (LASIX) 20 MG tablet take 1 tablet by mouth twice a day  Qty: 60 tablet, Refills: 2      insulin aspart (NOVOLOG FLEXPEN) 100 unit/mL InPn pen 26 units AC  Qty: 2 Box, Refills: 12    Associated Diagnoses: Type 2 diabetes mellitus with stage 4 chronic kidney disease, with long-term current use of insulin      insulin detemir (LEVEMIR FLEXTOUCH) 100 unit/mL (3 mL) SubQ InPn pen Inject 26 Units into the skin every evening.  Qty: 1 Box, Refills: 12    Associated Diagnoses: Type 2 diabetes mellitus with stage 4 chronic kidney disease, with long-term current use of insulin      levothyroxine (SYNTHROID) 50 MCG tablet Take 1 tablet (50 mcg total) by mouth before breakfast.  Qty: 90 tablet, Refills: 4      metoprolol tartrate (LOPRESSOR) 25 MG tablet take 1 tablet by mouth twice a day  Qty: 60 tablet, Refills: 2      ondansetron (ZOFRAN) 4 MG tablet Take 1 tablet (4 mg total) by mouth every 8 (eight) hours as needed for Nausea.  Qty: 12 tablet, Refills: 0      pen needle, diabetic, safety (NOVOFINE AUTOCOVER) 30 gauge x 1/3" Ndle Uses 4 a day  Qty: 150 each, Refills: 12      pravastatin (PRAVACHOL) 40 MG tablet take 1 tablet by mouth once daily  Qty: 30 tablet, Refills: 3    Associated Diagnoses: Dyslipidemia      warfarin (COUMADIN) 2.5 MG tablet Take 2.5 mg by mouth once daily. Except take 5 mg on Wed             Seth Mcpherson MD  Comprehensive Stroke Center  Department of Vascular Neurology   [facility]   "

## 2017-04-18 NOTE — PLAN OF CARE
Problem: Occupational Therapy Goal  Goal: Occupational Therapy Goal  Goals set 4/13 to be addressed for 7 days with expiration date, 4/20:  Patient will increase functional independence with ADLs by performing:    Patient will demonstrate rolling to the right with modified independence. GOAL MET 04/16/17  Patient will demonstrate rolling to the left with modified independence. GOAL MET 04/16/17  Patient will demonstrate supine -sit with modified independence. Not met  Patient will demonstrate stand pivot transfers with SBA. Not met  Patient will demonstrate grooming while standing with SBA. Not met  Patient will demonstrate upper body dressing with SBA. Not met  Patient will demonstrate lower body dressing with SBA. Not met  Patient will demonstrate toileting with CGA. Not met  Patient will demonstrate bathing while seated EOB with CGA. Not met  Patients family / caregiver will demonstrate independence and safety with assisting patient with self-care skills and functional mobility. Not met  Patient and/or patients family will verbalize understanding of stroke prevention guidelines, personal risk factors and stroke warning signs via teachback method. Not met        Goals remain appropriate.  RITA Fontenot  4/18/2017

## 2017-04-18 NOTE — ASSESSMENT & PLAN NOTE
71 year-old lady that presents with right facial palsy with UMN pattern, jak-hypoesthesia of left face, LUE and LLE, and weakness of LUE and LLE. Concerning for brainstem ischemia however negative.      Antithrombotics Anticoagulants:  Warfarin INR adjusted target, INR goal 2-3  Statins: Atorvastatin- 40 mg oral daily,   Aggressive risk factor modification: Hypertension, Smoking, Diabetes and High Cholesterol,   Rehab Efforts: Physical Therapy, Occupational Therapy, Speech and Language Pathology and Physical Medicine and Rehabilitation,   Diagnostics: HgbA1C 8.6. Lipid Profile WNL.     Trans-thoracic cardiac echo with reduced EF.    VTE Prophylaxis: Coumadin  - MRI brain and MRA head with no acute abnormalities. Couldn't rule out small lesions in brainstem.   - Possible that patient had vertebrobasilar stroke.

## 2017-04-18 NOTE — PLAN OF CARE
Plan for d/c to home on today with significant other.  Pt has arranged to have her oxygen tank delivered to her room as her tank is empty.  [facility] referral in place, will have pt seen tomorrow.  No additional needs at this time.    Future Appointments  Date Time Provider Department Center   4/27/2017 11:00 AM Nadiya Nava MD [facility] IM [place] [facility] PCW   5/2/2017 9:30 AM Abad Kruger MD [facility] PSYCH [facility]   5/4/2017 2:00 PM Samina Godoy MD [facility] NEPHRO [place] On license of [facility]   7/26/2017 1:30 PM Jose Elias [place], DARRYL [facility] OPTOMTY [place]       Maya Lynn, RN  Case Management  [id number]

## 2017-04-18 NOTE — NURSING
Patient is discharged home with all belongings in hand accompanied by the significant other.  Patient is given discharge instructions and instructed on f/u appointments.

## 2017-04-18 NOTE — ASSESSMENT & PLAN NOTE
- patient was SOB and hypoxic to 60-70% this 4/17--> Placed on BiPAP and ABGs demonstrated hypercapnia to 60s-71  - CXR with multiple B pulmonary opacities, started on cefepime and vancomycin   - CXR improved with diuresis and prescribed levofloxacin on discharge for possible pulmonary infxn as well as UTI  - O2 status much improved on day of discharge, pt only requiring 3-4L of O2 NC with no mental status changes throughout.   - leukocytosis most likely 2/2 IV steroids

## 2017-04-18 NOTE — ASSESSMENT & PLAN NOTE
- Likely secondary to brainstem CVA - MRI negative  - continued PT/OT,  - recommended OP rehab but pt want Home with home health

## 2017-04-18 NOTE — ASSESSMENT & PLAN NOTE
- Chronic smoker  - Duonebs Q6  - Home O2 at 3L --> New O2 requirements 4/17 started on BiPAP - CXR with new B pulm opacities, started on cefepime and vancomycin  - ABGs with hypercapnea somewhat above baseline (50-60s PCO2) - repeating   - ABGs showed improvement in pCO2 with BiPAP  - CXR improved with diuresis and prescribed levofloxacin on discharge for possible pulmonary infxn as well as UTI  - O2 status much improved on day of discharge, pt only requiring 3-4L of O2 NC with no mental status changes throughout.

## 2017-04-18 NOTE — PROGRESS NOTES
ABG attempted; Venous blood obtained; Dr. Mcpherson aware. Pt awake and alert, resting comfortably.

## 2017-04-18 NOTE — PLAN OF CARE
Problem: Patient Care Overview  Goal: Plan of Care Review  Outcome: Ongoing (interventions implemented as appropriate)  Plan of care reviewed with pt at bedside. Safety precautions initiated. Bed locked in lowest position, call bell within reach, bed alarm on. [id number] on Bipap, Will continue to monitor.

## 2017-04-18 NOTE — PT/OT/SLP PROGRESS
"Physical Therapy  Treatment/DischargeSabraham Polo   MRN: [id number]   Admitting Diagnosis: Arterial ischemic stroke, vertebrobasilar, brainstem, acute    PT Received On: 04/18/17  PT Start Time: 1338     PT Stop Time: 1357    PT Total Time (min): 19 min       Billable Minutes:  Therapeutic Activity 15    Treatment Type: Treatment  PT/PTA: PT        General Precautions: Standard, aspiration, fall    Subjective:  Communicated with RN (Paola) prior to session.    "I'm going home today." pt states "I need to  front the mirror to fix my hair."     Pain Rating:  (Pt reports pain to L LE in standing; did not rate)  Pain Rating Post-Intervention:  (NAD; resting comfortably in chair)    Objective:   Patient found with: telemetry, pulse ox (continuous)    Functional Mobility:  Bed Mobility:   Rolling/Turning Right: Modified independent  Scooting/Bridging: Stand by Assistance  Supine to Sit: Stand by Assistance  Sit to Supine:  (pt remained UIC)    Transfers:  Sit <> Stand Assistance: Contact Guard Assistance (pt requesting to perform sit to stand/transfer to chair (I))  Sit <> Stand Assistive Device: No Assistive Device  Bed <> Chair Technique: Stand Pivot  Bed <> Chair Assistance: Contact Guard Assistance (pt demo poor body mechanics, but no overt LOB)  Bed <> Chair Assistive Device: No Assistive Device    Gait:   Gait Distance: Did not perform; pt req rest break following standing balance task; pt remained UIC to eat lunch.    Balance:   Static Sit: GOOD-: Takes MODERATE challenges from all directions but inconsistently  Dynamic Sit: GOOD-: Maintains balance through MODERATE excursions of active trunk movement,     Static Stand: GOOD: Takes MODERATE challenges from all directions  Dynamic stand: FAIR: Needs CONTACT GUARD during gait     Pt tolerated standing x8 minutes total with SBA-CGA from PT. Pt able to raise B UE above head for performance of functional tasks while standing with SBA from PT, CGA " for WS.     Therapeutic Activities and Exercises:  PT entered pt's room to find pt resting in hospital bed with speech therapy; pt agreeable to PT.  Pt came to sitting EOB but demo poor body mechanics in sit to stand transfer. Pt req VCs for improved body mechanics in preparation to stand. Pt requesting to  front of mirror to put hair pins in hair. Pt tolerated standing as above.     Pt with drop in O2 sats following prolonged standing down to mid 70s. Pt instructed to return to sitting in bedside chair; O2 saturation returned to mid upper 80s. Pt positioned in bedside chair to eat lunch. RN aware of pt's current status and mobility needs.     AM-PAC 6 CLICK MOBILITY  How much help from another person does this patient currently need?   1 = Unable, Total/Dependent Assistance  2 = A lot, Maximum/Moderate Assistance  3 = A little, Minimum/Contact Guard/Supervision  4 = None, Modified [place]/Independent    Turning over in bed (including adjusting bedclothes, sheets and blankets)?: 4  Sitting down on and standing up from a chair with arms (e.g., wheelchair, bedside commode, etc.): 4  Moving from lying on back to sitting on the side of the bed?: 4  Moving to and from a bed to a chair (including a wheelchair)?: 3  Need to walk in hospital room?: 3  Climbing 3-5 steps with a railing?: 2  Total Score: 20    AM-PAC Raw Score CMS G-Code Modifier Level of Impairment Assistance   6 % Total / Unable   7 - 9 CM 80 - 100% Maximal Assist   10 - 14 CL 60 - 80% Moderate Assist   15 - 19 CK 40 - 60% Moderate Assist   20 - 22 CJ 20 - 40% Minimal Assist   23 CI 1-20% SBA / CGA   24 CH 0% Independent/ Mod I     Patient left up in chair with all lines intact, call button in reach and RN notified.    Assessment:  Savita Polo is a 71 y.o. female with a medical diagnosis of Arterial ischemic stroke, vertebrobasilar, brainstem, acute and presents with improved L hemiparesis and decreased activity tolerance. Pt  demo impulsive nature and increased distractibility during functional tasks. Pt hyper verbal and req frequent redirection to task to maintain O2 saturation above 85%. Pt will benefit from HH therapy to address the above and below deficits. D/C Acute PT services 2017.    Rehab identified problem list/impairments: Rehab identified problem list/impairments: weakness, impaired endurance, impaired self care skills, impaired functional mobilty, gait instability, impaired sensation, impaired balance, decreased lower extremity function, pain, impaired cognition, decreased safety awareness    Rehab potential is good.    Activity tolerance: Fair    Discharge recommendations: Discharge Facility/Level Of Care Needs: home with home health     Barriers to discharge: Barriers to Discharge: None    Equipment recommendations: Equipment Needed After Discharge: none     GOALS:   Physical Therapy Goals        Problem: Physical Therapy Goal    Goal Priority Disciplines Outcome Goal Variances Interventions   Physical Therapy Goal     PT/OT, PT      Description:  Goals to be met by: 17     Patient will increase functional independence with mobility by performin. Supine to sit with Modified [place]  2. Sit to supine with Modified [place]  3. Sit to stand transfer with supervision using AD or No AD (Min A met 4/15)  4. Bed to chair transfer with Supervision using Rolling Walker or appropriate AD (Min A met 4/15)  5. Gait x150 feet with Supervision using Rolling Walker or appropriate AD. (x10 feet with min A met 4/15)  6. Lower extremity exercise program x15-20 reps with assistance as needed.                PLAN:    D/C Acute PT services 2017. D/C home with support of SO and HH services.  Plan of Care reviewed with: patient, significant other     Angelika Cifuentes, PT, DPT  [phone]  2017

## 2017-04-18 NOTE — NURSING
Upon handoff pt O2 76% on 5LNC. Previous RN reported in handoff that she received a verbal communication order from MD to let pt have BiPap off in order to eat. Stroke team paged for clarification of order.    The time was not specific as to how long she was off of BiPap, however she was experiencing tachypnea (26 -28 RR), and mild tachycardia 100-108. Although pt is AAOX4, her oxygen level would only improve via Bipap. Increasing the max Liters of oxygen, as recommended by MD so pt would be able to finish dinner without mask would not help.     Pt was able to finish meal with RN at bedside, alternating between BiPap and nasal cannula while pt takes bites in order to keep her at least O2 of 88%.     Telemetry is out of Pulse OX probed hospital wide, pt is on the list as soon as one becomes available. Charge Nurse and MD aware.Dynamap at bedside with continuous pulse ox monitoring. BiPap on, VSS, O2 96 %. Will continue to monitor.

## 2017-04-19 ENCOUNTER — ANTI-COAG VISIT (OUTPATIENT)
Dept: CARDIOLOGY | Facility: CLINIC | Age: 72
End: 2017-04-19

## 2017-04-19 ENCOUNTER — PATIENT OUTREACH (OUTPATIENT)
Dept: ADMINISTRATIVE | Facility: CLINIC | Age: 72
End: 2017-04-19
Payer: MEDICARE

## 2017-04-19 DIAGNOSIS — Z79.01 LONG TERM (CURRENT) USE OF ANTICOAGULANTS: ICD-10-CM

## 2017-04-19 LAB
BACTERIA SPEC AEROBE CULT: NORMAL
GRAM STN SPEC: NORMAL
INR PPP: 2.6

## 2017-04-19 NOTE — PROGRESS NOTES
C3 nurse attempted to contact patient. No answer.  C3 nurse attempted to contact Savita Polo  for a TCC post hospital discharge follow up call. No answer at phone number listed and no voicemail available. The patient has a HOSFU appointment with Nadiya Nava MD  on 4\27\17 @ 1100. Message sent to Physician staff.

## 2017-04-19 NOTE — PROGRESS NOTES
Nehemias w/[facility] called to report that Pt was D/c'ed from the hosp -4/18 and is being admitted to  service today, asked Nehemias to please get an INR drawn today, order was faxed to , French, PharmD was notified

## 2017-04-20 ENCOUNTER — TELEPHONE (OUTPATIENT)
Dept: NEUROSURGERY | Facility: HOSPITAL | Age: 72
End: 2017-04-20

## 2017-04-20 ENCOUNTER — TELEPHONE (OUTPATIENT)
Dept: INTERNAL MEDICINE | Facility: CLINIC | Age: 72
End: 2017-04-20

## 2017-04-20 NOTE — PROGRESS NOTES
"Pt dc from [facility] 4/19 s/p  "Arterial ischemic stroke, vertebrobasilar, brainstem, acute  71 year-old lady that presents with right facial palsy with UMN pattern, jak-hypoesthesia of left face, LUE and LLE, and weakness of LUE and LLE. Concerning for brainstem ischemia however negative"    Pt dc on levaquin and prednisone 40mg BID. Above INR is from yesterday. INR ordered STAT tomorrow AM  "

## 2017-04-20 NOTE — PATIENT INSTRUCTIONS
Discharge Instructions for Stroke  You have been diagnosed with a stroke, or with a TIA (transient ischemic attack). Or you have been identified as having a high risk for stroke. During a stroke, blood stops flowing to part of your brain. This can damage areas in the brain that control other parts of the body. Symptoms after a stroke depend on which part of the brain has been affected.  Stroke risk factors  Once youve had a stroke, youre at greater risk for another one. Listed below are some other factors that can increase your risk for a stroke:  · High blood pressure  · High cholesterol  · Cigarette or cigar smoking  · Diabetes  · Carotid or other artery disease  · Atrial fibrillation, atrial flutter, or other heart disease  · Not being physically active  · Obesity  · Certain blood disorders (such as sickle cell anemia)  · Excessive alcohol use  · Abuse of street drugs  · Race  · Gender  · Family history of stroke  · Diet high in salty, fried, or greasy foods  Changes in daily living  Doing your regular tasks may be difficult after youve had a stroke, but you can learn new ways to manage your daily activities. In fact, doing daily activities may help you to regain muscle strength and bring back function to affected limbs. Be patient, give yourself time to adjust, and appreciate the progress you make.  Daily activities  You may be at risk of falling. Make changes to your home to help you walk more easily. A therapist will decide if you need an assistive device to walk safely.  You may need to see an occupational therapist or physical therapist to learn new ways of doing things. For example, you may need to make adjustments when bathing or dressing:  Tips for showering or bathing  · Test the water temperature with a hand or foot that was not affected by the stroke.  · Use grab bars, a shower seat, a hand-held showerhead, and a long-handled brush.  Tips for getting dressed  · Dress while sitting, starting with  the affected side or limb.  · Wear shirts that pull easily over your head. Wear pants or skirts with elastic waistbands.  · Use zippers with loops attached to the pull tabs.  Lifestyle changes  · Take your medicines exactly as directed. Dont skip doses.  · Begin an exercise program. Ask your provider how to get started. Also ask how much activity you should try to get on a daily or weekly basis. You can benefit from simple activities such as walking or gardening.  · Limit alcohol intake. Men should have no more than 2 alcoholic drinks a day. Women should limit themselves to 1 alcoholic drink per day.  · Know your cholesterol level. Follow your providers recommendations about how to keep cholesterol under control.  · If you are a smoker, quit now. Join a stop-smoking program to improve your chances of success. Ask your provider about medicines or other methods to help you quit.  · Learn stress management techniques to help you deal with stress in your home and work life.  Diet  Your healthcare provider will give you information on dietary changes that you may need to make, based on your situation. Your provider may recommend that you see a registered dietitian for help with diet changes. Changes may include:  · Reducing fat and cholesterol intake  · Reducing salt (sodium) intake, especially if you have high blood pressure  · Eating more fresh vegetables and fruits  · Eating more lean proteins, such as fish, poultry, and beans and peas (legumes)  · Eating less red meat and processed meats  · Using low-fat dairy products  · Limiting vegetable oils and nut oils  · Limiting sweets and processed foods such as chips, cookies, and baked goods  Follow-up care  · Keep your medical appointments. Close follow-up is important to stroke rehabilitation and recovery.  · Some medicines require blood tests to check for progress or problems. Keep follow-up appointments for any blood tests ordered by your providers.  When to call  911  Call 911 right away if you have any of the following symptoms of stroke:  · Weakness, tingling, or loss of feeling on one side of your face or body  · Sudden double vision or trouble seeing in one or both eyes  · Sudden trouble talking or slurred speech  · Trouble understanding others  · Sudden, severe headache  · Dizziness, loss of balance, or a sense of falling  · Blackouts or seizures      F.A.S.T. is an easy way to remember the signs of stroke. When you see these signs, you know that you need to call 911 fast.  F.A.S.T. stands for:  · F is for face drooping. One side of the face is drooping or numb. When the person smiles, the smile is uneven.  · A is for arm weakness. One arm is weak or numb. When the person lifts both arms at the same time, one arm may drift downward.  · S is for speech difficulty. You may notice slurred speech or trouble speaking. The person can't repeat a simple sentence correctly when asked.  · T is for time to call 911. If someone shows any of these symptoms, even if they go away, call 911 immediately. Make note of the time the symptoms first appeared.  Date Last Reviewed: 8/26/2015 © 2000-2016 [employer]. [address] [place] [address], [place] [address]. All rights reserved. This information is not intended as a substitute for professional medical care. Always follow your healthcare professional's instructions.

## 2017-04-20 NOTE — TELEPHONE ENCOUNTER
Attempted to contact patient via number on file.  No answer.  Busy signal received.  Will try again later.

## 2017-04-21 ENCOUNTER — TELEPHONE (OUTPATIENT)
Dept: NEUROSURGERY | Facility: HOSPITAL | Age: 72
End: 2017-04-21

## 2017-04-21 ENCOUNTER — ANTI-COAG VISIT (OUTPATIENT)
Dept: CARDIOLOGY | Facility: CLINIC | Age: 72
End: 2017-04-21

## 2017-04-21 DIAGNOSIS — Z79.01 LONG TERM (CURRENT) USE OF ANTICOAGULANTS: ICD-10-CM

## 2017-04-21 LAB — INR PPP: 3.3

## 2017-04-21 NOTE — TELEPHONE ENCOUNTER
Called number on file.  Spoke with patient.  Risk factors specific to patient for stroke discussed with teach back implemented.  Patient verbalized understanding of discharge instructions and medications.  Patient was asked about discharge appointments and follow up care.  Follow appointment scheduled for 5/25/2017 at 1500.  Warning sings discussed with teach back discussion method implemented.  Notified to seek immediate medical help via 911 if new or worsening stroke symptoms occur.  Patient relayed no new questions or comments at this time.  Instructed to call [facility] with any further questions.

## 2017-04-22 DIAGNOSIS — E78.5 DYSLIPIDEMIA: Chronic | ICD-10-CM

## 2017-04-22 RX ORDER — LEVOTHYROXINE SODIUM 75 UG/1
TABLET ORAL
Qty: 30 TABLET | Refills: 0 | Status: SHIPPED | OUTPATIENT
Start: 2017-04-22 | End: 2017-05-22 | Stop reason: SDUPTHER

## 2017-04-22 RX ORDER — FUROSEMIDE 20 MG/1
TABLET ORAL
Qty: 60 TABLET | Refills: 0 | Status: SHIPPED | OUTPATIENT
Start: 2017-04-22 | End: 2017-05-22 | Stop reason: SDUPTHER

## 2017-04-22 RX ORDER — WARFARIN SODIUM 5 MG/1
TABLET ORAL
Qty: 30 TABLET | Refills: 0 | Status: SHIPPED | OUTPATIENT
Start: 2017-04-22 | End: 2017-05-22 | Stop reason: SDUPTHER

## 2017-04-22 RX ORDER — METOPROLOL TARTRATE 25 MG/1
TABLET, FILM COATED ORAL
Qty: 60 TABLET | Refills: 0 | Status: SHIPPED | OUTPATIENT
Start: 2017-04-22 | End: 2017-05-22 | Stop reason: SDUPTHER

## 2017-04-22 RX ORDER — LISINOPRIL 5 MG/1
TABLET ORAL
Qty: 90 TABLET | Refills: 1 | OUTPATIENT
Start: 2017-04-22

## 2017-04-22 RX ORDER — PRAVASTATIN SODIUM 40 MG/1
TABLET ORAL
Qty: 30 TABLET | Refills: 0 | Status: SHIPPED | OUTPATIENT
Start: 2017-04-22 | End: 2017-05-22 | Stop reason: SDUPTHER

## 2017-04-24 ENCOUNTER — TELEPHONE (OUTPATIENT)
Dept: NEUROLOGY | Facility: CLINIC | Age: 72
End: 2017-04-24

## 2017-04-24 ENCOUNTER — ANTI-COAG VISIT (OUTPATIENT)
Dept: CARDIOLOGY | Facility: CLINIC | Age: 72
End: 2017-04-24

## 2017-04-24 DIAGNOSIS — Z79.01 LONG TERM (CURRENT) USE OF ANTICOAGULANTS: ICD-10-CM

## 2017-04-24 LAB — INR PPP: 2.8

## 2017-04-24 NOTE — TELEPHONE ENCOUNTER
Cancelled the 5/22/17 appt with Dr. Pina. Confirmed with pt to keep the 5/25/17 appt with Dr. Viveros.

## 2017-04-24 NOTE — TELEPHONE ENCOUNTER
----- Message from Hernando Jefferson RN sent at 4/21/2017  2:37 PM CDT -----  Gabriel Bailon,     I see Mrs. Polo has two appointments with the clinic.  I believe it would be best for her to keep the one with Dr. Viveros above the one with Dr. Finnegan.      Thanks so much,  COLBY TimmonsN-RN

## 2017-04-26 NOTE — PROGRESS NOTES
Vicky from home health called patient was seen on 4/26 they will not be able to see her again until 4/28

## 2017-04-27 ENCOUNTER — OFFICE VISIT (OUTPATIENT)
Dept: INTERNAL MEDICINE | Facility: CLINIC | Age: 72
End: 2017-04-27
Payer: MEDICARE

## 2017-04-27 DIAGNOSIS — I63.9 CEREBROVASCULAR ACCIDENT (CVA), UNSPECIFIED MECHANISM: Primary | ICD-10-CM

## 2017-04-27 PROCEDURE — 99999 PR PBB SHADOW E&M-EST. PATIENT-LVL V: CPT | Mod: PBBFAC,,, | Performed by: INTERNAL MEDICINE

## 2017-04-27 PROCEDURE — 99215 OFFICE O/P EST HI 40 MIN: CPT | Mod: PBBFAC | Performed by: INTERNAL MEDICINE

## 2017-04-27 PROCEDURE — 99213 OFFICE O/P EST LOW 20 MIN: CPT | Mod: S$PBB,,, | Performed by: INTERNAL MEDICINE

## 2017-04-27 RX ORDER — ACETAMINOPHEN AND CODEINE PHOSPHATE 300; 30 MG/1; MG/1
1 TABLET ORAL 3 TIMES DAILY PRN
Qty: 30 TABLET | Refills: 1 | Status: SHIPPED | OUTPATIENT
Start: 2017-04-27 | End: 2017-05-07

## 2017-04-27 NOTE — PROGRESS NOTES
We received Monday's INR on Tuesday. Patient could not be reached on Tuesday. Therefore, I  received Monday's INR results 2 days late and asked for redraw on 4/27 due to recent hospitalization/stroke and sensitivity and importance of accurate dosing based on a current INR with the discontinuation of previous interacting medications. However, per home health, they cannot draw INR until 4/28. Attempted to reach patient to have her come in for INR but unable to reach her. I am concerned that since she stopped the interacting meds maybe her INR may be dropping and in light of recent stroke this would be dangerous. Without a current INR I can only assume and estimate a dose that may be appropriate. I will ask that INR be done tomorrow AM STAT.

## 2017-04-27 NOTE — MR AVS SNAPSHOT
[facility]  [address] [address]  Phone: [phone]  Fax: [phone]                  Savita Polo   2017 11:00 AM   Office Visit    Description:  Female : 1945   Provider:  Nadiya Nava MD   Department:  [facility] Internal Medicine           Reason for Visit     Hospital Follow Up                To Do List           Future Appointments        Provider Department Dept Phone    2017 2:00 PM Samina Godoy MD [facility] - Nephrology [phone]    2017 3:00 PM Pao Viveros MD [facility] - Neuro Stroke [facility] [phone]    2017 1:30 PM Jose Elias Butterfield OD [place] - Optometry [phone]      Goals (5 Years of Data)              4/13/17    3/29/17    3/13/17    HEMOGLOBIN A1C < 7.5   8.1  8.7  7.8      Follow-Up and Disposition     Return for EPP 4 months.       These Medications        Disp Refills Start End    acetaminophen-codeine 300-30mg (TYLENOL #3) 300-30 mg Tab 30 tablet 1 2017    Take 1 tablet by mouth 3 (three) times daily as needed. - Oral    Pharmacy: [facility] - ALICE MARTINO [address] #: [phone]         [facility] On [facility]     [facility] On Call Nurse Care Line -  Assistance  Unless otherwise directed by your provider, please contact [facility], our nurse care line that is available for  assistance.     Registered nurses in the [facility] On Call Center provide: appointment scheduling, clinical advisement, health education, and other advisory services.  Call: [phone] (toll free)               Medications           Message regarding Medications     Verify the changes and/or additions to your medication regime listed below are the same as discussed with your clinician today.  If any of these changes or additions are incorrect, please notify your healthcare provider.        START taking these NEW medications        Refills     acetaminophen-codeine 300-30mg (TYLENOL #3) 300-30 mg Tab 1    Sig: Take 1 tablet by mouth 3 (three) times daily as needed.    Class: Normal    Route: Oral      STOP taking these medications     tramadol (ULTRAM) 50 mg tablet Take 1 tablet (50 mg total) by mouth every 6 (six) hours as needed for Pain. Please only take when needed and when pain not relieved by Tylenol.           Verify that the below list of medications is an accurate representation of the medications you are currently taking.  If none reported, the list may be blank. If incorrect, please contact your healthcare provider. Carry this list with you in case of emergency.           Current Medications     albuterol (PROAIR HFA) 90 mcg/actuation inhaler Inhale 2 puffs into the lungs every 6 (six) hours as needed for Wheezing or Shortness of Breath.    albuterol-ipratropium 2.5mg-0.5mg/3mL (DUO-NEB) 0.5 mg-3 mg(2.5 mg base)/3 mL nebulizer solution Take 3 mLs by nebulization every 4 (four) hours as needed for Wheezing. Rescue    alprazolam (XANAX) 0.5 MG tablet TAKE 1 TABLET BY MOUTH 2 TIMES A DAY    cetirizine (ZYRTEC) 10 MG tablet Take 1 tablet (10 mg total) by mouth once daily.    diltiaZEM (CARDIZEM CD) 240 MG 24 hr capsule Take 1 capsule (240 mg total) by mouth once daily.    ergocalciferol (ERGOCALCIFEROL) 50,000 unit Cap Take 1 capsule (50,000 Units total) by mouth every 7 days.    FERROUS SULFATE ORAL Take 1 tablet by mouth once daily.    fluticasone (FLONASE) 50 mcg/actuation nasal spray 1 spray by Each Nare route once daily.    fluticasone-salmeterol 500-50 mcg/dose (ADVAIR DISKUS) 500-50 mcg/dose DsDv diskus inhaler Inhale 1 puff into the lungs 2 (two) times daily. Controller    furosemide (LASIX) 20 MG tablet take 1 tablet by mouth twice a day    insulin aspart (NOVOLOG FLEXPEN) 100 unit/mL InPn pen 26 units AC    insulin detemir (LEVEMIR FLEXTOUCH) 100 unit/mL (3 mL) SubQ InPn pen Inject 26 Units into the skin every evening.    levothyroxine  "(SYNTHROID) 50 MCG tablet Take 1 tablet (50 mcg total) by mouth before breakfast.    levothyroxine (SYNTHROID) 75 MCG tablet take 1 tablet by mouth every morning on empty stomach    metoprolol tartrate (LOPRESSOR) 25 MG tablet take 1 tablet by mouth twice a day    ondansetron (ZOFRAN) 4 MG tablet Take 1 tablet (4 mg total) by mouth every 8 (eight) hours as needed for Nausea.    pen needle, diabetic, safety (NOVOFINE AUTOCOVER) 30 gauge x 1/3" Ndle Uses 4 a day    pravastatin (PRAVACHOL) 40 MG tablet take 1 tablet by mouth once daily    predniSONE (DELTASONE) 20 MG tablet Take 2 tablets (40 mg total) by mouth once daily.    warfarin (COUMADIN) 2.5 MG tablet Take 2.5 mg by mouth once daily. Except take 5 mg on Wed    warfarin (COUMADIN) 5 MG tablet take 1 tablet by mouth once daily    acetaminophen-codeine 300-30mg (TYLENOL #3) 300-30 mg Tab Take 1 tablet by mouth 3 (three) times daily as needed.           Clinical Reference Information           Your Vitals Were     BP Pulse Temp Height Weight Last Period    100/60 (BP Location: Left arm, Patient Position: Sitting, BP Method: Manual) 65 97.5 °F (36.4 °C) (Oral) 4' 9" (1.448 m) 64 kg (141 lb) (LMP Unknown)    SpO2 BMI             98% 30.51 kg/m2         Blood Pressure          Most Recent Value    BP  100/60      Allergies as of 4/27/2017     Latex, Natural Rubber    Adhesive    Sulfa (Sulfonamide Antibiotics)      Immunizations Administered on Date of Encounter - 4/27/2017     None      [facility] Sign-Up     Activating your [facility] account is as easy as 1-2-3!     1) Visit [email or web address], select Sign Up Now, enter this activation code and your date of birth, then select Next.  [email or web address]  Expires: 4/30/2017  4:18 PM      2) Create a username and password to use when you visit [facility] in the future and select a security question in case you lose your password and select Next.    3) Enter your e-mail address and click Sign Up!    Additional Information  If " you have questions, please e-mail [email or web address] or call [phone] to talk to our [facility] staff. Remember, [facility] is NOT to be used for urgent needs. For medical emergencies, dial 911.         Smoking Cessation     If you would like to quit smoking:   You may be eligible for free services if you are a [place] resident and started smoking cigarettes before September 1, 1988.  Call the Smoking Cessation Trust ([facility]) toll free at ([phone] [phone] or [phone] 754-1741.   Call 1-800-QUIT-NOW if you do not meet the above criteria.   Contact us via email: [email or web address]   View our website for more information: [email or web address]        Language Assistance Services     ATTENTION: Language assistance services are available, free of charge. Please call [phone].      ATENCIÓN: Si habla español, tiene a yoo disposición servicios gratuitos de asistencia lingüística. Llame al [phone].     CHÚ Ý: N?u b?n nói Ti?ng Vi?t, có các d?ch v? h? tr? ngôn ng? mi?n phí dành cho b?n. G?i s? [phone].         [facility] complies with applicable Federal civil rights laws and does not discriminate on the basis of race, color, national origin, age, disability, or sex.

## 2017-04-28 ENCOUNTER — ANTI-COAG VISIT (OUTPATIENT)
Dept: CARDIOLOGY | Facility: CLINIC | Age: 72
End: 2017-04-28

## 2017-04-28 ENCOUNTER — TELEPHONE (OUTPATIENT)
Dept: ADMINISTRATIVE | Facility: CLINIC | Age: 72
End: 2017-04-28

## 2017-04-28 DIAGNOSIS — Z79.01 LONG TERM (CURRENT) USE OF ANTICOAGULANTS: ICD-10-CM

## 2017-04-28 LAB — INR PPP: 2.4

## 2017-04-28 NOTE — TELEPHONE ENCOUNTER
Call received from April Vega requesting an order for transfer tub bench order to be sent to [facility]. Upon chart review orders faxed on [phone]. Call to [facility] who reported bench not covered and would cost $82.73. Information emailed to April Vega for follow-up.

## 2017-04-30 VITALS
DIASTOLIC BLOOD PRESSURE: 60 MMHG | SYSTOLIC BLOOD PRESSURE: 100 MMHG | WEIGHT: 141 LBS | HEART RATE: 65 BPM | TEMPERATURE: 98 F | OXYGEN SATURATION: 98 % | HEIGHT: 57 IN | BODY MASS INDEX: 30.42 KG/M2

## 2017-04-30 NOTE — PROGRESS NOTES
Subjective:       Patient ID: Savita Polo is a 71 y.o. female.    Chief Complaint: Hospital Follow Up    HPI: She is here for hospital follow-up.  Currently without complaint  Review of Systems   Constitutional: Negative for chills, fatigue, fever and unexpected weight change.   Respiratory: Negative for chest tightness and shortness of breath.    Cardiovascular: Negative for chest pain and palpitations.   Gastrointestinal: Negative for abdominal pain and blood in stool.   Neurological: Negative for dizziness, syncope, numbness and headaches.       Objective:      Physical Exam   HENT:   Right Ear: External ear normal.   Left Ear: External ear normal.   Nose: Nose normal.   Mouth/Throat: Oropharynx is clear and moist.   Eyes: Pupils are equal, round, and reactive to light.   Neck: Normal range of motion.   Cardiovascular: Normal rate and regular rhythm.    No murmur heard.  Pulmonary/Chest: Breath sounds normal.   Abdominal: She exhibits no distension. There is no hepatosplenomegaly. There is no tenderness.   Lymphadenopathy:     She has no cervical adenopathy.     She has no axillary adenopathy.   Neurological: She has normal reflexes. No cranial nerve deficit or sensory deficit.   Left hemiparesis     rectal exam: Heme-negative, no mass palpated  Assessment:     assessment and plan: CVA with left hemiparesis: Follow-up with neurology.    discussed Pap smear, pelvic exam, mammogram, breast exam, bone density.  She refused all  Plan:       As above

## 2017-05-02 ENCOUNTER — OFFICE VISIT (OUTPATIENT)
Dept: PSYCHIATRY | Facility: CLINIC | Age: 72
End: 2017-05-02
Payer: MEDICARE

## 2017-05-02 VITALS
DIASTOLIC BLOOD PRESSURE: 60 MMHG | BODY MASS INDEX: 35.6 KG/M2 | SYSTOLIC BLOOD PRESSURE: 118 MMHG | HEART RATE: 65 BPM | WEIGHT: 165 LBS | HEIGHT: 57 IN

## 2017-05-02 DIAGNOSIS — F41.1 GAD (GENERALIZED ANXIETY DISORDER): Primary | ICD-10-CM

## 2017-05-02 PROCEDURE — 99213 OFFICE O/P EST LOW 20 MIN: CPT | Mod: PBBFAC | Performed by: PSYCHIATRY & NEUROLOGY

## 2017-05-02 PROCEDURE — 99999 PR PBB SHADOW E&M-EST. PATIENT-LVL III: CPT | Mod: PBBFAC,,, | Performed by: PSYCHIATRY & NEUROLOGY

## 2017-05-02 PROCEDURE — 99214 OFFICE O/P EST MOD 30 MIN: CPT | Mod: S$PBB,,, | Performed by: PSYCHIATRY & NEUROLOGY

## 2017-05-02 RX ORDER — ALPRAZOLAM 0.5 MG/1
TABLET ORAL
Qty: 90 TABLET | Refills: 3 | Status: SHIPPED | OUTPATIENT
Start: 2017-05-02 | End: 2017-08-01 | Stop reason: SDUPTHER

## 2017-05-02 NOTE — PROGRESS NOTES
Outpatient Psychiatry Follow-Up Visit (MD/NP)    5/2/2017    Clinical Status of Patient:  Outpatient (Ambulatory)    Chief Complaint:  Savita Polo is a 71 y.o. female who presents today for follow-up of depression and anxiety.  Met with patient.      Interval History and Content of Current Session:  Interim Events/Subjective Report/Content of Current Session: She had a stroke about a month ago which affected her left side and her speech somewhat.  That has improved.  She still notes significant memory deficits and a tendency for her to lose her train of thought.  She has been very cooperative with speech and physical therapy.  She has been more anxious lately worrying about her daughter wh is in assisted.  She requested an increase in Xanax.  Discussed at length risks of falls.  She said she would be particularly careful.    Psychotherapy:  · Target symptoms: depression, anxiety   · Why chosen therapy is appropriate versus another modality: relevant to diagnosis  · Outcome monitoring methods: self-report, observation  · Therapeutic intervention type: supportive psychotherapy  · Topics discussed/themes: parenting issues, stress related to medical comorbidities, symptom recognition  · The patient's response to the intervention is accepting. The patient's progress toward treatment goals is good.   · Duration of intervention: 10 minutes.    Review of Systems   · PSYCHIATRIC: Pertinant items are noted in the narrative.    Past Medical, Family and Social History: The patient's past medical, family and social history have been reviewed and updated as appropriate within the electronic medical record - see encounter notes.    Compliance: yes    Side effects: None    Risk Parameters:  Patient reports no suicidal ideation  Patient reports no homicidal ideation  Patient reports no self-injurious behavior  Patient reports no violent behavior    Exam (detailed: at least 9 elements; comprehensive: all 15 elements)  "  Constitutional  Vitals:  Most recent vital signs, dated less than 90 days prior to this appointment, were reviewed.   Vitals:    05/02/17 0913   BP: 118/60   Pulse: 65   Weight: 74.8 kg (165 lb)   Height: 4' 9" (1.448 m)        General:  unremarkable, age appropriate     Musculoskeletal  Muscle Strength/Tone:  no tremor   Gait & Station:  non-ataxic, uses walker     Psychiatric  Speech:  no latency; no press   Mood & Affect:  anxious  congruent and appropriate   Thought Process:  normal and logical   Associations:  intact   Thought Content:  normal, no suicidality, no homicidality, delusions, or paranoia   Insight:  intact   Judgement: behavior is adequate to circumstances   Orientation:  grossly intact   Memory: intact for content of interview   Language: grossly intact   Attention Span & Concentration:  able to focus   Fund of Knowledge:  intact and appropriate to age and level of education     Assessment and Diagnosis   Status/Progress: Based on the examination today, the patient's problem(s) is/are adequately but not ideally controlled.  New problems have been presented today.   Co-morbidities are complicating management of the primary condition.  There are no active rule-out diagnoses for this patient at this time.     General Impression: Doing well considering medical and family problems.    No diagnosis found.    Intervention/Counseling/Treatment Plan   · Medication Management: Continue current medications. The risks and benefits of medication were discussed with the patient. except increase Xanax to 0.5 mg TID      Return to Clinic: 3 months  "

## 2017-05-03 ENCOUNTER — ANTI-COAG VISIT (OUTPATIENT)
Dept: CARDIOLOGY | Facility: CLINIC | Age: 72
End: 2017-05-03

## 2017-05-03 DIAGNOSIS — Z79.01 LONG TERM (CURRENT) USE OF ANTICOAGULANTS: ICD-10-CM

## 2017-05-03 LAB — INR PPP: 2.4

## 2017-05-04 ENCOUNTER — OFFICE VISIT (OUTPATIENT)
Dept: NEPHROLOGY | Facility: CLINIC | Age: 72
End: 2017-05-04
Payer: MEDICARE

## 2017-05-04 ENCOUNTER — LAB VISIT (OUTPATIENT)
Dept: LAB | Facility: HOSPITAL | Age: 72
End: 2017-05-04
Attending: INTERNAL MEDICINE
Payer: MEDICARE

## 2017-05-04 VITALS
BODY MASS INDEX: 36.58 KG/M2 | OXYGEN SATURATION: 88 % | SYSTOLIC BLOOD PRESSURE: 120 MMHG | DIASTOLIC BLOOD PRESSURE: 60 MMHG | HEART RATE: 82 BPM | WEIGHT: 169.56 LBS | HEIGHT: 57 IN

## 2017-05-04 DIAGNOSIS — E08.22 DIABETES MELLITUS DUE TO UNDERLYING CONDITION WITH STAGE 3 CHRONIC KIDNEY DISEASE, UNSPECIFIED LONG TERM INSULIN USE STATUS: ICD-10-CM

## 2017-05-04 DIAGNOSIS — D63.1 ANEMIA IN CHRONIC KIDNEY DISEASE(285.21): Chronic | ICD-10-CM

## 2017-05-04 DIAGNOSIS — I12.9 HYPERTENSIVE CKD (CHRONIC KIDNEY DISEASE): ICD-10-CM

## 2017-05-04 DIAGNOSIS — N18.30 CKD (CHRONIC KIDNEY DISEASE), STAGE III: ICD-10-CM

## 2017-05-04 DIAGNOSIS — N18.30 CKD (CHRONIC KIDNEY DISEASE), STAGE III: Primary | ICD-10-CM

## 2017-05-04 DIAGNOSIS — N18.3 DIABETES MELLITUS DUE TO UNDERLYING CONDITION WITH STAGE 3 CHRONIC KIDNEY DISEASE, UNSPECIFIED LONG TERM INSULIN USE STATUS: ICD-10-CM

## 2017-05-04 DIAGNOSIS — R82.90 ABNORMAL URINALYSIS: ICD-10-CM

## 2017-05-04 DIAGNOSIS — N18.9 ANEMIA IN CHRONIC KIDNEY DISEASE(285.21): Chronic | ICD-10-CM

## 2017-05-04 LAB
ANION GAP SERPL CALC-SCNC: 7 MMOL/L
BUN SERPL-MCNC: 28 MG/DL
CALCIUM SERPL-MCNC: 9 MG/DL
CHLORIDE SERPL-SCNC: 103 MMOL/L
CO2 SERPL-SCNC: 34 MMOL/L
CREAT SERPL-MCNC: 1.5 MG/DL
EST. GFR  (AFRICAN AMERICAN): 39.8 ML/MIN/1.73 M^2
EST. GFR  (NON AFRICAN AMERICAN): 34.6 ML/MIN/1.73 M^2
GLUCOSE SERPL-MCNC: 126 MG/DL
POTASSIUM SERPL-SCNC: 4.2 MMOL/L
SODIUM SERPL-SCNC: 144 MMOL/L

## 2017-05-04 PROCEDURE — 36415 COLL VENOUS BLD VENIPUNCTURE: CPT

## 2017-05-04 PROCEDURE — 80048 BASIC METABOLIC PNL TOTAL CA: CPT

## 2017-05-04 PROCEDURE — 99999 PR PBB SHADOW E&M-EST. PATIENT-LVL III: CPT | Mod: PBBFAC,,, | Performed by: INTERNAL MEDICINE

## 2017-05-04 PROCEDURE — 99215 OFFICE O/P EST HI 40 MIN: CPT | Mod: S$PBB,,, | Performed by: INTERNAL MEDICINE

## 2017-05-04 NOTE — PROGRESS NOTES
Subjective:       Patient ID: Savita Polo is a 71 y.o. White female who presents for follow-up evaluation of Chronic Kidney Disease    HPI     Ms. Polo was seen for follow up on CKD. She was being followed by Dr. Garzon and was last seen by her on 8/24/15. I started following her since 3/15/16. She was last seen on 10/4/16. Pt arrives in the clinic with her supplemental oxygen. She has longstanding uncontrolled diabetes, hypertension and several other medical problems as listed in her problem list.     She reports she was admitted to hospital recently due to stroke. Discharge summary and other data pertaining to her labs from recent hospitalization were noted. It appears she had hyperkalemia which was treated medically. She reports she was taken off lisinopril since then. She also had another episode of UTI which was treated with antibiotic.    She reports she has left sides weakness since then but she still manages to walk with the help of her walker.     She denies any recent nausea/ vomiting/ diarrhea/ abdominal pain/ chest pain. She has CKD III/IV. She has no symptoms to suggest uremia.     Per labs from 9/29/16 her creatinine was 2.0, BUN 63, eGFR 24.4, K 4.6, bicarbonate 28, albumin 3.3, PTH 87. Recent A1C was 8.8. Ca 9.6, Phos level is 3.2, Hb is 15.3. Urine PC ratio is normal. US from 3/16 showed 9.5 and 10.3 cm kidneys and elevated RI.     She does not have labs checked since her hospital discharge on 4/18/17 when it was 1.7, BUN was 50.     She is aware of her chronic medical problems. She wears oxygen continuously at home.     Review of Systems   Constitutional: Negative for appetite change, fatigue and unexpected weight change.   HENT: Negative for facial swelling, sneezing and sore throat.    Respiratory: Positive for shortness of breath. Negative for cough and wheezing.    Cardiovascular: Negative for leg swelling.   Endocrine: Negative for polydipsia and polyphagia.   Genitourinary:  Negative for decreased urine volume, difficulty urinating, dysuria, flank pain, frequency, hematuria and urgency.   Musculoskeletal: Negative for arthralgias and back pain.   Neurological: Negative for dizziness and headaches.   Hematological: Does not bruise/bleed easily.   Psychiatric/Behavioral: Negative for behavioral problems. The patient is not nervous/anxious.        Objective:      Physical Exam   Constitutional: She is oriented to person, place, and time. She appears well-nourished. No distress.   Eyes: Right eye exhibits no discharge. Left eye exhibits no discharge.   Neck: No JVD present.   Cardiovascular: Normal rate, S1 normal and S2 normal.  Exam reveals no friction rub.    Pulmonary/Chest: Breath sounds normal. She has no wheezes. She has no rales.   On supplemental oxygen   Abdominal: Soft. She exhibits no distension. There is no tenderness.   Neurological: She is alert and oriented to person, place, and time.   Skin: Skin is warm and dry.   Psychiatric: She has a normal mood and affect.   Vitals reviewed.  left sided weakness.    Assessment:     1. CKD III/IV  2. recurrent UTI  3. Diabetic nephropathy  4. Home oxygen dependant COPD  5. Azotemia  6. Secondary hyperparathyroidism    Plan:     She appears to have diabetic and hypertensive nephropathy causing CKD. She has CKD IV, proteinuria, secondary hyperparathyroidism. Relatively stable kidney size noted. She had recent another episode of UTI while she was hospitalized for stroke in 4/17. Pt was made aware of her advanced CKD. I explained CKD staging to her. Explained potential risk of progression to ESRD and need for dialysis. I have encouraged her to bring her daughter for clinic visit next time. She expressed understanding.     CKD IV  - She has been taken off ACE-I during recent hospitalization due to hyperkalemia.   - monitor labs periodically  - high risk for progression to ESRD, she was made aware of it  - avoid NSAID/ bactrim/ IV contrast/  gadolinium/ aminoglycoside where possible      CKD MBD  - trend PTH, check vit D next visit    Poorly controlled diabetes  - continue to work with PCP, endocrine for better glycemic control    Screen for anemia, currently Hb is above goal and no indication for Epogen   Continue to trend labs for acid base, electrolytes, CKD MBD, proteinuria    Labs and plan was explained to her in detail, CKD staging was explained to her. Her questions were answered to her satisfaction.     Plan for BMP now to follow on her electrolytes. She has been taken off ACE-I during recent hospitalization due to hyperkalemia.     BMP results were followed, Na 144, K 4.2, BUN 28, creatinine 1.5, Ca 9, elevated bicarbonate of 34 could reflect her chronic respiratory acidosis.     RTC 2 months with labs prior.

## 2017-05-08 ENCOUNTER — TELEPHONE (OUTPATIENT)
Dept: INTERNAL MEDICINE | Facility: CLINIC | Age: 72
End: 2017-05-08

## 2017-05-08 NOTE — TELEPHONE ENCOUNTER
Spoke with nurse and she stated that pt left leg has edema and its going up the the knee. Pt leg was 20 centimeters last Friday and today its up to 20.5. Nurse also stated it is tender to touch.

## 2017-05-08 NOTE — TELEPHONE ENCOUNTER
----- Message from Jocelynn Caballero sent at 5/8/2017  1:32 PM CDT -----  Contact: [facility]   Type: Home Health (orders, updates, clarifications, etc.)    Home Health Agency/Nurse: [facility]     Phone number: [phone]    Reason for call: Fiona would like to speak with the nurse about edema the patient is having in her legs.     Thanks!

## 2017-05-09 ENCOUNTER — ANTI-COAG VISIT (OUTPATIENT)
Dept: CARDIOLOGY | Facility: CLINIC | Age: 72
End: 2017-05-09

## 2017-05-09 DIAGNOSIS — Z79.01 LONG TERM (CURRENT) USE OF ANTICOAGULANTS: ICD-10-CM

## 2017-05-09 LAB — INR PPP: 1.9

## 2017-05-10 ENCOUNTER — OFFICE VISIT (OUTPATIENT)
Dept: INTERNAL MEDICINE | Facility: CLINIC | Age: 72
End: 2017-05-10
Payer: MEDICARE

## 2017-05-10 DIAGNOSIS — I27.20 PULMONARY HTN: Primary | ICD-10-CM

## 2017-05-10 PROCEDURE — 99999 PR PBB SHADOW E&M-EST. PATIENT-LVL V: CPT | Mod: PBBFAC,,, | Performed by: INTERNAL MEDICINE

## 2017-05-10 PROCEDURE — 99215 OFFICE O/P EST HI 40 MIN: CPT | Mod: PBBFAC | Performed by: INTERNAL MEDICINE

## 2017-05-10 PROCEDURE — 99213 OFFICE O/P EST LOW 20 MIN: CPT | Mod: S$PBB,,, | Performed by: INTERNAL MEDICINE

## 2017-05-10 NOTE — MR AVS SNAPSHOT
"    [facility]  [address]  [place] [address]  Phone: [phone]  Fax: [phone]                  Savita Polo   5/10/2017 11:30 AM   Office Visit    Description:  Female : 1945   Provider:  Nadiya Nava MD   Department:  [facility]           Reason for Visit     Leg Swelling           Diagnoses this Visit        Comments    Pulmonary HTN    -  Primary            To Do List           Future Appointments        Provider Department Dept Phone    2017 3:00 PM Pao Viveros MD [facility] - Neuro St[facility] [phone]    2017 1:00 PM LAB, APPOINTMENT [place] [facility] [phone]    2017 1:05 PM SPECIMEN, MAIN [facility]AMP[facility] [phone]    2017 2:30 PM Samina Godoy MD [facility] - Nephrology [phone]    2017 1:30 PM Jose Elias Butterfield OD [place] - Optometry [phone]      Goals (5 Years of Data)              4/13/17    3/29/17    3/13/17    HEMOGLOBIN A1C < 7.5   8.1  8.7  7.8       These Medications        Disp Refills Start End    pen needle, diabetic, safety (NOVOFINE AUTOCOVER) 30 gauge x 1/3" Ndle 150 each 12 5/10/2017     Uses 4 a day    Pharmacy: [facility] [place] [address]. - [place] [address] #: [phone]         [facility]     [facility] Nurse Care Line - 24 Assistance  Unless otherwise directed by your provider, please contact [facility], our nurse care line that is available for / assistance.     Registered nurses in the [facility] provide: appointment scheduling, clinical advisement, health education, and other advisory services.  Call: [phone] (toll free)               Medications           Message regarding Medications     Verify the changes and/or additions to your medication regime listed below are the same as discussed with your clinician today.  If any of these " changes or additions are incorrect, please notify your healthcare provider.             Verify that the below list of medications is an accurate representation of the medications you are currently taking.  If none reported, the list may be blank. If incorrect, please contact your healthcare provider. Carry this list with you in case of emergency.           Current Medications     albuterol (PROAIR HFA) 90 mcg/actuation inhaler Inhale 2 puffs into the lungs every 6 (six) hours as needed for Wheezing or Shortness of Breath.    albuterol-ipratropium 2.5mg-0.5mg/3mL (DUO-NEB) 0.5 mg-3 mg(2.5 mg base)/3 mL nebulizer solution Take 3 mLs by nebulization every 4 (four) hours as needed for Wheezing. Rescue    alprazolam (XANAX) 0.5 MG tablet TAKE 1 TABLET BY MOUTH 3 TIMES A DAY    cetirizine (ZYRTEC) 10 MG tablet Take 1 tablet (10 mg total) by mouth once daily.    diltiaZEM (CARDIZEM CD) 240 MG 24 hr capsule Take 1 capsule (240 mg total) by mouth once daily.    ergocalciferol (ERGOCALCIFEROL) 50,000 unit Cap Take 1 capsule (50,000 Units total) by mouth every 7 days.    FERROUS SULFATE ORAL Take 1 tablet by mouth once daily.    fluticasone (FLONASE) 50 mcg/actuation nasal spray 1 spray by Each Nare route once daily.    fluticasone-salmeterol 500-50 mcg/dose (ADVAIR DISKUS) 500-50 mcg/dose DsDv diskus inhaler Inhale 1 puff into the lungs 2 (two) times daily. Controller    furosemide (LASIX) 20 MG tablet take 1 tablet by mouth twice a day    insulin aspart (NOVOLOG FLEXPEN) 100 unit/mL InPn pen 26 units AC    insulin detemir (LEVEMIR FLEXTOUCH) 100 unit/mL (3 mL) SubQ InPn pen Inject 26 Units into the skin every evening.    levothyroxine (SYNTHROID) 50 MCG tablet Take 1 tablet (50 mcg total) by mouth before breakfast.    levothyroxine (SYNTHROID) 75 MCG tablet take 1 tablet by mouth every morning on empty stomach    metoprolol tartrate (LOPRESSOR) 25 MG tablet take 1 tablet by mouth twice a day    ondansetron (ZOFRAN) 4 MG  "tablet Take 1 tablet (4 mg total) by mouth every 8 (eight) hours as needed for Nausea.    pen needle, diabetic, safety (NOVOFINE AUTOCOVER) 30 gauge x 1/3" Ndle Uses 4 a day    pravastatin (PRAVACHOL) 40 MG tablet take 1 tablet by mouth once daily    warfarin (COUMADIN) 2.5 MG tablet Take 2.5 mg by mouth once daily. Except take 5 mg on Wed    warfarin (COUMADIN) 5 MG tablet take 1 tablet by mouth once daily           Clinical Reference Information           Your Vitals Were     BP Pulse Temp Height Weight Last Period    118/70 (BP Location: Left arm, Patient Position: Sitting, BP Method: Manual) 70 97.6 °F (36.4 °C) (Oral) 4' 9" (1.448 m) 74.8 kg (165 lb) (LMP Unknown)    SpO2 BMI             94% 35.71 kg/m2         Blood Pressure          Most Recent Value    BP  118/70      Allergies as of 5/10/2017     Latex, Natural Rubber    Adhesive    Sulfa (Sulfonamide Antibiotics)      Immunizations Administered on Date of Encounter - 5/10/2017     None      Orders Placed During Today's Visit      Normal Orders This Visit    Ambulatory Referral to Cardiology       [facility] Sign-Up     Activating your [facility] account is as easy as 1-2-3!     1) Visit [email or web address], select Sign Up Now, enter this activation code and your date of birth, then select Next.  [email or web address]  Expires: 6/24/2017 12:07 PM      2) Create a username and password to use when you visit [facility] in the future and select a security question in case you lose your password and select Next.    3) Enter your e-mail address and click Sign Up!    Additional Information  If you have questions, please e-mail [email or web address] or call [phone] to talk to our [facility] staff. Remember, [facility] is NOT to be used for urgent needs. For medical emergencies, dial 911.         Smoking Cessation     If you would like to quit smoking:   You may be eligible for free services if you are a [place] resident and started smoking cigarettes before September 1, " 1988.  Call the Smoking Cessation Trust (SCT) toll free at (004) 365-1206 or (974) 268-3945.   Call 1-800-QUIT-NOW if you do not meet the above criteria.   Contact us via email: [email or web address]   View our website for more information: [email or web address]        Language Assistance Services     ATTENTION: Language assistance services are available, free of charge. Please call [phone].      ATENCIÓN: Si prince carlyle, tiene a yoo disposición servicios gratuitos de asistencia lingüística. Llame al [phone].     CHÚ Ý: N?u b?n nói Ti?ng Vi?t, có các d?ch v? h? tr? ngôn ng? mi?n phí dành cho b?n. G?i s? [phone].         [facility] complies with applicable Federal civil rights laws and does not discriminate on the basis of race, color, national origin, age, disability, or sex.

## 2017-05-11 ENCOUNTER — ANTI-COAG VISIT (OUTPATIENT)
Dept: CARDIOLOGY | Facility: CLINIC | Age: 72
End: 2017-05-11

## 2017-05-11 DIAGNOSIS — Z79.01 LONG TERM (CURRENT) USE OF ANTICOAGULANTS: ICD-10-CM

## 2017-05-11 LAB — INR PPP: 2.4

## 2017-05-14 ENCOUNTER — TELEPHONE (OUTPATIENT)
Dept: INTERNAL MEDICINE | Facility: CLINIC | Age: 72
End: 2017-05-14

## 2017-05-14 VITALS
WEIGHT: 165 LBS | BODY MASS INDEX: 35.6 KG/M2 | SYSTOLIC BLOOD PRESSURE: 118 MMHG | DIASTOLIC BLOOD PRESSURE: 70 MMHG | OXYGEN SATURATION: 95 % | TEMPERATURE: 98 F | HEIGHT: 57 IN | HEART RATE: 70 BPM

## 2017-05-14 DIAGNOSIS — E11.9 TYPE 2 DIABETES MELLITUS WITHOUT COMPLICATION, WITH LONG-TERM CURRENT USE OF INSULIN: Primary | ICD-10-CM

## 2017-05-14 DIAGNOSIS — Z79.4 TYPE 2 DIABETES MELLITUS WITHOUT COMPLICATION, WITH LONG-TERM CURRENT USE OF INSULIN: Primary | ICD-10-CM

## 2017-05-15 ENCOUNTER — HOME CARE VISIT (OUTPATIENT)
Dept: NEUROLOGY | Facility: HOSPITAL | Age: 72
End: 2017-05-15

## 2017-05-15 NOTE — PROGRESS NOTES
Subjective:       Patient ID: Savita Polo is a 71 y.o. female.    Chief Complaint: Hypertension    HPI: She returns for management of hypertension.  She has had hypertension for over a year.  Current treatment has included medications outlined in medication list.  She denies chest pain or shortness of breath.  No palpitations.  Denies left arm or neck pain.    Past medical history: Hypertension, diabetes, CVA with left hemiparesis, hypothyroidism, COPD,Atrial fibrillation, chronic renal insufficiency, hyperlipidemia,vitamin D deficiency, diverticulitis, lung nodule, anemia, avascular necrosis of hip, status post hysterectomy, ankle surgery. She had a colonoscopy February 2015      Medications: Albuterol MDI 2 puffs q.i.d. p.r.n., Advair 500/50 one puff b.i.d.,novolog insulin,levemir insulin 28 units daily, Synthroid 0.075 mg daily, Cardizem  mg daily, Coumadin as monitored by Coumadin clinic, Pravachol 40 mg daily, home oxygen , Lasix 20 mg twice a day , Lopressor 25 mg twice a day      Allergies: sulfa     Review of Systems   Constitutional: Negative for chills, fatigue, fever and unexpected weight change.   Respiratory: Negative for chest tightness and shortness of breath.    Cardiovascular: Negative for chest pain and palpitations.   Gastrointestinal: Negative for abdominal pain and blood in stool.   Neurological: Negative for dizziness, syncope, numbness and headaches.       Objective:      Physical Exam   HENT:   Right Ear: External ear normal.   Left Ear: External ear normal.   Nose: Nose normal.   Mouth/Throat: Oropharynx is clear and moist.   Eyes: Pupils are equal, round, and reactive to light.   Neck: Normal range of motion.   Cardiovascular: Normal rate and regular rhythm.    No murmur heard.  Pulmonary/Chest: Breath sounds normal.   Abdominal: She exhibits no distension. There is no hepatosplenomegaly. There is no tenderness.   Lymphadenopathy:     She has no cervical adenopathy.     She has  no axillary adenopathy.   Neurological: She has normal reflexes. No cranial nerve deficit or sensory deficit.   Left hemiparesis       Assessment:         assessment and plan: Pulmonary hypertension: Follow up with cardiology.  She was here for an urgent care only appointment.  She will return to clinic for a physical  Plan:       As above

## 2017-05-16 ENCOUNTER — ANTI-COAG VISIT (OUTPATIENT)
Dept: CARDIOLOGY | Facility: CLINIC | Age: 72
End: 2017-05-16

## 2017-05-16 DIAGNOSIS — Z79.01 LONG TERM (CURRENT) USE OF ANTICOAGULANTS: ICD-10-CM

## 2017-05-16 LAB — INR PPP: 2.4

## 2017-05-22 ENCOUNTER — TELEPHONE (OUTPATIENT)
Dept: CARDIOLOGY | Facility: CLINIC | Age: 72
End: 2017-05-22

## 2017-05-22 DIAGNOSIS — E78.5 DYSLIPIDEMIA: Chronic | ICD-10-CM

## 2017-05-22 RX ORDER — METOPROLOL TARTRATE 25 MG/1
TABLET, FILM COATED ORAL
Qty: 60 TABLET | Refills: 3 | Status: SHIPPED | OUTPATIENT
Start: 2017-05-22 | End: 2017-09-19 | Stop reason: SDUPTHER

## 2017-05-22 RX ORDER — LEVOTHYROXINE SODIUM 75 UG/1
TABLET ORAL
Qty: 30 TABLET | Refills: 3 | Status: SHIPPED | OUTPATIENT
Start: 2017-05-22 | End: 2017-09-19 | Stop reason: SDUPTHER

## 2017-05-22 RX ORDER — WARFARIN SODIUM 5 MG/1
TABLET ORAL
Qty: 30 TABLET | Refills: 0 | Status: SHIPPED | OUTPATIENT
Start: 2017-05-22 | End: 2017-06-21 | Stop reason: SDUPTHER

## 2017-05-22 RX ORDER — LISINOPRIL 5 MG/1
TABLET ORAL
Qty: 90 TABLET | Refills: 1
Start: 2017-05-22 | End: 2017-06-21 | Stop reason: SDUPTHER

## 2017-05-22 RX ORDER — FUROSEMIDE 20 MG/1
TABLET ORAL
Qty: 60 TABLET | Refills: 3 | Status: SHIPPED | OUTPATIENT
Start: 2017-05-22 | End: 2017-09-19 | Stop reason: SDUPTHER

## 2017-05-22 RX ORDER — PRAVASTATIN SODIUM 40 MG/1
TABLET ORAL
Qty: 30 TABLET | Refills: 3 | Status: SHIPPED | OUTPATIENT
Start: 2017-05-22 | End: 2017-09-19 | Stop reason: SDUPTHER

## 2017-05-22 NOTE — TELEPHONE ENCOUNTER
Spoke with patient about leg pain. She refused to come to the clinic for appointment today. Instructed to go to ER if pain continues with swelling. Apt made with Cardiology on 5/24/17.

## 2017-05-23 ENCOUNTER — ANTI-COAG VISIT (OUTPATIENT)
Dept: CARDIOLOGY | Facility: CLINIC | Age: 72
End: 2017-05-23

## 2017-05-23 DIAGNOSIS — Z79.01 LONG TERM (CURRENT) USE OF ANTICOAGULANTS: ICD-10-CM

## 2017-05-23 LAB — INR PPP: 2.5

## 2017-05-24 ENCOUNTER — OFFICE VISIT (OUTPATIENT)
Dept: CARDIOLOGY | Facility: CLINIC | Age: 72
End: 2017-05-24
Payer: MEDICARE

## 2017-05-24 VITALS
HEIGHT: 60 IN | BODY MASS INDEX: 32.89 KG/M2 | DIASTOLIC BLOOD PRESSURE: 57 MMHG | WEIGHT: 167.56 LBS | SYSTOLIC BLOOD PRESSURE: 115 MMHG | HEART RATE: 73 BPM

## 2017-05-24 DIAGNOSIS — Z79.4 TYPE 2 DIABETES MELLITUS WITH DIABETIC POLYNEUROPATHY, WITH LONG-TERM CURRENT USE OF INSULIN: ICD-10-CM

## 2017-05-24 DIAGNOSIS — I63.22: ICD-10-CM

## 2017-05-24 DIAGNOSIS — M79.605 PAIN OF LEFT LOWER EXTREMITY: Primary | ICD-10-CM

## 2017-05-24 DIAGNOSIS — Z99.81 ON HOME OXYGEN THERAPY: ICD-10-CM

## 2017-05-24 DIAGNOSIS — Z72.0 TOBACCO ABUSE: Chronic | ICD-10-CM

## 2017-05-24 DIAGNOSIS — I48.91 ATRIAL FIBRILLATION WITH RVR: ICD-10-CM

## 2017-05-24 DIAGNOSIS — E11.42 TYPE 2 DIABETES MELLITUS WITH DIABETIC POLYNEUROPATHY, WITH LONG-TERM CURRENT USE OF INSULIN: ICD-10-CM

## 2017-05-24 DIAGNOSIS — J96.11 CHRONIC RESPIRATORY FAILURE WITH HYPOXIA: Chronic | ICD-10-CM

## 2017-05-24 DIAGNOSIS — I63.219: ICD-10-CM

## 2017-05-24 PROCEDURE — 99214 OFFICE O/P EST MOD 30 MIN: CPT | Mod: S$PBB,GC,, | Performed by: INTERNAL MEDICINE

## 2017-05-24 PROCEDURE — 99215 OFFICE O/P EST HI 40 MIN: CPT | Mod: PBBFAC | Performed by: INTERNAL MEDICINE

## 2017-05-24 PROCEDURE — 99999 PR PBB SHADOW E&M-EST. PATIENT-LVL V: CPT | Mod: PBBFAC,GC,, | Performed by: INTERNAL MEDICINE

## 2017-05-24 NOTE — PROGRESS NOTES
Cardiology Clinic Note  Reason for Visit: Left Leg Swelling    HPI:   Ms. Polo is a 70 yo F with a history of HTN, HFpEF, PAF (on coumadin), DM2, COPD on home O2, tobacco use and CVA in 2013 and 2017 who presents for left lower extremity swelling.    She saw her PCP on 5/14/17 for this issue as well.  She has had edema in her left leg since her most recent stroke in April 2017.  She has also had associated pain and some redness.  The edema improves with elevating the leg and worsens with standing.  She also had a fever to 101 yesterday and states this has been going on for a couple of weeks.    She had a venous ultrasound in April 2017 that was negative for DVT.    She denies any change in dyspnea from her baseline, orthopnea, or PND.    ROS:    Constitution: Negative for fever or chills. Negative for weight loss or gain.   HENT: Negative for sore throat or headaches. Negative for rhinorrhea.  Eyes: Negative for blurred or double vision.   Cardiovascular: See above  Pulmonary: Negative for SOB. Negative for cough.   Gastrointestinal: Negative for abdominal pain. Negative for nausea/ vomiting. Negative for diarrhea.   : Negative for dysuria.   Neurological: Negative for focal weakness or sensory changes.  PMH:     Past Medical History:   Diagnosis Date    Anemia in chronic kidney disease 3/21/2017    Anxiety     Atherosclerotic cerebrovascular disease 7/25/2012    Chronic respiratory failure with hypoxia 8/11/2014    CKD (chronic kidney disease), stage IV 6/23/2016    CRLD (chronic restrictive lung disease) 8/11/2014    Diabetic peripheral neuropathy 7/24/2012    Diabetic retinopathy     Diverticulosis of colon     DJD (degenerative joint disease) 7/24/2012    Fatty liver     SUSAN (generalized anxiety disorder) 11/15/2012    Heart attack     History of PSVT (paroxysmal supraventricular tachycardia) 4/1/2014    Cardioverted on 2008     Iron deficiency 11/14/2014    Keloid cicatrix     Long  term (current) use of anticoagulants 4/15/2014    Mixed hyperlipidemia 1/4/2016    Multiple lung nodules 8/30/2016    Obesity, Class I, BMI 30-34.9 7/24/2012    On home oxygen therapy 6/22/2013    3L NC    Paroxysmal atrial fibrillation 1/4/2016    Renovascular hypertension 1/4/2016    Right-sided heart failure 10/26/2014     1 - Normal left ventricular systolic function (EF 65-70%).    2 - Biatrial enlargement.    3 - Right ventricular enlargement with normal systolic function.    4 - Trivial aortic stenosis, MARY = 1.87 cm2, peak velocity = 1.7 m/s, mean gradient = 6.0 mmHg.    5 - The mitral valve is markedly sclerotic with moderately restricted leaflet mobility.    6 - Mild mitral stenosis, MVA = 2.2 cm2.    7 - Trivial to mild mitral regurgitation.    8 - Trivial to mild tricuspid regurgitation.    9 - Increased central venous pressure.    10 - Pulmonary hypertension. The estimated PA systolic pressure is 63 mmHg.     Secondary hyperparathyroidism 6/23/2016    Secondary pulmonary hypertension 3/22/2017    Stroke     Type 2 diabetes mellitus with stage 4 chronic kidney disease 2/1/2016    Vitamin D deficiency disease 7/24/2012     Past Surgical History:   Procedure Laterality Date    ABDOMINAL SURGERY      ANKLE SURGERY      CATARACT EXTRACTION  8/17/00    right eye    CATARACT EXTRACTION  6/26/00    left eye    COLONOSCOPY      HYSTERECTOMY       Allergies:     Review of patient's allergies indicates:   Allergen Reactions    Latex, natural rubber Dermatitis and Rash    Adhesive Itching and Rash     Allergy to adhesive in nicotine patch.     Medications:     Current Outpatient Prescriptions on File Prior to Visit   Medication Sig Dispense Refill    albuterol (PROAIR HFA) 90 mcg/actuation inhaler Inhale 2 puffs into the lungs every 6 (six) hours as needed for Wheezing or Shortness of Breath. 2 Inhaler 6    albuterol-ipratropium 2.5mg-0.5mg/3mL (DUO-NEB) 0.5 mg-3 mg(2.5 mg base)/3 mL nebulizer  "solution Take 3 mLs by nebulization every 4 (four) hours as needed for Wheezing. Rescue 200 vial 12    alprazolam (XANAX) 0.5 MG tablet TAKE 1 TABLET BY MOUTH 3 TIMES A DAY 90 tablet 3    cetirizine (ZYRTEC) 10 MG tablet Take 1 tablet (10 mg total) by mouth once daily. 90 tablet 6    diltiaZEM (CARDIZEM CD) 240 MG 24 hr capsule Take 1 capsule (240 mg total) by mouth once daily. 90 capsule 4    ergocalciferol (ERGOCALCIFEROL) 50,000 unit Cap Take 1 capsule (50,000 Units total) by mouth every 7 days. 4 capsule 12    FERROUS SULFATE ORAL Take 1 tablet by mouth once daily.      fluticasone (FLONASE) 50 mcg/actuation nasal spray 1 spray by Each Nare route once daily. 16 g 12    fluticasone-salmeterol 500-50 mcg/dose (ADVAIR DISKUS) 500-50 mcg/dose DsDv diskus inhaler Inhale 1 puff into the lungs 2 (two) times daily. Controller 60 each 6    furosemide (LASIX) 20 MG tablet take 1 tablet by mouth twice a day 60 tablet 3    insulin aspart (NOVOLOG FLEXPEN) 100 unit/mL InPn pen 26 units AC 2 Box 12    insulin detemir (LEVEMIR FLEXTOUCH) 100 unit/mL (3 mL) SubQ InPn pen Inject 26 Units into the skin every evening. 1 Box 12    levothyroxine (SYNTHROID) 50 MCG tablet Take 1 tablet (50 mcg total) by mouth before breakfast. 90 tablet 4    levothyroxine (SYNTHROID) 75 MCG tablet take 1 tablet by mouth every morning ON AN EMPTY STOMACH 30 tablet 3    lisinopril (PRINIVIL,ZESTRIL) 5 MG tablet take 1 tablet by mouth once daily for high blood pressure 90 tablet 1    metoprolol tartrate (LOPRESSOR) 25 MG tablet take 1 tablet by mouth twice a day 60 tablet 3    ondansetron (ZOFRAN) 4 MG tablet Take 1 tablet (4 mg total) by mouth every 8 (eight) hours as needed for Nausea. 12 tablet 0    pen needle, diabetic, safety (NOVOFINE AUTOCOVER) 30 gauge x 1/3" Ndle Uses 4 a day 150 each 12    pravastatin (PRAVACHOL) 40 MG tablet take 1 tablet by mouth once daily 30 tablet 3    warfarin (COUMADIN) 2.5 MG tablet Take 2.5 mg by " mouth once daily. Except take 5 mg on Wed      warfarin (COUMADIN) 5 MG tablet take 1 tablet by mouth once daily 30 tablet 0     No current facility-administered medications on file prior to visit.      Social History:     Social History   Substance Use Topics    Smoking status: Light Tobacco Smoker     Packs/day: 0.50     Years: 20.00     Types: Cigarettes     Last attempt to quit: 2/15/2016    Smokeless tobacco: Never Used      Comment:  on Smoking program    Alcohol use No     Family History:     Family History   Problem Relation Age of Onset    Diabetes Mother     Stroke Mother     Hypertension Mother     Melanoma Mother     COPD Sister     Stroke Sister     Diabetes Sister     Hypertension Sister     COPD Brother     Diabetes Brother     Hypertension Brother     Heart disease Maternal Grandfather     No Known Problems Father     No Known Problems Maternal Aunt     No Known Problems Maternal Uncle     No Known Problems Paternal Aunt     Cancer Paternal Uncle     No Known Problems Maternal Grandmother     No Known Problems Paternal Grandmother     No Known Problems Paternal Grandfather     Psoriasis Neg Hx     Lupus Neg Hx     Eczema Neg Hx     Kidney disease Neg Hx     Heart attack Neg Hx     Amblyopia Neg Hx     Blindness Neg Hx     Cataracts Neg Hx     Glaucoma Neg Hx     Macular degeneration Neg Hx     Retinal detachment Neg Hx     Strabismus Neg Hx     Thyroid disease Neg Hx      Physical Exam:   BP (!) 115/57 (BP Location: Left arm, Patient Position: Sitting, BP Method: Automatic)   Pulse 73   Ht 5' (1.524 m)   Wt 76 kg (167 lb 8.8 oz)   LMP  (LMP Unknown)   BMI 32.72 kg/m²      Constitutional: No acute distress, conversant  HEENT: Sclera anicteric, Pupils equal, round and reactive to light, extraocular motions intact, Oropharynx clear  Neck: No JVD, no carotid bruits  Cardiovascular: regular rate and rhythm, no murmur, rubs or gallops, normal S1/S2  Pulmonary:  Scattered wheezes dominique  Abdominal: Abdomen soft, nontender, nondistended, positive bowel sounds  Extremities: 2+ Left lower extremity edema with redness, RLE with trace edema.  Vascular:  L posterior tibial  Dopplerable R posterior tibial  1+    L dorsalis pedis  Not dopplerable  R dorsalis pedis  1+      Skin: No ecchymosis or ulcers  Psych: Alert and oriented x 3, appropriate affect  Neuro: CNII-XII intact, no focal deficits    Labs:     Lab Results   Component Value Date     2017    K 4.2 2017     2017    CO2 34 (H) 2017    BUN 28 (H) 2017    CREATININE 1.5 (H) 2017    ANIONGAP 7 (L) 2017     Lab Results   Component Value Date    AST 27 2017    ALT 24 2017    ALKPHOS 65 2017    BILITOT 0.3 2017    ALBUMIN 2.7 (L) 2017     Lab Results   Component Value Date    CALCIUM 9.0 2017    MG 1.9 2017    PHOS 4.0 2017     Lab Results   Component Value Date     (H) 2017     (H) 2017     (H) 2017    Lab Results   Component Value Date    WBC 17.57 (H) 2017    HGB 12.1 2017    HCT 36.5 (L) 2017    HCT 28 (L) 2014     2017    GRAN 15.7 (H) 2017    GRAN 90.3 (H) 2017     Lab Results   Component Value Date    INR 2.5 2017     Lab Results   Component Value Date    CHOL 143 2017    HDL 58 2017    LDLCALC 69.4 2017    TRIG 78 2017     Lab Results   Component Value Date    HGBA1C 8.1 (H) 2017     Lab Results   Component Value Date    TSH 1.113 2017          Imagin/17 Venous u/s:    No evidence of venous thrombosis in either lower extremity.    EF   Date Value Ref Range Status   2017 65 55 - 65    2017 68 55 - 65    07/15/2014 60       Assessment:   In summary, Ms. Polo is a 70 yo F with a history of HTN, HFpEF, PAF (on coumadin), DM2, COPD on home O2, tobacco use and CVA in  and  2017 who presents for left lower extremity swelling.    Plan:   #Lower extremity Swelling: Suspect this is a combination of venous insufficiency and possible cellulitis.  Given fever and chills may need antibiotics to treat.  Will have pt contact PCP for appointment.  Do not suspect a cardiac cause as it is unilateral.  She is on coumadin and had negative lower extremity venous ultrasound in April so low suspicion for DVT.  She does not describe symptoms of claudication and has dopplerable pulses dominique.      #Pulmonary hypertension: She had a PASP on recent echo of 48.  Had previously been higher.  Likely due to WHO Group 3 from her severe COPD.  Her right heart size and function were normal on last echo.    #HTN: At goal.  Continue current medications.    #Paroxysmal atrial fibrillation: Continue diltiazem and warfarin.    #HLD: Continue pravastatin.    #Chronic diastolic heart failure: Appears compensated and euvolemic on exam.    RTC in 6 months. Follow up with PCP in next day or two for possible cellulitis (will notify PCP).    Signed:  Brooke Park MD, MPH  Cardiology Fellow, PGY-5  Pager: [phone]  5/24/2017 4:37 PM    Follow-up:   Future Appointments  Date Time Provider Department Center   6/1/2017 2:00 PM Pao Viveros MD [facility] STROKE [facility]   7/14/2017 1:00 PM LAB, APPOINTMENT [facility] LAB VNP [facility]   7/14/2017 1:05 PM SPECIMEN, [facility] SPECLAB [facility]   7/25/2017 2:30 PM Samina Godoy MD [facility] NEPHRO [facility]   7/26/2017 1:30 PM Jose Elias [place], DARRYL [facility] OPTOMTY [place]   8/1/2017 10:00 AM Abad Kruger MD [facility] PSYCH [facility]   8/28/2017 2:00 PM Nadiya Nava MD [facility] IM [facility] PC[facility]

## 2017-05-24 NOTE — Clinical Note
Dr. Nava,  I saw this patient in clinic today and suspect that her LLE edema is secondary to venous insufficiency with a possible superimposed cellulitis.  She has been having fever to 101 and has erythema of the left leg.  Asked her to call your office tomorrow to get an appointment.  Thanks, Brooke Park

## 2017-05-25 ENCOUNTER — TELEPHONE (OUTPATIENT)
Dept: INTERNAL MEDICINE | Facility: CLINIC | Age: 72
End: 2017-05-25

## 2017-05-25 VITALS
BODY MASS INDEX: 35.71 KG/M2 | WEIGHT: 165 LBS | DIASTOLIC BLOOD PRESSURE: 50 MMHG | OXYGEN SATURATION: 93 % | SYSTOLIC BLOOD PRESSURE: 108 MMHG | HEART RATE: 60 BPM

## 2017-05-25 RX ORDER — ACETAMINOPHEN AND CODEINE PHOSPHATE 300; 30 MG/1; MG/1
TABLET ORAL
Qty: 30 TABLET | Refills: 1 | OUTPATIENT
Start: 2017-05-25

## 2017-05-25 NOTE — TELEPHONE ENCOUNTER
Attempted to contact pt but phone continuously rung and no answer... Wasn't able to leave message because voicemail option never came on. Will try again later.

## 2017-05-25 NOTE — TELEPHONE ENCOUNTER
----- Message from Nikita Dover sent at 5/25/2017 11:32 AM CDT -----  Contact: self/ [phone] home  Type: Rx    Name of medication(s): Antibiotic for infection in leg    Is this a refill? New rx? New    Who prescribed medication? Dr. Nava    Pharmacy Name, Phone, & Location: [facility] on file    Comments: Pt was told by the cardiologist on yesterday that she has an infection in her leg, but no clots.  He told her to contact her PCP to get some antibiotics sent in.  Pt states that she doesn't want to come in because her leg still hurts.  She would like a call back to discuss.  Please call and advise.    Thank you

## 2017-05-25 NOTE — PROGRESS NOTES
Ms. Polo VS are WNL on today's visit. She has history of stroke 3 yrs ago, PMH of HTN, HLP, DM, and Afib. Patient is currently in smoking cessation, but reports smoking 1 or 2 cigarettes a day. She lives with daughter and significant other. Currently managed Afib with Coumadin.  team did care for patient post stroke 3 yrs ago.  nurse educated on Stroke RK, Stroke S/S, and smoking as behavioral modification needed to assist in reducing stroke reoccurrence.

## 2017-05-25 NOTE — TELEPHONE ENCOUNTER
Please contact patient and ask her to schedule an urgent care appt. Received message from cardiology about her legs

## 2017-05-29 ENCOUNTER — ANTI-COAG VISIT (OUTPATIENT)
Dept: CARDIOLOGY | Facility: CLINIC | Age: 72
End: 2017-05-29

## 2017-05-29 DIAGNOSIS — Z79.01 LONG TERM (CURRENT) USE OF ANTICOAGULANTS: ICD-10-CM

## 2017-05-29 LAB — INR PPP: 2.5

## 2017-05-31 ENCOUNTER — OFFICE VISIT (OUTPATIENT)
Dept: INTERNAL MEDICINE | Facility: CLINIC | Age: 72
End: 2017-05-31
Payer: MEDICARE

## 2017-05-31 DIAGNOSIS — I10 BENIGN ESSENTIAL HTN: ICD-10-CM

## 2017-05-31 DIAGNOSIS — R21 RASH: Primary | ICD-10-CM

## 2017-05-31 PROCEDURE — 99999 PR PBB SHADOW E&M-EST. PATIENT-LVL V: CPT | Mod: PBBFAC,,, | Performed by: INTERNAL MEDICINE

## 2017-05-31 PROCEDURE — 99214 OFFICE O/P EST MOD 30 MIN: CPT | Mod: S$PBB,,, | Performed by: INTERNAL MEDICINE

## 2017-05-31 PROCEDURE — 99215 OFFICE O/P EST HI 40 MIN: CPT | Mod: PBBFAC | Performed by: INTERNAL MEDICINE

## 2017-05-31 RX ORDER — ACETAMINOPHEN AND CODEINE PHOSPHATE 300; 30 MG/1; MG/1
1 TABLET ORAL 3 TIMES DAILY PRN
Qty: 30 TABLET | Refills: 1 | Status: SHIPPED | OUTPATIENT
Start: 2017-05-31 | End: 2017-06-10

## 2017-05-31 RX ORDER — CEPHALEXIN 500 MG/1
500 CAPSULE ORAL 4 TIMES DAILY
Qty: 28 CAPSULE | Refills: 0 | Status: SHIPPED | OUTPATIENT
Start: 2017-05-31 | End: 2017-07-03

## 2017-06-01 ENCOUNTER — OFFICE VISIT (OUTPATIENT)
Dept: NEUROLOGY | Facility: CLINIC | Age: 72
End: 2017-06-01
Payer: MEDICARE

## 2017-06-01 VITALS
WEIGHT: 167.56 LBS | SYSTOLIC BLOOD PRESSURE: 115 MMHG | DIASTOLIC BLOOD PRESSURE: 63 MMHG | HEART RATE: 75 BPM | HEIGHT: 60 IN | BODY MASS INDEX: 32.89 KG/M2

## 2017-06-01 DIAGNOSIS — I63.9 FLACCID HEMIPLEGIA OF LEFT NONDOMINANT SIDE DUE TO INFARCTION OF BRAIN: ICD-10-CM

## 2017-06-01 DIAGNOSIS — G81.04 FLACCID HEMIPLEGIA OF LEFT NONDOMINANT SIDE DUE TO INFARCTION OF BRAIN: ICD-10-CM

## 2017-06-01 PROCEDURE — 99999 PR PBB SHADOW E&M-EST. PATIENT-LVL III: CPT | Mod: PBBFAC,,, | Performed by: PSYCHIATRY & NEUROLOGY

## 2017-06-01 PROCEDURE — 99213 OFFICE O/P EST LOW 20 MIN: CPT | Mod: PBBFAC | Performed by: PSYCHIATRY & NEUROLOGY

## 2017-06-01 PROCEDURE — 99214 OFFICE O/P EST MOD 30 MIN: CPT | Mod: S$PBB,,, | Performed by: PSYCHIATRY & NEUROLOGY

## 2017-06-01 NOTE — LETTER
June 1, 2017      Nadiya Nava MD  [address]  [place] [address]           [facility]  [address]  [place] [address]  Phone: [phone]          Patient: Savita Polo   MR Number: [id number]   YOB: 1945   Date of Visit: 6/1/2017       Dear Dr. Nadiya Nava:    Thank you for referring Savita Polo to me for evaluation. Attached you will find relevant portions of my assessment and plan of care.    If you have questions, please do not hesitate to call me. I look forward to following Savita Polo along with you.    Sincerely,    Pao Viveros MD    Enclosure  CC:  No Recipients    If you would like to receive this communication electronically, please contact [email or web address] or [phone] to request more information on [employer] Link access.    For providers and/or their staff who would like to refer a patient to [facility], please contact us through our one-stop-shop provider referral line, [facility] , at [phone].    If you feel you have received this communication in error or would no longer like to receive these types of communications, please e-mail [email or web address]

## 2017-06-01 NOTE — PROGRESS NOTES
Subjective:       Patient ID: Savita Polo is a 71 y.o. female.    Chief Complaint:  Stroke and Memory Loss      History of Present Illness  HPI  Stroke Follow-up  She presents for follow-up of a stroke. Event occurred 2 months ago. She has residual symptoms of cognitive impairment. She denies syncope, seizures, balance disturbance, swallowing difficulty. Overall she feels the condition is improved. Stroke risk factors include diabetes mellitus, hyperlipidemia and hypertension. She also complains of none. She denies chest pain, palpitations, seizures and shortness of breath.       Review of Systems  Review of Systems   HENT: Negative.    Eyes: Negative.    Respiratory: Negative.    Cardiovascular: Negative.    Musculoskeletal: Positive for arthralgias.        Left foot   Neurological: Positive for facial asymmetry and weakness.       Objective:      Neurologic Exam     Mental Status   Oriented to person, place, and time.   Oriented to place.   Oriented to time.   Registration: recalls 3 of 3 objects. Recall at 5 minutes: recalls 2 of 3 objects. Follows 2 step commands.   Attention: normal. Concentration: normal.   Speech: speech is normal   Knowledge: good. Able to perform simple calculations.   Able to name object. Able to read. Able to repeat. Able to write. Normal comprehension.     Cranial Nerves     CN II   Visual fields full to confrontation.     CN III, IV, VI   Pupils are equal, round, and reactive to light.  Extraocular motions are normal.     CN V   Facial sensation intact.     CN VII   Right facial weakness: central  Left facial weakness: none    CN VIII   CN VIII normal.     CN IX, X   CN IX normal.   CN X normal.     CN XI   CN XI normal.     CN XII   CN XII normal.     Motor Exam   Muscle bulk: normal  Overall muscle tone: normal    Strength   Right neck flexion: 4/5  Left neck flexion: 4/5  Right neck extension: 4/5  Left neck extension: 4/5  Right deltoid: 4/5  Left deltoid: 4/5  Right biceps:  4/5  Left biceps: 4/5  Right triceps: 4/5  Left triceps: 4/5  Right wrist flexion: 4/5  Left wrist flexion: 4/5  Right wrist extension: 4/5  Left wrist extension: 4/5  Right interossei: 4/5  Left interossei: 4/5  Right abdominals: 4/5  Left abdominals: 4/5  Right iliopsoas: 4/5  Left iliopsoas: 4/5  Right quadriceps: 4/5  Left quadriceps: 4/5  Right hamstrin/5  Left hamstrin/5  Right glutei: 4/5  Left glutei: 4/5  Right anterior tibial: 4/5  Left anterior tibial: 4/5  Right posterior tibial: 4/5  Left posterior tibial: 4/5  Right peroneal: 4/5  Left peroneal: 4/5  Right gastroc: 4/5  Left gastroc: 4/5    Sensory Exam   Light touch normal.   Vibration normal.   Proprioception normal.   Pinprick normal.     Gait, Coordination, and Reflexes     Gait  Gait: non-neurologic    Coordination   Romberg: negative  Finger to nose coordination: normal  Heel to shin coordination: normal  Tandem walking coordination: abnormal    Tremor   Resting tremor: absent  Intention tremor: absent  Action tremor: absent    Reflexes   Reflexes 2+ except as noted.   Right plantar: normal  Left plantar: upgoing      Physical Exam   Constitutional: She is oriented to person, place, and time. She appears well-developed and well-nourished.   Eyes: EOM are normal. Pupils are equal, round, and reactive to light.   Neck: Normal range of motion. Neck supple.   Cardiovascular: Normal rate, regular rhythm and normal heart sounds.    Pulmonary/Chest: Effort normal and breath sounds normal.   Musculoskeletal: Normal range of motion.   Neurological: She is alert and oriented to person, place, and time. She has normal reflexes. She has an abnormal Tandem Gait Test. She has a normal Finger-Nose-Finger Test, a normal Heel to Chapa Test and a normal Romberg Test.   Skin: Skin is warm and dry.   Psychiatric: She has a normal mood and affect. Her speech is normal and behavior is normal. Judgment and thought content normal.         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 Improving steadily.  Continue medications.  RTC 6 months           Other Visit Diagnoses    None.         Plan:

## 2017-06-04 VITALS
DIASTOLIC BLOOD PRESSURE: 70 MMHG | HEART RATE: 70 BPM | HEIGHT: 60 IN | OXYGEN SATURATION: 95 % | SYSTOLIC BLOOD PRESSURE: 110 MMHG | WEIGHT: 167 LBS | TEMPERATURE: 98 F | BODY MASS INDEX: 32.79 KG/M2

## 2017-06-04 NOTE — PROGRESS NOTES
Subjective:       Patient ID: Savita Polo is a 71 y.o. female.    Chief Complaint: Infection (left leg)    HPI: She returns for management of hypertension.  She has had hypertension for over a year.  Current treatment has included medications outlined in medication list.  She denies chest pain or shortness of breath.  No palpitations.  Denies left arm or neck pain.  She complains of having an infection on her left calf    Medications: See med card    Social history: Does not smoke, does not drink alcohol      Review of Systems   Constitutional: Negative for chills, fatigue, fever and unexpected weight change.   Respiratory: Negative for chest tightness and shortness of breath.    Cardiovascular: Negative for chest pain and palpitations.   Gastrointestinal: Negative for abdominal pain and blood in stool.   Neurological: Negative for dizziness, syncope, numbness and headaches.       Objective:      Physical Exam   HENT:   Right Ear: External ear normal.   Left Ear: External ear normal.   Nose: Nose normal.   Mouth/Throat: Oropharynx is clear and moist.   Eyes: Pupils are equal, round, and reactive to light.   Neck: Normal range of motion.   Cardiovascular: Normal rate and regular rhythm.    No murmur heard.  Pulmonary/Chest: Breath sounds normal.   Abdominal: She exhibits no distension. There is no hepatosplenomegaly. There is no tenderness.   Lymphadenopathy:     She has no cervical adenopathy.     She has no axillary adenopathy.   Neurological: She has normal reflexes. No cranial nerve deficit or sensory deficit.   Left hemiparesis     left calf anterior aspect with 3 cm red macule  Assessment:     assessment and plan: 1.  Hypertension: Continue Lopressor  2.  Cellulitis: Keflex 500 mg by mouth 4 times a day ×7 days.  Advised her to contact Coumadin clinic regarding dose adjustment.  Call if no relief  3.  Discussed podiatry appointment.  She refused      Plan:       As above

## 2017-06-07 ENCOUNTER — ANTI-COAG VISIT (OUTPATIENT)
Dept: CARDIOLOGY | Facility: CLINIC | Age: 72
End: 2017-06-07

## 2017-06-07 DIAGNOSIS — Z79.01 LONG TERM (CURRENT) USE OF ANTICOAGULANTS: ICD-10-CM

## 2017-06-07 LAB — INR PPP: 3

## 2017-06-14 ENCOUNTER — ANTI-COAG VISIT (OUTPATIENT)
Dept: CARDIOLOGY | Facility: CLINIC | Age: 72
End: 2017-06-14

## 2017-06-14 ENCOUNTER — HOME CARE VISIT (OUTPATIENT)
Dept: NEUROLOGY | Facility: HOSPITAL | Age: 72
End: 2017-06-14

## 2017-06-14 DIAGNOSIS — Z79.01 LONG TERM (CURRENT) USE OF ANTICOAGULANTS: ICD-10-CM

## 2017-06-14 LAB — INR PPP: 3.8

## 2017-06-14 NOTE — PROGRESS NOTES
Calendar dose of warfarin confirmed; patient reports cranberry juice 8 oz last week. She is taking Keflex since 5/31 and has one dose left. On 6/5 she started Diltiazem 240mg one tablet QD QTY 30

## 2017-06-21 ENCOUNTER — ANTI-COAG VISIT (OUTPATIENT)
Dept: CARDIOLOGY | Facility: CLINIC | Age: 72
End: 2017-06-21

## 2017-06-21 DIAGNOSIS — Z79.01 LONG TERM (CURRENT) USE OF ANTICOAGULANTS: ICD-10-CM

## 2017-06-21 LAB — INR PPP: 2.8

## 2017-06-21 RX ORDER — LISINOPRIL 5 MG/1
TABLET ORAL
Qty: 90 TABLET | Refills: 0 | Status: SHIPPED | OUTPATIENT
Start: 2017-06-21 | End: 2017-08-20 | Stop reason: SDUPTHER

## 2017-06-21 RX ORDER — WARFARIN SODIUM 5 MG/1
TABLET ORAL
Qty: 30 TABLET | Refills: 0 | Status: SHIPPED | OUTPATIENT
Start: 2017-06-21 | End: 2017-07-21 | Stop reason: SDUPTHER

## 2017-06-28 ENCOUNTER — ANTI-COAG VISIT (OUTPATIENT)
Dept: CARDIOLOGY | Facility: CLINIC | Age: 72
End: 2017-06-28

## 2017-06-28 DIAGNOSIS — Z79.01 LONG TERM (CURRENT) USE OF ANTICOAGULANTS: ICD-10-CM

## 2017-06-28 LAB — INR PPP: 2.2

## 2017-07-03 ENCOUNTER — OFFICE VISIT (OUTPATIENT)
Dept: INTERNAL MEDICINE | Facility: CLINIC | Age: 72
End: 2017-07-03
Payer: MEDICARE

## 2017-07-03 VITALS
BODY MASS INDEX: 33.98 KG/M2 | WEIGHT: 174 LBS | SYSTOLIC BLOOD PRESSURE: 122 MMHG | OXYGEN SATURATION: 94 % | HEART RATE: 63 BPM | DIASTOLIC BLOOD PRESSURE: 74 MMHG

## 2017-07-03 DIAGNOSIS — N39.0 URINARY TRACT INFECTION WITHOUT HEMATURIA, SITE UNSPECIFIED: Primary | ICD-10-CM

## 2017-07-03 PROCEDURE — 1157F ADVNC CARE PLAN IN RCRD: CPT | Mod: ,,, | Performed by: INTERNAL MEDICINE

## 2017-07-03 PROCEDURE — 1159F MED LIST DOCD IN RCRD: CPT | Mod: ,,, | Performed by: INTERNAL MEDICINE

## 2017-07-03 PROCEDURE — 99999 PR PBB SHADOW E&M-EST. PATIENT-LVL V: CPT | Mod: PBBFAC,,, | Performed by: INTERNAL MEDICINE

## 2017-07-03 PROCEDURE — 99215 OFFICE O/P EST HI 40 MIN: CPT | Mod: PBBFAC | Performed by: INTERNAL MEDICINE

## 2017-07-03 PROCEDURE — 99213 OFFICE O/P EST LOW 20 MIN: CPT | Mod: S$PBB,,, | Performed by: INTERNAL MEDICINE

## 2017-07-03 RX ORDER — CIPROFLOXACIN 250 MG/1
250 TABLET, FILM COATED ORAL 2 TIMES DAILY
Qty: 14 TABLET | Refills: 0 | Status: SHIPPED | OUTPATIENT
Start: 2017-07-03 | End: 2017-07-10

## 2017-07-03 NOTE — PROGRESS NOTES
Patient denies any ER visits or hospital stays. VS are WNL on today. CBG post lunch 213. Discussed her increase of 10 lbs in one month.  nurse educated her again on symptoms to report to home health agency and when to seek medical attention. Instructed patient in stroke recovery and stroke residual symptoms versus new stroke symptoms.

## 2017-07-03 NOTE — PROGRESS NOTES
Per Dr. Nava, the pt is starting a course of Cipro 250mg bid x 7 days today.  As this is a low dose, I am not adjusting her warfarin for the current time.  I will monitor closely with an INR on 7/5 and adjust her dose, if needed, at that time.

## 2017-07-05 ENCOUNTER — ANTI-COAG VISIT (OUTPATIENT)
Dept: CARDIOLOGY | Facility: CLINIC | Age: 72
End: 2017-07-05

## 2017-07-05 DIAGNOSIS — Z79.01 LONG TERM (CURRENT) USE OF ANTICOAGULANTS: ICD-10-CM

## 2017-07-05 LAB — INR PPP: 2.9

## 2017-07-06 ENCOUNTER — LAB VISIT (OUTPATIENT)
Dept: LAB | Facility: HOSPITAL | Age: 72
End: 2017-07-06
Attending: INTERNAL MEDICINE
Payer: MEDICARE

## 2017-07-06 DIAGNOSIS — N39.0 URINARY TRACT INFECTION WITHOUT HEMATURIA, SITE UNSPECIFIED: ICD-10-CM

## 2017-07-06 PROCEDURE — 81001 URINALYSIS AUTO W/SCOPE: CPT

## 2017-07-06 PROCEDURE — 87086 URINE CULTURE/COLONY COUNT: CPT

## 2017-07-07 LAB
BACTERIA #/AREA URNS AUTO: NORMAL /HPF
BILIRUB UR QL STRIP: NEGATIVE
CLARITY UR REFRACT.AUTO: ABNORMAL
COLOR UR AUTO: YELLOW
GLUCOSE UR QL STRIP: NEGATIVE
HGB UR QL STRIP: ABNORMAL
KETONES UR QL STRIP: NEGATIVE
LEUKOCYTE ESTERASE UR QL STRIP: ABNORMAL
MICROSCOPIC COMMENT: NORMAL
NITRITE UR QL STRIP: NEGATIVE
PH UR STRIP: 5 [PH] (ref 5–8)
PROT UR QL STRIP: NEGATIVE
RBC #/AREA URNS AUTO: 1 /HPF (ref 0–4)
SP GR UR STRIP: 1.01 (ref 1–1.03)
SQUAMOUS #/AREA URNS AUTO: 2 /HPF
URN SPEC COLLECT METH UR: ABNORMAL
UROBILINOGEN UR STRIP-ACNC: NEGATIVE EU/DL
WBC #/AREA URNS AUTO: 2 /HPF (ref 0–5)

## 2017-07-08 VITALS
HEIGHT: 60 IN | OXYGEN SATURATION: 94 % | HEART RATE: 62 BPM | SYSTOLIC BLOOD PRESSURE: 136 MMHG | BODY MASS INDEX: 31.41 KG/M2 | TEMPERATURE: 98 F | DIASTOLIC BLOOD PRESSURE: 80 MMHG | WEIGHT: 160 LBS

## 2017-07-08 LAB
BACTERIA UR CULT: NORMAL
BACTERIA UR CULT: NORMAL

## 2017-07-08 NOTE — PROGRESS NOTES
Subjective:       Patient ID: Savita Polo is a 72 y.o. female.    Chief Complaint: Urinary Tract Infection    HPI: She complains of dysuria and urinary frequency.  No fever, chills, night sweats    Past medical history: Hypertension, diabetes, CVA with left hemiparesis, hypothyroidism, COPD,Atrial fibrillation, chronic renal insufficiency, hyperlipidemia,vitamin D deficiency, diverticulitis, lung nodule, anemia, avascular necrosis of hip, status post hysterectomy, ankle surgery. She had a colonoscopy February 2015      Medications: Albuterol MDI 2 puffs q.i.d. p.r.n., Advair 500/50 one puff b.i.d.,novolog insulin,levemir insulin 28 units daily, Synthroid 0.075 mg daily, Cardizem  mg daily, Coumadin as monitored by Coumadin clinic, Pravachol 40 mg daily, home oxygen , Lasix 20 mg twice a day , Lopressor 25 mg twice a day, lisinopril 5 mg daily      Allergies: sulfa        Review of Systems   Constitutional: Negative for chills, fatigue, fever and unexpected weight change.   Respiratory: Negative for chest tightness and shortness of breath.    Cardiovascular: Negative for chest pain and palpitations.   Gastrointestinal: Negative for abdominal pain and blood in stool.   Neurological: Negative for dizziness, syncope, numbness and headaches.       Objective:      Physical Exam   HENT:   Right Ear: External ear normal.   Left Ear: External ear normal.   Nose: Nose normal.   Mouth/Throat: Oropharynx is clear and moist.   Eyes: Pupils are equal, round, and reactive to light.   Neck: Normal range of motion.   Cardiovascular: Normal rate and regular rhythm.    No murmur heard.  Pulmonary/Chest: Breath sounds normal.   Abdominal: She exhibits no distension. There is no hepatosplenomegaly. There is no tenderness.   Lymphadenopathy:     She has no cervical adenopathy.     She has no axillary adenopathy.   Neurological: She has normal reflexes. No cranial nerve deficit or sensory deficit.   Left hemiparesis        Assessment:     assessment and plan: Urinary tract infection: Urine sent for urinalysis and culture.  Cipro 250 mg twice a day ×7 days.  Call if no relief.  She was here for an urgent care only appointment.  She will return to clinic for a physical      Plan:       As above

## 2017-07-11 ENCOUNTER — ANTI-COAG VISIT (OUTPATIENT)
Dept: CARDIOLOGY | Facility: CLINIC | Age: 72
End: 2017-07-11

## 2017-07-11 ENCOUNTER — TELEPHONE (OUTPATIENT)
Dept: INTERNAL MEDICINE | Facility: CLINIC | Age: 72
End: 2017-07-11

## 2017-07-11 DIAGNOSIS — Z79.01 LONG TERM (CURRENT) USE OF ANTICOAGULANTS: ICD-10-CM

## 2017-07-11 LAB — INR PPP: 3.5

## 2017-07-11 NOTE — TELEPHONE ENCOUNTER
----- Message from Lola Ramos sent at 7/11/2017 11:27 AM CDT -----  Contact: [facility]/jory/[phone]  [facility] called in regards to the pt having blisters on right leg from the calve to the ankles skin is red. She would like to know what should she do.      Please advise

## 2017-07-12 ENCOUNTER — OFFICE VISIT (OUTPATIENT)
Dept: INTERNAL MEDICINE | Facility: CLINIC | Age: 72
End: 2017-07-12
Payer: MEDICARE

## 2017-07-12 DIAGNOSIS — R21 RASH: Primary | ICD-10-CM

## 2017-07-12 PROCEDURE — 99213 OFFICE O/P EST LOW 20 MIN: CPT | Mod: S$PBB,,, | Performed by: INTERNAL MEDICINE

## 2017-07-12 PROCEDURE — 99215 OFFICE O/P EST HI 40 MIN: CPT | Mod: PBBFAC | Performed by: INTERNAL MEDICINE

## 2017-07-12 PROCEDURE — 99999 PR PBB SHADOW E&M-EST. PATIENT-LVL V: CPT | Mod: PBBFAC,,, | Performed by: INTERNAL MEDICINE

## 2017-07-12 PROCEDURE — 1159F MED LIST DOCD IN RCRD: CPT | Mod: ,,, | Performed by: INTERNAL MEDICINE

## 2017-07-12 PROCEDURE — 1157F ADVNC CARE PLAN IN RCRD: CPT | Mod: ,,, | Performed by: INTERNAL MEDICINE

## 2017-07-12 NOTE — PROGRESS NOTES
Patient reports that she held warfarin 7/5; All other days she took 2.5mg.  she took the wrong dose on Monday   She lost her Cipro so She did not start taking them until last night 7/11

## 2017-07-17 ENCOUNTER — ANTI-COAG VISIT (OUTPATIENT)
Dept: CARDIOLOGY | Facility: CLINIC | Age: 72
End: 2017-07-17

## 2017-07-17 DIAGNOSIS — Z79.01 LONG TERM (CURRENT) USE OF ANTICOAGULANTS: ICD-10-CM

## 2017-07-17 LAB — INR PPP: 3.3

## 2017-07-18 ENCOUNTER — HOME CARE VISIT (OUTPATIENT)
Dept: NEUROLOGY | Facility: HOSPITAL | Age: 72
End: 2017-07-18

## 2017-07-20 ENCOUNTER — ANTI-COAG VISIT (OUTPATIENT)
Dept: CARDIOLOGY | Facility: CLINIC | Age: 72
End: 2017-07-20

## 2017-07-20 ENCOUNTER — TELEPHONE (OUTPATIENT)
Dept: INTERNAL MEDICINE | Facility: CLINIC | Age: 72
End: 2017-07-20

## 2017-07-20 DIAGNOSIS — Z79.01 LONG TERM (CURRENT) USE OF ANTICOAGULANTS: ICD-10-CM

## 2017-07-20 LAB — INR PPP: 2

## 2017-07-20 NOTE — TELEPHONE ENCOUNTER
----- Message from Savita Etienne sent at 7/20/2017  3:09 PM CDT -----  Contact: Eliezer/[phone]      Type:Home Health(orders,updates,clarifications,etc)    Home Health Agency/Nurse: [facility]    Phone number: [phone]    Reason for call:    Comments: Екатерина called stated that patient had a fall this morning 07/20/17 trying to get out of the toilet, fell on the back, did not see anything abnormal. Please call and advise.       Thank you!!!

## 2017-07-24 ENCOUNTER — ANTI-COAG VISIT (OUTPATIENT)
Dept: CARDIOLOGY | Facility: CLINIC | Age: 72
End: 2017-07-24

## 2017-07-24 VITALS
DIASTOLIC BLOOD PRESSURE: 80 MMHG | HEIGHT: 60 IN | TEMPERATURE: 98 F | WEIGHT: 187.38 LBS | OXYGEN SATURATION: 94 % | SYSTOLIC BLOOD PRESSURE: 130 MMHG | BODY MASS INDEX: 36.79 KG/M2 | HEART RATE: 72 BPM

## 2017-07-24 DIAGNOSIS — Z79.01 LONG TERM (CURRENT) USE OF ANTICOAGULANTS: ICD-10-CM

## 2017-07-24 LAB — INR PPP: 1.9

## 2017-07-24 NOTE — PROGRESS NOTES
Subjective:       Patient ID: Savita Polo is a 72 y.o. female.    Chief Complaint: Rash    HPI: She complains of a rash of her bilateral legs  Review of Systems   Constitutional: Negative for chills, fatigue, fever and unexpected weight change.   Respiratory: Negative for chest tightness and shortness of breath.    Cardiovascular: Negative for chest pain and palpitations.   Gastrointestinal: Negative for abdominal pain and blood in stool.   Neurological: Negative for dizziness, syncope, numbness and headaches.       Objective:      Physical Exam   HENT:   Right Ear: External ear normal.   Left Ear: External ear normal.   Nose: Nose normal.   Mouth/Throat: Oropharynx is clear and moist.   Eyes: Pupils are equal, round, and reactive to light.   Neck: Normal range of motion.   Cardiovascular: Normal rate and regular rhythm.    No murmur heard.  Pulmonary/Chest: Breath sounds normal.   Abdominal: She exhibits no distension. There is no hepatosplenomegaly. There is no tenderness.   Lymphadenopathy:     She has no cervical adenopathy.     She has no axillary adenopathy.   Neurological: She has normal reflexes. No cranial nerve deficit or sensory deficit.   Left hemiparesis     red macules bilateral legs  Assessment:         assessment and plan: Dermatitis: Triamcinolone cream 0.1% apply to skin twice a day when necessary.  She was here for an urgent care only appointment.  She will return to clinic for a physical  Plan:       As above

## 2017-07-25 RX ORDER — WARFARIN SODIUM 5 MG/1
TABLET ORAL
Qty: 30 TABLET | Refills: 0 | Status: SHIPPED | OUTPATIENT
Start: 2017-07-25 | End: 2017-08-20 | Stop reason: SDUPTHER

## 2017-07-31 ENCOUNTER — ANTI-COAG VISIT (OUTPATIENT)
Dept: CARDIOLOGY | Facility: CLINIC | Age: 72
End: 2017-07-31

## 2017-07-31 DIAGNOSIS — Z79.01 LONG TERM (CURRENT) USE OF ANTICOAGULANTS: ICD-10-CM

## 2017-07-31 LAB — INR PPP: 2.7

## 2017-07-31 NOTE — PROGRESS NOTES
Baljinder Perry from [facility] stated no orders were received for 7/27.  Faxed new order for today 7/31  .  .

## 2017-08-01 ENCOUNTER — OFFICE VISIT (OUTPATIENT)
Dept: PSYCHIATRY | Facility: CLINIC | Age: 72
End: 2017-08-01
Payer: MEDICARE

## 2017-08-01 VITALS
HEART RATE: 63 BPM | SYSTOLIC BLOOD PRESSURE: 120 MMHG | WEIGHT: 182 LBS | BODY MASS INDEX: 35.54 KG/M2 | DIASTOLIC BLOOD PRESSURE: 67 MMHG

## 2017-08-01 DIAGNOSIS — F41.1 GENERALIZED ANXIETY DISORDER: Primary | ICD-10-CM

## 2017-08-01 PROCEDURE — 1157F ADVNC CARE PLAN IN RCRD: CPT | Mod: ,,, | Performed by: PSYCHIATRY & NEUROLOGY

## 2017-08-01 PROCEDURE — 99214 OFFICE O/P EST MOD 30 MIN: CPT | Mod: S$PBB,,, | Performed by: PSYCHIATRY & NEUROLOGY

## 2017-08-01 PROCEDURE — 99212 OFFICE O/P EST SF 10 MIN: CPT | Mod: PBBFAC | Performed by: PSYCHIATRY & NEUROLOGY

## 2017-08-01 PROCEDURE — 1159F MED LIST DOCD IN RCRD: CPT | Mod: ,,, | Performed by: PSYCHIATRY & NEUROLOGY

## 2017-08-01 PROCEDURE — 99999 PR PBB SHADOW E&M-EST. PATIENT-LVL II: CPT | Mod: PBBFAC,,, | Performed by: PSYCHIATRY & NEUROLOGY

## 2017-08-01 RX ORDER — ALPRAZOLAM 0.5 MG/1
TABLET ORAL
Qty: 90 TABLET | Refills: 3 | Status: SHIPPED | OUTPATIENT
Start: 2017-08-01

## 2017-08-01 NOTE — PROGRESS NOTES
Outpatient Psychiatry Follow-Up Visit (MD/NP)    8/1/2017    Clinical Status of Patient:  Outpatient (Ambulatory)    Chief Complaint:  Savita Polo is a 72 y.o. female who presents today for follow-up of anxiety.  Met with patient.      Interval History and Content of Current Session:  Interim Events/Subjective Report/Content of Current Session: Pt now with continuous oxygen and uses a walker.  She is in fairly good spirits.  Her main concern is her daughter who remains in correction.  We again cautioned her about benzos and fall risk.      Psychotherapy:  · Target symptoms: depression, anxiety   · Why chosen therapy is appropriate versus another modality: relevant to diagnosis  · Outcome monitoring methods: self-report, observation  · Therapeutic intervention type: supportive psychotherapy  · Topics discussed/themes: stress related to medical comorbidities, building skills sets for symptom management, symptom recognition  · The patient's response to the intervention is accepting. The patient's progress toward treatment goals is good.   · Duration of intervention: 10 minutes.    Review of Systems   · PSYCHIATRIC: Pertinant items are noted in the narrative.    Past Medical, Family and Social History: The patient's past medical, family and social history have been reviewed and updated as appropriate within the electronic medical record - see encounter notes.    Compliance: yes    Side effects: None    Risk Parameters:  Patient reports no suicidal ideation  Patient reports no homicidal ideation  Patient reports no self-injurious behavior  Patient reports no violent behavior    Exam (detailed: at least 9 elements; comprehensive: all 15 elements)   Constitutional  Vitals:  Most recent vital signs, dated less than 90 days prior to this appointment, were reviewed.   Vitals:    08/01/17 0950   BP: 120/67   Pulse: 63   Weight: 82.6 kg (182 lb)        General:  unremarkable, age appropriate     Musculoskeletal  Muscle  Strength/Tone:  no tremor   Gait & Station:  non-ataxic, uses walker     Psychiatric  Speech:  no latency; no press   Mood & Affect:  euthymic  congruent and appropriate   Thought Process:  normal and logical   Associations:  intact   Thought Content:  normal, no suicidality, no homicidality, delusions, or paranoia   Insight:  intact   Judgement: behavior is adequate to circumstances   Orientation:  grossly intact   Memory: intact for content of interview   Language: grossly intact   Attention Span & Concentration:  able to focus   Fund of Knowledge:  intact and appropriate to age and level of education     Assessment and Diagnosis   Status/Progress: Based on the examination today, the patient's problem(s) is/are improved.  New problems have not been presented today.   Co-morbidities are complicating management of the primary condition.  There are no active rule-out diagnoses for this patient at this time.     General Impression: Doing better    No diagnosis found.    Intervention/Counseling/Treatment Plan   · Medication Management: Continue current medications. The risks and benefits of medication were discussed with the patient.      Return to Clinic: 3 months

## 2017-08-03 ENCOUNTER — ANTI-COAG VISIT (OUTPATIENT)
Dept: CARDIOLOGY | Facility: CLINIC | Age: 72
End: 2017-08-03

## 2017-08-03 DIAGNOSIS — Z79.01 LONG TERM (CURRENT) USE OF ANTICOAGULANTS: ICD-10-CM

## 2017-08-03 LAB — INR PPP: 2.9

## 2017-08-06 NOTE — PROGRESS NOTES
Ms. Polo PV completed. Patient denies any ER visits or Hospital stays. She reports being fatigue a few days a week. Reports no medication changes. SM nurse reinforced education on low salt diet.

## 2017-08-08 ENCOUNTER — OFFICE VISIT (OUTPATIENT)
Dept: DERMATOLOGY | Facility: CLINIC | Age: 72
End: 2017-08-08
Payer: MEDICARE

## 2017-08-08 ENCOUNTER — ANTI-COAG VISIT (OUTPATIENT)
Dept: CARDIOLOGY | Facility: CLINIC | Age: 72
End: 2017-08-08

## 2017-08-08 DIAGNOSIS — L03.90 CELLULITIS, UNSPECIFIED CELLULITIS SITE: Primary | ICD-10-CM

## 2017-08-08 DIAGNOSIS — Z79.01 LONG TERM (CURRENT) USE OF ANTICOAGULANTS: ICD-10-CM

## 2017-08-08 DIAGNOSIS — R21 RASH AND NONSPECIFIC SKIN ERUPTION: ICD-10-CM

## 2017-08-08 LAB — INR PPP: 2.4

## 2017-08-08 PROCEDURE — 1125F AMNT PAIN NOTED PAIN PRSNT: CPT | Mod: ,,, | Performed by: DERMATOLOGY

## 2017-08-08 PROCEDURE — 99213 OFFICE O/P EST LOW 20 MIN: CPT | Mod: PBBFAC | Performed by: DERMATOLOGY

## 2017-08-08 PROCEDURE — 1157F ADVNC CARE PLAN IN RCRD: CPT | Mod: ,,, | Performed by: DERMATOLOGY

## 2017-08-08 PROCEDURE — 99999 PR PBB SHADOW E&M-EST. PATIENT-LVL III: CPT | Mod: PBBFAC,,, | Performed by: DERMATOLOGY

## 2017-08-08 PROCEDURE — 1159F MED LIST DOCD IN RCRD: CPT | Mod: ,,, | Performed by: DERMATOLOGY

## 2017-08-08 PROCEDURE — 3008F BODY MASS INDEX DOCD: CPT | Mod: ,,, | Performed by: DERMATOLOGY

## 2017-08-08 PROCEDURE — 99213 OFFICE O/P EST LOW 20 MIN: CPT | Mod: S$PBB,,, | Performed by: DERMATOLOGY

## 2017-08-08 RX ORDER — CIPROFLOXACIN 250 MG/1
250 TABLET, FILM COATED ORAL 2 TIMES DAILY
Qty: 28 TABLET | Refills: 0 | Status: SHIPPED | OUTPATIENT
Start: 2017-08-08 | End: 2017-08-22

## 2017-08-08 RX ORDER — TRIAMCINOLONE ACETONIDE 1 MG/G
OINTMENT TOPICAL 2 TIMES DAILY
Qty: 454 G | Refills: 0 | Status: SHIPPED | OUTPATIENT
Start: 2017-08-08

## 2017-08-08 NOTE — LETTER
August 8, 2017      Nadiya Nava MD  [address]  [place] [address]           [facility] - Dermatology  [address]  [place] [address]  Phone: [phone]  Fax: [phone]          Patient: Savita Polo   MR Number: [id number]   YOB: 1945   Date of Visit: 8/8/2017       Dear Dr. Nadiya Nava:    Thank you for referring Savita Polo to me for evaluation. Attached you will find relevant portions of my assessment and plan of care.    If you have questions, please do not hesitate to call me. I look forward to following Savita Polo along with you.    Sincerely,    Lizeth Fleming MD    Enclosure  CC:  No Recipients    If you would like to receive this communication electronically, please contact [email or web address] or [phone] to request more information on [employer] access.    For providers and/or their staff who would like to refer a patient to [facility], please contact us through our one-stop-shop provider referral line, [facility], at [phone].    If you feel you have received this communication in error or would no longer like to receive these types of communications, please e-mail [email or web address]

## 2017-08-08 NOTE — PROGRESS NOTES
Subjective:       Patient ID:  Savita Polo is a 72 y.o. female who presents for   Chief Complaint   Patient presents with    Rash     painful rash on legs x 2-3 months, spreading to thighs, Tac oint bid, med not helping     HPI  73 yo F presents for evaluation of a rash on her legs.  The rash has been present x 3-4 months.  SHe feels that this is the same rash as before in Oct 2016, Jan 2017.  Resolved with TAC ointment and cipro.      Past Medical History:   Diagnosis Date    Anemia in chr kidney dis 3/21/2017    Anxiety     Atherosclerotic cerebrovascular disease 7/25/2012    Chronic respiratory failure with hypoxia 8/11/2014    CKD (chronic kidney disease), stage IV 6/23/2016    CRLD (chronic restrictive lung disease) 8/11/2014    Diabetic peripheral neuropathy 7/24/2012    Diabetic retinopathy     Diverticulosis of colon     DJD (degenerative joint disease) 7/24/2012    Fatty liver     SUSAN (generalized anxiety disorder) 11/15/2012    Heart attack     History of PSVT (paroxysmal supraventricular tachycardia) 4/1/2014    Cardioverted on 2008     Iron deficiency 11/14/2014    Joint pain     Keloid cicatrix     Long term (current) use of anticoagulants 4/15/2014    Mixed hyperlipidemia 1/4/2016    Multiple lung nodules 8/30/2016    Obesity, Class I, BMI 30-34.9 7/24/2012    On home oxygen therapy 6/22/2013    3L NC    Paroxysmal atrial fibrillation 1/4/2016    Renovascular hypertension 1/4/2016    Right-sided heart failure 10/26/2014     1 - Normal left ventricular systolic function (EF 65-70%).    2 - Biatrial enlargement.    3 - Right ventricular enlargement with normal systolic function.    4 - Trivial aortic stenosis, MARY = 1.87 cm2, peak velocity = 1.7 m/s, mean gradient = 6.0 mmHg.    5 - The mitral valve is markedly sclerotic with moderately restricted leaflet mobility.    6 - Mild mitral stenosis, MVA = 2.2 cm2.    7 - Trivial to mild mitral regurgitation.    8 - Trivial to  mild tricuspid regurgitation.    9 - Increased central venous pressure.    10 - Pulmonary hypertension. The estimated PA systolic pressure is 63 mmHg.     Secondary hyperparathyroidism 6/23/2016    Secondary pulmonary hypertension 3/22/2017    Stroke     Type 2 diabetes mellitus with stage 4 chronic kidney disease 2/1/2016    Vitamin D deficiency disease 7/24/2012     Review of Systems   Skin: Positive for itching, rash and dry skin.   Hematologic/Lymphatic: Bruises/bleeds easily.        Objective:    Physical Exam   Constitutional: She appears well-developed and well-nourished.   Neurological: She is alert and oriented to person, place, and time.   Psychiatric: She has a normal mood and affect.   Skin:   Areas Examined (abnormalities noted in diagram):   Head / Face Inspection Performed  Neck Inspection Performed  RUE Inspected  LUE Inspection Performed  RLE Inspected  LLE Inspection Performed              Diagram Legend     Erythematous scaling macule/papule c/w actinic keratosis       Vascular papule c/w angioma      Pigmented verrucoid papule/plaque c/w seborrheic keratosis      Yellow umbilicated papule c/w sebaceous hyperplasia      Irregularly shaped tan macule c/w lentigo     1-2 mm smooth white papules consistent with Milia      Movable subcutaneous cyst with punctum c/w epidermal inclusion cyst      Subcutaneous movable cyst c/w pilar cyst      Firm pink to brown papule c/w dermatofibroma      Pedunculated fleshy papule(s) c/w skin tag(s)      Evenly pigmented macule c/w junctional nevus     Mildly variegated pigmented, slightly irregular-bordered macule c/w mildly atypical nevus      Flesh colored to evenly pigmented papule c/w intradermal nevus       Pink pearly papule/plaque c/w basal cell carcinoma      Erythematous hyperkeratotic cursted plaque c/w SCC      Surgical scar with no sign of skin cancer recurrence      Open and closed comedones      Inflammatory papules and pustules      Verrucoid  papule consistent consistent with wart     Erythematous eczematous patches and plaques     Dystrophic onycholytic nail with subungual debris c/w onychomycosis     Umbilicated papule    Erythematous-base heme-crusted tan verrucoid plaque consistent with inflamed seborrheic keratosis     Erythematous Silvery Scaling Plaque c/w Psoriasis     See annotation      Assessment / Plan:        Cellulitis, unspecified cellulitis site  -     ciprofloxacin HCl (CIPRO) 250 MG tablet; Take 1 tablet (250 mg total) by mouth 2 (two) times daily.  Dispense: 28 tablet; Refill: 0    Rash and nonspecific skin eruption-stasis dermatitis +/- xerotic eczema  Keep legs elevated when resting  -     triamcinolone acetonide 0.1% (KENALOG) 0.1 % ointment; Apply topically 2 (two) times daily.  Dispense: 454 g; Refill: 0  Continue Dove cleanser           Return in about 4 weeks (around 9/5/2017).

## 2017-08-15 ENCOUNTER — ANTI-COAG VISIT (OUTPATIENT)
Dept: CARDIOLOGY | Facility: CLINIC | Age: 72
End: 2017-08-15

## 2017-08-15 DIAGNOSIS — Z79.01 LONG TERM (CURRENT) USE OF ANTICOAGULANTS: Primary | ICD-10-CM

## 2017-08-15 LAB
INR PPP: 1.9
INR PPP: 109

## 2017-08-16 NOTE — PROGRESS NOTES
Patient questioned and confirmed correct dose.  Reports eating jef salad and okra gumbo over the weekend.  Also reports starting 8/8 CIPRO 250 MG tablet one tablet twice daily . Last dose will be 8/22. (no interaction seen)  No other changes reported.

## 2017-08-21 ENCOUNTER — HOME CARE VISIT (OUTPATIENT)
Dept: NEUROLOGY | Facility: HOSPITAL | Age: 72
End: 2017-08-21

## 2017-08-21 RX ORDER — WARFARIN SODIUM 5 MG/1
TABLET ORAL
Qty: 30 TABLET | Refills: 0 | Status: ON HOLD | OUTPATIENT
Start: 2017-08-21 | End: 2017-10-19 | Stop reason: SDUPTHER

## 2017-08-21 RX ORDER — LISINOPRIL 5 MG/1
TABLET ORAL
Qty: 90 TABLET | Refills: 0 | Status: SHIPPED | OUTPATIENT
Start: 2017-08-21

## 2017-08-22 VITALS
DIASTOLIC BLOOD PRESSURE: 70 MMHG | WEIGHT: 178 LBS | BODY MASS INDEX: 34.76 KG/M2 | OXYGEN SATURATION: 92 % | SYSTOLIC BLOOD PRESSURE: 116 MMHG | HEART RATE: 71 BPM

## 2017-08-22 NOTE — PROGRESS NOTES
Spoke with patient regarding missed 8/21 appointment .  Patient reports that she is stressed because someone was trying to steal her medications on yesterday but the situation has been handled.  Patient rescheduled 8/21 to 8/31 at [facility] .Stated she needs time to get her self togather and declined to be seen earlier. Patient confirmed correct dose and no changes reported.

## 2017-08-22 NOTE — PROGRESS NOTES
Ms. Polo VS are WNL on today's visit. She had visit with dermatologist for rash, new medication of oral abx and cream. CBG post meal during visit was 296. Patient is not herself her demeanor is disheveled. She was agitated the entire visit due to social family issues. Patient and caregiver had a verbal disagreement. Caregiver states patient has been very hard to deal with, she has been hearing and seeing things that aren't there, and has paranoid thoughts.  nurse attempted to contact granddaughter who is caregivers as well but was unable to speak with her, left message for her to return my call. Patient states she has not been sleeping for days.  nurse encouraged her to take anxiety medication as prescribed and to contact her mental health provider immediately. patient and caregiver verbalized understanding.

## 2017-08-24 ENCOUNTER — LAB VISIT (OUTPATIENT)
Dept: LAB | Facility: HOSPITAL | Age: 72
End: 2017-08-24
Attending: INTERNAL MEDICINE
Payer: MEDICARE

## 2017-08-24 ENCOUNTER — TELEPHONE (OUTPATIENT)
Dept: INTERNAL MEDICINE | Facility: CLINIC | Age: 72
End: 2017-08-24

## 2017-08-24 DIAGNOSIS — N39.0 URINARY TRACT INFECTION WITHOUT HEMATURIA, SITE UNSPECIFIED: Primary | ICD-10-CM

## 2017-08-24 DIAGNOSIS — N18.4 CKD (CHRONIC KIDNEY DISEASE), STAGE IV: ICD-10-CM

## 2017-08-24 LAB
ALBUMIN SERPL BCP-MCNC: 2.7 G/DL
ANION GAP SERPL CALC-SCNC: 7 MMOL/L
BASOPHILS # BLD AUTO: 0.05 K/UL
BASOPHILS NFR BLD: 0.6 %
BUN SERPL-MCNC: 44 MG/DL
CALCIUM SERPL-MCNC: 8.4 MG/DL
CHLORIDE SERPL-SCNC: 103 MMOL/L
CO2 SERPL-SCNC: 31 MMOL/L
CREAT SERPL-MCNC: 2.2 MG/DL
DIFFERENTIAL METHOD: ABNORMAL
EOSINOPHIL # BLD AUTO: 0.1 K/UL
EOSINOPHIL NFR BLD: 0.9 %
ERYTHROCYTE [DISTWIDTH] IN BLOOD BY AUTOMATED COUNT: 14.2 %
EST. GFR  (AFRICAN AMERICAN): 24.9 ML/MIN/1.73 M^2
EST. GFR  (NON AFRICAN AMERICAN): 21.6 ML/MIN/1.73 M^2
GLUCOSE SERPL-MCNC: 172 MG/DL
HCT VFR BLD AUTO: 37.7 %
HGB BLD-MCNC: 11.6 G/DL
LYMPHOCYTES # BLD AUTO: 1.9 K/UL
LYMPHOCYTES NFR BLD: 20.7 %
MCH RBC QN AUTO: 31.5 PG
MCHC RBC AUTO-ENTMCNC: 30.8 G/DL
MCV RBC AUTO: 102 FL
MONOCYTES # BLD AUTO: 0.8 K/UL
MONOCYTES NFR BLD: 8.8 %
NEUTROPHILS # BLD AUTO: 6.2 K/UL
NEUTROPHILS NFR BLD: 68.9 %
PHOSPHATE SERPL-MCNC: 3.7 MG/DL
PLATELET # BLD AUTO: 147 K/UL
PMV BLD AUTO: 10.9 FL
POTASSIUM SERPL-SCNC: 4.5 MMOL/L
PTH-INTACT SERPL-MCNC: 192 PG/ML
RBC # BLD AUTO: 3.68 M/UL
SODIUM SERPL-SCNC: 141 MMOL/L
WBC # BLD AUTO: 8.99 K/UL

## 2017-08-24 PROCEDURE — 36415 COLL VENOUS BLD VENIPUNCTURE: CPT

## 2017-08-24 PROCEDURE — 80069 RENAL FUNCTION PANEL: CPT

## 2017-08-24 PROCEDURE — 83970 ASSAY OF PARATHORMONE: CPT

## 2017-08-24 PROCEDURE — 85025 COMPLETE CBC W/AUTO DIFF WBC: CPT

## 2017-08-24 NOTE — TELEPHONE ENCOUNTER
Spoke with pt and pt stated she need orders placed in for urinary tract infection will be in on today to get blood work done and will like to submit urine sample then

## 2017-08-24 NOTE — TELEPHONE ENCOUNTER
----- Message from Savita Etienne sent at 8/23/2017  4:27 PM CDT -----  Contact: self/[phone]  Patient called in regards needing to talk with Dr Nava or the medical assistant to get an order for labs for urinary tract infection. Please call and advise.       Thank you!!!

## 2017-08-28 ENCOUNTER — TELEPHONE (OUTPATIENT)
Dept: NEPHROLOGY | Facility: CLINIC | Age: 72
End: 2017-08-28

## 2017-08-31 ENCOUNTER — ANTI-COAG VISIT (OUTPATIENT)
Dept: CARDIOLOGY | Facility: CLINIC | Age: 72
End: 2017-08-31
Payer: MEDICARE

## 2017-08-31 DIAGNOSIS — Z79.01 LONG TERM (CURRENT) USE OF ANTICOAGULANTS: Primary | ICD-10-CM

## 2017-08-31 LAB — INR PPP: 2 (ref 2–3)

## 2017-08-31 PROCEDURE — 85610 PROTHROMBIN TIME: CPT | Mod: PBBFAC | Performed by: PHARMACIST

## 2017-08-31 PROCEDURE — 99211 OFF/OP EST MAY X REQ PHY/QHP: CPT | Mod: PBBFAC | Performed by: PHARMACIST

## 2017-08-31 PROCEDURE — 99999 PR PBB SHADOW E&M-EST. PATIENT-LVL I: CPT | Mod: PBBFAC,,, | Performed by: PHARMACIST

## 2017-08-31 NOTE — PROGRESS NOTES
INR improved and therapeutic today with no overt complications. Patient does have bruising on her arms that started after her second stroke. Counseled her on scenarios warranting immediate attention and she verbalized understanding. Patient denies any changes in diet/vitamin K intake or medications. Patient correctly stated weekly warfarin regimen with little prompting, and denies any issues with missed or doubled warfarin doses. Patient elderly, will maintain weekly warfarin regimen with no changes, with plans to follow-up in ~1-2 weeks to monitor and reassess therapy. Patient advised to contact clinic with any changes, questions, or concerns.

## 2017-09-07 ENCOUNTER — OFFICE VISIT (OUTPATIENT)
Dept: NEPHROLOGY | Facility: CLINIC | Age: 72
End: 2017-09-07
Payer: MEDICARE

## 2017-09-07 ENCOUNTER — LAB VISIT (OUTPATIENT)
Dept: LAB | Facility: HOSPITAL | Age: 72
End: 2017-09-07
Attending: INTERNAL MEDICINE
Payer: MEDICARE

## 2017-09-07 VITALS
WEIGHT: 178 LBS | DIASTOLIC BLOOD PRESSURE: 70 MMHG | BODY MASS INDEX: 34.95 KG/M2 | SYSTOLIC BLOOD PRESSURE: 122 MMHG | HEART RATE: 92 BPM | OXYGEN SATURATION: 85 % | HEIGHT: 60 IN

## 2017-09-07 DIAGNOSIS — N25.81 SECONDARY HYPERPARATHYROIDISM: ICD-10-CM

## 2017-09-07 DIAGNOSIS — N17.9 AKI (ACUTE KIDNEY INJURY): Primary | ICD-10-CM

## 2017-09-07 DIAGNOSIS — R82.90 ABNORMAL URINALYSIS: ICD-10-CM

## 2017-09-07 DIAGNOSIS — E11.22 TYPE 2 DIABETES MELLITUS WITH STAGE 4 CHRONIC KIDNEY DISEASE, UNSPECIFIED LONG TERM INSULIN USE STATUS: Chronic | ICD-10-CM

## 2017-09-07 DIAGNOSIS — J96.11 CHRONIC RESPIRATORY FAILURE WITH HYPOXIA: Chronic | ICD-10-CM

## 2017-09-07 DIAGNOSIS — N18.30 CKD (CHRONIC KIDNEY DISEASE), STAGE III: ICD-10-CM

## 2017-09-07 DIAGNOSIS — N18.4 TYPE 2 DIABETES MELLITUS WITH STAGE 4 CHRONIC KIDNEY DISEASE, UNSPECIFIED LONG TERM INSULIN USE STATUS: Chronic | ICD-10-CM

## 2017-09-07 DIAGNOSIS — E55.9 VITAMIN D DEFICIENCY: ICD-10-CM

## 2017-09-07 DIAGNOSIS — I12.9 HYPERTENSIVE CKD (CHRONIC KIDNEY DISEASE): ICD-10-CM

## 2017-09-07 LAB
ALBUMIN SERPL BCP-MCNC: 2.9 G/DL
ANION GAP SERPL CALC-SCNC: 8 MMOL/L
BUN SERPL-MCNC: 60 MG/DL
CALCIUM SERPL-MCNC: 9 MG/DL
CHLORIDE SERPL-SCNC: 103 MMOL/L
CO2 SERPL-SCNC: 30 MMOL/L
CREAT SERPL-MCNC: 2.4 MG/DL
EST. GFR  (AFRICAN AMERICAN): 22.4 ML/MIN/1.73 M^2
EST. GFR  (NON AFRICAN AMERICAN): 19.4 ML/MIN/1.73 M^2
GLUCOSE SERPL-MCNC: 151 MG/DL
PHOSPHATE SERPL-MCNC: 3.9 MG/DL
POTASSIUM SERPL-SCNC: 4.9 MMOL/L
SODIUM SERPL-SCNC: 141 MMOL/L

## 2017-09-07 PROCEDURE — 80069 RENAL FUNCTION PANEL: CPT

## 2017-09-07 PROCEDURE — 1159F MED LIST DOCD IN RCRD: CPT | Mod: ,,, | Performed by: INTERNAL MEDICINE

## 2017-09-07 PROCEDURE — 3078F DIAST BP <80 MM HG: CPT | Mod: ,,, | Performed by: INTERNAL MEDICINE

## 2017-09-07 PROCEDURE — 3074F SYST BP LT 130 MM HG: CPT | Mod: ,,, | Performed by: INTERNAL MEDICINE

## 2017-09-07 PROCEDURE — 1157F ADVNC CARE PLAN IN RCRD: CPT | Mod: ,,, | Performed by: INTERNAL MEDICINE

## 2017-09-07 PROCEDURE — 36415 COLL VENOUS BLD VENIPUNCTURE: CPT

## 2017-09-07 PROCEDURE — 1126F AMNT PAIN NOTED NONE PRSNT: CPT | Mod: ,,, | Performed by: INTERNAL MEDICINE

## 2017-09-07 PROCEDURE — 99215 OFFICE O/P EST HI 40 MIN: CPT | Mod: S$PBB,,, | Performed by: INTERNAL MEDICINE

## 2017-09-07 PROCEDURE — 99213 OFFICE O/P EST LOW 20 MIN: CPT | Mod: PBBFAC | Performed by: INTERNAL MEDICINE

## 2017-09-07 PROCEDURE — 99999 PR PBB SHADOW E&M-EST. PATIENT-LVL III: CPT | Mod: PBBFAC,,, | Performed by: INTERNAL MEDICINE

## 2017-09-07 PROCEDURE — 3066F NEPHROPATHY DOC TX: CPT | Mod: ,,, | Performed by: INTERNAL MEDICINE

## 2017-09-07 PROCEDURE — 3045F PR MOST RECENT HEMOGLOBIN A1C LEVEL 7.0-9.0%: CPT | Mod: ,,, | Performed by: INTERNAL MEDICINE

## 2017-09-08 ENCOUNTER — TELEPHONE (OUTPATIENT)
Dept: NEPHROLOGY | Facility: CLINIC | Age: 72
End: 2017-09-08

## 2017-09-08 DIAGNOSIS — N17.9 AKI (ACUTE KIDNEY INJURY): Primary | ICD-10-CM

## 2017-09-08 DIAGNOSIS — R80.9 PROTEINURIA, UNSPECIFIED TYPE: ICD-10-CM

## 2017-09-08 NOTE — TELEPHONE ENCOUNTER
9/7/17  Please inform pt that creatinine is further increased on labs from yesterday after clinic labs which indicates kidney function is further declined. Potassium level is stable.   - Please have her do US at the earliest possible  - push fluids orally.  - She needs repeat labs on Monday, orders in for renal panel and urine studies   - please have her hold lisinopril for now     thanks.     9/18/17  Still waiting on repeat labs.

## 2017-09-09 NOTE — PROGRESS NOTES
Subjective:       Patient ID: Savita Polo is a 72 y.o. White female who presents for follow-up evaluation of Chronic Kidney Disease    HPI     Ms. Polo was seen for follow up on CKD. She was being followed by Dr. Garzon and was last seen by her on 8/24/15. I started following her since 3/15/16. She was last seen on 5/4/17. Pt arrives in the clinic with her supplemental oxygen. She has longstanding uncontrolled diabetes, hypertension and several other medical problems as listed in her problem list. She has stroke, episodes of RICHA on CKD, hyperkalemia.     She reports she has left sides weakness since then but she still manages to walk with the help of her walker.     She denies any recent nausea/ vomiting/ diarrhea/ abdominal pain/ chest pain. She states she had dysuria. But urine c/s as obtained by PCP did not grown any bacterial infection.    She has CKD III/IV. She has no symptoms to suggest uremia.     Per labs from 9/7/17 her Na 141, K 4.9, bicarbonate 30 which has been chronically elevated, she uses supplemental oxygen, BUN 60, creatinine 2.4, eGFR 19.4, Ca 9, phos 3.9, albumin 2.9. Labs from 8/24/17 which were pre clinic showed creatinine of 2.2, , BUN 44, Hb 11.6. Urine study from 8/24/17 showed cloudy urine, 2+ occult blood, 3+ LE, 0 RBC and 0 WBC. Culture was negative. Kidney US from 3/16 showed 9.5 and 10.3 cm kidneys and elevated RI.     She is aware of her chronic medical problems. She wears oxygen continuously at home.     Review of Systems   Constitutional: Negative for appetite change, fatigue and unexpected weight change.   HENT: Negative for facial swelling, sneezing and sore throat.    Respiratory: Positive for shortness of breath. Negative for cough and wheezing.    Cardiovascular: Negative for leg swelling.   Endocrine: Negative for polydipsia and polyphagia.   Genitourinary: Negative for decreased urine volume, difficulty urinating, dysuria, flank pain, frequency, hematuria  and urgency.   Musculoskeletal: Negative for arthralgias and back pain.   Neurological: Negative for dizziness and headaches.   Hematological: Does not bruise/bleed easily.   Psychiatric/Behavioral: Negative for behavioral problems. The patient is not nervous/anxious.        Objective:      Physical Exam   Constitutional: She is oriented to person, place, and time. She appears well-nourished. No distress.   Eyes: Right eye exhibits no discharge. Left eye exhibits no discharge.   Neck: No JVD present.   Cardiovascular: Normal rate, S1 normal and S2 normal.  Exam reveals no friction rub.    Pulmonary/Chest: Breath sounds normal. She has no wheezes. She has no rales.   On supplemental oxygen   Abdominal: Soft. She exhibits no distension. There is no tenderness.   Neurological: She is alert and oriented to person, place, and time.   Skin: Skin is warm and dry.   Psychiatric: She has a normal mood and affect.   Vitals reviewed.  left sided weakness.    Assessment:     1. RICHA on CKD IV  2. recurrent UTI  3. Diabetic nephropathy  4. Home oxygen dependant COPD  5. Azotemia  6. Secondary hyperparathyroidism    Plan:     She appears to have diabetic and hypertensive nephropathy causing CKD. She has CKD IV, proteinuria, secondary hyperparathyroidism. Relatively stable kidney size noted. She had recent another episode of UTI while she was hospitalized for stroke in 4/17. Pt was made aware of her advanced CKD. I explained CKD staging to her. Explained potential risk of progression to ESRD and need for dialysis. I have encouraged her to bring her daughter for clinic visit next time. She expressed understanding.     Most recent labs show worsening of renal function. I have advised her to increase fluid intake, hold lisinopril and repeat labs in 2 days. Also plan to obtain US kidneys with post void residual, with her underlying poorly controlled diabetes, she has risk of urinary retention from bladder dysfunction.    CKD IV  - She  has been taken off ACE-I during one of the hospitalization due to hyperkalemia. But looks she has been taking it currently. Will have her stop taking it in light of worsened renal function  - increase fluid intake  - repeat labs in 2 days  - monitor labs periodically  - high risk for progression to ESRD, she was made aware of it  - avoid NSAID/ bactrim/ IV contrast/ gadolinium/ aminoglycoside where possible      CKD MBD  - trend PTH, check vit D, corrected Ca next visit  - continue ergocalciferol for now    Poorly controlled diabetes  - continue to work with PCP, endocrine for better glycemic control    Screen for anemia, currently Hb is above goal and no indication for Epogen   Continue to trend labs for acid base, electrolytes, CKD MBD, proteinuria    Labs and plan was explained to her in detail, CKD staging was explained to her. Her questions were answered to her satisfaction.       RTC 1 month with labs prior.

## 2017-09-13 ENCOUNTER — ANTI-COAG VISIT (OUTPATIENT)
Dept: CARDIOLOGY | Facility: CLINIC | Age: 72
End: 2017-09-13
Payer: MEDICARE

## 2017-09-13 ENCOUNTER — HOSPITAL ENCOUNTER (OUTPATIENT)
Dept: RADIOLOGY | Facility: HOSPITAL | Age: 72
Discharge: HOME | End: 2017-09-13
Attending: INTERNAL MEDICINE
Payer: MEDICARE

## 2017-09-13 DIAGNOSIS — Z79.01 LONG TERM (CURRENT) USE OF ANTICOAGULANTS: Primary | ICD-10-CM

## 2017-09-13 DIAGNOSIS — N17.9 AKI (ACUTE KIDNEY INJURY): ICD-10-CM

## 2017-09-13 LAB — INR PPP: 1.7 (ref 2–3)

## 2017-09-13 PROCEDURE — 99999 PR PBB SHADOW E&M-EST. PATIENT-LVL I: CPT | Mod: PBBFAC,,, | Performed by: PHARMACIST

## 2017-09-13 PROCEDURE — 85610 PROTHROMBIN TIME: CPT | Mod: PBBFAC | Performed by: PHARMACIST

## 2017-09-13 PROCEDURE — 99211 OFF/OP EST MAY X REQ PHY/QHP: CPT | Mod: PBBFAC,25 | Performed by: PHARMACIST

## 2017-09-13 PROCEDURE — 76770 US EXAM ABDO BACK WALL COMP: CPT | Mod: 26,GC,, | Performed by: RADIOLOGY

## 2017-09-13 PROCEDURE — 76770 US EXAM ABDO BACK WALL COMP: CPT | Mod: TC

## 2017-09-13 NOTE — PROGRESS NOTES
INR low today with no reported changes. I will increase her dose at this time. Patient was re-educated on situations that would require placing a call to the coumadin clinic, including bleeding or unusual bruising issues, changes in health, diet or medications, upcoming procedures that require warfarin interruption, and missed coumadin dose(s). Patient expressed understanding that avoidance of consistency with these parameters could cause fluctuations in INR, leading to more frequent visits and increase risk of adverse events.

## 2017-09-19 ENCOUNTER — HOME CARE VISIT (OUTPATIENT)
Dept: NEUROLOGY | Facility: HOSPITAL | Age: 72
End: 2017-09-19

## 2017-09-19 DIAGNOSIS — E78.5 DYSLIPIDEMIA: Chronic | ICD-10-CM

## 2017-09-19 RX ORDER — LEVOTHYROXINE SODIUM 75 UG/1
TABLET ORAL
Qty: 30 TABLET | Refills: 1 | Status: SHIPPED | OUTPATIENT
Start: 2017-09-19

## 2017-09-19 RX ORDER — METOPROLOL TARTRATE 25 MG/1
TABLET, FILM COATED ORAL
Qty: 60 TABLET | Refills: 1 | Status: SHIPPED | OUTPATIENT
Start: 2017-09-19

## 2017-09-19 RX ORDER — FUROSEMIDE 20 MG/1
TABLET ORAL
Qty: 60 TABLET | Refills: 1 | Status: SHIPPED | OUTPATIENT
Start: 2017-09-19

## 2017-09-19 RX ORDER — PRAVASTATIN SODIUM 40 MG/1
TABLET ORAL
Qty: 30 TABLET | Refills: 1 | Status: ON HOLD | OUTPATIENT
Start: 2017-09-19 | End: 2017-10-27 | Stop reason: HOSPADM

## 2017-09-20 ENCOUNTER — ANTI-COAG VISIT (OUTPATIENT)
Dept: CARDIOLOGY | Facility: CLINIC | Age: 72
End: 2017-09-20
Payer: MEDICARE

## 2017-09-20 DIAGNOSIS — Z79.01 LONG TERM (CURRENT) USE OF ANTICOAGULANTS: Primary | ICD-10-CM

## 2017-09-20 LAB — INR PPP: 2.5 (ref 2–3)

## 2017-09-20 PROCEDURE — 99211 OFF/OP EST MAY X REQ PHY/QHP: CPT | Mod: PBBFAC | Performed by: PHARMACIST

## 2017-09-20 PROCEDURE — 85610 PROTHROMBIN TIME: CPT | Mod: PBBFAC | Performed by: PHARMACIST

## 2017-09-20 PROCEDURE — 99999 PR PBB SHADOW E&M-EST. PATIENT-LVL I: CPT | Mod: PBBFAC,,, | Performed by: PHARMACIST

## 2017-09-20 NOTE — PROGRESS NOTES
Patient is here for a quick follow up status post low INR on 9/13. INR therpaeutic today. Patient denies any changes in diet, medications, or health that would effect warfarin therapy.  Patient was re-educated on situations that would require placing a call to the coumadin clinic, including bleeding or unusual bruising issues, changes in health, diet or medications, upcoming procedures that require warfarin interruption, and missed coumadin dose(s). Patient expressed understanding that avoidance of consistency with these parameters could cause fluctuations in INR, leading to more frequent visits and increase risk of adverse events.

## 2017-09-27 ENCOUNTER — TELEPHONE (OUTPATIENT)
Dept: ADMINISTRATIVE | Facility: CLINIC | Age: 72
End: 2017-09-27

## 2017-09-27 NOTE — TELEPHONE ENCOUNTER
She has been seen in psychiatry here previously. If you could contact her (or family member) about scheduling a follow up with psychiatry

## 2017-10-02 NOTE — TELEPHONE ENCOUNTER
Spoke with pt and pt stated that she has an appt on the 31st of this month. Notified pt that it wasnt on her appt list pt stated she will give the office a call because she even has paper work

## 2017-10-10 VITALS — DIASTOLIC BLOOD PRESSURE: 70 MMHG | HEART RATE: 65 BPM | OXYGEN SATURATION: 95 % | SYSTOLIC BLOOD PRESSURE: 110 MMHG

## 2017-10-10 NOTE — PROGRESS NOTES
Patient VS are WNL on today's visit. She is very tired today. Caregiver states she's not sleeping at night and still has hallucinations, thoughts of paranoia, and anxiety. No hospital stays or ER visits. Encouraged caregiver to discuss with patient when she is in a better state of mind visiting her mental health physician.

## 2017-10-12 ENCOUNTER — HOSPITAL ENCOUNTER (INPATIENT)
Facility: HOSPITAL | Age: 72
LOS: 14 days | Discharge: HOSPICE | DRG: 870 | End: 2017-10-27
Attending: EMERGENCY MEDICINE | Admitting: EMERGENCY MEDICINE
Payer: MEDICARE

## 2017-10-12 DIAGNOSIS — R00.0 TACHYCARDIA: ICD-10-CM

## 2017-10-12 DIAGNOSIS — I50.9 ACUTE ON CHRONIC CONGESTIVE HEART FAILURE, UNSPECIFIED CONGESTIVE HEART FAILURE TYPE: ICD-10-CM

## 2017-10-12 DIAGNOSIS — R09.02 HYPOXIA: Primary | ICD-10-CM

## 2017-10-12 DIAGNOSIS — I46.9 CARDIAC ARREST: ICD-10-CM

## 2017-10-12 DIAGNOSIS — N39.0 ACUTE UTI: ICD-10-CM

## 2017-10-12 DIAGNOSIS — I48.91 A-FIB: ICD-10-CM

## 2017-10-12 DIAGNOSIS — R07.9 CHEST PAIN: ICD-10-CM

## 2017-10-12 DIAGNOSIS — R06.02 SHORTNESS OF BREATH: ICD-10-CM

## 2017-10-12 LAB
ALBUMIN SERPL BCP-MCNC: 3.3 G/DL
ALP SERPL-CCNC: 113 U/L
ALT SERPL W/O P-5'-P-CCNC: 19 U/L
ANION GAP SERPL CALC-SCNC: 8 MMOL/L
AST SERPL-CCNC: 18 U/L
BACTERIA #/AREA URNS HPF: ABNORMAL /HPF
BASOPHILS # BLD AUTO: 0.03 K/UL
BASOPHILS NFR BLD: 0.4 %
BILIRUB SERPL-MCNC: 0.5 MG/DL
BILIRUB UR QL STRIP: NEGATIVE
BNP SERPL-MCNC: 361 PG/ML
BUN SERPL-MCNC: 60 MG/DL
CALCIUM SERPL-MCNC: 9.1 MG/DL
CHLORIDE SERPL-SCNC: 105 MMOL/L
CLARITY UR: ABNORMAL
CO2 SERPL-SCNC: 29 MMOL/L
COLOR UR: ABNORMAL
CREAT SERPL-MCNC: 2.1 MG/DL
DIFFERENTIAL METHOD: ABNORMAL
EOSINOPHIL # BLD AUTO: 0.1 K/UL
EOSINOPHIL NFR BLD: 1 %
ERYTHROCYTE [DISTWIDTH] IN BLOOD BY AUTOMATED COUNT: 15.5 %
EST. GFR  (AFRICAN AMERICAN): 26 ML/MIN/1.73 M^2
EST. GFR  (NON AFRICAN AMERICAN): 23 ML/MIN/1.73 M^2
GLUCOSE SERPL-MCNC: 250 MG/DL
GLUCOSE UR QL STRIP: NEGATIVE
HCT VFR BLD AUTO: 40.7 %
HGB BLD-MCNC: 13.3 G/DL
HGB UR QL STRIP: ABNORMAL
HYALINE CASTS #/AREA URNS LPF: ABNORMAL /LPF
INR PPP: 2
KETONES UR QL STRIP: NEGATIVE
LEUKOCYTE ESTERASE UR QL STRIP: ABNORMAL
LYMPHOCYTES # BLD AUTO: 1 K/UL
LYMPHOCYTES NFR BLD: 11.8 %
MAGNESIUM SERPL-MCNC: 2 MG/DL
MCH RBC QN AUTO: 33.3 PG
MCHC RBC AUTO-ENTMCNC: 32.7 G/DL
MCV RBC AUTO: 102 FL
MICROSCOPIC COMMENT: ABNORMAL
MONOCYTES # BLD AUTO: 0.9 K/UL
MONOCYTES NFR BLD: 10.5 %
NEUTROPHILS # BLD AUTO: 6.4 K/UL
NEUTROPHILS NFR BLD: 76.3 %
NITRITE UR QL STRIP: POSITIVE
PH UR STRIP: 5 [PH] (ref 5–8)
PLATELET # BLD AUTO: 139 K/UL
PMV BLD AUTO: 10.6 FL
POCT GLUCOSE: 264 MG/DL (ref 70–110)
POTASSIUM SERPL-SCNC: 4.2 MMOL/L
PROT SERPL-MCNC: 8.3 G/DL
PROT UR QL STRIP: ABNORMAL
PROTHROMBIN TIME: 20.7 SEC
RBC # BLD AUTO: 3.99 M/UL
RBC #/AREA URNS HPF: 20 /HPF (ref 0–4)
SODIUM SERPL-SCNC: 142 MMOL/L
SP GR UR STRIP: 1.01 (ref 1–1.03)
TROPONIN I SERPL DL<=0.01 NG/ML-MCNC: 0.02 NG/ML
TSH SERPL DL<=0.005 MIU/L-ACNC: 3.26 UIU/ML
URN SPEC COLLECT METH UR: ABNORMAL
UROBILINOGEN UR STRIP-ACNC: NEGATIVE EU/DL
WBC # BLD AUTO: 8.31 K/UL
WBC #/AREA URNS HPF: >100 /HPF (ref 0–5)

## 2017-10-12 PROCEDURE — 93005 ELECTROCARDIOGRAM TRACING: CPT

## 2017-10-12 PROCEDURE — 80053 COMPREHEN METABOLIC PANEL: CPT

## 2017-10-12 PROCEDURE — 84484 ASSAY OF TROPONIN QUANT: CPT

## 2017-10-12 PROCEDURE — 87088 URINE BACTERIA CULTURE: CPT

## 2017-10-12 PROCEDURE — 99900035 HC TECH TIME PER 15 MIN (STAT)

## 2017-10-12 PROCEDURE — 82962 GLUCOSE BLOOD TEST: CPT

## 2017-10-12 PROCEDURE — 25000003 PHARM REV CODE 250: Performed by: EMERGENCY MEDICINE

## 2017-10-12 PROCEDURE — 84443 ASSAY THYROID STIM HORMONE: CPT

## 2017-10-12 PROCEDURE — 63600175 PHARM REV CODE 636 W HCPCS: Performed by: EMERGENCY MEDICINE

## 2017-10-12 PROCEDURE — 87077 CULTURE AEROBIC IDENTIFY: CPT

## 2017-10-12 PROCEDURE — 27000221 HC OXYGEN, UP TO 24 HOURS

## 2017-10-12 PROCEDURE — 94644 CONT INHLJ TX 1ST HOUR: CPT

## 2017-10-12 PROCEDURE — 93010 ELECTROCARDIOGRAM REPORT: CPT | Mod: ,,, | Performed by: INTERNAL MEDICINE

## 2017-10-12 PROCEDURE — 25000242 PHARM REV CODE 250 ALT 637 W/ HCPCS: Performed by: EMERGENCY MEDICINE

## 2017-10-12 PROCEDURE — 85610 PROTHROMBIN TIME: CPT

## 2017-10-12 PROCEDURE — 96365 THER/PROPH/DIAG IV INF INIT: CPT

## 2017-10-12 PROCEDURE — 83735 ASSAY OF MAGNESIUM: CPT

## 2017-10-12 PROCEDURE — 87086 URINE CULTURE/COLONY COUNT: CPT

## 2017-10-12 PROCEDURE — 96375 TX/PRO/DX INJ NEW DRUG ADDON: CPT

## 2017-10-12 PROCEDURE — 81000 URINALYSIS NONAUTO W/SCOPE: CPT

## 2017-10-12 PROCEDURE — 83880 ASSAY OF NATRIURETIC PEPTIDE: CPT

## 2017-10-12 PROCEDURE — 99285 EMERGENCY DEPT VISIT HI MDM: CPT | Mod: 25

## 2017-10-12 PROCEDURE — 85025 COMPLETE CBC W/AUTO DIFF WBC: CPT

## 2017-10-12 PROCEDURE — 87186 SC STD MICRODIL/AGAR DIL: CPT

## 2017-10-12 PROCEDURE — 96376 TX/PRO/DX INJ SAME DRUG ADON: CPT

## 2017-10-12 RX ORDER — ATORVASTATIN CALCIUM 10 MG/1
10 TABLET, FILM COATED ORAL DAILY
COMMUNITY

## 2017-10-12 RX ORDER — DILTIAZEM HYDROCHLORIDE 5 MG/ML
10 INJECTION INTRAVENOUS
Status: COMPLETED | OUTPATIENT
Start: 2017-10-12 | End: 2017-10-13

## 2017-10-12 RX ORDER — ALBUTEROL SULFATE 2.5 MG/.5ML
10 SOLUTION RESPIRATORY (INHALATION)
Status: COMPLETED | OUTPATIENT
Start: 2017-10-12 | End: 2017-10-12

## 2017-10-12 RX ORDER — NITROFURANTOIN 25; 75 MG/1; MG/1
100 CAPSULE ORAL 2 TIMES DAILY
Status: ON HOLD | COMMUNITY
End: 2017-10-27 | Stop reason: HOSPADM

## 2017-10-12 RX ORDER — LORATADINE 10 MG/1
10 TABLET ORAL DAILY
COMMUNITY

## 2017-10-12 RX ORDER — ASPIRIN 325 MG
325 TABLET ORAL
Status: COMPLETED | OUTPATIENT
Start: 2017-10-12 | End: 2017-10-13

## 2017-10-12 RX ORDER — METHYLPREDNISOLONE SOD SUCC 125 MG
125 VIAL (EA) INJECTION
Status: COMPLETED | OUTPATIENT
Start: 2017-10-12 | End: 2017-10-12

## 2017-10-12 RX ORDER — DILTIAZEM HYDROCHLORIDE 5 MG/ML
INJECTION INTRAVENOUS
Status: DISPENSED
Start: 2017-10-12 | End: 2017-10-13

## 2017-10-12 RX ORDER — ASPIRIN 325 MG
TABLET ORAL
Status: DISPENSED
Start: 2017-10-12 | End: 2017-10-13

## 2017-10-12 RX ORDER — ADHESIVE BANDAGE
30 BANDAGE TOPICAL DAILY PRN
Status: ON HOLD | COMMUNITY
End: 2017-10-27 | Stop reason: HOSPADM

## 2017-10-12 RX ORDER — IPRATROPIUM BROMIDE 0.5 MG/2.5ML
0.5 SOLUTION RESPIRATORY (INHALATION)
Status: COMPLETED | OUTPATIENT
Start: 2017-10-12 | End: 2017-10-12

## 2017-10-12 RX ORDER — ARIPIPRAZOLE 2 MG/1
5 TABLET ORAL DAILY
COMMUNITY

## 2017-10-12 RX ADMIN — ALBUTEROL SULFATE 10 MG: 2.5 SOLUTION RESPIRATORY (INHALATION) at 10:10

## 2017-10-12 RX ADMIN — SODIUM CHLORIDE 500 ML: 0.9 INJECTION, SOLUTION INTRAVENOUS at 11:10

## 2017-10-12 RX ADMIN — METHYLPREDNISOLONE SODIUM SUCCINATE 125 MG: 125 INJECTION, POWDER, FOR SOLUTION INTRAMUSCULAR; INTRAVENOUS at 10:10

## 2017-10-12 RX ADMIN — IPRATROPIUM BROMIDE 0.5 MG: 0.5 SOLUTION RESPIRATORY (INHALATION) at 10:10

## 2017-10-12 NOTE — PROGRESS NOTES
I spoke with Ronaldo patient want away for 72 hours I will call back next week to check on patient

## 2017-10-13 PROBLEM — Z79.01 CHRONIC ANTICOAGULATION: Status: ACTIVE | Noted: 2017-10-13

## 2017-10-13 PROBLEM — R09.02 HYPOXIA: Status: ACTIVE | Noted: 2017-10-13

## 2017-10-13 LAB
ALBUMIN SERPL BCP-MCNC: 2.7 G/DL
ALLENS TEST: ABNORMAL
ALP SERPL-CCNC: 80 U/L
ALT SERPL W/O P-5'-P-CCNC: 25 U/L
ANION GAP SERPL CALC-SCNC: 10 MMOL/L
AST SERPL-CCNC: 27 U/L
BACTERIA #/AREA URNS HPF: ABNORMAL /HPF
BILIRUB SERPL-MCNC: 0.8 MG/DL
BILIRUB UR QL STRIP: NEGATIVE
BUN SERPL-MCNC: 63 MG/DL
CALCIUM SERPL-MCNC: 8.7 MG/DL
CHLORIDE SERPL-SCNC: 109 MMOL/L
CLARITY UR: ABNORMAL
CO2 SERPL-SCNC: 22 MMOL/L
COLOR UR: YELLOW
CREAT SERPL-MCNC: 2.3 MG/DL
DELSYS: ABNORMAL
ERYTHROCYTE [SEDIMENTATION RATE] IN BLOOD BY WESTERGREN METHOD: 15 MM/H
ERYTHROCYTE [SEDIMENTATION RATE] IN BLOOD BY WESTERGREN METHOD: 15 MM/H
EST. GFR  (AFRICAN AMERICAN): 24 ML/MIN/1.73 M^2
EST. GFR  (NON AFRICAN AMERICAN): 20 ML/MIN/1.73 M^2
ESTIMATED PA SYSTOLIC PRESSURE: 50.76
FIO2: 100
FLOW: 15
GLOBAL PERICARDIAL EFFUSION: ABNORMAL
GLUCOSE SERPL-MCNC: 327 MG/DL
GLUCOSE UR QL STRIP: ABNORMAL
HCO3 UR-SCNC: 23 MMOL/L (ref 24–28)
HCO3 UR-SCNC: 24 MMOL/L (ref 24–28)
HCO3 UR-SCNC: 30 MMOL/L (ref 24–28)
HGB UR QL STRIP: ABNORMAL
HYALINE CASTS #/AREA URNS LPF: 0 /LPF
INR PPP: 2.1
KETONES UR QL STRIP: NEGATIVE
LACTATE SERPL-SCNC: 2.5 MMOL/L
LEUKOCYTE ESTERASE UR QL STRIP: ABNORMAL
MAGNESIUM SERPL-MCNC: 1.7 MG/DL
MICROSCOPIC COMMENT: ABNORMAL
MIN VOL: 7.3
MIN VOL: 7.4
MITRAL VALVE STENOSIS: ABNORMAL
MODE: ABNORMAL
NITRITE UR QL STRIP: NEGATIVE
PCO2 BLDA: 46.7 MMHG (ref 35–45)
PCO2 BLDA: 52 MMHG (ref 35–45)
PCO2 BLDA: 74.8 MMHG (ref 35–45)
PEEP: 5
PEEP: 5
PH SMN: 7.21 [PH] (ref 7.35–7.45)
PH SMN: 7.25 [PH] (ref 7.35–7.45)
PH SMN: 7.32 [PH] (ref 7.35–7.45)
PH UR STRIP: 5 [PH] (ref 5–8)
PIP: 45
PIP: 45
PO2 BLDA: 68 MMHG (ref 80–100)
PO2 BLDA: 71 MMHG (ref 80–100)
PO2 BLDA: 74 MMHG (ref 80–100)
POC BE: -2 MMOL/L
POC BE: -5 MMOL/L
POC BE: 0 MMOL/L
POC SATURATED O2: 88 % (ref 95–100)
POC SATURATED O2: 91 % (ref 95–100)
POC SATURATED O2: 93 % (ref 95–100)
POC TCO2: 25 MMOL/L (ref 23–27)
POC TCO2: 25 MMOL/L (ref 23–27)
POC TCO2: 32 MMOL/L (ref 23–27)
POCT GLUCOSE: 285 MG/DL (ref 70–110)
POCT GLUCOSE: 322 MG/DL (ref 70–110)
POCT GLUCOSE: 329 MG/DL (ref 70–110)
POTASSIUM SERPL-SCNC: 5 MMOL/L
PROCALCITONIN SERPL IA-MCNC: 0.12 NG/ML
PROT SERPL-MCNC: 7.2 G/DL
PROT UR QL STRIP: ABNORMAL
PROTHROMBIN TIME: 21.8 SEC
RBC #/AREA URNS HPF: 2 /HPF (ref 0–4)
RETIRED EF AND QEF - SEE NOTES: 65 (ref 55–65)
SAMPLE: ABNORMAL
SITE: ABNORMAL
SODIUM SERPL-SCNC: 141 MMOL/L
SP GR UR STRIP: 1.01 (ref 1–1.03)
SP02: 92
SP02: 99
SP02: 99
SQUAMOUS #/AREA URNS HPF: ABNORMAL /HPF
TRICUSPID VALVE REGURGITATION: ABNORMAL
TROPONIN I SERPL DL<=0.01 NG/ML-MCNC: 0.03 NG/ML
TROPONIN I SERPL DL<=0.01 NG/ML-MCNC: 0.03 NG/ML
TROPONIN I SERPL DL<=0.01 NG/ML-MCNC: 0.04 NG/ML
TROPONIN I SERPL DL<=0.01 NG/ML-MCNC: 0.05 NG/ML
URN SPEC COLLECT METH UR: ABNORMAL
UROBILINOGEN UR STRIP-ACNC: NEGATIVE EU/DL
VT: 500
VT: 500
WBC #/AREA URNS HPF: 8 /HPF (ref 0–5)

## 2017-10-13 PROCEDURE — 25000003 PHARM REV CODE 250: Performed by: INTERNAL MEDICINE

## 2017-10-13 PROCEDURE — 99292 CRITICAL CARE ADDL 30 MIN: CPT | Mod: ,,, | Performed by: INTERNAL MEDICINE

## 2017-10-13 PROCEDURE — 83605 ASSAY OF LACTIC ACID: CPT

## 2017-10-13 PROCEDURE — 99291 CRITICAL CARE FIRST HOUR: CPT | Mod: ,,, | Performed by: INTERNAL MEDICINE

## 2017-10-13 PROCEDURE — C9113 INJ PANTOPRAZOLE SODIUM, VIA: HCPCS | Performed by: HOSPITALIST

## 2017-10-13 PROCEDURE — 27000221 HC OXYGEN, UP TO 24 HOURS

## 2017-10-13 PROCEDURE — 82803 BLOOD GASES ANY COMBINATION: CPT

## 2017-10-13 PROCEDURE — 25000003 PHARM REV CODE 250: Performed by: EMERGENCY MEDICINE

## 2017-10-13 PROCEDURE — 93306 TTE W/DOPPLER COMPLETE: CPT | Mod: 26,,, | Performed by: INTERNAL MEDICINE

## 2017-10-13 PROCEDURE — 63600175 PHARM REV CODE 636 W HCPCS: Performed by: INTERNAL MEDICINE

## 2017-10-13 PROCEDURE — 93010 ELECTROCARDIOGRAM REPORT: CPT | Mod: ,,, | Performed by: INTERNAL MEDICINE

## 2017-10-13 PROCEDURE — 94761 N-INVAS EAR/PLS OXIMETRY MLT: CPT

## 2017-10-13 PROCEDURE — 63600175 PHARM REV CODE 636 W HCPCS: Performed by: EMERGENCY MEDICINE

## 2017-10-13 PROCEDURE — 5A1955Z RESPIRATORY VENTILATION, GREATER THAN 96 CONSECUTIVE HOURS: ICD-10-PCS | Performed by: HOSPITALIST

## 2017-10-13 PROCEDURE — 87040 BLOOD CULTURE FOR BACTERIA: CPT

## 2017-10-13 PROCEDURE — 27200966 HC CLOSED SUCTION SYSTEM

## 2017-10-13 PROCEDURE — 84145 PROCALCITONIN (PCT): CPT

## 2017-10-13 PROCEDURE — 5A12012 PERFORMANCE OF CARDIAC OUTPUT, SINGLE, MANUAL: ICD-10-PCS | Performed by: HOSPITALIST

## 2017-10-13 PROCEDURE — 25000242 PHARM REV CODE 250 ALT 637 W/ HCPCS: Performed by: EMERGENCY MEDICINE

## 2017-10-13 PROCEDURE — 36415 COLL VENOUS BLD VENIPUNCTURE: CPT

## 2017-10-13 PROCEDURE — 93306 TTE W/DOPPLER COMPLETE: CPT

## 2017-10-13 PROCEDURE — 99900035 HC TECH TIME PER 15 MIN (STAT)

## 2017-10-13 PROCEDURE — 94002 VENT MGMT INPAT INIT DAY: CPT

## 2017-10-13 PROCEDURE — 20000000 HC ICU ROOM

## 2017-10-13 PROCEDURE — 93010 ELECTROCARDIOGRAM REPORT: CPT | Mod: 76,,, | Performed by: INTERNAL MEDICINE

## 2017-10-13 PROCEDURE — 94660 CPAP INITIATION&MGMT: CPT

## 2017-10-13 PROCEDURE — 84484 ASSAY OF TROPONIN QUANT: CPT | Mod: 91

## 2017-10-13 PROCEDURE — 36600 WITHDRAWAL OF ARTERIAL BLOOD: CPT

## 2017-10-13 PROCEDURE — 63600175 PHARM REV CODE 636 W HCPCS: Performed by: HOSPITALIST

## 2017-10-13 PROCEDURE — 87086 URINE CULTURE/COLONY COUNT: CPT

## 2017-10-13 PROCEDURE — 85610 PROTHROMBIN TIME: CPT

## 2017-10-13 PROCEDURE — 25000242 PHARM REV CODE 250 ALT 637 W/ HCPCS: Performed by: INTERNAL MEDICINE

## 2017-10-13 PROCEDURE — 94640 AIRWAY INHALATION TREATMENT: CPT

## 2017-10-13 PROCEDURE — 80053 COMPREHEN METABOLIC PANEL: CPT

## 2017-10-13 PROCEDURE — 27000190 HC CPAP FULL FACE MASK W/VALVE

## 2017-10-13 PROCEDURE — 84484 ASSAY OF TROPONIN QUANT: CPT

## 2017-10-13 PROCEDURE — 83735 ASSAY OF MAGNESIUM: CPT

## 2017-10-13 PROCEDURE — 0BH18EZ INSERTION OF ENDOTRACHEAL AIRWAY INTO TRACHEA, VIA NATURAL OR ARTIFICIAL OPENING ENDOSCOPIC: ICD-10-PCS | Performed by: HOSPITALIST

## 2017-10-13 PROCEDURE — 81000 URINALYSIS NONAUTO W/SCOPE: CPT

## 2017-10-13 RX ORDER — DILTIAZEM HYDROCHLORIDE 5 MG/ML
10 INJECTION INTRAVENOUS
Status: COMPLETED | OUTPATIENT
Start: 2017-10-13 | End: 2017-10-13

## 2017-10-13 RX ORDER — ASPIRIN 325 MG
325 TABLET ORAL DAILY
Status: DISCONTINUED | OUTPATIENT
Start: 2017-10-13 | End: 2017-10-13 | Stop reason: SDUPTHER

## 2017-10-13 RX ORDER — IPRATROPIUM BROMIDE AND ALBUTEROL SULFATE 2.5; .5 MG/3ML; MG/3ML
3 SOLUTION RESPIRATORY (INHALATION) EVERY 6 HOURS
Status: DISCONTINUED | OUTPATIENT
Start: 2017-10-13 | End: 2017-10-27 | Stop reason: HOSPADM

## 2017-10-13 RX ORDER — WARFARIN 2.5 MG/1
2.5 TABLET ORAL DAILY
Status: DISCONTINUED | OUTPATIENT
Start: 2017-10-13 | End: 2017-10-14

## 2017-10-13 RX ORDER — PROPOFOL 10 MG/ML
5 INJECTION, EMULSION INTRAVENOUS CONTINUOUS
Status: DISCONTINUED | OUTPATIENT
Start: 2017-10-13 | End: 2017-10-15

## 2017-10-13 RX ORDER — LEVOTHYROXINE SODIUM 75 UG/1
75 TABLET ORAL
Status: DISCONTINUED | OUTPATIENT
Start: 2017-10-13 | End: 2017-10-27 | Stop reason: HOSPADM

## 2017-10-13 RX ORDER — ARIPIPRAZOLE 5 MG/1
5 TABLET ORAL DAILY
Status: DISCONTINUED | OUTPATIENT
Start: 2017-10-13 | End: 2017-10-13

## 2017-10-13 RX ORDER — GLUCAGON 1 MG
1 KIT INJECTION
Status: DISCONTINUED | OUTPATIENT
Start: 2017-10-13 | End: 2017-10-27 | Stop reason: HOSPADM

## 2017-10-13 RX ORDER — PRAVASTATIN SODIUM 40 MG/1
40 TABLET ORAL DAILY
Status: DISCONTINUED | OUTPATIENT
Start: 2017-10-13 | End: 2017-10-27 | Stop reason: HOSPADM

## 2017-10-13 RX ORDER — FUROSEMIDE 10 MG/ML
40 INJECTION INTRAMUSCULAR; INTRAVENOUS
Status: COMPLETED | OUTPATIENT
Start: 2017-10-13 | End: 2017-10-13

## 2017-10-13 RX ORDER — LISINOPRIL 5 MG/1
5 TABLET ORAL DAILY
Status: DISCONTINUED | OUTPATIENT
Start: 2017-10-13 | End: 2017-10-13

## 2017-10-13 RX ORDER — IPRATROPIUM BROMIDE 0.5 MG/2.5ML
0.5 SOLUTION RESPIRATORY (INHALATION) EVERY 6 HOURS
Status: DISCONTINUED | OUTPATIENT
Start: 2017-10-13 | End: 2017-10-13

## 2017-10-13 RX ORDER — DILTIAZEM HYDROCHLORIDE 5 MG/ML
INJECTION INTRAVENOUS
Status: DISPENSED
Start: 2017-10-13 | End: 2017-10-13

## 2017-10-13 RX ORDER — INSULIN ASPART 100 [IU]/ML
26 INJECTION, SOLUTION INTRAVENOUS; SUBCUTANEOUS
Status: DISCONTINUED | OUTPATIENT
Start: 2017-10-13 | End: 2017-10-13

## 2017-10-13 RX ORDER — ONDANSETRON 2 MG/ML
8 INJECTION INTRAMUSCULAR; INTRAVENOUS EVERY 8 HOURS PRN
Status: DISCONTINUED | OUTPATIENT
Start: 2017-10-13 | End: 2017-10-27 | Stop reason: HOSPADM

## 2017-10-13 RX ORDER — PANTOPRAZOLE SODIUM 40 MG/10ML
40 INJECTION, POWDER, LYOPHILIZED, FOR SOLUTION INTRAVENOUS DAILY
Status: DISCONTINUED | OUTPATIENT
Start: 2017-10-13 | End: 2017-10-27 | Stop reason: HOSPADM

## 2017-10-13 RX ORDER — FUROSEMIDE 10 MG/ML
20 INJECTION INTRAMUSCULAR; INTRAVENOUS 2 TIMES DAILY
Status: DISCONTINUED | OUTPATIENT
Start: 2017-10-13 | End: 2017-10-13

## 2017-10-13 RX ORDER — PREDNISONE 20 MG/1
40 TABLET ORAL DAILY
Status: DISCONTINUED | OUTPATIENT
Start: 2017-10-14 | End: 2017-10-15

## 2017-10-13 RX ORDER — LEVALBUTEROL 1.25 MG/.5ML
1.25 SOLUTION, CONCENTRATE RESPIRATORY (INHALATION) EVERY 6 HOURS
Status: DISCONTINUED | OUTPATIENT
Start: 2017-10-13 | End: 2017-10-13

## 2017-10-13 RX ORDER — MEROPENEM AND SODIUM CHLORIDE 1 G/50ML
1 INJECTION, SOLUTION INTRAVENOUS
Status: DISCONTINUED | OUTPATIENT
Start: 2017-10-13 | End: 2017-10-22

## 2017-10-13 RX ORDER — FUROSEMIDE 10 MG/ML
INJECTION INTRAMUSCULAR; INTRAVENOUS
Status: DISPENSED
Start: 2017-10-13 | End: 2017-10-13

## 2017-10-13 RX ORDER — METOPROLOL TARTRATE 25 MG/1
25 TABLET, FILM COATED ORAL 2 TIMES DAILY
Status: DISCONTINUED | OUTPATIENT
Start: 2017-10-13 | End: 2017-10-13

## 2017-10-13 RX ORDER — ONDANSETRON 2 MG/ML
4 INJECTION INTRAMUSCULAR; INTRAVENOUS EVERY 8 HOURS PRN
Status: DISCONTINUED | OUTPATIENT
Start: 2017-10-13 | End: 2017-10-13

## 2017-10-13 RX ORDER — INSULIN ASPART 100 [IU]/ML
1-10 INJECTION, SOLUTION INTRAVENOUS; SUBCUTANEOUS EVERY 6 HOURS PRN
Status: DISCONTINUED | OUTPATIENT
Start: 2017-10-13 | End: 2017-10-27 | Stop reason: HOSPADM

## 2017-10-13 RX ORDER — DILTIAZEM HYDROCHLORIDE 5 MG/ML
10 INJECTION INTRAVENOUS
Status: DISCONTINUED | OUTPATIENT
Start: 2017-10-13 | End: 2017-10-13

## 2017-10-13 RX ORDER — DILTIAZEM HYDROCHLORIDE 120 MG/1
240 CAPSULE, COATED, EXTENDED RELEASE ORAL DAILY
Status: DISCONTINUED | OUTPATIENT
Start: 2017-10-13 | End: 2017-10-13

## 2017-10-13 RX ORDER — SODIUM CHLORIDE 0.9 % (FLUSH) 0.9 %
3 SYRINGE (ML) INJECTION EVERY 8 HOURS PRN
Status: DISCONTINUED | OUTPATIENT
Start: 2017-10-13 | End: 2017-10-27 | Stop reason: HOSPADM

## 2017-10-13 RX ORDER — FUROSEMIDE 10 MG/ML
60 INJECTION INTRAMUSCULAR; INTRAVENOUS ONCE
Status: COMPLETED | OUTPATIENT
Start: 2017-10-13 | End: 2017-10-13

## 2017-10-13 RX ORDER — ASPIRIN 325 MG
325 TABLET, DELAYED RELEASE (ENTERIC COATED) ORAL DAILY
Status: DISCONTINUED | OUTPATIENT
Start: 2017-10-14 | End: 2017-10-13

## 2017-10-13 RX ORDER — FAMOTIDINE 10 MG/ML
20 INJECTION INTRAVENOUS 2 TIMES DAILY
Status: DISCONTINUED | OUTPATIENT
Start: 2017-10-13 | End: 2017-10-13

## 2017-10-13 RX ORDER — CHLORHEXIDINE GLUCONATE ORAL RINSE 1.2 MG/ML
15 SOLUTION DENTAL 2 TIMES DAILY
Status: DISCONTINUED | OUTPATIENT
Start: 2017-10-13 | End: 2017-10-27 | Stop reason: HOSPADM

## 2017-10-13 RX ORDER — FLUTICASONE FUROATE AND VILANTEROL 200; 25 UG/1; UG/1
1 POWDER RESPIRATORY (INHALATION) DAILY
Status: DISCONTINUED | OUTPATIENT
Start: 2017-10-13 | End: 2017-10-13

## 2017-10-13 RX ADMIN — PROPOFOL 25 MCG/KG/MIN: 10 INJECTION, EMULSION INTRAVENOUS at 05:10

## 2017-10-13 RX ADMIN — ASPIRIN 325 MG ORAL TABLET 325 MG: 325 PILL ORAL at 12:10

## 2017-10-13 RX ADMIN — DILTIAZEM HYDROCHLORIDE 10 MG: 5 INJECTION INTRAVENOUS at 01:10

## 2017-10-13 RX ADMIN — FENTANYL CITRATE: 50 INJECTION, SOLUTION INTRAMUSCULAR; INTRAVENOUS at 01:10

## 2017-10-13 RX ADMIN — INSULIN ASPART 8 UNITS: 100 INJECTION, SOLUTION INTRAVENOUS; SUBCUTANEOUS at 05:10

## 2017-10-13 RX ADMIN — CEFTRIAXONE 2 G: 2 INJECTION, SOLUTION INTRAVENOUS at 01:10

## 2017-10-13 RX ADMIN — METHYLPREDNISOLONE SODIUM SUCCINATE 40 MG: 40 INJECTION, POWDER, FOR SOLUTION INTRAMUSCULAR; INTRAVENOUS at 02:10

## 2017-10-13 RX ADMIN — FUROSEMIDE 20 MG: 10 INJECTION, SOLUTION INTRAMUSCULAR; INTRAVENOUS at 10:10

## 2017-10-13 RX ADMIN — PROPOFOL 5 MCG/KG/MIN: 10 INJECTION, EMULSION INTRAVENOUS at 10:10

## 2017-10-13 RX ADMIN — CHLORHEXIDINE GLUCONATE 15 ML: 1.2 RINSE ORAL at 09:10

## 2017-10-13 RX ADMIN — DILTIAZEM HYDROCHLORIDE 10 MG: 5 INJECTION INTRAVENOUS at 12:10

## 2017-10-13 RX ADMIN — WARFARIN SODIUM 2.5 MG: 2.5 TABLET ORAL at 05:10

## 2017-10-13 RX ADMIN — IPRATROPIUM BROMIDE AND ALBUTEROL SULFATE 3 ML: .5; 3 SOLUTION RESPIRATORY (INHALATION) at 07:10

## 2017-10-13 RX ADMIN — LEVALBUTEROL 1.25 MG: 1.25 SOLUTION, CONCENTRATE RESPIRATORY (INHALATION) at 08:10

## 2017-10-13 RX ADMIN — IPRATROPIUM BROMIDE AND ALBUTEROL SULFATE 3 ML: .5; 3 SOLUTION RESPIRATORY (INHALATION) at 11:10

## 2017-10-13 RX ADMIN — AZITHROMYCIN MONOHYDRATE 500 MG: 500 INJECTION, POWDER, LYOPHILIZED, FOR SOLUTION INTRAVENOUS at 05:10

## 2017-10-13 RX ADMIN — METHYLPREDNISOLONE SODIUM SUCCINATE 40 MG: 40 INJECTION, POWDER, FOR SOLUTION INTRAMUSCULAR; INTRAVENOUS at 09:10

## 2017-10-13 RX ADMIN — PRAVASTATIN SODIUM 40 MG: 40 TABLET ORAL at 09:10

## 2017-10-13 RX ADMIN — FLUTICASONE FUROATE AND VILANTEROL TRIFENATATE 1 PUFF: 200; 25 POWDER RESPIRATORY (INHALATION) at 08:10

## 2017-10-13 RX ADMIN — VANCOMYCIN HYDROCHLORIDE 1500 MG: 1 INJECTION, POWDER, LYOPHILIZED, FOR SOLUTION INTRAVENOUS at 01:10

## 2017-10-13 RX ADMIN — INSULIN ASPART 6 UNITS: 100 INJECTION, SOLUTION INTRAVENOUS; SUBCUTANEOUS at 07:10

## 2017-10-13 RX ADMIN — INSULIN DETEMIR 20 UNITS: 100 INJECTION, SOLUTION SUBCUTANEOUS at 09:10

## 2017-10-13 RX ADMIN — IPRATROPIUM BROMIDE 0.5 MG: 0.5 SOLUTION RESPIRATORY (INHALATION) at 08:10

## 2017-10-13 RX ADMIN — FUROSEMIDE 60 MG: 10 INJECTION, SOLUTION INTRAMUSCULAR; INTRAVENOUS at 02:10

## 2017-10-13 RX ADMIN — IPRATROPIUM BROMIDE AND ALBUTEROL SULFATE 3 ML: .5; 3 SOLUTION RESPIRATORY (INHALATION) at 12:10

## 2017-10-13 RX ADMIN — PANTOPRAZOLE SODIUM 40 MG: 40 INJECTION, POWDER, FOR SOLUTION INTRAVENOUS at 09:10

## 2017-10-13 RX ADMIN — METHYLPREDNISOLONE SODIUM SUCCINATE 125 MG: 40 INJECTION, POWDER, FOR SOLUTION INTRAMUSCULAR; INTRAVENOUS at 09:10

## 2017-10-13 RX ADMIN — FUROSEMIDE 40 MG: 10 INJECTION, SOLUTION INTRAMUSCULAR; INTRAVENOUS at 12:10

## 2017-10-13 RX ADMIN — LEVOTHYROXINE SODIUM 75 MCG: 75 TABLET ORAL at 05:10

## 2017-10-13 RX ADMIN — MEROPENEM AND SODIUM CHLORIDE 1 G: 1 INJECTION, SOLUTION INTRAVENOUS at 02:10

## 2017-10-13 NOTE — ASSESSMENT & PLAN NOTE
Acute on chronic respiratory failure with hypoxia and hypercapnia secondary to multifactorial etiology including: COPD, tobacco abuse, pulmonary hypertension, and right-sided heart failure   · Nebulizer treatments (albuterol/atrovent)  · Initiate Solumedrol 125mg IV q8, then taper as clinical course improves; convert to PO taper as outpatient  · Titrate O2 sats between 88 to 93%.  No supplemental 02 for 02 saturation greater than 93% due to V/Q mismatch  · Breo,while inpatient  · Advair 500/50mg, Spiriva 18mcg,  Daliresp 500mcg at or before discharge  · Mucolytics  · Consult pulmonology for further optimization  · Tobacco cessation counseling  · Optimize blood pressure control

## 2017-10-13 NOTE — NURSING
Code blue called patient without respirations and cyanotic code team called at 0820 checked left side of neck for palpable pulse no pulse was felt. Initiated cardiopulmonary resusitation and heart rhythm established. . At the time of this note patient intubated and being transferred to icu care. Will contact family

## 2017-10-13 NOTE — ASSESSMENT & PLAN NOTE
· Currently rate controlled  · Maintain with beta-blocker with hold parameters  · Monitor on telemetry  · Maintain magnesium around 2.0  · Maintain potassium around 4.0  · INR 2.0

## 2017-10-13 NOTE — ED NOTES
Tech from [facility] left bedside; ED tech is at bedside to continue patient monitoring until report is called to floor.

## 2017-10-13 NOTE — ED NOTES
MD made aware that pt. Cont. To be stating around 84-86 with O2 at 5L. MD order another EKG on the pt. And stated he will put orders in.   EKG repeated

## 2017-10-13 NOTE — ED TRIAGE NOTES
Pt presents to ED via EMS from [facility] for SOB x 3 days. Per EMS, pt was 88% upon arrival to [facility], pt currently 93% on 4L nasal cannula. Hx of COPD and CHF. Pt uses home oxygen. Pt is CEC for delusional thinking and paranoia. Pt is alert and oriented, calm and cooperative at this time. Tech from [facility] at bedside. [facility] reports altered mental status and pneumonia on chest x-ray. Pt currently receiving Macrobid for UTI.

## 2017-10-13 NOTE — ASSESSMENT & PLAN NOTE
Chronic anticoagulation  · For treatment of atrial fibrillation  · Continue warfarin.  · Monitor INR closely

## 2017-10-13 NOTE — SIGNIFICANT EVENT
I have been called for code blue to Counts include 234 beds at the [facility] room,patient was just admitted for acute on chronic hypoxic,hypercapnic respiratory failure duo to COPD,CHF  Exacerbation,per nursing staff,patient was cyanotic and pulseless,CPR already was started,patient received one time Epi and was emergently intubated,ROSC achieved after about 9 minutes,patient has been transferred immediately to ICU in intubated  condition,consulted pulmonology for vent management,family (sister)has been called,but not able be reached.

## 2017-10-13 NOTE — NURSING
I called several numbers listed in patient demographics jason joya is listed as daughter I called the home number [phone] and cell [phone] unable to get answer then I called ant clancy listed as well at [phone] he did answer I told him I needed to speak to ashley grimaldo family unable to get ahold of her daughter. Mr ant clancy informed me the patient daughter jason was incarcerated he did not know the name of the facility but he could contact the patients sister and granddaughter . I have him the ICU number [phone]. I also spoke to ravi the icu nurse and gave her an update on patient and update on the family information that I could not reach the immediate family but that a friend mr clancy is going to call her.

## 2017-10-13 NOTE — ED NOTES
"Reported to MD that pt. Stated she was "allergic to steroid, they make me shack". MD also made aware that pt. Is stating in the 70-80's; her cont. Breathing tx just started.   MD stated to ask pt. If she would like to take the steroid and he will give her something if she shakes. Pt. States she will take medication.   "

## 2017-10-13 NOTE — ED NOTES
Spoke with Sylvia at [facility], updated on pt status. Sylvia states that she will notify pt family.

## 2017-10-13 NOTE — ASSESSMENT & PLAN NOTE
· Chronic tobacco use  · Tobacco cessation counseling  · Nicotine patch offered; consider Wellbutrin or Chantix through PCP as an outpatient (will require closer monitoring)  · I discussed with the patient regarding the hazardous effects of smoking on increasing risk of heart attack and stroke, worsening lung functions, and increasing cancer risk.   Patient was urged to stop smoking now.  I also offered nicotine taper (such as nicotine patch and gum) to help ease the craving to smoke.

## 2017-10-13 NOTE — H&P
[facility] Medicine  History & Physical    Patient Name: Savita Polo  MRN: [id number]  Admission Date: 10/13/2017  Attending Physician: Boogie Kong MD, MPH      PCP:     Nadiya Nava MD    CC:     Chief Complaint   Patient presents with    Shortness of Breath     SOB x 3 days, per EMS 88% upon arrival to [facility] currently 93% on 4L nasal cannula        HISTORY OF PRESENT ILLNESS:     Savita Polo is a 72 y.o. female that (in part)  has a past medical history of Anemia in chronic kidney disease(285.21); Anxiety; Atherosclerotic cerebrovascular disease; CHF (congestive heart failure); Chronic respiratory failure with hypoxia; CKD (chronic kidney disease), stage IV; COPD (chronic obstructive pulmonary disease); CRLD (chronic restrictive lung disease); Diabetic peripheral neuropathy; Diabetic retinopathy; Diverticulosis of colon; DJD (degenerative joint disease); Fatty liver; SUSAN (generalized anxiety disorder); Heart attack; History of PSVT (paroxysmal supraventricular tachycardia); Iron deficiency; Joint pain; Keloid cicatrix; Long term (current) use of anticoagulants; Mixed hyperlipidemia; Multiple lung nodules; Obesity, Class I, BMI 30-34.9; On home oxygen therapy; Paroxysmal atrial fibrillation; Renovascular hypertension; Right-sided heart failure; Secondary hyperparathyroidism; Secondary pulmonary hypertension; Stroke; Thyroid disease; Type 2 diabetes mellitus with stage 4 chronic kidney disease; and Vitamin D deficiency disease. Presents to [facility] complaining of shortness of breath.  She lives and [facility] nursing home.  She is a poorly 93% on 4 L by nasal cannula at her residence.  She is on chronic oxygen has had multiple admissions for respiratory failure.  Worsened with exertion.  No relief given with supplemental oxygen.  Located in the chest.  Moderate to high intensity.  No radiation.  Subacute onset with  progressive worsening.  Constant duration.    In the emergency department chest x-ray, ABG, and routine laboratory studies were obtained.  There was evidence of hypoxia and hypercapnia.  She also had evidence of urinary tract infection.    Hospital medicine has been asked to admit for further evaluation and treatment.       REVIEW OF SYSTEMS:     -- Constitutional: Generalized weakness and fatigue.  No fever or chills.  -- Eyes: No visual changes, diplopia, pain, tearing, blind spots, or discharge.   -- Ears, nose, mouth, throat, and face: No congestion, sore throat, epistaxis, d/c, bleeding gums, neck stiffness masses, or dental issues.  -- Respiratory: As above in history of present illness.  -- Cardiovascular: Dyspnea with exertion.  No chest pain, syncope, PND, cyanosis, or palpitations.   -- Gastrointestinal: No vomiting, abdominal pain, hematemesis, melena, dyspepsia, or change in bowel habits.  -- Genitourinary: Increased urinary frequency and dysuria.  History of recurrent UTIs.  -- Integument/breast: No rash, pruritis, pigmentation changes, dryness, or changes in hair  -- Hematologic/lymphatic: No easy bruising or lymphadenopathy.   -- Musculoskeletal: No acute arthralgias, acute myalgias, joint swelling, acute limitations of ROM.  Subacute generalized muscular weakness without focal deficit.  -- Neurological: No seizures, headaches, incoordination, paraesthesias, ataxia, vertigo, or tremors.  -- Behavioral/Psych: Treated anxiety.  No auditory or visual hallucinations, depression, or suicidal/homicidal ideations.  -- Endocrine: Treated hypothyroidism.  No heat or cold intolerance, polydipsia, or unintentional weight gain / loss.  -- Allergy/Immunologic: No recurrent infections or adverse reaction to food, insects, or difficulty breathing.      PAST MEDICAL / SURGICAL HISTORY:     Past Medical History:   Diagnosis Date    Anemia in chronic kidney disease(285.21) 3/21/2017    Anxiety     Atherosclerotic  cerebrovascular disease 7/25/2012    CHF (congestive heart failure)     Chronic respiratory failure with hypoxia 8/11/2014    CKD (chronic kidney disease), stage IV 6/23/2016    COPD (chronic obstructive pulmonary disease)     CRLD (chronic restrictive lung disease) 8/11/2014    Diabetic peripheral neuropathy 7/24/2012    Diabetic retinopathy     Diverticulosis of colon     DJD (degenerative joint disease) 7/24/2012    Fatty liver     SUSAN (generalized anxiety disorder) 11/15/2012    Heart attack     History of PSVT (paroxysmal supraventricular tachycardia) 4/1/2014    Cardioverted on 2008     Iron deficiency 11/14/2014    Joint pain     Keloid cicatrix     Long term (current) use of anticoagulants 4/15/2014    Mixed hyperlipidemia 1/4/2016    Multiple lung nodules 8/30/2016    Obesity, Class I, BMI 30-34.9 7/24/2012    On home oxygen therapy 6/22/2013    3L NC    Paroxysmal atrial fibrillation 1/4/2016    Renovascular hypertension 1/4/2016    Right-sided heart failure 10/26/2014     1 - Normal left ventricular systolic function (EF 65-70%).    2 - Biatrial enlargement.    3 - Right ventricular enlargement with normal systolic function.    4 - Trivial aortic stenosis, MARY = 1.87 cm2, peak velocity = 1.7 m/s, mean gradient = 6.0 mmHg.    5 - The mitral valve is markedly sclerotic with moderately restricted leaflet mobility.    6 - Mild mitral stenosis, MVA = 2.2 cm2.    7 - Trivial to mild mitral regurgitation.    8 - Trivial to mild tricuspid regurgitation.    9 - Increased central venous pressure.    10 - Pulmonary hypertension. The estimated PA systolic pressure is 63 mmHg.     Secondary hyperparathyroidism 6/23/2016    Secondary pulmonary hypertension 3/22/2017    Stroke     Thyroid disease     Type 2 diabetes mellitus with stage 4 chronic kidney disease 2/1/2016    Vitamin D deficiency disease 7/24/2012     Past Surgical History:   Procedure Laterality Date    ABDOMINAL SURGERY       ANKLE SURGERY      CATARACT EXTRACTION  8/17/00    right eye    CATARACT EXTRACTION  6/26/00    left eye    COLONOSCOPY      HYSTERECTOMY           FAMILY HISTORY:     Family History   Problem Relation Age of Onset    Diabetes Mother     Stroke Mother     Hypertension Mother     Melanoma Mother     COPD Sister     Stroke Sister     Diabetes Sister     Hypertension Sister     COPD Brother     Diabetes Brother     Hypertension Brother     Heart disease Maternal Grandfather     No Known Problems Father     No Known Problems Maternal Aunt     No Known Problems Maternal Uncle     No Known Problems Paternal Aunt     Cancer Paternal Uncle     No Known Problems Maternal Grandmother     No Known Problems Paternal Grandmother     No Known Problems Paternal Grandfather     Psoriasis Neg Hx     Lupus Neg Hx     Eczema Neg Hx     Kidney disease Neg Hx     Heart attack Neg Hx     Amblyopia Neg Hx     Blindness Neg Hx     Cataracts Neg Hx     Glaucoma Neg Hx     Macular degeneration Neg Hx     Retinal detachment Neg Hx     Strabismus Neg Hx     Thyroid disease Neg Hx          SOCIAL HISTORY:     Social History   Substance Use Topics    Smoking status: Light Tobacco Smoker     Packs/day: 0.50     Years: 20.00     Types: Cigarettes     Last attempt to quit: 2/15/2016    Smokeless tobacco: Never Used      Comment:  on Smoking program    Alcohol use No     Social History     Social History    Marital status:      Spouse name: N/A    Number of children: N/A    Years of education: N/A     Occupational History    Retired       Social History Main Topics    Smoking status: Light Tobacco Smoker     Packs/day: 0.50     Years: 20.00     Types: Cigarettes     Last attempt to quit: 2/15/2016    Smokeless tobacco: Never Used      Comment:  on Smoking program    Alcohol use No    Drug use: No    Sexual activity: Not Asked     Other Topics Concern    Are You Pregnant Or Think You May  "Be? No    Breast-Feeding No     Social History Narrative     - has a daughter who she is currently lives with         ALLERGIES:       Review of patient's allergies indicates:   Allergen Reactions    Latex, natural rubber Dermatitis and Rash    Amoxicillin      "They say that but they've given it to me and nothing happens."     Adhesive Itching and Rash     Allergy to adhesive in nicotine patch.         HEALTH SCREENING:     Influenza vaccine not up-to-date for this season.  Prevnar 13 pneumonia vaccine =  evidence of previous vaccination found in the medical record      HOME MEDICATIONS:     Prior to Admission medications    Medication Sig Start Date End Date Taking? Authorizing Provider   alprazolam (XANAX) 0.5 MG tablet TAKE 1 TABLET BY MOUTH 3 TIMES A DAY  Patient taking differently: 0.25 mg 3 (three) times daily. TAKE 1 TABLET BY MOUTH 3 TIMES A DAY 8/1/17  Yes Abad Kruger MD   aripiprazole (ABILIFY) 2 MG Tab Take 5 mg by mouth once daily.   Yes Historical Provider, MD   atorvastatin (LIPITOR) 10 MG tablet Take 10 mg by mouth once daily.   Yes Historical Provider, MD   diltiaZEM (CARDIZEM CD) 240 MG 24 hr capsule Take 1 capsule (240 mg total) by mouth once daily. 3/22/17  Yes Josh Giron MD   FERROUS SULFATE ORAL Take 1 tablet by mouth once daily.   Yes Historical Provider, MD   fluticasone-salmeterol 500-50 mcg/dose (ADVAIR DISKUS) 500-50 mcg/dose DsDv diskus inhaler Inhale 1 puff into the lungs 2 (two) times daily. Controller 3/22/17  Yes Josh Giron MD   furosemide (LASIX) 20 MG tablet take 1 tablet by mouth twice a day 9/19/17  Yes Nadiya Nava MD   levothyroxine (SYNTHROID) 75 MCG tablet take 1 tablet by mouth every morning ON AN EMPTY STOMACH 9/19/17  Yes Nadiya Nava MD   lisinopril (PRINIVIL,ZESTRIL) 5 MG tablet TAKE 1 TABLET BY MOUTH DAILY FOR HIGH BLOOD PRESSURE 8/21/17  Yes Nadiya Nava MD   loratadine (CLARITIN) 10 mg tablet Take 10 mg by mouth once daily.   Yes " "Historical Provider, MD   metoprolol tartrate (LOPRESSOR) 25 MG tablet take 1 tablet by mouth twice a day 9/19/17  Yes Nadiya Nava MD   nitrofurantoin, macrocrystal-monohydrate, (MACROBID) 100 MG capsule Take 100 mg by mouth 2 (two) times daily.   Yes Historical Provider, MD   warfarin (COUMADIN) 5 MG tablet take 1 tablet by mouth once daily 8/21/17  Yes Nadiya Nava MD   albuterol (PROAIR HFA) 90 mcg/actuation inhaler Inhale 2 puffs into the lungs every 6 (six) hours as needed for Wheezing or Shortness of Breath. 3/22/17   Josh Giron MD   albuterol-ipratropium 2.5mg-0.5mg/3mL (DUO-NEB) 0.5 mg-3 mg(2.5 mg base)/3 mL nebulizer solution Take 3 mLs by nebulization every 4 (four) hours as needed for Wheezing. Rescue 3/22/17 3/22/18  Josh Giron MD   cetirizine (ZYRTEC) 10 MG tablet Take 1 tablet (10 mg total) by mouth once daily. 3/22/17   Josh Giron MD   ergocalciferol (ERGOCALCIFEROL) 50,000 unit Cap Take 1 capsule (50,000 Units total) by mouth every 7 days. 4/5/17   Nohemi Ardon DNP, NP   fluticasone (FLONASE) 50 mcg/actuation nasal spray 1 spray by Each Nare route once daily. 3/22/17   Josh Giron MD   insulin aspart (NOVOLOG FLEXPEN) 100 unit/mL InPn pen 26 units AC 4/5/17   oNhemi Ardon DNP, NP   insulin detemir (LEVEMIR FLEXTOUCH) 100 unit/mL (3 mL) SubQ InPn pen Inject 26 Units into the skin every evening. 4/5/17   Nohemi Ardon DNP, NP   magnesium hydroxide 400 mg/5 ml (MILK OF MAGNESIA) 400 mg/5 mL Susp Take 30 mLs by mouth daily as needed.    Historical Provider, MD   ondansetron (ZOFRAN) 4 MG tablet Take 1 tablet (4 mg total) by mouth every 8 (eight) hours as needed for Nausea. 9/25/16   Cris Levy MD   pen needle, diabetic, safety (NOVOFINE AUTOCOVER) 30 gauge x 1/3" Ndle Uses 4 a day 5/10/17   Nadiya Nava MD   pravastatin (PRAVACHOL) 40 MG tablet take 1 tablet by mouth once daily 9/19/17   Nadiya Nava MD   triamcinolone acetonide 0.1% (KENALOG) 0.1 % " ointment Apply topically 2 (two) times daily. 8/8/17   Lizeth Fleming MD   warfarin (COUMADIN) 2.5 MG tablet Take 2.5 mg by mouth once daily. Except take 5 mg on Wed Historical MD [place]          [facility] MEDICATIONS:     Scheduled Meds:    aripiprazole  5 mg Oral Daily    [START ON 10/14/2017] aspirin  325 mg Oral Daily    azithromycin  500 mg Intravenous Q24H    cefTRIAXone (ROCEPHIN) IVPB  1 g Intravenous Q24H    diltiaZEM  240 mg Oral Daily    fluticasone-vilanterol  1 puff Inhalation Daily    furosemide  20 mg Intravenous BID    insulin aspart  26 Units Subcutaneous TIDWM    insulin detemir  26 Units Subcutaneous QHS    ipratropium  0.5 mg Nebulization Q6H    levalbuterol  1.25 mg Nebulization Q6H    levothyroxine  75 mcg Oral Before breakfast    lisinopril  5 mg Oral Daily    methylPREDNISolone sodium succinate  125 mg Intravenous Q8H    metoprolol tartrate  25 mg Oral BID    pravastatin  40 mg Oral Daily    warfarin  2.5 mg Oral Daily     Continuous Infusions:    PRN Meds: dextrose 50%, diltiaZEM, glucagon (human recombinant), insulin aspart, ondansetron, sodium chloride 0.9%      PHYSICAL EXAM:     Wt Readings from Last 1 Encounters:   10/13/17 0403 91.5 kg (201 lb 11.5 oz)   10/12/17 1949 90.7 kg (200 lb)     Body mass index is 39.4 kg/m².  Vitals:    10/13/17 0242 10/13/17 0312 10/13/17 0403 10/13/17 0418   BP: (!) 143/65 136/69 121/68    Pulse: (!) 128 108 (!) 123 93   Resp:   (!) 35    Temp:   97.4 °F (36.3 °C)    TempSrc:   Axillary    SpO2: (!) 91% (!) 92% (!) 93%    Weight:   91.5 kg (201 lb 11.5 oz)    Height:              -- General appearance: well developed. appears stated age   -- Head: normocephalic, atraumatic   -- Eyes: conjunctivae clear. Extraocular muscles intact  -- Nose: Nares normal. Septum midline.   -- Mouth/Throat: lips, mucosa, and tongue normal. no throat erythema.   -- Neck: Positive JVD.  supple, symmetrical, trachea midline, thyroid not grossly enlarged,  appears symmetric  -- Lungs: Decreased breath sounds to auscultation bilaterally with poor air excursion and prolonged expiratory phase consistent with long-term smoker. normal respiratory effort. No use of accessory muscles.   -- Chest wall: no tenderness. equal bilateral chest rise   -- Heart: Rapid rate and regular rhythm. S1, S2 normal.  no click, rub or gallop   -- Abdomen: Obese abdomen, soft, non-tender, non-distended, non-tympanic; bowel sounds normal; no masses  -- Extremities: Trace peripheral edema.  no cyanosis, clubbing  -- Pulses: 2+ and symmetric   -- Skin: color normal, texture normal, turgor normal. No rashes or lesions.   -- Neurologic: Normal strength and tone. No focal numbness or weakness. CNII-XII intact. McRae [place] coma scale: eyes open spontaneously-4, oriented & converses-5, obeys commands-6.      LABORATORY STUDIES:     Recent Results (from the past 36 hour(s))   CBC auto differential    Collection Time: 10/12/17  9:06 PM   Result Value Ref Range    WBC 8.31 3.90 - 12.70 K/uL    RBC 3.99 (L) 4.00 - 5.40 M/uL    Hemoglobin 13.3 12.0 - 16.0 g/dL    Hematocrit 40.7 37.0 - 48.5 %     (H) 82 - 98 fL    MCH 33.3 (H) 27.0 - 31.0 pg    MCHC 32.7 32.0 - 36.0 g/dL    RDW 15.5 (H) 11.5 - 14.5 %    Platelets 139 (L) 150 - 350 K/uL    MPV 10.6 9.2 - 12.9 fL    Gran # 6.4 1.8 - 7.7 K/uL    Lymph # 1.0 1.0 - 4.8 K/uL    Mono # 0.9 0.3 - 1.0 K/uL    Eos # 0.1 0.0 - 0.5 K/uL    Baso # 0.03 0.00 - 0.20 K/uL    Gran% 76.3 (H) 38.0 - 73.0 %    Lymph% 11.8 (L) 18.0 - 48.0 %    Mono% 10.5 4.0 - 15.0 %    Eosinophil% 1.0 0.0 - 8.0 %    Basophil% 0.4 0.0 - 1.9 %    Differential Method Automated    Comprehensive metabolic panel    Collection Time: 10/12/17  9:06 PM   Result Value Ref Range    Sodium 142 136 - 145 mmol/L    Potassium 4.2 3.5 - 5.1 mmol/L    Chloride 105 95 - 110 mmol/L    CO2 29 23 - 29 mmol/L    Glucose 250 (H) 70 - 110 mg/dL    BUN, Bld 60 (H) 8 - 23 mg/dL    Creatinine 2.1 (H) 0.5 - 1.4 mg/dL     Calcium 9.1 8.7 - 10.5 mg/dL    Total Protein 8.3 6.0 - 8.4 g/dL    Albumin 3.3 (L) 3.5 - 5.2 g/dL    Total Bilirubin 0.5 0.1 - 1.0 mg/dL    Alkaline Phosphatase 113 55 - 135 U/L    AST 18 10 - 40 U/L    ALT 19 10 - 44 U/L    Anion Gap 8 8 - 16 mmol/L    eGFR if African American 26 (A) >60 mL/min/1.73 m^2    eGFR if non African American 23 (A) >60 mL/min/1.73 m^2   Troponin I    Collection Time: 10/12/17  9:06 PM   Result Value Ref Range    Troponin I 0.022 0.000 - 0.026 ng/mL   B-Type natriuretic peptide (BNP)    Collection Time: 10/12/17  9:06 PM   Result Value Ref Range     (H) 0 - 99 pg/mL   Magnesium    Collection Time: 10/12/17  9:06 PM   Result Value Ref Range    Magnesium 2.0 1.6 - 2.6 mg/dL   Protime-INR    Collection Time: 10/12/17  9:06 PM   Result Value Ref Range    Prothrombin Time 20.7 (H) 9.0 - 12.5 sec    INR 2.0 (H) 0.8 - 1.2   TSH    Collection Time: 10/12/17  9:06 PM   Result Value Ref Range    TSH 3.258 0.400 - 4.000 uIU/mL   POCT glucose    Collection Time: 10/12/17  9:06 PM   Result Value Ref Range    POCT Glucose 264 (H) 70 - 110 mg/dL   Urinalysis    Collection Time: 10/12/17 10:58 PM   Result Value Ref Range    Specimen UA Urine, Catheterized     Color, UA Red (A) Yellow, Straw, Olga    Appearance, UA Cloudy (A) Clear    pH, UA 5.0 5.0 - 8.0    Specific Gravity, UA 1.015 1.005 - 1.030    Protein, UA 2+ (A) Negative    Glucose, UA Negative Negative    Ketones, UA Negative Negative    Bilirubin (UA) Negative Negative    Occult Blood UA 3+ (A) Negative    Nitrite, UA Positive (A) Negative    Urobilinogen, UA Negative <2.0 EU/dL    Leukocytes, UA 3+ (A) Negative   Urinalysis Microscopic    Collection Time: 10/12/17 10:58 PM   Result Value Ref Range    RBC, UA 20 (H) 0 - 4 /hpf    WBC, UA >100 (H) 0 - 5 /hpf    Bacteria, UA Moderate (A) None-Occ /hpf    Hyaline Casts, UA none 0-1/lpf /lpf    Microscopic Comment SEE COMMENT    ISTAT PROCEDURE    Collection Time: 10/13/17 12:08 AM    Result Value Ref Range    POC PH 7.212 (LL) 7.35 - 7.45    POC PCO2 74.8 (HH) 35 - 45 mmHg    POC PO2 68 (L) 80 - 100 mmHg    POC HCO3 30.0 (H) 24 - 28 mmol/L    POC BE 0 -2 to 2 mmol/L    POC SATURATED O2 88 (L) 95 - 100 %    POC TCO2 32 (H) 23 - 27 mmol/L    Sample ARTERIAL     Site LR     Allens Test Pass     DelSys NRB     Mode SPONT     Flow 15     FiO2 100     Sp02 92        Lab Results   Component Value Date    INR 2.0 (H) 10/12/2017    INR 2.5 09/20/2017    INR 1.7 (A) 09/13/2017     Lab Results   Component Value Date    HGBA1C 8.1 (H) 04/13/2017     Recent Labs      10/12/17   2106   POCTGLUCOSE  264*           MICROBIOLOGY DATA:     Urine Culture, Routine   Date Value Ref Range Status   08/24/2017   Final    Multiple organisms isolated. None in predominance.  Repeat if   08/24/2017 clinically necessary.  Final   07/06/2017   Final    Multiple organisms isolated. None in predominance.  Repeat if   07/06/2017 clinically necessary.  Final   04/13/2017 ENTEROCOCCUS FAECALIS  >100,000 cfu/ml    Final       Microbiology x 7d:   Microbiology Results (last 7 days)     Procedure Component Value Units Date/Time    Blood Culture #2 **CANNOT BE ORDERED STAT** [[id number]] Collected:  10/13/17 0100    Order Status:  Sent Specimen:  Blood from Peripheral, Hand, Left Updated:  10/13/17 0111    Blood Culture #1 [[id number]] Collected:  10/13/17 0055    Order Status:  Sent Specimen:  Blood from Peripheral, Hand, Right Updated:  10/13/17 0110    Urine culture [[id number]] Collected:  10/12/17 2258    Order Status:  Sent Specimen:  Urine from Urine, Catheterized Updated:  10/12/17 2302            IMAGING:     Imaging Results          X-Ray Chest AP Portable (Final result)  Result time 10/12/17 23:15:21    Final result by Deshaun Perez MD (10/12/17 23:15:21)                 Impression:        CHF.        Electronically signed by: DESHAUN PEREZ MD  Date:     10/12/17  Time:    23:15              Narrative:    Chest AP  portable    Indication:shortness of breath.    Comparison:April 18, 2017.    Findings:         The cardiomediastinal silhouette is enlarged the pulmonary vascular congestion.  There is blunting of the CP angle suggesting minimal effusions.  The trachea is midline.  The lungs are symmetrically expanded bilaterally with diffuse bilateral edema.  There is no pneumothorax.  The osseous structures appear unchanged.                                ASSESSMENT & PLAN:     Primary Diagnosis:  Acute on chronic respiratory failure with hypoxia and hypercapnia    Active Hospital Problems    Diagnosis  POA    *Acute on chronic respiratory failure with hypoxia and hypercapnia [J96.21, J96.22]  Yes     Priority: 1 - High    Urinary tract infection with hematuria [N39.0, R31.9]  Yes     Priority: 2     Type 2 diabetes mellitus with stage 4 chronic kidney disease [E11.22, N18.4]  Yes     Priority: 3      Chronic    Chronic anticoagulation [Z79.01]  Not Applicable    Mixed hyperlipidemia [E78.2]  Yes     Chronic    Paroxysmal atrial fibrillation [I48.0]  Yes    Right-sided heart failure [I50.810]  Yes      1 - Normal left ventricular systolic function (EF 65-70%).     2 - Biatrial enlargement.     3 - Right ventricular enlargement with normal systolic function.     4 - Trivial aortic stenosis, MARY = 1.87 cm2, peak velocity = 1.7 m/s, mean gradient = 6.0 mmHg.     5 - The mitral valve is markedly sclerotic with moderately restricted leaflet mobility.     6 - Mild mitral stenosis, MVA = 2.2 cm2.     7 - Trivial to mild mitral regurgitation.     8 - Trivial to mild tricuspid regurgitation.     9 - Increased central venous pressure.     10 - Pulmonary hypertension. The estimated PA systolic pressure is 63 mmHg.       Tobacco abuse [Z72.0]  Yes     Chronic    On home oxygen therapy [Z99.81]  Not Applicable     3L NC      SUSAN (generalized anxiety disorder) [F41.1]  Yes      Resolved Hospital Problems    Diagnosis Date Resolved POA    No resolved problems to display.         Acute on chronic respiratory failure with hypoxia and hypercapnia  Acute on chronic respiratory failure with hypoxia and hypercapnia secondary to multifactorial etiology including: COPD, tobacco abuse, pulmonary hypertension, and right-sided heart failure   · Nebulizer treatments (albuterol/atrovent)  · Initiate Solumedrol 125mg IV q8, then taper as clinical course improves; convert to PO taper as outpatient  · Titrate O2 sats between 88 to 93%.  No supplemental 02 for 02 saturation greater than 93% due to V/Q mismatch  · Breo,while inpatient  · Advair 500/50mg, Spiriva 18mcg,  Daliresp 500mcg at or before discharge  · Mucolytics  · Consult pulmonology for further optimization  · Tobacco cessation counseling  · Optimize blood pressure control            Urinary tract infection with hematuria  Recurrent urinary tract infection, bacterial  · As evidenced by UA  · Urine culture and Gram stain obtained prior to antibiotics  · Given empiric antibiotics  · Will tailor antibiotic regimen according to culture & sensitivity results  · Maintain euvolemic status with IV fluids      Type 2 diabetes mellitus with stage 4 chronic kidney disease  · BG is not in acceptable range at this time  · Maintain w/ subcutaneous insulin management order set  · Hold oral diabetic meds  · ADA 1800 kcal diet  · BG goal while in patient is <180mg/dL  · HgA1c = Pending      Tobacco abuse  · Chronic tobacco use  · Tobacco cessation counseling  · Nicotine patch offered; consider Wellbutrin or Chantix through PCP as an outpatient (will require closer monitoring)  · I discussed with the patient regarding the hazardous effects of smoking on increasing risk of heart attack and stroke, worsening lung functions, and increasing cancer risk.   Patient was urged to stop smoking now.  I also offered nicotine taper (such as nicotine patch and gum) to help ease the craving to smoke.        Mixed  hyperlipidemia  · Lipid panel - as an outpatient  · Cardiac diet  · Continue statin        SUSAN (generalized anxiety disorder)  · Supportive care  · Continue home medications  · Anxiolytics PRN          On home oxygen therapy  Continue oxygen and titrate O2 sats to 88-93% to optimize ventilation perfusion mismatch in patient with COPD      Right-sided heart failure  · Contributory to shortness of breath  · Secondary to pulmonary hypertension.    Paroxysmal atrial fibrillation  · Currently rate controlled  · Maintain with beta-blocker with hold parameters  · Monitor on telemetry  · Maintain magnesium around 2.0  · Maintain potassium around 4.0  · INR 2.0        Chronic anticoagulation  Chronic anticoagulation  · For treatment of atrial fibrillation  · Continue warfarin.  · Monitor INR closely              VTE Risk Mitigation         Ordered     warfarin (COUMADIN) tablet 2.5 mg  Daily     Route:  Oral        10/13/17 0402     Medium Risk of VTE  Once      10/13/17 0402     Place RADHA hose  Until discontinued      10/13/17 0402     Place sequential compression device  Until discontinued      10/13/17 0402            Adult PRN medications available   DVT prophylaxis given       DISPOSITION:     Will admit to the Hospital Medicine service for further evaluation and treatment.    Chart reviewed and updated where applicable.    High Risk Conditions:  Patient has a condition that poses threat to life and bodily function: Severe Respiratory Distress      ===============================================================    Boogie Kong MD, MPH  Department of Hospital Medicine   [facility]  [phone]  (7pm - 6am)          This note is dictated using Dragon Medical 360 voice recognition software.  There are word recognition mistakes that are occasionally missed on review.

## 2017-10-13 NOTE — ASSESSMENT & PLAN NOTE
Recurrent urinary tract infection, bacterial  · As evidenced by UA  · Urine culture and Gram stain obtained prior to antibiotics  · Given empiric antibiotics  · Will tailor antibiotic regimen according to culture & sensitivity results  · Maintain euvolemic status with IV fluids

## 2017-10-13 NOTE — CONSULTS
"Consult Note  Pulmonology & Critical Care Medicine    Consult Requested By: Amanda Franklin MD  Reason for Consult: PEA cardiac arrest    History of Present Illness:  Unable to obtain history from the patient due to her status- on a mechanical ventilator & unable to speak to me. History obtained from patient's chart. I called her sister, but there was no answer.    The pt is a 72 yof who was brought to the ED yesterday evening by EMS from [facility] for shortness of breath. She had been receiving macrobid for a UTI. From the ED, she was given lasix & bipap. She was admitted to hospital medicine. Early this morning. She was found to become unresponsive & pulseless. ACLS was provided, & she was revived relatively quickly.    Review of patient's allergies indicates:   Allergen Reactions    Latex, natural rubber Dermatitis and Rash    Amoxicillin      "They say that but they've given it to me and nothing happens."     Adhesive Itching and Rash     Allergy to adhesive in nicotine patch.       Past Medical History:   Diagnosis Date    Anemia in chronic kidney disease(285.21) 3/21/2017    Anxiety     Atherosclerotic cerebrovascular disease 7/25/2012    CHF (congestive heart failure)     Chronic respiratory failure with hypoxia 8/11/2014    CKD (chronic kidney disease), stage IV 6/23/2016    COPD (chronic obstructive pulmonary disease)     CRLD (chronic restrictive lung disease) 8/11/2014    Diabetic peripheral neuropathy 7/24/2012    Diabetic retinopathy     Diverticulosis of colon     DJD (degenerative joint disease) 7/24/2012    Fatty liver     SUSAN (generalized anxiety disorder) 11/15/2012    Heart attack     History of PSVT (paroxysmal supraventricular tachycardia) 4/1/2014    Cardioverted on 2008     Iron deficiency 11/14/2014    Joint pain     Keloid cicatrix     Long term (current) use of anticoagulants 4/15/2014    Mixed hyperlipidemia 1/4/2016    Multiple lung nodules " 8/30/2016    Obesity, Class I, BMI 30-34.9 7/24/2012    On home oxygen therapy 6/22/2013    3L NC    Paroxysmal atrial fibrillation 1/4/2016    Renovascular hypertension 1/4/2016    Right-sided heart failure 10/26/2014     1 - Normal left ventricular systolic function (EF 65-70%).    2 - Biatrial enlargement.    3 - Right ventricular enlargement with normal systolic function.    4 - Trivial aortic stenosis, MARY = 1.87 cm2, peak velocity = 1.7 m/s, mean gradient = 6.0 mmHg.    5 - The mitral valve is markedly sclerotic with moderately restricted leaflet mobility.    6 - Mild mitral stenosis, MVA = 2.2 cm2.    7 - Trivial to mild mitral regurgitation.    8 - Trivial to mild tricuspid regurgitation.    9 - Increased central venous pressure.    10 - Pulmonary hypertension. The estimated PA systolic pressure is 63 mmHg.     Secondary hyperparathyroidism 6/23/2016    Secondary pulmonary hypertension 3/22/2017    Stroke     Thyroid disease     Type 2 diabetes mellitus with stage 4 chronic kidney disease 2/1/2016    Vitamin D deficiency disease 7/24/2012     Past Surgical History:   Procedure Laterality Date    ABDOMINAL SURGERY      ANKLE SURGERY      CATARACT EXTRACTION  8/17/00    right eye    CATARACT EXTRACTION  6/26/00    left eye    COLONOSCOPY      HYSTERECTOMY       Family History   Problem Relation Age of Onset    Diabetes Mother     Stroke Mother     Hypertension Mother     Melanoma Mother     COPD Sister     Stroke Sister     Diabetes Sister     Hypertension Sister     COPD Brother     Diabetes Brother     Hypertension Brother     Heart disease Maternal Grandfather     No Known Problems Father     No Known Problems Maternal Aunt     No Known Problems Maternal Uncle     No Known Problems Paternal Aunt     Cancer Paternal Uncle     No Known Problems Maternal Grandmother     No Known Problems Paternal Grandmother     No Known Problems Paternal Grandfather     Psoriasis Neg Hx      Lupus Neg Hx     Eczema Neg Hx     Kidney disease Neg Hx     Heart attack Neg Hx     Amblyopia Neg Hx     Blindness Neg Hx     Cataracts Neg Hx     Glaucoma Neg Hx     Macular degeneration Neg Hx     Retinal detachment Neg Hx     Strabismus Neg Hx     Thyroid disease Neg Hx      Social History     Social History    Marital status:      Spouse name: N/A    Number of children: N/A    Years of education: N/A     Occupational History    Retired       Social History Main Topics    Smoking status: Light Tobacco Smoker     Packs/day: 0.50     Years: 20.00     Types: Cigarettes     Last attempt to quit: 2/15/2016    Smokeless tobacco: Never Used      Comment:  on Smoking program    Alcohol use No    Drug use: No    Sexual activity: Not on file     Other Topics Concern    Are You Pregnant Or Think You May Be? No    Breast-Feeding No     Social History Narrative     - has a daughter who she is currently lives with       Review of Systems:  Unable to obtain due to patient's status- on a mechanical ventilator & unable to speak to me.    OBJECTIVE:     Vital Signs (Most Recent)  Temp: 97.6 °F (36.4 °C) (10/13/17 0845)  Pulse: 68 (10/13/17 1251)  Resp: 20 (10/13/17 1251)  BP: (!) 120/54 (10/13/17 1145)  SpO2: 96 % (10/13/17 1251)    Vital Signs Range (Last 24H):  Temp:  [97 °F (36.1 °C)-98.3 °F (36.8 °C)]   Pulse:  []   Resp:  [15-35]   BP: ()/(47-77)   SpO2:  [77 %-100 %]     Physical Exam:  General: no distress  Eyes:  conjunctivae/corneas clear  Nose: no discharge  Neck: trachea midline, no swelling, cannot appreciate any JVD  Lungs:  Equal breath sounds bilaterally, prolonged expiratory phase, + expiratory wheezing  Heart: regular rate and no murmur  Abdomen: non-distended, soft, non-tender, + central obesity though she does look like she has lost weight in recent months with loose, hanging skin folds in some areas.  Extremities: no cyanosis or clubbing, + moderate  bilateral pedal edema, no sacral edema  Skin: No rashes or lesions. good skin turgor  Neurologic: arouses to voice, squeezes my finger with both hands, PERRL, symmetric facies.     Laboratory:  CBC:   Recent Labs  Lab 10/12/17  2106   WBC 8.31   RBC 3.99*   HGB 13.3   HCT 40.7   *   *   MCH 33.3*   MCHC 32.7     CMP:   Recent Labs  Lab 10/12/17  2106   *   CALCIUM 9.1   ALBUMIN 3.3*   PROT 8.3      K 4.2   CO2 29      BUN 60*   CREATININE 2.1*   ALKPHOS 113   ALT 19   AST 18   BILITOT 0.5       Chest X-Ray:   My impression- bilateral airspace disease    Diagnostic Results:  Bedside echo showed intact LV systolic function, normal RV size, no pericardial effusion.  Await formal echo.    ASSESSMENT/PLAN:     Neuro  - Intact following commands. Therapeutic hypothermia was considered & determined to be not indicated.  - Desats when dysynchronized with ventilator, so I would aim for a RASS -4. Start propofol & fentanyl to achieve sedation goal.    CV  - PEA cardiac arrest. Likely due to hypoxemia.  - HFPEF. Trial of diuresis today. Obtain echo. No evidence of acute MI.  - Atrial flutter with normal ventricular rate. Would hold oral agents for now. Can use IV metoprolol if she develops RVR. Her INR is therapeutic on coumadin.    Resp  - Acute on chronic hypoxemic respiratory failure. Given the clinical signs of infection & continued decline despite attempted diuresis, ARDS due to sepsis is the most likely cause. Also possible, though felt to be less likely, is the possibility of pulmonary toxicity from macrobid. Will ventilate with 6cc/kg & titrate PEEP/FiO2 per the ARDS network ladder.  She is maintaining her BP currently without signs of shock, so I would like to attempt diuresis with lasix 60mg IV.  - COPD exacerbation. No PFTs available to review, but she does have a prominent expiratory wheeze & prolonged expiratory phase, so I will continue treatment with duonebs & steroids for a COPD  exacerbation.    ID  - Pneumonia. Tx with vancomycin & meropenem. Obtain sputum culture.  - UTI, GNR on culture. Tx with meropenem.    Renal  - CKD 4. Creatinine is at baseline.    PPX: protonix & coumadin    Critical Care Time: 100 minutes  Critical care was time spent personally by me on the following activities: evaluating this patient's organ dysfunction, development of treatment plan, discussing treatment plan with patient or surrogate and bedside caregivers, discussions with consultants, evaluation of patient's response to treatment, examination of patient, ordering and performing treatments and interventions, ordering and review of laboratory studies, ordering and review of radiographic studies, re-evaluation of patient's condition. This critical care time did not overlap with that of any other provider or involve time for any procedures.      Mejia Shirley MD  [facility]  Pager: [phone]

## 2017-10-13 NOTE — PROGRESS NOTES
spoke with patient sister Vida Sandy on phone this am, requested that she bring any additional HCPOA document she may have as the ones in EPIC do not appear to be witnessed properly. Ms Sandy was at work. SW suggested she bring this evening when she comes to visit. ARABELLA will add her contact information to sticky note for staff.

## 2017-10-13 NOTE — CARE UPDATE
Pt tranferred to room 341b via nrb mask.  Upon arrival to room pt was placed on charted bipap settings.

## 2017-10-13 NOTE — CONSULTS
[facility]  Adult Nutrition  Consult Note    SUMMARY     Recommendations    Recommendation/Intervention:   1. If unable to extubate in 48 hrs rec      - TF: Impact peptide initiated @ 10 ml/hr and advanced 10 ml q 4 hrs to goal rate of 35 ml/hr       - Fluid flushes of 115 ml q 4 hrs for normal fluid intake      - TF to provide: 1260 kcal, 79 g pro, 647 ml fluid   2. RD to monitor     Goals: Initiate nutrition within 48 hrs  Nutrition Goal Status: new  Communication of RD Recs: reviewed with RN    Continuum of Care Plan    Referral to Outpatient Services:  (D/C planning: Too soon to determine)    Reason for Assessment    Reason for Assessment: physician consult  Diagnosis:  (Resp. Distress)  Relevent Medical History: CVA, CHF, CKD, COPD, DM, Diverticulitis       General Information Comments: Patient coded and sent to ICU. Now on vent support with propofol.     Nutrition Prescription Ordered    Current Diet Order: NPO      Evaluation of Received Nutrients/Fluid Intake    Other Calories (kcal): 145 (propofol)   Energy Calories Required: not meeting needs   Protein Required: not meeting needs   I/O:  (not recorded fully at this time.)       Fluid Required: not meeting needs     Nutrition Risk Screen     Nutrition Risk Screen: no indicators present    Nutrition/Diet History     Food Preferences: MILY   Factors Affecting Nutritional Intake: NPO, on mechanical ventilation     Labs/Tests/Procedures/Meds     Pertinent Labs Reviewed: reviewed   Pertinent Medications Reviewed: reviewed     Physical Findings    Overall Physical Appearance: obese, on ventilator support  Tubes: orogastric tube   Skin: intact (Mynor 19)    Anthropometrics    Temp: 97.6 °F (36.4 °C)     Height: 5' (152.4 cm)  Weight Method: Bed Scale  Weight: 92.2 kg (203 lb 4.2 oz)     Ideal Body Weight (IBW), Female: 100 lb     % Ideal Body Weight, Female (lb): 203.27 lb  BMI (Calculated): 39.8     Estimated/Assessed Needs    Weight Used  For Calorie Calculations: 92.2 kg (203 lb 4.2 oz)       Energy Need Method: Librado State (modified) (1470 kcal; 70% = 1029 kcal)    RMR (Stewart-St. Jeor Equation): 1348.5  RMR (Librado State Equation): 1399.96  RMR (Modified Watkins State Equation): 1470.47  Weight Used For Protein Calculations: 92.2 kg (203 lb 4.2 oz)  Protein Requirements: 75-90g     Fluid Need Method: RDA Method   CHO Requirement: 150g     Assessment and Plan    Nutrition Problem  Inadequate Energy Intake    Related to (etiology):   Mechanical Ventilation    Signs and Symptoms (as evidenced by):   NPO    Nutrition Diagnosis Status:   New        Monitor and Evaluation    Food and Nutrient Intake: energy intake, food and beverage intake, enteral nutrition intake  Food and Nutrient Adminstration: diet order, enteral and parenteral nutrition administration  Knowledge/Beliefs/Attitudes: food and nutrition knowledge/skill  Physical Activity and Function: nutrition-related ADLs and IADLs  Anthropometric Measurements: weight, weight change  Biochemical Data, Medical Tests and Procedures: electrolyte and renal panel, glucose/endocrine profile  Nutrition-Focused Physical Findings: overall appearance    Nutrition Risk    Level of Risk:  (2 x week)    Nutrition Follow-Up    RD Follow-up?: Yes

## 2017-10-13 NOTE — ASSESSMENT & PLAN NOTE
Continue oxygen and titrate O2 sats to 88-93% to optimize ventilation perfusion mismatch in patient with COPD

## 2017-10-13 NOTE — PLAN OF CARE
Recommendations     Recommendation/Intervention:   1. If unable to extubate in 48 hrs rec      - TF: Impact peptide initiated @ 10 ml/hr and advanced 10 ml q 4 hrs to goal rate of 35 ml/hr       - Fluid flushes of 115 ml q 4 hrs for normal fluid intake      - TF to provide: 1260 kcal, 79 g pro, 647 ml fluid   2. RD to monitor      Goals: Initiate nutrition within 48 hrs  Nutrition Goal Status: new  Communication of RD Recs: reviewed with RN     Continuum of Care Plan     Referral to Outpatient Services:  (D/C planning: Too soon to determine)

## 2017-10-13 NOTE — PROVIDER TRANSFER
Ms. Polo is a 73 yo woman with chronic hypoxic respiratory failure, CAD, CVA, PAF on coumadin, COPD, CKD4, and anxiety who presented from [facility] for shortness of breath. She was admitted for acute on chronic respiratory failure and UTI. Shortly thereafter she was noted to be in cardiac arrest with PEA. She was intubated and transferred to the ICU after ROSC after ~9 min ACLS protocol.  Found to have ARDS likely from GNR bacteremia. Started on vanc and merrem (allergy to amoxicillin). She has a history of VRE and if clinical improvement not seen, linezolid should be considered.    Neuro:  Post code she was alert and following commands therefore not appropriate for cooling protocol. She was dyssynchronous with the ventilator, so deep sedation required with fentanyl and propofol. Daily sedation holidays. H/o CVA, unknown baseline.    CV:  Cardiac arrest not thought to be due to MI, but rather hypoxia. Troponin 1 hour post arrest not elevate, further trops pending. EKG pending. Echo pending. Known HFpEF. Diurese as in ARDS. Monitor BP closely. May need to add levophed. Has chronic PAF, now in flutter with normal ventricular rate. Anticoagulated on coumadin - therapeutic.    Pulm:  Baseline COPD, chronic hypoxic resp failure, chronic tobacco abuse with known lung nodules. Acute on chronic resp failure 2/2 ARDS from GNR sepsis. Known HF but BNP not markedly high than previous. Diurese 2/2 ARDS. Continue ARDS protocol with low title volumes and adjust PEEP/FiO2 accordingly. +wheeze in COPD - duonebs and steroids.    ID:  Vanc and meropenam for GNR sepsis 2/2 UTI as above. Also follow up sputum culture.    GI:  PPI for stress ulcer PPX. Tube feeds.    Renal:  At baseline Cr. CKD4. Monitor with diuresis.    Hem:  OAC with therapeutic INR. Monitor. Effective DVT PPX.    Endocrine:  IDDM, adjust insulin as needed. Will be difficult to control on steroids. Continue home synthroid.    Psych:  From  inpatient psych facility. On xanax chronically? Clarify home regimen and monitor for benzo withdraw.    CC time 90 min.

## 2017-10-13 NOTE — NURSING
0720- bedside rounding report received from dajuan bowen lpn and updated hand off report sheet given to me. Patient has bipap on and resting with head of bed in high fowlers position bed alarm on and one to one sitter in room with patient at all times.

## 2017-10-13 NOTE — NURSING
0830- patient transported to icu unable to completefully assessment  Due to rapid condition changes.

## 2017-10-13 NOTE — HPI
Savita Polo is a 72 y.o. female that (in part)  has a past medical history of Anemia in chronic kidney disease(285.21); Anxiety; Atherosclerotic cerebrovascular disease; CHF (congestive heart failure); Chronic respiratory failure with hypoxia; CKD (chronic kidney disease), stage IV; COPD (chronic obstructive pulmonary disease); CRLD (chronic restrictive lung disease); Diabetic peripheral neuropathy; Diabetic retinopathy; Diverticulosis of colon; DJD (degenerative joint disease); Fatty liver; SUSAN (generalized anxiety disorder); Heart attack; History of PSVT (paroxysmal supraventricular tachycardia); Iron deficiency; Joint pain; Keloid cicatrix; Long term (current) use of anticoagulants; Mixed hyperlipidemia; Multiple lung nodules; Obesity, Class I, BMI 30-34.9; On home oxygen therapy; Paroxysmal atrial fibrillation; Renovascular hypertension; Right-sided heart failure; Secondary hyperparathyroidism; Secondary pulmonary hypertension; Stroke; Thyroid disease; Type 2 diabetes mellitus with stage 4 chronic kidney disease; and Vitamin D deficiency disease. Presents to [facility] Emergency Department complaining of shortness of breath.  She lives and [facility] home.  She is a poorly 93% on 4 L by nasal cannula at her residence.  She is on chronic oxygen has had multiple admissions for respiratory failure.  Worsened with exertion.  No relief given with supplemental oxygen.  Located in the chest.  Moderate to high intensity.  No radiation.  Subacute onset with progressive worsening.  Constant duration.    In the emergency department chest x-ray, ABG, and routine laboratory studies were obtained.  There was evidence of hypoxia and hypercapnia.  She also had evidence of urinary tract infection.    Hospital medicine has been asked to admit for further evaluation and treatment.

## 2017-10-14 LAB
ALBUMIN SERPL BCP-MCNC: 2.5 G/DL
ALLENS TEST: ABNORMAL
ALP SERPL-CCNC: 64 U/L
ALT SERPL W/O P-5'-P-CCNC: 20 U/L
ANION GAP SERPL CALC-SCNC: 10 MMOL/L
AST SERPL-CCNC: 20 U/L
BACTERIA UR CULT: NORMAL
BASOPHILS # BLD AUTO: 0 K/UL
BASOPHILS NFR BLD: 0 %
BILIRUB SERPL-MCNC: 0.6 MG/DL
BUN SERPL-MCNC: 63 MG/DL
CALCIUM SERPL-MCNC: 8.6 MG/DL
CHLORIDE SERPL-SCNC: 106 MMOL/L
CO2 SERPL-SCNC: 25 MMOL/L
CREAT SERPL-MCNC: 2 MG/DL
DELSYS: ABNORMAL
DIFFERENTIAL METHOD: ABNORMAL
EOSINOPHIL # BLD AUTO: 0 K/UL
EOSINOPHIL NFR BLD: 0 %
ERYTHROCYTE [DISTWIDTH] IN BLOOD BY AUTOMATED COUNT: 14.9 %
ERYTHROCYTE [SEDIMENTATION RATE] IN BLOOD BY WESTERGREN METHOD: 20 MM/H
EST. GFR  (AFRICAN AMERICAN): 28 ML/MIN/1.73 M^2
EST. GFR  (NON AFRICAN AMERICAN): 24 ML/MIN/1.73 M^2
FIO2: 70
GLUCOSE SERPL-MCNC: 289 MG/DL
HCO3 UR-SCNC: 29.1 MMOL/L (ref 24–28)
HCT VFR BLD AUTO: 38.9 %
HGB BLD-MCNC: 12.7 G/DL
INR PPP: 4
LYMPHOCYTES # BLD AUTO: 0.5 K/UL
LYMPHOCYTES NFR BLD: 4.1 %
MAGNESIUM SERPL-MCNC: 1.7 MG/DL
MCH RBC QN AUTO: 32.2 PG
MCHC RBC AUTO-ENTMCNC: 32.6 G/DL
MCV RBC AUTO: 99 FL
MODE: ABNORMAL
MONOCYTES # BLD AUTO: 0.5 K/UL
MONOCYTES NFR BLD: 3.9 %
NEUTROPHILS # BLD AUTO: 12.1 K/UL
NEUTROPHILS NFR BLD: 91.8 %
PCO2 BLDA: 39.1 MMHG (ref 35–45)
PEEP: 12
PH SMN: 7.48 [PH] (ref 7.35–7.45)
PHOSPHATE SERPL-MCNC: 3.3 MG/DL
PLATELET # BLD AUTO: 133 K/UL
PMV BLD AUTO: 10.9 FL
PO2 BLDA: 135 MMHG (ref 80–100)
POC BE: 5 MMOL/L
POC SATURATED O2: 99 % (ref 95–100)
POC TCO2: 30 MMOL/L (ref 23–27)
POCT GLUCOSE: 275 MG/DL (ref 70–110)
POCT GLUCOSE: 283 MG/DL (ref 70–110)
POCT GLUCOSE: 313 MG/DL (ref 70–110)
POCT GLUCOSE: 321 MG/DL (ref 70–110)
POCT GLUCOSE: 327 MG/DL (ref 70–110)
POTASSIUM SERPL-SCNC: 4.2 MMOL/L
PROT SERPL-MCNC: 6.5 G/DL
PROTHROMBIN TIME: 39.9 SEC
RBC # BLD AUTO: 3.94 M/UL
SAMPLE: ABNORMAL
SITE: ABNORMAL
SODIUM SERPL-SCNC: 141 MMOL/L
TROPONIN I SERPL DL<=0.01 NG/ML-MCNC: 0.03 NG/ML
VANCOMYCIN SERPL-MCNC: 19.8 UG/ML
VT: 380
WBC # BLD AUTO: 13.16 K/UL

## 2017-10-14 PROCEDURE — 80053 COMPREHEN METABOLIC PANEL: CPT

## 2017-10-14 PROCEDURE — 93010 ELECTROCARDIOGRAM REPORT: CPT | Mod: ,,, | Performed by: INTERNAL MEDICINE

## 2017-10-14 PROCEDURE — 80202 ASSAY OF VANCOMYCIN: CPT

## 2017-10-14 PROCEDURE — 25000003 PHARM REV CODE 250: Performed by: EMERGENCY MEDICINE

## 2017-10-14 PROCEDURE — 85025 COMPLETE CBC W/AUTO DIFF WBC: CPT

## 2017-10-14 PROCEDURE — 85610 PROTHROMBIN TIME: CPT

## 2017-10-14 PROCEDURE — 25000242 PHARM REV CODE 250 ALT 637 W/ HCPCS: Performed by: INTERNAL MEDICINE

## 2017-10-14 PROCEDURE — C9113 INJ PANTOPRAZOLE SODIUM, VIA: HCPCS | Performed by: HOSPITALIST

## 2017-10-14 PROCEDURE — 63600175 PHARM REV CODE 636 W HCPCS: Performed by: INTERNAL MEDICINE

## 2017-10-14 PROCEDURE — 20000000 HC ICU ROOM

## 2017-10-14 PROCEDURE — 84484 ASSAY OF TROPONIN QUANT: CPT

## 2017-10-14 PROCEDURE — 63600175 PHARM REV CODE 636 W HCPCS: Performed by: HOSPITALIST

## 2017-10-14 PROCEDURE — 99900035 HC TECH TIME PER 15 MIN (STAT)

## 2017-10-14 PROCEDURE — 25000003 PHARM REV CODE 250: Performed by: HOSPITALIST

## 2017-10-14 PROCEDURE — 94640 AIRWAY INHALATION TREATMENT: CPT

## 2017-10-14 PROCEDURE — 99900026 HC AIRWAY MAINTENANCE (STAT)

## 2017-10-14 PROCEDURE — 87070 CULTURE OTHR SPECIMN AEROBIC: CPT

## 2017-10-14 PROCEDURE — 94761 N-INVAS EAR/PLS OXIMETRY MLT: CPT

## 2017-10-14 PROCEDURE — 94003 VENT MGMT INPAT SUBQ DAY: CPT

## 2017-10-14 PROCEDURE — 27000221 HC OXYGEN, UP TO 24 HOURS

## 2017-10-14 PROCEDURE — 25000003 PHARM REV CODE 250: Performed by: INTERNAL MEDICINE

## 2017-10-14 PROCEDURE — 87205 SMEAR GRAM STAIN: CPT

## 2017-10-14 PROCEDURE — 84100 ASSAY OF PHOSPHORUS: CPT

## 2017-10-14 PROCEDURE — 36600 WITHDRAWAL OF ARTERIAL BLOOD: CPT

## 2017-10-14 PROCEDURE — 83735 ASSAY OF MAGNESIUM: CPT

## 2017-10-14 RX ORDER — METOPROLOL TARTRATE 25 MG/1
25 TABLET, FILM COATED ORAL 2 TIMES DAILY
Status: DISCONTINUED | OUTPATIENT
Start: 2017-10-14 | End: 2017-10-21

## 2017-10-14 RX ADMIN — INSULIN ASPART 8 UNITS: 100 INJECTION, SOLUTION INTRAVENOUS; SUBCUTANEOUS at 12:10

## 2017-10-14 RX ADMIN — MEROPENEM AND SODIUM CHLORIDE 1 G: 1 INJECTION, SOLUTION INTRAVENOUS at 01:10

## 2017-10-14 RX ADMIN — PREDNISONE 40 MG: 20 TABLET ORAL at 08:10

## 2017-10-14 RX ADMIN — PROPOFOL 25 MCG/KG/MIN: 10 INJECTION, EMULSION INTRAVENOUS at 08:10

## 2017-10-14 RX ADMIN — PROPOFOL 25 MCG/KG/MIN: 10 INJECTION, EMULSION INTRAVENOUS at 06:10

## 2017-10-14 RX ADMIN — INSULIN ASPART 6 UNITS: 100 INJECTION, SOLUTION INTRAVENOUS; SUBCUTANEOUS at 06:10

## 2017-10-14 RX ADMIN — MEROPENEM AND SODIUM CHLORIDE 1 G: 1 INJECTION, SOLUTION INTRAVENOUS at 12:10

## 2017-10-14 RX ADMIN — PROPOFOL 25 MCG/KG/MIN: 10 INJECTION, EMULSION INTRAVENOUS at 12:10

## 2017-10-14 RX ADMIN — FENTANYL CITRATE 200 MCG/HR: 50 INJECTION, SOLUTION INTRAMUSCULAR; INTRAVENOUS at 01:10

## 2017-10-14 RX ADMIN — INSULIN ASPART 4 UNITS: 100 INJECTION, SOLUTION INTRAVENOUS; SUBCUTANEOUS at 12:10

## 2017-10-14 RX ADMIN — PRAVASTATIN SODIUM 40 MG: 40 TABLET ORAL at 08:10

## 2017-10-14 RX ADMIN — PANTOPRAZOLE SODIUM 40 MG: 40 INJECTION, POWDER, FOR SOLUTION INTRAVENOUS at 08:10

## 2017-10-14 RX ADMIN — IPRATROPIUM BROMIDE AND ALBUTEROL SULFATE 3 ML: .5; 3 SOLUTION RESPIRATORY (INHALATION) at 07:10

## 2017-10-14 RX ADMIN — CHLORHEXIDINE GLUCONATE 15 ML: 1.2 RINSE ORAL at 09:10

## 2017-10-14 RX ADMIN — LEVOTHYROXINE SODIUM 75 MCG: 75 TABLET ORAL at 06:10

## 2017-10-14 RX ADMIN — FENTANYL CITRATE 200 MCG/HR: 50 INJECTION, SOLUTION INTRAMUSCULAR; INTRAVENOUS at 12:10

## 2017-10-14 RX ADMIN — SODIUM CHLORIDE 500 ML: 0.9 INJECTION, SOLUTION INTRAVENOUS at 06:10

## 2017-10-14 RX ADMIN — CHLORHEXIDINE GLUCONATE 15 ML: 1.2 RINSE ORAL at 08:10

## 2017-10-14 RX ADMIN — PROPOFOL 25 MCG/KG/MIN: 10 INJECTION, EMULSION INTRAVENOUS at 01:10

## 2017-10-14 RX ADMIN — IPRATROPIUM BROMIDE AND ALBUTEROL SULFATE 3 ML: .5; 3 SOLUTION RESPIRATORY (INHALATION) at 01:10

## 2017-10-14 NOTE — PROGRESS NOTES
Pt remains intubated and on the servo-i vent. With the following settings: PRVC 20/ 380/ +12 PEEP/ 60%. AMBU bag is at the HOB, All alarms are set and functioning. Will continue to monitor and wean as tolerated.     0750 PEEP weaned to 10 per MD order.

## 2017-10-14 NOTE — PROGRESS NOTES
Sputum sample collected and sent to lab . Juan BRADFORD notifieResults for CAM THOMAS (MRN [id number]) as of 10/14/2017 05:27. Decreased FIO2 to 60% after results   Ref. Range 10/14/2017 05:18   POC PH Latest Ref Range: 7.35 - 7.45  7.480 (H)   POC PCO2 Latest Ref Range: 35 - 45 mmHg 39.1   POC PO2 Latest Ref Range: 80 - 100 mmHg 135 (H)   POC BE Latest Ref Range: -2 to 2 mmol/L 5   POC HCO3 Latest Ref Range: 24 - 28 mmol/L 29.1 (H)   POC SATURATED O2 Latest Ref Range: 95 - 100 % 99   POC TCO2 Latest Ref Range: 23 - 27 mmol/L 30 (H)   FiO2 Unknown 70   Vt Unknown 380   PEEP Unknown 12   Sample Unknown ARTERIAL   DelSys Unknown Adult Vent   Allens Test Unknown Pass   Site Unknown RR   Mode Unknown AC/PRVC   d . ABG results are as follows

## 2017-10-14 NOTE — HOSPITAL COURSE
Ms Polo presented with acute on chronic respiratory failure with hypoxemia secondary to pneumonia and COPD exacerbation, Soon after admission patient had a cardiac arrest. Was intubated and transferred to ICU. Pulmonary process thought to be a combination of pneumonia and ARDS, as she still decompensated despite diuresis and BiPAP on admission. Treated with steroids and duo-nebs while intubated. UCx with > 100,000 Klebsiella pneumoniae. Blood cultures negative and respiratory culture with no growth although moderate WBCs. Treated with meropenem (see allergy list) for total of 10 days. Extubated to BiPAP on 10/19. Attempted to wean to high flow, but not tolerate for long. Developed vivid visual hallucinations and insomnia, complicating clinical course. Despite this, she was able to initiate a pureed diet with honey thickened fluids while on high flow, but then placed on BiPAP when not eating. Intermittent diuresis. Persistently in AFib with RVR. On amiodarone and BB. On full anticoagulation with coumadin, which was held on 10/23 for INR > 3. Psychiatry consulted for delirium and insomnia. Was started on nightly Seroquel,Prognosis is poor overall and unlikely to achieve meaningful recovery despite adequate medical treatment.she remains on BIPAP. Palliative care consulted for goals of care. Patient was full code ,family meeting with sister and  palliative care done ,needed still discuss with daughter, she was also agree with DNR status and hospice placement.Patient has been discharged to Hospice.

## 2017-10-14 NOTE — SUBJECTIVE & OBJECTIVE
Interval History: Sedated on vent.  No acute events overnight.    Review of Systems   Unable to perform ROS: Intubated     Objective:     Vital Signs (Most Recent):  Temp: 97.8 °F (36.6 °C) (10/14/17 0315)  Pulse: 94 (10/14/17 1200)  Resp: 20 (10/14/17 1200)  BP: (!) 99/50 (10/14/17 1200)  SpO2: 96 % (10/14/17 1200) Vital Signs (24h Range):  Temp:  [97.4 °F (36.3 °C)-97.8 °F (36.6 °C)] 97.8 °F (36.6 °C)  Pulse:  [] 94  Resp:  [17-23] 20  SpO2:  [83 %-100 %] 96 %  BP: ()/(50-63) 99/50     Weight: 92.2 kg (203 lb 4.2 oz)  Body mass index is 39.7 kg/m².    Intake/Output Summary (Last 24 hours) at 10/14/17 1224  Last data filed at 10/14/17 0931   Gross per 24 hour   Intake           927.72 ml   Output             2180 ml   Net         -1252.28 ml      Physical Exam   Constitutional: She appears well-developed. No distress.   HENT:   ET tube in place   Eyes: Conjunctivae are normal. Right eye exhibits no discharge. Left eye exhibits no discharge.   Neck: Neck supple.   Cardiovascular: Normal heart sounds.    Irregular   Pulmonary/Chest: No stridor.   Mechanically ventilated.  Coarse BS bilaterally   Abdominal: Soft. Bowel sounds are normal.   Musculoskeletal: She exhibits no edema.   Neurological:   Sedated   Skin: Skin is warm and dry.       Significant Labs:   BMP:   Recent Labs  Lab 10/14/17  0319   *      K 4.2      CO2 25   BUN 63*   CREATININE 2.0*   CALCIUM 8.6*   MG 1.7     CBC:   Recent Labs  Lab 10/12/17  2106 10/14/17  0319   WBC 8.31 13.16*   HGB 13.3 12.7   HCT 40.7 38.9   * 133*       Significant Imaging: I have reviewed all pertinent imaging results/findings within the past 24 hours.

## 2017-10-14 NOTE — PROGRESS NOTES
[facility] Hospital Medicine  Progress Note    Patient Name: Savita Polo  MRN: [id number]  Patient Class: IP- Inpatient   Admission Date: 10/12/2017  Length of Stay: 1 days  Attending Physician: Asael Cullen MD  Primary Care Provider: Nadiya Nava MD        Subjective:     Principal Problem:Acute on chronic respiratory failure with hypoxia and hypercapnia    HPI:  Savita Polo is a 72 y.o. female that (in part)  has a past medical history of Anemia in chronic kidney disease(285.21); Anxiety; Atherosclerotic cerebrovascular disease; CHF (congestive heart failure); Chronic respiratory failure with hypoxia; CKD (chronic kidney disease), stage IV; COPD (chronic obstructive pulmonary disease); CRLD (chronic restrictive lung disease); Diabetic peripheral neuropathy; Diabetic retinopathy; Diverticulosis of colon; DJD (degenerative joint disease); Fatty liver; SUSAN (generalized anxiety disorder); Heart attack; History of PSVT (paroxysmal supraventricular tachycardia); Iron deficiency; Joint pain; Keloid cicatrix; Long term (current) use of anticoagulants; Mixed hyperlipidemia; Multiple lung nodules; Obesity, Class I, BMI 30-34.9; On home oxygen therapy; Paroxysmal atrial fibrillation; Renovascular hypertension; Right-sided heart failure; Secondary hyperparathyroidism; Secondary pulmonary hypertension; Stroke; Thyroid disease; Type 2 diabetes mellitus with stage 4 chronic kidney disease; and Vitamin D deficiency disease. Presents to [facility] Emergency Department complaining of shortness of breath.  She lives and [facility].  She is a poorly 93% on 4 L by nasal cannula at her residence.  She is on chronic oxygen has had multiple admissions for respiratory failure.  Worsened with exertion.  No relief given with supplemental oxygen.  Located in the chest.  Moderate to high intensity.  No radiation.  Subacute onset with progressive worsening.  Constant  duration.    In the emergency department chest x-ray, ABG, and routine laboratory studies were obtained.  There was evidence of hypoxia and hypercapnia.  She also had evidence of urinary tract infection.    Hospital medicine has been asked to admit for further evaluation and treatment.       Hospital Course:  71 y/o female admitted from [facility] for SOB.  Started on treatment for CAP/COPD on the floor when she had a cardiac arrest.  Sepsis secondary to UTI with ARDS.  Now in ICU on vent.  Currently on Meropenem.  Nebs and steroids for COPD.    Interval History: Sedated on vent.  No acute events overnight.    Review of Systems   Unable to perform ROS: Intubated     Objective:     Vital Signs (Most Recent):  Temp: 97.8 °F (36.6 °C) (10/14/17 0315)  Pulse: 94 (10/14/17 1200)  Resp: 20 (10/14/17 1200)  BP: (!) 99/50 (10/14/17 1200)  SpO2: 96 % (10/14/17 1200) Vital Signs (24h Range):  Temp:  [97.4 °F (36.3 °C)-97.8 °F (36.6 °C)] 97.8 °F (36.6 °C)  Pulse:  [] 94  Resp:  [17-23] 20  SpO2:  [83 %-100 %] 96 %  BP: ()/(50-63) 99/50     Weight: 92.2 kg (203 lb 4.2 oz)  Body mass index is 39.7 kg/m².    Intake/Output Summary (Last 24 hours) at 10/14/17 1224  Last data filed at 10/14/17 0931   Gross per 24 hour   Intake           927.72 ml   Output             2180 ml   Net         -1252.28 ml      Physical Exam   Constitutional: She appears well-developed. No distress.   HENT:   ET tube in place   Eyes: Conjunctivae are normal. Right eye exhibits no discharge. Left eye exhibits no discharge.   Neck: Neck supple.   Cardiovascular: Normal heart sounds.    Irregular   Pulmonary/Chest: No stridor.   Mechanically ventilated.  Coarse BS bilaterally   Abdominal: Soft. Bowel sounds are normal.   Musculoskeletal: She exhibits no edema.   Neurological:   Sedated   Skin: Skin is warm and dry.       Significant Labs:   BMP:   Recent Labs  Lab 10/14/17  0319   *      K 4.2      CO2 25   BUN 63*    CREATININE 2.0*   CALCIUM 8.6*   MG 1.7     CBC:   Recent Labs  Lab 10/12/17  2106 10/14/17  0319   WBC 8.31 13.16*   HGB 13.3 12.7   HCT 40.7 38.9   * 133*       Significant Imaging: I have reviewed all pertinent imaging results/findings within the past 24 hours.    Assessment/Plan:      * Acute on chronic respiratory failure with hypoxia and hypercapnia    Acute on chronic respiratory failure with hypoxia and hypercapnia secondary to multifactorial etiology including: COPD, tobacco abuse, pulmonary hypertension, and right-sided heart failure   · Now on vent.  Pulmonary following for vent management.  · Continue Nebs and steroids.  · IV Lasix  · Continue weaning efforts.                Hypoxia              Chronic anticoagulation                  Urinary tract infection with hematuria    UTI secondary to Klebsiella.  Continue Meropenem.        Type 2 diabetes mellitus with stage 4 chronic kidney disease    · Hyperglycemia worsened by steroids.  Increase nightly Levemir.          Mixed hyperlipidemia                Paroxysmal atrial fibrillation    · Currently rate controlled  · Supratherapeutic INR.  Hold Coumadin.            Right-sided heart failure    · Contributory to shortness of breath  · Secondary to pulmonary hypertension.        Tobacco abuse    · Chronic tobacco use  · Tobacco cessation counseling              On home oxygen therapy    Continue oxygen and titrate O2 sats to 88-93% to optimize ventilation perfusion mismatch in patient with COPD          SUSAN (generalized anxiety disorder)    · Supportive care  · Continue home medications  · Anxiolytics PRN                VTE Risk Mitigation         Ordered     Medium Risk of VTE  Once      10/13/17 0402     Place RADHA hose  Until discontinued      10/13/17 0402     Place sequential compression device  Until discontinued      10/13/17 0402          Critical care time spent on the evaluation and treatment of severe organ dysfunction, review of  pertinent labs and imaging studies, discussions with consulting providers and discussions with patient/family: 40 minutes.    Asael Cullen MD  Department of Hospital Medicine   [facility]

## 2017-10-14 NOTE — PLAN OF CARE
Problem: Patient Care Overview  Goal: Plan of Care Review  Pt remains free of falls/injury this shift, pt turned Q2 hrs, VSS, afebrile, vent weaned as tolerated, tubefeedings advanced to goal 35cc/hr, scant residuals, hidalgo intact urine output 1200cc this shift,

## 2017-10-14 NOTE — PLAN OF CARE
Problem: Patient Care Overview  Goal: Plan of Care Review  Outcome: Ongoing (interventions implemented as appropriate)  Plan of care reviewed. No falls/injuries this shift. Skin assessed. Temp low this am, warming blanket applied, pt tolerated, temp improved. Unable to wean vent. ETT stable. Propofol and fentanyl for sedation. Afib with RVR this evening, md hendrix notified and ekg obtained. Pt easily aroused and becomes anxious easily, reaching for medical equipment. Education provided.

## 2017-10-14 NOTE — PROGRESS NOTES
Received report from CRISTIANO Strauss. Pt found on noted vent settings. Alarms are set and functional. Ambu bag at [place]. Oral ett moved from right to center. + bbs exp whz. Tube at 21cm marisela.HME and urena changed. Pt tolerated tx well; no distress noted at this time.

## 2017-10-14 NOTE — PROGRESS NOTES
0845 spoke with md Cullen concerning, pt's temp. 93.9 rectal. MD will place new orders for warming blanket, warming blanket placed. CBG checked at time to verify pt not hypoglycemic. 's. Also, informed md if Vanc and INR levels, md will assess. Informed md on sedation hold, pt does follow commands and opens eyes, becomes tremulous and restless.   1250 Verified titration orders for fentanyl drip with MD Cullen.   1350 called RT to inform pt de-satting to 88%, x2 post decrease in FiO2. 100% o2 breath provided with improvement in sats to 93%. RT will adjust back to previous settings.

## 2017-10-14 NOTE — ASSESSMENT & PLAN NOTE
Acute on chronic respiratory failure with hypoxia and hypercapnia secondary to multifactorial etiology including: COPD, tobacco abuse, pulmonary hypertension, and right-sided heart failure   · Now on vent.  Pulmonary following for vent management.  · Continue Nebs and steroids.  · IV Lasix  · Continue weaning efforts.

## 2017-10-15 LAB
ALBUMIN SERPL BCP-MCNC: 2.4 G/DL
ALLENS TEST: ABNORMAL
ALP SERPL-CCNC: 54 U/L
ALT SERPL W/O P-5'-P-CCNC: 18 U/L
ANION GAP SERPL CALC-SCNC: 9 MMOL/L
AST SERPL-CCNC: 13 U/L
BACTERIA UR CULT: NO GROWTH
BASOPHILS # BLD AUTO: 0.01 K/UL
BASOPHILS NFR BLD: 0.1 %
BILIRUB SERPL-MCNC: 0.4 MG/DL
BUN SERPL-MCNC: 75 MG/DL
CALCIUM SERPL-MCNC: 8.3 MG/DL
CHLORIDE SERPL-SCNC: 107 MMOL/L
CO2 SERPL-SCNC: 24 MMOL/L
CREAT SERPL-MCNC: 1.7 MG/DL
DELSYS: ABNORMAL
DIFFERENTIAL METHOD: ABNORMAL
EOSINOPHIL # BLD AUTO: 0 K/UL
EOSINOPHIL NFR BLD: 0 %
ERYTHROCYTE [DISTWIDTH] IN BLOOD BY AUTOMATED COUNT: 15.7 %
ERYTHROCYTE [SEDIMENTATION RATE] IN BLOOD BY WESTERGREN METHOD: 20 MM/H
EST. GFR  (AFRICAN AMERICAN): 34 ML/MIN/1.73 M^2
EST. GFR  (NON AFRICAN AMERICAN): 29 ML/MIN/1.73 M^2
FIO2: 60
GLUCOSE SERPL-MCNC: 285 MG/DL
HCO3 UR-SCNC: 27.1 MMOL/L (ref 24–28)
HCT VFR BLD AUTO: 37.6 %
HGB BLD-MCNC: 12.3 G/DL
INR PPP: 4.4
LYMPHOCYTES # BLD AUTO: 0.6 K/UL
LYMPHOCYTES NFR BLD: 3.6 %
MCH RBC QN AUTO: 32.5 PG
MCHC RBC AUTO-ENTMCNC: 32.7 G/DL
MCV RBC AUTO: 100 FL
MIN VOL: 7.9
MODE: ABNORMAL
MONOCYTES # BLD AUTO: 0.5 K/UL
MONOCYTES NFR BLD: 2.8 %
NEUTROPHILS # BLD AUTO: 15.2 K/UL
NEUTROPHILS NFR BLD: 93.5 %
PCO2 BLDA: 44.6 MMHG (ref 35–45)
PEEP: 10
PH SMN: 7.39 [PH] (ref 7.35–7.45)
PIP: 35
PLATELET # BLD AUTO: 139 K/UL
PMV BLD AUTO: 11.1 FL
PO2 BLDA: 81 MMHG (ref 80–100)
POC BE: 2 MMOL/L
POC SATURATED O2: 96 % (ref 95–100)
POC TCO2: 28 MMOL/L (ref 23–27)
POCT GLUCOSE: 258 MG/DL (ref 70–110)
POCT GLUCOSE: 301 MG/DL (ref 70–110)
POCT GLUCOSE: 305 MG/DL (ref 70–110)
POCT GLUCOSE: 309 MG/DL (ref 70–110)
POCT GLUCOSE: 321 MG/DL (ref 70–110)
POCT GLUCOSE: 326 MG/DL (ref 70–110)
POCT GLUCOSE: 379 MG/DL (ref 70–110)
POTASSIUM SERPL-SCNC: 4.5 MMOL/L
PROT SERPL-MCNC: 6.3 G/DL
PROTHROMBIN TIME: 44.3 SEC
RBC # BLD AUTO: 3.78 M/UL
SAMPLE: ABNORMAL
SITE: ABNORMAL
SODIUM SERPL-SCNC: 140 MMOL/L
SP02: 94
VANCOMYCIN SERPL-MCNC: 13.5 UG/ML
VT: 380
WBC # BLD AUTO: 16.28 K/UL

## 2017-10-15 PROCEDURE — 25000003 PHARM REV CODE 250: Performed by: EMERGENCY MEDICINE

## 2017-10-15 PROCEDURE — 94003 VENT MGMT INPAT SUBQ DAY: CPT

## 2017-10-15 PROCEDURE — 36415 COLL VENOUS BLD VENIPUNCTURE: CPT

## 2017-10-15 PROCEDURE — 80202 ASSAY OF VANCOMYCIN: CPT

## 2017-10-15 PROCEDURE — 63600175 PHARM REV CODE 636 W HCPCS: Performed by: HOSPITALIST

## 2017-10-15 PROCEDURE — 99900026 HC AIRWAY MAINTENANCE (STAT)

## 2017-10-15 PROCEDURE — 27000221 HC OXYGEN, UP TO 24 HOURS

## 2017-10-15 PROCEDURE — 99900035 HC TECH TIME PER 15 MIN (STAT)

## 2017-10-15 PROCEDURE — 82803 BLOOD GASES ANY COMBINATION: CPT

## 2017-10-15 PROCEDURE — 80053 COMPREHEN METABOLIC PANEL: CPT

## 2017-10-15 PROCEDURE — 25000003 PHARM REV CODE 250: Performed by: INTERNAL MEDICINE

## 2017-10-15 PROCEDURE — 25000242 PHARM REV CODE 250 ALT 637 W/ HCPCS: Performed by: INTERNAL MEDICINE

## 2017-10-15 PROCEDURE — 25000003 PHARM REV CODE 250: Performed by: HOSPITALIST

## 2017-10-15 PROCEDURE — 20000000 HC ICU ROOM

## 2017-10-15 PROCEDURE — 85025 COMPLETE CBC W/AUTO DIFF WBC: CPT

## 2017-10-15 PROCEDURE — C9113 INJ PANTOPRAZOLE SODIUM, VIA: HCPCS | Performed by: HOSPITALIST

## 2017-10-15 PROCEDURE — 85610 PROTHROMBIN TIME: CPT

## 2017-10-15 PROCEDURE — 63600175 PHARM REV CODE 636 W HCPCS: Performed by: INTERNAL MEDICINE

## 2017-10-15 PROCEDURE — 94640 AIRWAY INHALATION TREATMENT: CPT

## 2017-10-15 PROCEDURE — 36600 WITHDRAWAL OF ARTERIAL BLOOD: CPT

## 2017-10-15 RX ORDER — PREDNISONE 20 MG/1
20 TABLET ORAL DAILY
Status: DISCONTINUED | OUTPATIENT
Start: 2017-10-16 | End: 2017-10-17

## 2017-10-15 RX ORDER — PROPOFOL 10 MG/ML
5 INJECTION, EMULSION INTRAVENOUS CONTINUOUS
Status: DISCONTINUED | OUTPATIENT
Start: 2017-10-15 | End: 2017-10-16

## 2017-10-15 RX ORDER — PHYTONADIONE 5 MG/1
10 TABLET ORAL ONCE
Status: COMPLETED | OUTPATIENT
Start: 2017-10-15 | End: 2017-10-15

## 2017-10-15 RX ADMIN — METOPROLOL TARTRATE 25 MG: 25 TABLET ORAL at 08:10

## 2017-10-15 RX ADMIN — IPRATROPIUM BROMIDE AND ALBUTEROL SULFATE 3 ML: .5; 3 SOLUTION RESPIRATORY (INHALATION) at 01:10

## 2017-10-15 RX ADMIN — INSULIN ASPART 4 UNITS: 100 INJECTION, SOLUTION INTRAVENOUS; SUBCUTANEOUS at 12:10

## 2017-10-15 RX ADMIN — PHYTONADIONE 10 MG: 5 TABLET ORAL at 04:10

## 2017-10-15 RX ADMIN — LEVOTHYROXINE SODIUM 75 MCG: 75 TABLET ORAL at 06:10

## 2017-10-15 RX ADMIN — PROPOFOL 30 MCG/KG/MIN: 10 INJECTION, EMULSION INTRAVENOUS at 08:10

## 2017-10-15 RX ADMIN — PREDNISONE 40 MG: 20 TABLET ORAL at 08:10

## 2017-10-15 RX ADMIN — IPRATROPIUM BROMIDE AND ALBUTEROL SULFATE 3 ML: .5; 3 SOLUTION RESPIRATORY (INHALATION) at 08:10

## 2017-10-15 RX ADMIN — FENTANYL CITRATE 200 MCG/HR: 50 INJECTION, SOLUTION INTRAMUSCULAR; INTRAVENOUS at 02:10

## 2017-10-15 RX ADMIN — METOPROLOL TARTRATE 25 MG: 25 TABLET ORAL at 09:10

## 2017-10-15 RX ADMIN — INSULIN ASPART 4 UNITS: 100 INJECTION, SOLUTION INTRAVENOUS; SUBCUTANEOUS at 11:10

## 2017-10-15 RX ADMIN — INSULIN ASPART 8 UNITS: 100 INJECTION, SOLUTION INTRAVENOUS; SUBCUTANEOUS at 06:10

## 2017-10-15 RX ADMIN — IPRATROPIUM BROMIDE AND ALBUTEROL SULFATE 3 ML: .5; 3 SOLUTION RESPIRATORY (INHALATION) at 07:10

## 2017-10-15 RX ADMIN — IPRATROPIUM BROMIDE AND ALBUTEROL SULFATE 3 ML: .5; 3 SOLUTION RESPIRATORY (INHALATION) at 12:10

## 2017-10-15 RX ADMIN — CHLORHEXIDINE GLUCONATE 15 ML: 1.2 RINSE ORAL at 09:10

## 2017-10-15 RX ADMIN — PROPOFOL 30 MCG/KG/MIN: 10 INJECTION, EMULSION INTRAVENOUS at 03:10

## 2017-10-15 RX ADMIN — CHLORHEXIDINE GLUCONATE 15 ML: 1.2 RINSE ORAL at 08:10

## 2017-10-15 RX ADMIN — MEROPENEM AND SODIUM CHLORIDE 1 G: 1 INJECTION, SOLUTION INTRAVENOUS at 12:10

## 2017-10-15 RX ADMIN — PANTOPRAZOLE SODIUM 40 MG: 40 INJECTION, POWDER, FOR SOLUTION INTRAVENOUS at 08:10

## 2017-10-15 RX ADMIN — INSULIN ASPART 8 UNITS: 100 INJECTION, SOLUTION INTRAVENOUS; SUBCUTANEOUS at 12:10

## 2017-10-15 RX ADMIN — FENTANYL CITRATE 200 MCG/HR: 50 INJECTION, SOLUTION INTRAMUSCULAR; INTRAVENOUS at 04:10

## 2017-10-15 RX ADMIN — PRAVASTATIN SODIUM 40 MG: 40 TABLET ORAL at 08:10

## 2017-10-15 NOTE — PLAN OF CARE
10/15/17 1636   Discharge Assessment   Assessment Type Discharge Planning Assessment   Confirmed/corrected address and phone number on facesheet? Yes   Assessment information obtained from? Caregiver;Medical Record   Communicated expected length of stay with patient/caregiver no   Prior to hospitilization cognitive status: Unable to Assess   Prior to hospitalization functional status: Independent;Assistive Equipment   Current cognitive status: Unable to Assess   Lives With significant other   Able to Return to Prior Arrangements unable to determine at this time (comments)   Is patient able to care for self after discharge? Unable to determine at this time (comments)   Who are your caregiver(s) and their phone number(s)? (Vida RESENDEZ)   Patient's perception of discharge disposition admitted as an inpatient   Readmission Within The Last 30 Days no previous admission in last 30 days   Patient currently being followed by outpatient case management? No   Patient currently receives any other outside agency services? No   Equipment Currently Used at Home cane, straight;walker, rolling   Do you have any problems affording any of your prescribed medications? No   Is the patient taking medications as prescribed? yes   Does the patient have transportation home? Yes   Transportation Available car;family or friend will provide   Discharge Plan A Other  (TBD)   Patient/Family In Agreement With Plan yes   Does the patient have transportation to healthcare appointments? Yes   [place] spoke to POA, she works at [employer] cell phone has bad reception, call after 3:00pm.  Ronaldo has been s/o for 40 years..Paula Quevedo RN, BSN, STN [facility]  10/15/2017

## 2017-10-15 NOTE — PLAN OF CARE
Problem: Patient Care Overview  Goal: Plan of Care Review  Outcome: Outcome(s) achieved Date Met: 10/15/17  Continues on ventilator with similar settings. Diprivan at 30mcg/kg/min, Fentanyl at 200mcg/min. Tube feeds at 35cc/hr, tolerating well. Unable to do good oral care due to continued oral bleeding, Dr Cullen aware. Recheck INR in am, Vitamin K given this am. Good urine output. Bilateral soft wrist restraints in place to prevent pulling of lines/tubes.  No falls, injuries or skin breakdown.

## 2017-10-15 NOTE — PLAN OF CARE
Problem: Patient Care Overview  Goal: Plan of Care Review  Outcome: Ongoing (interventions implemented as appropriate)  Patient on ventilator. Sedated on Diprovan and Fentanyl infusing. Patient aroused easily. Able to follow simple commands. A.Flutter on cardiac monitor. HR controlled. Ferris intact. Skin intact. Full code. Accu-checks q6hrs. Blood glucose covered by sliding scale. Tube feedings infusing and tolerating well. OG tube intact. Bilateral upper restraints intact. Coumadin discontinued. 10mg of Vitamin K+ administered for 4.4 INR. Unable to discuss plan with patient.

## 2017-10-15 NOTE — EICU
ABG reviewed.  Continue to wean FIO2, maintaining oxygen saturation 90% or higher.    Eamon Brian MD

## 2017-10-15 NOTE — PROGRESS NOTES
1814 with turning pt, AFib rvr noted on monitor, pt anxious, spoke with md Cullen, confirmed with 12 lead EKG interpretation, rate as high as 130. sbp 104. Afebrile temp 97.9 ax. Auscultated lung sounds, coarse, no crackles. New orders received for metoprolol per og 25 mg bid 1st dose now, pt takes this as home med. hold Parameters obtained.  1833 pt's sbp 96, have not given metoprolol. HR fluctuating between 106 and 120 remains in afib. Discussed with MD Cullen via phone. New orders received. 500 ml NS bolus x1. Ok to hold metoprolol per og per hold parameter orders.

## 2017-10-15 NOTE — SUBJECTIVE & OBJECTIVE
Interval History: No acute events overnight.    Review of Systems   Unable to perform ROS: Intubated     Objective:     Vital Signs (Most Recent):  Temp: 98 °F (36.7 °C) (10/15/17 0700)  Pulse: 79 (10/15/17 1030)  Resp: 20 (10/15/17 1030)  BP: (!) 124/58 (10/15/17 1030)  SpO2: 95 % (10/15/17 1030) Vital Signs (24h Range):  Temp:  [97.7 °F (36.5 °C)-98.3 °F (36.8 °C)] 98 °F (36.7 °C)  Pulse:  [] 79  Resp:  [16-20] 20  SpO2:  [90 %-99 %] 95 %  BP: ()/(48-59) 124/58     Weight: 90.2 kg (198 lb 13.7 oz)  Body mass index is 38.84 kg/m².    Intake/Output Summary (Last 24 hours) at 10/15/17 1056  Last data filed at 10/15/17 1000   Gross per 24 hour   Intake          2823.27 ml   Output             1315 ml   Net          1508.27 ml      Physical Exam   Constitutional: She appears well-developed. No distress.   HENT:   ET tube in place   Eyes: Conjunctivae are normal. Right eye exhibits no discharge. Left eye exhibits no discharge.   Neck: Neck supple.   Cardiovascular: Normal heart sounds.    Irregular   Pulmonary/Chest: No stridor.   Mechanically ventilated.  Coarse BS bilaterally   Abdominal: Soft. Bowel sounds are normal.   Musculoskeletal: She exhibits no edema.   Neurological:   Sedated   Skin: Skin is warm and dry.       Significant Labs:   BMP:     Recent Labs  Lab 10/14/17  0319 10/15/17  0322   * 285*    140   K 4.2 4.5    107   CO2 25 24   BUN 63* 75*   CREATININE 2.0* 1.7*   CALCIUM 8.6* 8.3*   MG 1.7  --      CBC:     Recent Labs  Lab 10/14/17  0319 10/15/17  0322   WBC 13.16* 16.28*   HGB 12.7 12.3   HCT 38.9 37.6   * 139*       Significant Imaging: I have reviewed all pertinent imaging results/findings within the past 24 hours.

## 2017-10-15 NOTE — PROGRESS NOTES
[facility] Hospital Medicine  Progress Note    Patient Name: Savita Polo  MRN: [id number]  Patient Class: IP- Inpatient   Admission Date: 10/12/2017  Length of Stay: 2 days  Attending Physician: Asael Cullen MD  Primary Care Provider: Nadiya Nava MD        Subjective:     Principal Problem:Acute on chronic respiratory failure with hypoxia and hypercapnia    HPI:  Savita Polo is a 72 y.o. female that (in part)  has a past medical history of Anemia in chronic kidney disease(285.21); Anxiety; Atherosclerotic cerebrovascular disease; CHF (congestive heart failure); Chronic respiratory failure with hypoxia; CKD (chronic kidney disease), stage IV; COPD (chronic obstructive pulmonary disease); CRLD (chronic restrictive lung disease); Diabetic peripheral neuropathy; Diabetic retinopathy; Diverticulosis of colon; DJD (degenerative joint disease); Fatty liver; SUSAN (generalized anxiety disorder); Heart attack; History of PSVT (paroxysmal supraventricular tachycardia); Iron deficiency; Joint pain; Keloid cicatrix; Long term (current) use of anticoagulants; Mixed hyperlipidemia; Multiple lung nodules; Obesity, Class I, BMI 30-34.9; On home oxygen therapy; Paroxysmal atrial fibrillation; Renovascular hypertension; Right-sided heart failure; Secondary hyperparathyroidism; Secondary pulmonary hypertension; Stroke; Thyroid disease; Type 2 diabetes mellitus with stage 4 chronic kidney disease; and Vitamin D deficiency disease. Presents to [facility] Emergency Department complaining of shortness of breath.  She lives and [facility].  She is a poorly 93% on 4 L by nasal cannula at her residence.  She is on chronic oxygen has had multiple admissions for respiratory failure.  Worsened with exertion.  No relief given with supplemental oxygen.  Located in the chest.  Moderate to high intensity.  No radiation.  Subacute onset with progressive worsening.  Constant  duration.    In the emergency department chest x-ray, ABG, and routine laboratory studies were obtained.  There was evidence of hypoxia and hypercapnia.  She also had evidence of urinary tract infection.    Hospital medicine has been asked to admit for further evaluation and treatment.       Hospital Course:  71 y/o female admitted from [facility] for SOB.  Started on treatment for CAP/COPD on the floor when she had a cardiac arrest.  Sepsis secondary to UTI with ARDS.  Now in ICU on vent.  Currently on Meropenem.  Nebs and steroids for COPD.    Interval History: No acute events overnight.    Review of Systems   Unable to perform ROS: Intubated     Objective:     Vital Signs (Most Recent):  Temp: 98 °F (36.7 °C) (10/15/17 0700)  Pulse: 79 (10/15/17 1030)  Resp: 20 (10/15/17 1030)  BP: (!) 124/58 (10/15/17 1030)  SpO2: 95 % (10/15/17 1030) Vital Signs (24h Range):  Temp:  [97.7 °F (36.5 °C)-98.3 °F (36.8 °C)] 98 °F (36.7 °C)  Pulse:  [] 79  Resp:  [16-20] 20  SpO2:  [90 %-99 %] 95 %  BP: ()/(48-59) 124/58     Weight: 90.2 kg (198 lb 13.7 oz)  Body mass index is 38.84 kg/m².    Intake/Output Summary (Last 24 hours) at 10/15/17 1056  Last data filed at 10/15/17 1000   Gross per 24 hour   Intake          2823.27 ml   Output             1315 ml   Net          1508.27 ml      Physical Exam   Constitutional: She appears well-developed. No distress.   HENT:   ET tube in place   Eyes: Conjunctivae are normal. Right eye exhibits no discharge. Left eye exhibits no discharge.   Neck: Neck supple.   Cardiovascular: Normal heart sounds.    Irregular   Pulmonary/Chest: No stridor.   Mechanically ventilated.  Coarse BS bilaterally   Abdominal: Soft. Bowel sounds are normal.   Musculoskeletal: She exhibits no edema.   Neurological:   Sedated   Skin: Skin is warm and dry.       Significant Labs:   BMP:     Recent Labs  Lab 10/14/17  0319 10/15/17  0322   * 285*    140   K 4.2 4.5    107   CO2 25  24   BUN 63* 75*   CREATININE 2.0* 1.7*   CALCIUM 8.6* 8.3*   MG 1.7  --      CBC:     Recent Labs  Lab 10/14/17  0319 10/15/17  0322   WBC 13.16* 16.28*   HGB 12.7 12.3   HCT 38.9 37.6   * 139*       Significant Imaging: I have reviewed all pertinent imaging results/findings within the past 24 hours.    Assessment/Plan:      * Acute on chronic respiratory failure with hypoxia and hypercapnia    Acute on chronic respiratory failure with hypoxia and hypercapnia secondary to multifactorial etiology including: COPD, tobacco abuse, pulmonary hypertension, and right-sided heart failure   · Now on vent.  Pulmonary following for vent management.  · Continue Nebs and steroids.  · IV Lasix  · Continue weaning efforts.                Hypoxia              Chronic anticoagulation                  Urinary tract infection with hematuria    UTI secondary to Klebsiella.  Continue Meropenem.        Type 2 diabetes mellitus with stage 4 chronic kidney disease    · Hyperglycemia worsened by steroids.  Increase nightly Levemir.          Mixed hyperlipidemia                Paroxysmal atrial fibrillation    · Currently rate controlled  · Supratherapeutic INR.  Hold Coumadin.            Right-sided heart failure    · Contributory to shortness of breath  · Secondary to pulmonary hypertension.        Tobacco abuse    · Chronic tobacco use  · Tobacco cessation counseling              On home oxygen therapy    Continue oxygen and titrate O2 sats to 88-93% to optimize ventilation perfusion mismatch in patient with COPD          SUSAN (generalized anxiety disorder)    · Supportive care  · Continue home medications  · Anxiolytics PRN                VTE Risk Mitigation         Ordered     Medium Risk of VTE  Once      10/13/17 0402     Place RADHA hose  Until discontinued      10/13/17 0402     Place sequential compression device  Until discontinued      10/13/17 0402          Critical care time spent on the evaluation and treatment of severe  organ dysfunction, review of pertinent labs and imaging studies, discussions with consulting providers and discussions with patient/family: 35 minutes.    Asael Cullen MD  Department of Hospital Medicine   [facility]

## 2017-10-15 NOTE — PROGRESS NOTES
Results for CAM THOMAS (MRN [id number]) as of 10/15/2017 06:00   Ref. Range 10/15/2017 04:29   POC PH Latest Ref Range: 7.35 - 7.45  7.392   POC PCO2 Latest Ref Range: 35 - 45 mmHg 44.6   POC PO2 Latest Ref Range: 80 - 100 mmHg 81   POC BE Latest Ref Range: -2 to 2 mmol/L 2   POC HCO3 Latest Ref Range: 24 - 28 mmol/L 27.1   POC SATURATED O2 Latest Ref Range: 95 - 100 % 96   POC TCO2 Latest Ref Range: 23 - 27 mmol/L 28 (H)   FiO2 Unknown 60   Vt Unknown 380   PiP Unknown 35   PEEP Unknown 10   Sample Unknown ARTERIAL   DelSys Unknown Adult Vent   Allens Test Unknown Pass   Site Unknown RR   Mode Unknown AC/PRVC

## 2017-10-16 LAB
ALBUMIN SERPL BCP-MCNC: 2.2 G/DL
ALLENS TEST: ABNORMAL
ALP SERPL-CCNC: 54 U/L
ALT SERPL W/O P-5'-P-CCNC: 15 U/L
ANION GAP SERPL CALC-SCNC: 6 MMOL/L
AST SERPL-CCNC: 13 U/L
BACTERIA SPEC AEROBE CULT: NORMAL
BASOPHILS # BLD AUTO: 0 K/UL
BASOPHILS NFR BLD: 0 %
BILIRUB SERPL-MCNC: 0.5 MG/DL
BUN SERPL-MCNC: 72 MG/DL
CALCIUM SERPL-MCNC: 8.3 MG/DL
CHLORIDE SERPL-SCNC: 108 MMOL/L
CO2 SERPL-SCNC: 27 MMOL/L
CREAT SERPL-MCNC: 1.3 MG/DL
DELSYS: ABNORMAL
DIFFERENTIAL METHOD: ABNORMAL
EOSINOPHIL # BLD AUTO: 0 K/UL
EOSINOPHIL NFR BLD: 0 %
ERYTHROCYTE [DISTWIDTH] IN BLOOD BY AUTOMATED COUNT: 15.4 %
ERYTHROCYTE [SEDIMENTATION RATE] IN BLOOD BY WESTERGREN METHOD: 20 MM/H
EST. GFR  (AFRICAN AMERICAN): 47 ML/MIN/1.73 M^2
EST. GFR  (NON AFRICAN AMERICAN): 41 ML/MIN/1.73 M^2
FIO2: 60
GLUCOSE SERPL-MCNC: 311 MG/DL
GRAM STN SPEC: NORMAL
HCO3 UR-SCNC: 27.6 MMOL/L (ref 24–28)
HCT VFR BLD AUTO: 36.9 %
HGB BLD-MCNC: 12.2 G/DL
INR PPP: 2
LYMPHOCYTES # BLD AUTO: 0.5 K/UL
LYMPHOCYTES NFR BLD: 3 %
MCH RBC QN AUTO: 32.4 PG
MCHC RBC AUTO-ENTMCNC: 33.1 G/DL
MCV RBC AUTO: 98 FL
MODE: ABNORMAL
MONOCYTES # BLD AUTO: 1 K/UL
MONOCYTES NFR BLD: 6.5 %
NEUTROPHILS # BLD AUTO: 14.3 K/UL
NEUTROPHILS NFR BLD: 90.5 %
PCO2 BLDA: 36.7 MMHG (ref 35–45)
PEEP: 5
PH SMN: 7.48 [PH] (ref 7.35–7.45)
PLATELET # BLD AUTO: 145 K/UL
PMV BLD AUTO: 11 FL
PO2 BLDA: 83 MMHG (ref 80–100)
POC BE: 4 MMOL/L
POC SATURATED O2: 97 % (ref 95–100)
POC TCO2: 29 MMOL/L (ref 23–27)
POCT GLUCOSE: 190 MG/DL (ref 70–110)
POCT GLUCOSE: 209 MG/DL (ref 70–110)
POCT GLUCOSE: 301 MG/DL (ref 70–110)
POTASSIUM SERPL-SCNC: 4.7 MMOL/L
PROT SERPL-MCNC: 6 G/DL
PROTHROMBIN TIME: 20.1 SEC
RBC # BLD AUTO: 3.76 M/UL
SAMPLE: ABNORMAL
SITE: ABNORMAL
SODIUM SERPL-SCNC: 141 MMOL/L
VANCOMYCIN SERPL-MCNC: 10 UG/ML
VT: 380
WBC # BLD AUTO: 15.82 K/UL

## 2017-10-16 PROCEDURE — 63600175 PHARM REV CODE 636 W HCPCS: Performed by: HOSPITALIST

## 2017-10-16 PROCEDURE — 99900026 HC AIRWAY MAINTENANCE (STAT)

## 2017-10-16 PROCEDURE — 25000242 PHARM REV CODE 250 ALT 637 W/ HCPCS: Performed by: INTERNAL MEDICINE

## 2017-10-16 PROCEDURE — 63600175 PHARM REV CODE 636 W HCPCS: Performed by: INTERNAL MEDICINE

## 2017-10-16 PROCEDURE — 20000000 HC ICU ROOM

## 2017-10-16 PROCEDURE — 94003 VENT MGMT INPAT SUBQ DAY: CPT

## 2017-10-16 PROCEDURE — 80053 COMPREHEN METABOLIC PANEL: CPT

## 2017-10-16 PROCEDURE — C9113 INJ PANTOPRAZOLE SODIUM, VIA: HCPCS | Performed by: HOSPITALIST

## 2017-10-16 PROCEDURE — 85610 PROTHROMBIN TIME: CPT

## 2017-10-16 PROCEDURE — 25000003 PHARM REV CODE 250: Performed by: HOSPITALIST

## 2017-10-16 PROCEDURE — 25000003 PHARM REV CODE 250: Performed by: EMERGENCY MEDICINE

## 2017-10-16 PROCEDURE — 99900035 HC TECH TIME PER 15 MIN (STAT)

## 2017-10-16 PROCEDURE — 36600 WITHDRAWAL OF ARTERIAL BLOOD: CPT

## 2017-10-16 PROCEDURE — 99291 CRITICAL CARE FIRST HOUR: CPT | Mod: ,,, | Performed by: INTERNAL MEDICINE

## 2017-10-16 PROCEDURE — 80202 ASSAY OF VANCOMYCIN: CPT

## 2017-10-16 PROCEDURE — 94640 AIRWAY INHALATION TREATMENT: CPT

## 2017-10-16 PROCEDURE — 25000003 PHARM REV CODE 250: Performed by: INTERNAL MEDICINE

## 2017-10-16 PROCEDURE — 85025 COMPLETE CBC W/AUTO DIFF WBC: CPT

## 2017-10-16 PROCEDURE — 36415 COLL VENOUS BLD VENIPUNCTURE: CPT

## 2017-10-16 RX ORDER — METOCLOPRAMIDE HYDROCHLORIDE 5 MG/5ML
5 SOLUTION ORAL 3 TIMES DAILY
Status: DISCONTINUED | OUTPATIENT
Start: 2017-10-16 | End: 2017-10-17

## 2017-10-16 RX ORDER — PROPOFOL 10 MG/ML
5 INJECTION, EMULSION INTRAVENOUS CONTINUOUS
Status: DISCONTINUED | OUTPATIENT
Start: 2017-10-16 | End: 2017-10-18

## 2017-10-16 RX ADMIN — METOCLOPRAMIDE HYDROCHLORIDE 5 MG: 5 SOLUTION ORAL at 01:10

## 2017-10-16 RX ADMIN — PREDNISONE 20 MG: 20 TABLET ORAL at 09:10

## 2017-10-16 RX ADMIN — PROPOFOL 30 MCG/KG/MIN: 10 INJECTION, EMULSION INTRAVENOUS at 03:10

## 2017-10-16 RX ADMIN — PROPOFOL 25 MCG/KG/MIN: 10 INJECTION, EMULSION INTRAVENOUS at 03:10

## 2017-10-16 RX ADMIN — METOPROLOL TARTRATE 25 MG: 25 TABLET ORAL at 08:10

## 2017-10-16 RX ADMIN — MEROPENEM AND SODIUM CHLORIDE 1 G: 1 INJECTION, SOLUTION INTRAVENOUS at 01:10

## 2017-10-16 RX ADMIN — INSULIN ASPART 3 UNITS: 100 INJECTION, SOLUTION INTRAVENOUS; SUBCUTANEOUS at 11:10

## 2017-10-16 RX ADMIN — METOPROLOL TARTRATE 25 MG: 25 TABLET ORAL at 09:10

## 2017-10-16 RX ADMIN — MEROPENEM AND SODIUM CHLORIDE 1 G: 1 INJECTION, SOLUTION INTRAVENOUS at 12:10

## 2017-10-16 RX ADMIN — LEVOTHYROXINE SODIUM 75 MCG: 75 TABLET ORAL at 05:10

## 2017-10-16 RX ADMIN — IPRATROPIUM BROMIDE AND ALBUTEROL SULFATE 3 ML: .5; 3 SOLUTION RESPIRATORY (INHALATION) at 08:10

## 2017-10-16 RX ADMIN — PRAVASTATIN SODIUM 40 MG: 40 TABLET ORAL at 09:10

## 2017-10-16 RX ADMIN — INSULIN ASPART 8 UNITS: 100 INJECTION, SOLUTION INTRAVENOUS; SUBCUTANEOUS at 05:10

## 2017-10-16 RX ADMIN — METOCLOPRAMIDE HYDROCHLORIDE 5 MG: 5 SOLUTION ORAL at 10:10

## 2017-10-16 RX ADMIN — FENTANYL CITRATE 175 MCG/HR: 50 INJECTION, SOLUTION INTRAMUSCULAR; INTRAVENOUS at 05:10

## 2017-10-16 RX ADMIN — INSULIN ASPART 4 UNITS: 100 INJECTION, SOLUTION INTRAVENOUS; SUBCUTANEOUS at 11:10

## 2017-10-16 RX ADMIN — INSULIN ASPART 2 UNITS: 100 INJECTION, SOLUTION INTRAVENOUS; SUBCUTANEOUS at 05:10

## 2017-10-16 RX ADMIN — IPRATROPIUM BROMIDE AND ALBUTEROL SULFATE 3 ML: .5; 3 SOLUTION RESPIRATORY (INHALATION) at 07:10

## 2017-10-16 RX ADMIN — PROPOFOL 35 MCG/KG/MIN: 10 INJECTION, EMULSION INTRAVENOUS at 08:10

## 2017-10-16 RX ADMIN — IPRATROPIUM BROMIDE AND ALBUTEROL SULFATE 3 ML: .5; 3 SOLUTION RESPIRATORY (INHALATION) at 12:10

## 2017-10-16 RX ADMIN — CHLORHEXIDINE GLUCONATE 15 ML: 1.2 RINSE ORAL at 08:10

## 2017-10-16 RX ADMIN — FENTANYL CITRATE 200 MCG/HR: 50 INJECTION, SOLUTION INTRAMUSCULAR; INTRAVENOUS at 03:10

## 2017-10-16 RX ADMIN — CHLORHEXIDINE GLUCONATE 15 ML: 1.2 RINSE ORAL at 09:10

## 2017-10-16 RX ADMIN — IPRATROPIUM BROMIDE AND ALBUTEROL SULFATE 3 ML: .5; 3 SOLUTION RESPIRATORY (INHALATION) at 02:10

## 2017-10-16 RX ADMIN — PROPOFOL 30 MCG/KG/MIN: 10 INJECTION, EMULSION INTRAVENOUS at 08:10

## 2017-10-16 RX ADMIN — PANTOPRAZOLE SODIUM 40 MG: 40 INJECTION, POWDER, FOR SOLUTION INTRAVENOUS at 09:10

## 2017-10-16 RX ADMIN — METOCLOPRAMIDE HYDROCHLORIDE 5 MG: 5 SOLUTION ORAL at 09:10

## 2017-10-16 NOTE — PLAN OF CARE
"   10/16/17 0941   Discharge Reassessment   Assessment Type Discharge Planning Reassessment   Provided patient/caregiver education on the expected discharge date and the discharge plan Yes  (sister Vida and brother in law Jonathan)   Do you have any problems affording any of your prescribed medications? No   Discharge Plan A Other  (in ICU on vent support--TBD)   Patient choice form signed by patient/caregiver No   Can the patient answer the patient profile reliably? No, cognitively impaired   How does the patient rate their overall health at the present time? Fair   Describe the patient's ability to walk at the present time. Does not walk or unable to take any steps at all   How often would a person be available to care for the patient? Often   Number of comorbid conditions (as recorded on the chart) Five or more   During the past month, has the patient often been bothered by feeling down, depressed or hopeless? No  (record)   During the past month, has the patient often been bothered by little interest or pleasure in doing things? No  (record)   patient remains in ICU on vent support. SW reviewed chart, reviewed with team in bed huddle, met with sister Vida and brother in . SW provided brochure "Discharge Planning Begins on Admission", provided education regarding discharge planning, SW/CM role with tx team. SW provided fax number for sister to send any updated HCPOA, LW information when she finds the document. The ones on file in EPIC (2) appear signed by patient but do not appear to be witnessed correctly. SW will continue to follow, update plan and assist as needed.  10:15 sister asked SW to arrange for  to visit, anoint patient. ARABELLA spoke with richard Mabry who directed SW to flow sheet for documentation. ARABELLA confirmed that Father Jarrod noted anointing patient on 10/13. ARABELLA called sister Vida with information.   "

## 2017-10-16 NOTE — HPI
72 year old woman with a complex medical history that includes, cerebrovascular disease, reported history of CHF, stage IV CKD, COPD, DM with neuropathy, obesity, atrial fibrillation, and COPD with a component of chronic respiratory failure on home O2.  She presented to the  ED with shortness of breath.  Prior to admission she had been treated for UTI with nitrofurantoin. In the ED she was given lasix and treated with bipap.    She was admitted to hospital medicine.  On 10/13/17 she was found unresponsive.  Per chart review a code was called and she achieved ROSC after approximately 9 min. She was transferred to ICU for further management.

## 2017-10-16 NOTE — SUBJECTIVE & OBJECTIVE
Interval History/Significant Events:   Bleeding from oral cavity  High residuals    Review of Systems   Unable to perform ROS: Acuity of condition     Objective:     Vital Signs (Most Recent):  Temp: 97.7 °F (36.5 °C) (10/16/17 1100)  Pulse: 74 (10/16/17 1100)  Resp: 20 (10/16/17 1100)  BP: (!) 115/56 (10/16/17 1100)  SpO2: 97 % (10/16/17 1100) Vital Signs (24h Range):  Temp:  [93.8 °F (34.3 °C)-98.1 °F (36.7 °C)] 97.7 °F (36.5 °C)  Pulse:  [64-92] 74  Resp:  [5-35] 20  SpO2:  [93 %-97 %] 97 %  BP: (101-157)/(48-67) 115/56   Weight: 90.1 kg (198 lb 10.2 oz)  Body mass index is 38.79 kg/m².      Intake/Output Summary (Last 24 hours) at 10/16/17 1144  Last data filed at 10/16/17 1100   Gross per 24 hour   Intake          1517.91 ml   Output             3330 ml   Net         -1812.09 ml       Physical Exam   Constitutional: She appears well-developed.   HENT:   Head: Normocephalic.   ET tube in place   Eyes: Pupils are equal, round, and reactive to light.   No gaze deviation   Neck: Neck supple. No tracheal deviation present.   Cardiovascular: Normal rate and normal heart sounds.    No murmur heard.  Pulmonary/Chest: Effort normal.   Coarse breath sounds bilaterally   Abdominal: Soft. Bowel sounds are normal. She exhibits no distension.   Musculoskeletal: She exhibits no edema or tenderness.   Skin: Skin is warm and dry.   Vitals reviewed.      Vents:  Vent Mode: PRVC (10/16/17 1018)  Set Rate: 20 bmp (10/16/17 0449)  Vt Set: 380 mL (10/16/17 0449)  PEEP/CPAP: 10 cmH20 (10/16/17 0449)  Oxygen Concentration (%): 60 (10/16/17 1100)  Peak Airway Pressure: 38.8 cmH2O (10/16/17 0449)  Total Ve: 7.9 mL (10/16/17 0449)  F/VT Ratio<105 (RSBI): (!) 50.76 (10/16/17 0440)  Lines/Drains/Airways     Drain                 NG/OG Tube 10/13/17 0855 3 days         Urethral Catheter 10/13/17 0900 Latex 16 Fr. 3 days          Airway                 Airway 3 days         Airway - Non-Surgical 10/13/17 0830 Endotracheal Tube 3 days           Peripheral Intravenous Line                 Peripheral IV - Single Lumen 10/12/17 2109 Right Antecubital 3 days         Peripheral IV - Single Lumen 10/13/17 0900 Left;Anterior Other 3 days         Peripheral IV - Single Lumen 10/13/17 0905 Left;Anterior;Other (Comment) Other 3 days              Significant Labs:    CBC/Anemia Profile:    Recent Labs  Lab 10/15/17  0322 10/16/17  0315   WBC 16.28* 15.82*   HGB 12.3 12.2   HCT 37.6 36.9*   * 145*   * 98   RDW 15.7* 15.4*        Chemistries:    Recent Labs  Lab 10/15/17  0322 10/16/17  0315    141   K 4.5 4.7    108   CO2 24 27   BUN 75* 72*   CREATININE 1.7* 1.3   CALCIUM 8.3* 8.3*   ALBUMIN 2.4* 2.2*   PROT 6.3 6.0   BILITOT 0.4 0.5   ALKPHOS 54* 54*   ALT 18 15   AST 13 13       All pertinent labs within the past 24 hours have been reviewed.    Significant Imaging:  I have reviewed and interpreted all pertinent imaging results/findings within the past 24 hours.

## 2017-10-16 NOTE — ASSESSMENT & PLAN NOTE
Pan sensitive Klebsiella pneumonia UTI currently on meropenem.   Based on sensitivities it would be reasonable to de-escalate.

## 2017-10-16 NOTE — PROGRESS NOTES
ABG results   Results for CAM THOMAS (MRN [id number]) as of 10/16/2017 05:29   Ref. Range 10/16/2017 04:49   POC PH Latest Ref Range: 7.35 - 7.45  7.484 (H)   POC PCO2 Latest Ref Range: 35 - 45 mmHg 36.7   POC PO2 Latest Ref Range: 80 - 100 mmHg 83   POC BE Latest Ref Range: -2 to 2 mmol/L 4   POC HCO3 Latest Ref Range: 24 - 28 mmol/L 27.6   POC SATURATED O2 Latest Ref Range: 95 - 100 % 97   POC TCO2 Latest Ref Range: 23 - 27 mmol/L 29 (H)   FiO2 Unknown 60   Vt Unknown 380   PEEP Unknown 5   Sample Unknown ARTERIAL   DelSys Unknown Adult Vent   Allens Test Unknown Pass   Site Unknown RR   Mode Unknown AC/PRVC

## 2017-10-16 NOTE — NURSING
Notified  of patient's continuous bleeding from her mouth which requires constant suctioning. Reported the INR-2.0, H&H-12.2/36.9, and PLT-145 levels which none are critical. Also reminded  the patient is no longer on coumadin, lovenox, or heparin products.  stated to monitor patient and pass this on to day shift. No new orders rec'd.

## 2017-10-16 NOTE — ASSESSMENT & PLAN NOTE
Would hold further anticoagulation given reports of blood from the oral cavity.  If bleeding continues despite normalization of INR, would suggest ENT eval.

## 2017-10-16 NOTE — EICU
Vent day #4    73 y/o F COPD cor pulmonale s/p CPR on 10/13  Significant bloody oral secretions.    CXR possible layered effusion, ET tube in place     10/16/2017 04:49   POC PH 7.484 (H)   POC PCO2 36.7   POC PO2 83   POC BE 4   POC HCO3 27.6   POC SATURATED O2 97   POC TCO2 29 (H)   FiO2 60   Vt 380     · Wean sedation to off  · Spontaneous breathing trial once more awake  · Consider ultrasound of chest to assess amount of effusion if patient will not be able to tolerating spontaneous breathing trials

## 2017-10-16 NOTE — PROVIDER TRANSFER
[facility]  Adult Nutrition  Consult Note    SUMMARY     Recommendations    Recommendation/Intervention:   1. With current propofol rate rec TF: Peptamen Bariatric to goal rate of 30 ml/hr;   -Fluid flushes of 100 ml q 4 hrs for normal fluid intake   -Hold for residuals >250 ml or sx/o n/v/abd discomfort.   -TF to provide: 720 kcal,66 g pro,605 ml fluid(1228 kcal with propofol)   2. RD to monitor     Goals: Initiate nutrition within 48 hrs  Nutrition Goal Status: new  Communication of RD Recs: reviewed with RN    Continuum of Care Plan    Referral to Outpatient Services:  (D/C planning: Too soon to determine)    Reason for Assessment    Reason for Assessment: RD follow-up  Diagnosis:  (Resp. Distress)  Relevent Medical History: CVA, CHF, CKD, COPD, DM, Diverticulitis       General Information Comments: Patient remains intubated. TF held due to high residuals. BG levels remain elevated. +propofol/fentanyl     Nutrition Prescription Ordered    Current Diet Order: NPO         Evaluation of Received Nutrients/Fluid Intake     Other Calories (kcal): 529 (propofol/dextrose)      Energy Calories Required: not meeting needs   Protein Required: not meeting needs     I/O: 1797/3350       Fluid Required: per MD     Note: bleeding from mouth, TF held due to high residuals.     Nutrition Risk Screen     Nutrition Risk Screen: no indicators present    Nutrition/Diet History     Food Preferences: MILY   Factors Affecting Nutritional Intake: NPO, on mechanical ventilation     Labs/Tests/Procedures/Meds     Pertinent Labs Reviewed: reviewed   Pertinent Medications Reviewed: reviewed     Physical Findings    Overall Physical Appearance: obese, on ventilator support; edema (2+)  Tubes: orogastric tube   Skin: intact (Mynor 19)    Anthropometrics    Temp: 97.7 °F (36.5 °C)     Height: 5' (152.4 cm)  Weight Method: Bed Scale  Weight: 90.1 kg (198 lb 10.2 oz)     Ideal Body Weight (IBW), Female: 100 lb     % Ideal  Body Weight, Female (lb): 203.27 lb  BMI (Calculated): 39.8       Estimated/Assessed Needs    Weight Used For Calorie Calculations: 90.1 kg (198 lb 10.2 oz)       Energy Need Method: [place] State (modified) (1484 kcal 70% =1038)     RMR (Wadsworth-St. Jeor Equation): 1327.5  RMR (Codorus State Equation): 1382.46  RMR (Modified Codorus State Equation): 1484.51  Weight Used For Protein Calculations: 92.2 kg (203 lb 4.2 oz)  Protein Requirements: 75-90g     Fluid Need Method: RDA Method       CHO Requirement: 150g     Assessment and Plan    Nutrition Problem  Inadequate Energy Intake     Related to (etiology):   Mechanical Ventilation     Signs and Symptoms (as evidenced by):   NPO     Nutrition Diagnosis Status:   Continues      Monitor and Evaluation    Food and Nutrient Intake: energy intake, food and beverage intake, enteral nutrition intake  Food and Nutrient Adminstration: diet order, enteral and parenteral nutrition administration  Knowledge/Beliefs/Attitudes: food and nutrition knowledge/skill  Physical Activity and Function: nutrition-related ADLs and IADLs  Anthropometric Measurements: weight, weight change  Biochemical Data, Medical Tests and Procedures: electrolyte and renal panel, glucose/endocrine profile  Nutrition-Focused Physical Findings: overall appearance    Nutrition Risk    Level of Risk:  (2 x week)    Nutrition Follow-Up    RD Follow-up?: Yes

## 2017-10-16 NOTE — ASSESSMENT & PLAN NOTE
Oxygen requirements are improving- taken to 55% this morning  Patient is on 4L continuous oxygen at home.  Wean supplemental oxygen while maintaining an O2 Sat at or above 88%.  Patient did not tolerate SAT today --> went into afib, thus SBT was not appropriate.  Would consider adding precedex tomorrow morning and then weaning propofol slowing with a goal RASS of 0.  Additionally, would aim for a negative fluid balance daily.  (patient is auto-diuresing at present- net negative 1.5L in the last 24 hours)

## 2017-10-16 NOTE — ASSESSMENT & PLAN NOTE
· Currently rate controlled  · Supratherapeutic INR.  Held Coumadin.  · INR now down to 2  · Patient was having some oral bleeding.  Will continue holding Coumadin for now.

## 2017-10-16 NOTE — PROGRESS NOTES
[facility]  Critical Care Medicine  Progress Note    Patient Name: Savita Polo  MRN: [id number]  Admission Date: 10/12/2017  Hospital Length of Stay: 3 days  Code Status: Full Code  Attending Provider: Asael Cullen MD  Primary Care Provider: Nadiya Nava MD   Principal Problem: Acute on chronic respiratory failure with hypoxia and hypercapnia    Subjective:     HPI:  72 year old woman with a complex medical history that includes, cerebrovascular disease, reported history of CHF, stage IV CKD, COPD, DM with neuropathy, obesity, atrial fibrillation, and COPD with a component of chronic respiratory failure on home O2.  She presented to the  ED with shortness of breath.  Prior to admission she had been treated for UTI with nitrofurantoin. In the ED she was given lasix and treated with bipap.    She was admitted to hospital medicine.  On 10/13/17 she was found unresponsive.  Per chart review a code was called and she achieved ROSC after approximately 9 min. She was transferred to ICU for further management.       Hospital/ICU Course:  10/13/17: intubated for acute on chronic respiratory failure  10/16: overnight there were report of bleeding from the oral cavity (nothing from ET tube); tube feeds held due to high residuals;  Could not tolerate sedation vacation this morning-->afib with rvr when propofol held    Interval History/Significant Events:   Bleeding from oral cavity  High residuals    Review of Systems   Unable to perform ROS: Acuity of condition     Objective:     Vital Signs (Most Recent):  Temp: 97.7 °F (36.5 °C) (10/16/17 1100)  Pulse: 74 (10/16/17 1100)  Resp: 20 (10/16/17 1100)  BP: (!) 115/56 (10/16/17 1100)  SpO2: 97 % (10/16/17 1100) Vital Signs (24h Range):  Temp:  [93.8 °F (34.3 °C)-98.1 °F (36.7 °C)] 97.7 °F (36.5 °C)  Pulse:  [64-92] 74  Resp:  [5-35] 20  SpO2:  [93 %-97 %] 97 %  BP: (101-157)/(48-67) 115/56   Weight: 90.1 kg (198 lb 10.2 oz)  Body mass index is  38.79 kg/m².      Intake/Output Summary (Last 24 hours) at 10/16/17 1144  Last data filed at 10/16/17 1100   Gross per 24 hour   Intake          1517.91 ml   Output             3330 ml   Net         -1812.09 ml       Physical Exam   Constitutional: She appears well-developed.   HENT:   Head: Normocephalic.   ET tube in place   Eyes: Pupils are equal, round, and reactive to light.   No gaze deviation   Neck: Neck supple. No tracheal deviation present.   Cardiovascular: Normal rate and normal heart sounds.    No murmur heard.  Pulmonary/Chest: Effort normal.   Coarse breath sounds bilaterally   Abdominal: Soft. Bowel sounds are normal. She exhibits no distension.   Musculoskeletal: She exhibits no edema or tenderness.   Skin: Skin is warm and dry.   Vitals reviewed.      Vents:  Vent Mode: PRVC (10/16/17 1018)  Set Rate: 20 bmp (10/16/17 0449)  Vt Set: 380 mL (10/16/17 0449)  PEEP/CPAP: 10 cmH20 (10/16/17 0449)  Oxygen Concentration (%): 60 (10/16/17 1100)  Peak Airway Pressure: 38.8 cmH2O (10/16/17 0449)  Total Ve: 7.9 mL (10/16/17 0449)  F/VT Ratio<105 (RSBI): (!) 50.76 (10/16/17 0449)  Lines/Drains/Airways     Drain                 NG/OG Tube 10/13/17 0855 3 days         Urethral Catheter 10/13/17 0900 Latex 16 Fr. 3 days          Airway                 Airway 3 days         Airway - Non-Surgical 10/13/17 0830 Endotracheal Tube 3 days          Peripheral Intravenous Line                 Peripheral IV - Single Lumen 10/12/17 2109 Right Antecubital 3 days         Peripheral IV - Single Lumen 10/13/17 0900 Left;Anterior Other 3 days         Peripheral IV - Single Lumen 10/13/17 0905 Left;Anterior;Other (Comment) Other 3 days              Significant Labs:    CBC/Anemia Profile:    Recent Labs  Lab 10/15/17  0322 10/16/17  0315   WBC 16.28* 15.82*   HGB 12.3 12.2   HCT 37.6 36.9*   * 145*   * 98   RDW 15.7* 15.4*        Chemistries:    Recent Labs  Lab 10/15/17  0322 10/16/17  0315    141   K 4.5  4.7    108   CO2 24 27   BUN 75* 72*   CREATININE 1.7* 1.3   CALCIUM 8.3* 8.3*   ALBUMIN 2.4* 2.2*   PROT 6.3 6.0   BILITOT 0.4 0.5   ALKPHOS 54* 54*   ALT 18 15   AST 13 13       All pertinent labs within the past 24 hours have been reviewed.    Significant Imaging:  I have reviewed and interpreted all pertinent imaging results/findings within the past 24 hours.    Assessment/Plan:     Pulmonary   * Acute on chronic respiratory failure with hypoxia and hypercapnia    Oxygen requirements are improving- taken to 55% this morning  Patient is on 4L continuous oxygen at home.  Wean supplemental oxygen while maintaining an O2 Sat at or above 88%.  Patient did not tolerate SAT today --> went into afib, thus SBT was not appropriate.  Would consider adding precedex tomorrow morning and then weaning propofol slowing with a goal RASS of 0.  Additionally, would aim for a negative fluid balance daily.  (patient is auto-diuresing at present- net negative 1.5L in the last 24 hours)          Renal/   Urinary tract infection with hematuria    Herrera sensitive Klebsiella pneumonia UTI currently on meropenem.   Based on sensitivities it would be reasonable to de-escalate.        Hematology   Chronic anticoagulation    Would hold further anticoagulation given reports of blood from the oral cavity.  If bleeding continues despite normalization of INR, would suggest ENT eval.         Endocrine   Type 2 diabetes mellitus with stage 4 chronic kidney disease    Blood sugars in the 200's.  Continue sliding scale insulin           Critical Care Daily Checklist:    A: Awake: RASS Goal/Actual Goal: RASS Goal: -2-->light sedation  Actual: Chiu Agitation Sedation Scale (RASS): Light sedation   B: Spontaneous Breathing Trial Performed?     C: SAT & SBT Coordinated?  yes                      D: Delirium: CAM-ICU Overall CAM-ICU: Negative   E: Early Mobility Performed? No   F: Feeding Goal: Goals: Initiate nutrition within 48 hrs  Status:  Nutrition Goal Status: new   Current Diet Order   Procedures    Diet NPO Except for: Medication, Ice Chips, Sips with Medication     Order Specific Question:   Except for     Answer:   Medication     Order Specific Question:   Except for     Answer:   Ice Chips     Order Specific Question:   Except for     Answer:   Sips with Medication      AS: Analgesia/Sedation Propofol and fentanyl   T: Thromboembolic Prophylaxis Anticoagulated with coumadin   H: HOB > 300 Yes   U: Stress Ulcer Prophylaxis (if needed) famotidine   G: Glucose Control Sliding scale   B: Bowel Function Stool Occurrence: 0   I: Indwelling Catheter (Lines & Ferris) Necessity reviewed   D: De-escalation of Antimicrobials/Pharmacotherapies reviewed    Plan for the day/ETD Continue supportive care    Code Status:  Family/Goals of Care: Full Code       Critical Care Time: 40 minutes  Critical secondary to Patient has a condition that poses threat to life and bodily function: Acute on chronic respiratory failure with hypoxemic and hypercapnia.      Critical care was time spent personally by me on the following activities: development of treatment plan with patient or surrogate and bedside caregivers, discussions with consultants, evaluation of patient's response to treatment, examination of patient, ordering and performing treatments and interventions, ordering and review of laboratory studies, ordering and review of radiographic studies, pulse oximetry, re-evaluation of patient's condition. This critical care time did not overlap with that of any other provider or involve time for any procedures.     Rasia Gale MD  Critical Care Medicine  [facility]

## 2017-10-16 NOTE — PROGRESS NOTES
Received report form CRISTIANO Melendrez. Pt on noted vent settings. Alarms set and functional. Ambu bag at HOB. Oral ett moved from center to left. +bbs . Pt tolerated tx well; no distress noted at that time.

## 2017-10-16 NOTE — SUBJECTIVE & OBJECTIVE
Interval History: Remains on vent at 60% FIO2.    Review of Systems   Unable to perform ROS: Intubated     Objective:     Vital Signs (Most Recent):  Temp: 97.7 °F (36.5 °C) (10/16/17 1100)  Pulse: 74 (10/16/17 1100)  Resp: 20 (10/16/17 1100)  BP: (!) 115/56 (10/16/17 1100)  SpO2: 97 % (10/16/17 1100) Vital Signs (24h Range):  Temp:  [93.8 °F (34.3 °C)-98.1 °F (36.7 °C)] 97.7 °F (36.5 °C)  Pulse:  [64-92] 74  Resp:  [5-35] 20  SpO2:  [93 %-97 %] 97 %  BP: (101-157)/(48-67) 115/56     Weight: 90.1 kg (198 lb 10.2 oz)  Body mass index is 38.79 kg/m².    Intake/Output Summary (Last 24 hours) at 10/16/17 1137  Last data filed at 10/16/17 1100   Gross per 24 hour   Intake          1517.91 ml   Output             3330 ml   Net         -1812.09 ml      Physical Exam   Constitutional: She appears well-developed. No distress.   HENT:   ET tube in place   Eyes: Conjunctivae are normal. Right eye exhibits no discharge. Left eye exhibits no discharge.   Neck: Neck supple.   Cardiovascular: Normal heart sounds.    Irregular   Pulmonary/Chest: No stridor.   Mechanically ventilated.  Coarse BS bilaterally   Abdominal: Soft. Bowel sounds are normal.   Musculoskeletal: She exhibits no edema.   Neurological:   Sedated   Skin: Skin is warm and dry.       Significant Labs:   BMP:     Recent Labs  Lab 10/16/17  0315   *      K 4.7      CO2 27   BUN 72*   CREATININE 1.3   CALCIUM 8.3*     CBC:     Recent Labs  Lab 10/15/17  0322 10/16/17  0315   WBC 16.28* 15.82*   HGB 12.3 12.2   HCT 37.6 36.9*   * 145*       Significant Imaging: I have reviewed all pertinent imaging results/findings within the past 24 hours.

## 2017-10-16 NOTE — PROGRESS NOTES
[facility] Hospital Medicine  Progress Note    Patient Name: Savita Polo  MRN: [id number]  Patient Class: IP- Inpatient   Admission Date: 10/12/2017  Length of Stay: 3 days  Attending Physician: Asael Cullen MD  Primary Care Provider: Nadiya Nava MD        Subjective:     Principal Problem:Acute on chronic respiratory failure with hypoxia and hypercapnia    HPI:  Savita Polo is a 72 y.o. female that (in part)  has a past medical history of Anemia in chronic kidney disease(285.21); Anxiety; Atherosclerotic cerebrovascular disease; CHF (congestive heart failure); Chronic respiratory failure with hypoxia; CKD (chronic kidney disease), stage IV; COPD (chronic obstructive pulmonary disease); CRLD (chronic restrictive lung disease); Diabetic peripheral neuropathy; Diabetic retinopathy; Diverticulosis of colon; DJD (degenerative joint disease); Fatty liver; SUSAN (generalized anxiety disorder); Heart attack; History of PSVT (paroxysmal supraventricular tachycardia); Iron deficiency; Joint pain; Keloid cicatrix; Long term (current) use of anticoagulants; Mixed hyperlipidemia; Multiple lung nodules; Obesity, Class I, BMI 30-34.9; On home oxygen therapy; Paroxysmal atrial fibrillation; Renovascular hypertension; Right-sided heart failure; Secondary hyperparathyroidism; Secondary pulmonary hypertension; Stroke; Thyroid disease; Type 2 diabetes mellitus with stage 4 chronic kidney disease; and Vitamin D deficiency disease. Presents to [facility] Emergency Department complaining of shortness of breath.  She lives and [facility].  She is a poorly 93% on 4 L by nasal cannula at her residence.  She is on chronic oxygen has had multiple admissions for respiratory failure.  Worsened with exertion.  No relief given with supplemental oxygen.  Located in the chest.  Moderate to high intensity.  No radiation.  Subacute onset with progressive worsening.  Constant  duration.    In the emergency department chest x-ray, ABG, and routine laboratory studies were obtained.  There was evidence of hypoxia and hypercapnia.  She also had evidence of urinary tract infection.    Hospital medicine has been asked to admit for further evaluation and treatment.       Hospital Course:  71 y/o female admitted from [facility] for SOB.  Started on treatment for CAP/COPD on the floor when she had a cardiac arrest.  Sepsis secondary to UTI with ARDS.  Now in ICU on vent.  Currently on Meropenem.  Nebs and steroids for COPD.    Interval History: Remains on vent at 60% FIO2.    Review of Systems   Unable to perform ROS: Intubated     Objective:     Vital Signs (Most Recent):  Temp: 97.7 °F (36.5 °C) (10/16/17 1100)  Pulse: 74 (10/16/17 1100)  Resp: 20 (10/16/17 1100)  BP: (!) 115/56 (10/16/17 1100)  SpO2: 97 % (10/16/17 1100) Vital Signs (24h Range):  Temp:  [93.8 °F (34.3 °C)-98.1 °F (36.7 °C)] 97.7 °F (36.5 °C)  Pulse:  [64-92] 74  Resp:  [5-35] 20  SpO2:  [93 %-97 %] 97 %  BP: (101-157)/(48-67) 115/56     Weight: 90.1 kg (198 lb 10.2 oz)  Body mass index is 38.79 kg/m².    Intake/Output Summary (Last 24 hours) at 10/16/17 1137  Last data filed at 10/16/17 1100   Gross per 24 hour   Intake          1517.91 ml   Output             3330 ml   Net         -1812.09 ml      Physical Exam   Constitutional: She appears well-developed. No distress.   HENT:   ET tube in place   Eyes: Conjunctivae are normal. Right eye exhibits no discharge. Left eye exhibits no discharge.   Neck: Neck supple.   Cardiovascular: Normal heart sounds.    Irregular   Pulmonary/Chest: No stridor.   Mechanically ventilated.  Coarse BS bilaterally   Abdominal: Soft. Bowel sounds are normal.   Musculoskeletal: She exhibits no edema.   Neurological:   Sedated   Skin: Skin is warm and dry.       Significant Labs:   BMP:     Recent Labs  Lab 10/16/17  0315   *      K 4.7      CO2 27   BUN 72*   CREATININE 1.3    CALCIUM 8.3*     CBC:     Recent Labs  Lab 10/15/17  0322 10/16/17  0315   WBC 16.28* 15.82*   HGB 12.3 12.2   HCT 37.6 36.9*   * 145*       Significant Imaging: I have reviewed all pertinent imaging results/findings within the past 24 hours.    Assessment/Plan:      * Acute on chronic respiratory failure with hypoxia and hypercapnia    Acute on chronic respiratory failure with hypoxia and hypercapnia secondary to multifactorial etiology including: COPD, tobacco abuse, pulmonary hypertension, and right-sided heart failure   · Now on vent.  Pulmonary following for vent management.  Currently on 60% FIO2.  · Continue Nebs and steroids.  · Continue weaning efforts.                Hypoxia              Chronic anticoagulation                  Urinary tract infection with hematuria    UTI secondary to Klebsiella.  Continue Meropenem.        Type 2 diabetes mellitus with stage 4 chronic kidney disease    · Hyperglycemia worsened by steroids.  Increased nightly Levemir.  Glucose should improve with steroid taper.          Mixed hyperlipidemia                Paroxysmal atrial fibrillation    · Currently rate controlled  · Supratherapeutic INR.  Held Coumadin.  · INR now down to 2  · Patient was having some oral bleeding.  Will continue holding Coumadin for now.              Right-sided heart failure    · Contributory to shortness of breath  · Secondary to pulmonary hypertension.        Tobacco abuse    · Chronic tobacco use  · Tobacco cessation counseling              On home oxygen therapy    Continue oxygen and titrate O2 sats to 88-93% to optimize ventilation perfusion mismatch in patient with COPD          SUSAN (generalized anxiety disorder)    · Supportive care  · Continue home medications  · Anxiolytics PRN                VTE Risk Mitigation         Ordered     Medium Risk of VTE  Once      10/13/17 0402     Place RADHA hose  Until discontinued      10/13/17 0402     Place sequential compression device  Until  discontinued      10/13/17 0402          Critical care time spent on the evaluation and treatment of severe organ dysfunction, review of pertinent labs and imaging studies, discussions with consulting providers and discussions with patient/family: 40 minutes.    Asael Cullen MD  Department of Hospital Medicine   [facility]

## 2017-10-16 NOTE — PLAN OF CARE
Problem: Patient Care Overview  Goal: Plan of Care Review  Outcome: Ongoing (interventions implemented as appropriate)  Patient is still in ICU. Patient is being mechanically ventilated via ET tube. Patient currently has propofol and fenatyl infusing at this time. Pt is only responsive to pain. Patient is in two wrist restraints because she was attempting to pull at medical devices. Patient has maintained a cardiac rhythm of aflutter. Patient has had adequate urinary output throughout the shift; measured by urimeter. Patient's tube feedings were put on hold at 0325 because at that time there was 400cc of residual tube feed. Prior to 0325 the patient was receiving 35 cc/h of nutren and 115 cc of free water q4h. Patient has barbara, red blood coming from her mouth throughout this shift. The patient has been suctioned via yanker. No blood is coming from the patient's ET tube at this time; MD notified. Patient has accu checks q6h and is being covered by insulin for any elevated cbgs. VSS; pt has been afebrile. Patient is without falls and injury this shift. Patient maintains no skin breakdown this shift.

## 2017-10-16 NOTE — ASSESSMENT & PLAN NOTE
· Hyperglycemia worsened by steroids.  Increased nightly Levemir.  Glucose should improve with steroid taper.

## 2017-10-16 NOTE — ASSESSMENT & PLAN NOTE
Acute on chronic respiratory failure with hypoxia and hypercapnia secondary to multifactorial etiology including: COPD, tobacco abuse, pulmonary hypertension, and right-sided heart failure   · Now on vent.  Pulmonary following for vent management.  Currently on 60% FIO2.  · Continue Nebs and steroids.  · Continue weaning efforts.

## 2017-10-16 NOTE — HOSPITAL COURSE
10/13/17: intubated for acute on chronic respiratory failure  10/16: overnight there were report of bleeding from the oral cavity (nothing from ET tube); tube feeds held due to high residuals;  Could not tolerate sedation vacation this morning-->afib with rvr when propofol held  10/19: extubated to bipap    She remains extubated but requires FIO2 70% on BiPAP to maintain SpO2.

## 2017-10-17 PROBLEM — Z99.11 ON MECHANICALLY ASSISTED VENTILATION: Status: ACTIVE | Noted: 2017-10-17

## 2017-10-17 PROBLEM — N39.0 UTI DUE TO KLEBSIELLA SPECIES: Status: ACTIVE | Noted: 2017-10-17

## 2017-10-17 PROBLEM — K13.79 ORAL BLEEDING: Status: ACTIVE | Noted: 2017-10-17

## 2017-10-17 PROBLEM — N18.30 CKD STAGE 3 DUE TO TYPE 2 DIABETES MELLITUS: Status: ACTIVE | Noted: 2017-10-17

## 2017-10-17 PROBLEM — E11.22 CKD STAGE 3 DUE TO TYPE 2 DIABETES MELLITUS: Status: ACTIVE | Noted: 2017-10-17

## 2017-10-17 PROBLEM — B96.89 UTI DUE TO KLEBSIELLA SPECIES: Status: ACTIVE | Noted: 2017-10-17

## 2017-10-17 LAB
ALBUMIN SERPL BCP-MCNC: 2.1 G/DL
ALLENS TEST: ABNORMAL
ANION GAP SERPL CALC-SCNC: 8 MMOL/L
BASOPHILS # BLD AUTO: 0.02 K/UL
BASOPHILS NFR BLD: 0.1 %
BUN SERPL-MCNC: 65 MG/DL
CALCIUM SERPL-MCNC: 8.2 MG/DL
CHLORIDE SERPL-SCNC: 107 MMOL/L
CO2 SERPL-SCNC: 25 MMOL/L
CREAT SERPL-MCNC: 1.1 MG/DL
DELSYS: ABNORMAL
DIFFERENTIAL METHOD: ABNORMAL
EOSINOPHIL # BLD AUTO: 0 K/UL
EOSINOPHIL NFR BLD: 0.2 %
ERYTHROCYTE [DISTWIDTH] IN BLOOD BY AUTOMATED COUNT: 15.3 %
ERYTHROCYTE [SEDIMENTATION RATE] IN BLOOD BY WESTERGREN METHOD: 20 MM/H
EST. GFR  (AFRICAN AMERICAN): 58 ML/MIN/1.73 M^2
EST. GFR  (NON AFRICAN AMERICAN): 50 ML/MIN/1.73 M^2
FIO2: 40
GLUCOSE SERPL-MCNC: 269 MG/DL
HCO3 UR-SCNC: 29.2 MMOL/L (ref 24–28)
HCT VFR BLD AUTO: 35.6 %
HGB BLD-MCNC: 11.9 G/DL
INR PPP: 1.5
LYMPHOCYTES # BLD AUTO: 1 K/UL
LYMPHOCYTES NFR BLD: 6.6 %
MCH RBC QN AUTO: 31.9 PG
MCHC RBC AUTO-ENTMCNC: 33.4 G/DL
MCV RBC AUTO: 95 FL
MODE: ABNORMAL
MONOCYTES # BLD AUTO: 1.1 K/UL
MONOCYTES NFR BLD: 7.5 %
NEUTROPHILS # BLD AUTO: 12.4 K/UL
NEUTROPHILS NFR BLD: 85.6 %
PCO2 BLDA: 40.8 MMHG (ref 35–45)
PEEP: 5
PH SMN: 7.46 [PH] (ref 7.35–7.45)
PHOSPHATE SERPL-MCNC: 3.2 MG/DL
PIP: 44
PLATELET # BLD AUTO: 140 K/UL
PMV BLD AUTO: 10.7 FL
PO2 BLDA: 47 MMHG (ref 80–100)
POC BE: 5 MMOL/L
POC SATURATED O2: 85 % (ref 95–100)
POC TCO2: 30 MMOL/L (ref 23–27)
POCT GLUCOSE: 251 MG/DL (ref 70–110)
POCT GLUCOSE: 267 MG/DL (ref 70–110)
POCT GLUCOSE: 273 MG/DL (ref 70–110)
POCT GLUCOSE: 314 MG/DL (ref 70–110)
POTASSIUM SERPL-SCNC: 4.7 MMOL/L
PROTHROMBIN TIME: 15.6 SEC
PS: 10
RBC # BLD AUTO: 3.73 M/UL
SAMPLE: ABNORMAL
SITE: ABNORMAL
SODIUM SERPL-SCNC: 140 MMOL/L
SP02: 86
VT: 380
WBC # BLD AUTO: 14.45 K/UL

## 2017-10-17 PROCEDURE — 27200966 HC CLOSED SUCTION SYSTEM

## 2017-10-17 PROCEDURE — 80069 RENAL FUNCTION PANEL: CPT

## 2017-10-17 PROCEDURE — 25000003 PHARM REV CODE 250: Performed by: EMERGENCY MEDICINE

## 2017-10-17 PROCEDURE — 63600175 PHARM REV CODE 636 W HCPCS: Performed by: HOSPITALIST

## 2017-10-17 PROCEDURE — 94002 VENT MGMT INPAT INIT DAY: CPT

## 2017-10-17 PROCEDURE — 85025 COMPLETE CBC W/AUTO DIFF WBC: CPT

## 2017-10-17 PROCEDURE — 25000003 PHARM REV CODE 250: Performed by: INTERNAL MEDICINE

## 2017-10-17 PROCEDURE — 85610 PROTHROMBIN TIME: CPT

## 2017-10-17 PROCEDURE — 63600175 PHARM REV CODE 636 W HCPCS: Performed by: INTERNAL MEDICINE

## 2017-10-17 PROCEDURE — 36600 WITHDRAWAL OF ARTERIAL BLOOD: CPT

## 2017-10-17 PROCEDURE — 20000000 HC ICU ROOM

## 2017-10-17 PROCEDURE — 36415 COLL VENOUS BLD VENIPUNCTURE: CPT

## 2017-10-17 PROCEDURE — 25000242 PHARM REV CODE 250 ALT 637 W/ HCPCS: Performed by: INTERNAL MEDICINE

## 2017-10-17 PROCEDURE — C9113 INJ PANTOPRAZOLE SODIUM, VIA: HCPCS | Performed by: HOSPITALIST

## 2017-10-17 PROCEDURE — 82803 BLOOD GASES ANY COMBINATION: CPT

## 2017-10-17 PROCEDURE — 25000003 PHARM REV CODE 250: Performed by: HOSPITALIST

## 2017-10-17 PROCEDURE — 99900026 HC AIRWAY MAINTENANCE (STAT)

## 2017-10-17 PROCEDURE — 99900035 HC TECH TIME PER 15 MIN (STAT)

## 2017-10-17 PROCEDURE — 94640 AIRWAY INHALATION TREATMENT: CPT

## 2017-10-17 RX ORDER — FUROSEMIDE 10 MG/ML
40 INJECTION INTRAMUSCULAR; INTRAVENOUS ONCE
Status: COMPLETED | OUTPATIENT
Start: 2017-10-17 | End: 2017-10-17

## 2017-10-17 RX ORDER — WARFARIN 3 MG/1
3 TABLET ORAL DAILY
Status: DISCONTINUED | OUTPATIENT
Start: 2017-10-17 | End: 2017-10-17

## 2017-10-17 RX ORDER — PREDNISONE 20 MG/1
40 TABLET ORAL DAILY
Status: DISCONTINUED | OUTPATIENT
Start: 2017-10-17 | End: 2017-10-20

## 2017-10-17 RX ORDER — ENOXAPARIN SODIUM 100 MG/ML
40 INJECTION SUBCUTANEOUS EVERY 24 HOURS
Status: DISCONTINUED | OUTPATIENT
Start: 2017-10-17 | End: 2017-10-20

## 2017-10-17 RX ADMIN — FENTANYL CITRATE 175 MCG/HR: 50 INJECTION, SOLUTION INTRAMUSCULAR; INTRAVENOUS at 09:10

## 2017-10-17 RX ADMIN — PROPOFOL 25 MCG/KG/MIN: 10 INJECTION, EMULSION INTRAVENOUS at 07:10

## 2017-10-17 RX ADMIN — IPRATROPIUM BROMIDE AND ALBUTEROL SULFATE 3 ML: .5; 3 SOLUTION RESPIRATORY (INHALATION) at 08:10

## 2017-10-17 RX ADMIN — PROPOFOL 30 MCG/KG/MIN: 10 INJECTION, EMULSION INTRAVENOUS at 03:10

## 2017-10-17 RX ADMIN — INSULIN ASPART 6 UNITS: 100 INJECTION, SOLUTION INTRAVENOUS; SUBCUTANEOUS at 05:10

## 2017-10-17 RX ADMIN — PANTOPRAZOLE SODIUM 40 MG: 40 INJECTION, POWDER, FOR SOLUTION INTRAVENOUS at 09:10

## 2017-10-17 RX ADMIN — MEROPENEM AND SODIUM CHLORIDE 1 G: 1 INJECTION, SOLUTION INTRAVENOUS at 12:10

## 2017-10-17 RX ADMIN — PROPOFOL 30 MCG/KG/MIN: 10 INJECTION, EMULSION INTRAVENOUS at 07:10

## 2017-10-17 RX ADMIN — PREDNISONE 40 MG: 20 TABLET ORAL at 09:10

## 2017-10-17 RX ADMIN — FUROSEMIDE 40 MG: 10 INJECTION, SOLUTION INTRAMUSCULAR; INTRAVENOUS at 08:10

## 2017-10-17 RX ADMIN — FENTANYL CITRATE 162.5 MCG/HR: 50 INJECTION, SOLUTION INTRAMUSCULAR; INTRAVENOUS at 11:10

## 2017-10-17 RX ADMIN — METOCLOPRAMIDE HYDROCHLORIDE 5 MG: 5 SOLUTION ORAL at 05:10

## 2017-10-17 RX ADMIN — CHLORHEXIDINE GLUCONATE 15 ML: 1.2 RINSE ORAL at 09:10

## 2017-10-17 RX ADMIN — IPRATROPIUM BROMIDE AND ALBUTEROL SULFATE 3 ML: .5; 3 SOLUTION RESPIRATORY (INHALATION) at 01:10

## 2017-10-17 RX ADMIN — MEROPENEM AND SODIUM CHLORIDE 1 G: 1 INJECTION, SOLUTION INTRAVENOUS at 01:10

## 2017-10-17 RX ADMIN — METOPROLOL TARTRATE 25 MG: 25 TABLET ORAL at 09:10

## 2017-10-17 RX ADMIN — IPRATROPIUM BROMIDE AND ALBUTEROL SULFATE 3 ML: .5; 3 SOLUTION RESPIRATORY (INHALATION) at 07:10

## 2017-10-17 RX ADMIN — LEVOTHYROXINE SODIUM 75 MCG: 75 TABLET ORAL at 05:10

## 2017-10-17 RX ADMIN — PROPOFOL 35 MCG/KG/MIN: 10 INJECTION, EMULSION INTRAVENOUS at 02:10

## 2017-10-17 RX ADMIN — PRAVASTATIN SODIUM 40 MG: 40 TABLET ORAL at 09:10

## 2017-10-17 RX ADMIN — IPRATROPIUM BROMIDE AND ALBUTEROL SULFATE 3 ML: .5; 3 SOLUTION RESPIRATORY (INHALATION) at 12:10

## 2017-10-17 RX ADMIN — INSULIN ASPART 8 UNITS: 100 INJECTION, SOLUTION INTRAVENOUS; SUBCUTANEOUS at 05:10

## 2017-10-17 RX ADMIN — ENOXAPARIN SODIUM 40 MG: 100 INJECTION SUBCUTANEOUS at 04:10

## 2017-10-17 NOTE — ASSESSMENT & PLAN NOTE
Coumadin on hold for oral bleeding that had been ongoing, affecting proper oral care. Transition to prophylactic lovenox daily

## 2017-10-17 NOTE — PROGRESS NOTES
[facility] Hospital Medicine  Progress Note    Patient Name: Savita Polo  MRN: [id number]  Patient Class: IP- Inpatient   Admission Date: 10/12/2017  Length of Stay: 4 days  Attending Physician: Odessa Lovett MD  Primary Care Provider: Nadiya Nava MD        Subjective:     Principal Problem:Acute on chronic respiratory failure with hypoxia and hypercapnia    HPI:  Savtia Polo is a 72 y.o. female that (in part)  has a past medical history of Anemia in chronic kidney disease(285.21); Anxiety; Atherosclerotic cerebrovascular disease; CHF (congestive heart failure); Chronic respiratory failure with hypoxia; CKD (chronic kidney disease), stage IV; COPD (chronic obstructive pulmonary disease); CRLD (chronic restrictive lung disease); Diabetic peripheral neuropathy; Diabetic retinopathy; Diverticulosis of colon; DJD (degenerative joint disease); Fatty liver; SUSAN (generalized anxiety disorder); Heart attack; History of PSVT (paroxysmal supraventricular tachycardia); Iron deficiency; Joint pain; Keloid cicatrix; Long term (current) use of anticoagulants; Mixed hyperlipidemia; Multiple lung nodules; Obesity, Class I, BMI 30-34.9; On home oxygen therapy; Paroxysmal atrial fibrillation; Renovascular hypertension; Right-sided heart failure; Secondary hyperparathyroidism; Secondary pulmonary hypertension; Stroke; Thyroid disease; Type 2 diabetes mellitus with stage 4 chronic kidney disease; and Vitamin D deficiency disease. Presents to [facility] Emergency Department complaining of shortness of breath.  She lives and [facility].  She is a poorly 93% on 4 L by nasal cannula at her residence.  She is on chronic oxygen has had multiple admissions for respiratory failure.  Worsened with exertion.  No relief given with supplemental oxygen.  Located in the chest.  Moderate to high intensity.  No radiation.  Subacute onset with progressive worsening.  Constant  duration.    In the emergency department chest x-ray, ABG, and routine laboratory studies were obtained.  There was evidence of hypoxia and hypercapnia.  She also had evidence of urinary tract infection.    Hospital medicine has been asked to admit for further evaluation and treatment.       Hospital Course:  71 y/o female admitted from [facility] for SOB.  Started on treatment for CAP/COPD on the floor when she had a cardiac arrest.  Sepsis secondary to UTI with ARDS.  Now in ICU on vent.  Currently on Meropenem.  Nebs and steroids for COPD.    Interval History: gradually requiring more supplemental O2. PEEP and FiO2 increased to 8 and 65% respectively. Reviewed by eICU this AM.     Review of Systems   Unable to perform ROS: Intubated     Objective:     Vital Signs (Most Recent):  Temp: 97.6 °F (36.4 °C) (10/17/17 0400)  Pulse: 74 (10/17/17 0552)  Resp: 20 (10/17/17 0552)  BP: (!) 105/55 (10/17/17 0532)  SpO2: 97 % (10/17/17 0552) Vital Signs (24h Range):  Temp:  [97 °F (36.1 °C)-97.7 °F (36.5 °C)] 97.6 °F (36.4 °C)  Pulse:  [] 74  Resp:  [20-22] 20  SpO2:  [88 %-100 %] 97 %  BP: ()/(45-62) 105/55     Weight: 90.3 kg (199 lb 1.2 oz)  Body mass index is 38.88 kg/m².    Intake/Output Summary (Last 24 hours) at 10/17/17 0701  Last data filed at 10/17/17 0600   Gross per 24 hour   Intake          1872.16 ml   Output             2270 ml   Net          -397.84 ml      Physical Exam   Constitutional: She appears well-developed. No distress.   HENT:   ET tube in place  Dried blood on tongue   Eyes: Conjunctivae are normal. Right eye exhibits no discharge. Left eye exhibits no discharge.   Neck: Neck supple.   Cardiovascular: Normal rate, regular rhythm and normal heart sounds.    Pulmonary/Chest: No stridor.   Mechanically ventilated.  Coarse BS bilaterally  Right LL expiratory wheezing   Abdominal: Soft. Bowel sounds are normal.   Musculoskeletal: She exhibits no edema.   Neurological:   Sedated.  PinPoint pupils   Skin: Skin is warm and dry.       Significant Labs: All pertinent labs within the past 24 hours have been reviewed.    Significant Imaging: I have reviewed all pertinent imaging results/findings within the past 24 hours.  I have reviewed and interpreted all pertinent imaging results/findings within the past 24 hours.    Assessment/Plan:      * Acute on chronic respiratory failure with hypoxia and hypercapnia    With chronic hypoxia and pulmonary hypertension, dependent on supplemental O2 at home. With Tobacco abuse. Exacerbated by underlying urinary tract infection 2/2 Klebsiella, without bacteremia. Resp culture without significant findings. Wheezing, with increased O2 requirement, with generalized opacification on XRay (better), ABG with hypoxia and bibasilar ronchi/rales on exam. I suspect pulm edema from ARDS, which appeared to be improving. I will give one dose of furosemide IV 40 mg now. Continue nebs every 6 hours and increase prednisone to 40 mg daily. Already on meropenem for UTI (allergic to penicillins). Appreciate further pulm recs for ventilator co-management. Not ready for extubation given increased O2 requirement.         Cardiopulmonary arrest with successful resuscitation    This happened soon after admission on 10/12. Likely secondary to significant hypoxia at the time. ROSC established post ~ 9 mins of ACLS. Neuro intact post arrest therefore hypothermia protocol not indicated           Pulmonary hypertension due to COPD    2/2 chronic tobacco abuse. Is supplemental O2 dependent. No evidence of heart failure        On mechanically assisted ventilation    As above          Urinary tract infection with hematuria    UTI secondary to Klebsiella. No bacteremia.   Continue Meropenem. Will continue for a total of 7-10 days        UTI due to Klebsiella species    As above          Paroxysmal atrial fibrillation    In NSR while on metoprolol.             Chronic anticoagulation     Coumadin on hold for oral bleeding that had been ongoing, affecting proper oral care. Transition to prophylactic lovenox daily              Oral bleeding    As above          Type 2 diabetes mellitus with stage 4 chronic kidney disease    Hyperglycemia worsened by steroids.  Increased nightly Levemir.  Glucose should improve with steroid taper.          Mixed hyperlipidemia                Tobacco abuse    Chronic tobacco use  Tobacco cessation counseling              On home oxygen therapy    Continue oxygen and titrate O2 sats to 88-93% to optimize ventilation perfusion mismatch in patient with COPD          SUSAN (generalized anxiety disorder)    On sedation. Anxiolytics on hold                VTE Risk Mitigation         Ordered     enoxaparin injection 40 mg  Daily     Route:  Subcutaneous        10/17/17 0636     Medium Risk of VTE  Once      10/13/17 0402     Place RADHA hose  Until discontinued      10/13/17 0402     Place sequential compression device  Until discontinued      10/13/17 0402          Critical care time spent on the evaluation and treatment of severe organ dysfunction, review of pertinent labs and imaging studies, discussions with consulting providers and discussions with patient/family: > 35 minutes.    Odessa Zhong MD  Department of Hospital Medicine   [facility]

## 2017-10-17 NOTE — PROGRESS NOTES
Pt received on vent settings are SIMV/20/380/+8/10ps/65% sats maintained. Sx mod amount of secretions. ETT power was changed pt remains at 21 cm at lip.

## 2017-10-17 NOTE — EICU
eICU Note : Ventilator Rounds    On Mechanical ventilator :Settings: Mode: SIMV      RR 10  PEEP+5  FiO2 50%       PIP   SaO2 95%  Last ABG:  Checked Readiness to wean: P/F ratio  NA     F/TV   PEEP   Sedation on/off: off    Problem:Weaning attempt ,in need of her psych meds    Pertinent History and labs and CXR reviewed :CXR : Layered Effusion  72-year-old female with past medical history of DM2 CK D stg 4,CVA, CHF with chronic respiratory failure with hypoxia, COPD, chronic restrictive lung disease diabetic peripheral neuropathy and retinopathy, diverticulosis of colon  Patient on home oxygen therapy with history of paroxysmal A. Fib, onur- vascular hypertension, right-sided heart failure secondary to COPD    Treatment /Intervention given:1. Continue to wean with SIMV ,---did not tolerate , BP went high         Sydney Ngo M.D  eICU Physician  5:27 AM  ABG is 7.46/40/47/5/29/86% on SIMV 20, tidal volume 380, PEEP of +5, pressure support of +10, FiO2 65 will go ahead and increase the PEEP to 8

## 2017-10-17 NOTE — PLAN OF CARE
Interventions implemented as appropriate. Pt continues to have blood coming out of mouth throughout shift. Pt has soft wrist restraints because of attempts to remove ET tube. Pt continues to be on vent. Pt Blood sugar covered with insulin. No falls or injury. Pt has urinary catheter with adequate urinary output. No signs or symptoms of distress at this time.

## 2017-10-17 NOTE — ASSESSMENT & PLAN NOTE
This happened soon after admission on 10/12. Likely secondary to significant hypoxia at the time. ROSC established post ~ 9 mins of ACLS. Neuro intact post arrest therefore hypothermia protocol not indicated

## 2017-10-17 NOTE — ASSESSMENT & PLAN NOTE
UTI secondary to Klebsiella. No bacteremia.   Continue Meropenem. Will continue for a total of 7-10 days

## 2017-10-17 NOTE — ASSESSMENT & PLAN NOTE
With chronic hypoxia and pulmonary hypertension, dependent on supplemental O2 at home. With Tobacco abuse. Exacerbated by underlying urinary tract infection 2/2 Klebsiella, without bacteremia. Resp culture without significant findings. Wheezing, with increased O2 requirement, with generalized opacification on XRay (better), ABG with hypoxia and bibasilar ronchi/rales on exam. I suspect pulm edema from ARDS, which appeared to be improving. I will give one dose of furosemide IV 40 mg now. Continue nebs every 6 hours and increase prednisone to 40 mg daily. Already on meropenem for UTI (allergic to penicillins). Appreciate further pulm recs for ventilator co-management. Not ready for extubation given increased O2 requirement.

## 2017-10-17 NOTE — ASSESSMENT & PLAN NOTE
Hyperglycemia worsened by steroids.  Increased nightly Levemir.  Glucose should improve with steroid taper.

## 2017-10-17 NOTE — SUBJECTIVE & OBJECTIVE
Interval History: gradually requiring more supplemental O2. PEEP and FiO2 increased to 8 and 65% respectively. Reviewed by eICU this AM.     Review of Systems   Unable to perform ROS: Intubated     Objective:     Vital Signs (Most Recent):  Temp: 97.6 °F (36.4 °C) (10/17/17 0400)  Pulse: 74 (10/17/17 0552)  Resp: 20 (10/17/17 0552)  BP: (!) 105/55 (10/17/17 0532)  SpO2: 97 % (10/17/17 0552) Vital Signs (24h Range):  Temp:  [97 °F (36.1 °C)-97.7 °F (36.5 °C)] 97.6 °F (36.4 °C)  Pulse:  [] 74  Resp:  [20-22] 20  SpO2:  [88 %-100 %] 97 %  BP: ()/(45-62) 105/55     Weight: 90.3 kg (199 lb 1.2 oz)  Body mass index is 38.88 kg/m².    Intake/Output Summary (Last 24 hours) at 10/17/17 0701  Last data filed at 10/17/17 0600   Gross per 24 hour   Intake          1872.16 ml   Output             2270 ml   Net          -397.84 ml      Physical Exam   Constitutional: She appears well-developed. No distress.   HENT:   ET tube in place  Dried blood on tongue   Eyes: Conjunctivae are normal. Right eye exhibits no discharge. Left eye exhibits no discharge.   Neck: Neck supple.   Cardiovascular: Normal rate, regular rhythm and normal heart sounds.    Pulmonary/Chest: No stridor.   Mechanically ventilated.  Coarse BS bilaterally  Right LL expiratory wheezing   Abdominal: Soft. Bowel sounds are normal.   Musculoskeletal: She exhibits no edema.   Neurological:   Sedated. PinPoint pupils   Skin: Skin is warm and dry.       Significant Labs: All pertinent labs within the past 24 hours have been reviewed.    Significant Imaging: I have reviewed all pertinent imaging results/findings within the past 24 hours.  I have reviewed and interpreted all pertinent imaging results/findings within the past 24 hours.

## 2017-10-18 LAB
ALLENS TEST: ABNORMAL
BACTERIA BLD CULT: NORMAL
BACTERIA BLD CULT: NORMAL
DELSYS: ABNORMAL
ERYTHROCYTE [SEDIMENTATION RATE] IN BLOOD BY WESTERGREN METHOD: 20 MM/H
FIO2: 45
HCO3 UR-SCNC: 30.8 MMOL/L (ref 24–28)
INR PPP: 1.3
MODE: ABNORMAL
PCO2 BLDA: 39.5 MMHG (ref 35–45)
PEEP: 8
PH SMN: 7.5 [PH] (ref 7.35–7.45)
PIP: 33
PO2 BLDA: 72 MMHG (ref 80–100)
POC BE: 7 MMOL/L
POC SATURATED O2: 96 % (ref 95–100)
POC TCO2: 32 MMOL/L (ref 23–27)
POCT GLUCOSE: 180 MG/DL (ref 70–110)
POCT GLUCOSE: 182 MG/DL (ref 70–110)
POCT GLUCOSE: 217 MG/DL (ref 70–110)
POCT GLUCOSE: 220 MG/DL (ref 70–110)
POCT GLUCOSE: 235 MG/DL (ref 70–110)
PROTHROMBIN TIME: 13.5 SEC
PS: 10
SAMPLE: ABNORMAL
SITE: ABNORMAL
SP02: 45
VT: 380

## 2017-10-18 PROCEDURE — 82803 BLOOD GASES ANY COMBINATION: CPT

## 2017-10-18 PROCEDURE — 25000242 PHARM REV CODE 250 ALT 637 W/ HCPCS: Performed by: INTERNAL MEDICINE

## 2017-10-18 PROCEDURE — 25000003 PHARM REV CODE 250: Performed by: INTERNAL MEDICINE

## 2017-10-18 PROCEDURE — 99900026 HC AIRWAY MAINTENANCE (STAT)

## 2017-10-18 PROCEDURE — 25000003 PHARM REV CODE 250: Performed by: EMERGENCY MEDICINE

## 2017-10-18 PROCEDURE — 36415 COLL VENOUS BLD VENIPUNCTURE: CPT

## 2017-10-18 PROCEDURE — C9113 INJ PANTOPRAZOLE SODIUM, VIA: HCPCS | Performed by: HOSPITALIST

## 2017-10-18 PROCEDURE — 36600 WITHDRAWAL OF ARTERIAL BLOOD: CPT

## 2017-10-18 PROCEDURE — 94640 AIRWAY INHALATION TREATMENT: CPT

## 2017-10-18 PROCEDURE — 63600175 PHARM REV CODE 636 W HCPCS: Performed by: INTERNAL MEDICINE

## 2017-10-18 PROCEDURE — 94003 VENT MGMT INPAT SUBQ DAY: CPT

## 2017-10-18 PROCEDURE — 63600175 PHARM REV CODE 636 W HCPCS: Performed by: HOSPITALIST

## 2017-10-18 PROCEDURE — 99291 CRITICAL CARE FIRST HOUR: CPT | Mod: ,,, | Performed by: INTERNAL MEDICINE

## 2017-10-18 PROCEDURE — 27000221 HC OXYGEN, UP TO 24 HOURS

## 2017-10-18 PROCEDURE — 25000003 PHARM REV CODE 250: Performed by: HOSPITALIST

## 2017-10-18 PROCEDURE — 85610 PROTHROMBIN TIME: CPT

## 2017-10-18 PROCEDURE — 20000000 HC ICU ROOM

## 2017-10-18 RX ORDER — PROPOFOL 10 MG/ML
5 INJECTION, EMULSION INTRAVENOUS CONTINUOUS PRN
Status: DISCONTINUED | OUTPATIENT
Start: 2017-10-18 | End: 2017-10-25

## 2017-10-18 RX ORDER — INSULIN ASPART 100 [IU]/ML
8 INJECTION, SOLUTION INTRAVENOUS; SUBCUTANEOUS EVERY 6 HOURS
Status: DISCONTINUED | OUTPATIENT
Start: 2017-10-19 | End: 2017-10-19

## 2017-10-18 RX ORDER — DEXMEDETOMIDINE HYDROCHLORIDE 4 UG/ML
0.2 INJECTION, SOLUTION INTRAVENOUS CONTINUOUS
Status: DISCONTINUED | OUTPATIENT
Start: 2017-10-18 | End: 2017-10-19

## 2017-10-18 RX ADMIN — PROPOFOL 30 MCG/KG/MIN: 10 INJECTION, EMULSION INTRAVENOUS at 06:10

## 2017-10-18 RX ADMIN — IPRATROPIUM BROMIDE AND ALBUTEROL SULFATE 3 ML: .5; 3 SOLUTION RESPIRATORY (INHALATION) at 12:10

## 2017-10-18 RX ADMIN — CHLORHEXIDINE GLUCONATE 15 ML: 1.2 RINSE ORAL at 08:10

## 2017-10-18 RX ADMIN — INSULIN ASPART 2 UNITS: 100 INJECTION, SOLUTION INTRAVENOUS; SUBCUTANEOUS at 11:10

## 2017-10-18 RX ADMIN — IPRATROPIUM BROMIDE AND ALBUTEROL SULFATE 3 ML: .5; 3 SOLUTION RESPIRATORY (INHALATION) at 07:10

## 2017-10-18 RX ADMIN — PANTOPRAZOLE SODIUM 40 MG: 40 INJECTION, POWDER, FOR SOLUTION INTRAVENOUS at 08:10

## 2017-10-18 RX ADMIN — LEVOTHYROXINE SODIUM 75 MCG: 75 TABLET ORAL at 05:10

## 2017-10-18 RX ADMIN — PREDNISONE 40 MG: 20 TABLET ORAL at 08:10

## 2017-10-18 RX ADMIN — PRAVASTATIN SODIUM 40 MG: 40 TABLET ORAL at 08:10

## 2017-10-18 RX ADMIN — INSULIN ASPART 4 UNITS: 100 INJECTION, SOLUTION INTRAVENOUS; SUBCUTANEOUS at 05:10

## 2017-10-18 RX ADMIN — INSULIN ASPART 2 UNITS: 100 INJECTION, SOLUTION INTRAVENOUS; SUBCUTANEOUS at 05:10

## 2017-10-18 RX ADMIN — ENOXAPARIN SODIUM 40 MG: 100 INJECTION SUBCUTANEOUS at 04:10

## 2017-10-18 RX ADMIN — INSULIN ASPART 2 UNITS: 100 INJECTION, SOLUTION INTRAVENOUS; SUBCUTANEOUS at 12:10

## 2017-10-18 RX ADMIN — MEROPENEM AND SODIUM CHLORIDE 1 G: 1 INJECTION, SOLUTION INTRAVENOUS at 01:10

## 2017-10-18 RX ADMIN — DEXMEDETOMIDINE HYDROCHLORIDE 1.4 MCG/KG/HR: 4 INJECTION, SOLUTION INTRAVENOUS at 04:10

## 2017-10-18 RX ADMIN — DEXMEDETOMIDINE HYDROCHLORIDE 1.3 MCG/KG/HR: 4 INJECTION, SOLUTION INTRAVENOUS at 09:10

## 2017-10-18 RX ADMIN — METOPROLOL TARTRATE 25 MG: 25 TABLET ORAL at 08:10

## 2017-10-18 RX ADMIN — DEXMEDETOMIDINE HYDROCHLORIDE 0.2 MCG/KG/HR: 4 INJECTION, SOLUTION INTRAVENOUS at 11:10

## 2017-10-18 RX ADMIN — DEXMEDETOMIDINE HYDROCHLORIDE 1.4 MCG/KG/HR: 4 INJECTION, SOLUTION INTRAVENOUS at 07:10

## 2017-10-18 RX ADMIN — MEROPENEM AND SODIUM CHLORIDE 1 G: 1 INJECTION, SOLUTION INTRAVENOUS at 12:10

## 2017-10-18 RX ADMIN — PROPOFOL 30 MCG/KG/MIN: 10 INJECTION, EMULSION INTRAVENOUS at 01:10

## 2017-10-18 NOTE — EICU
eICU Note : Ventilator Rounds    On Mechanical ventilator :Settings:   Vent Mode: SIMV (PRVC) + PS  Oxygen Concentration (%):  [40-65] 50  Resp Rate Total:  [20 br/min] 20 br/min  Vt Set:  [380 mL] 380 mL  PEEP/CPAP:  [8 cmH20] 8 cmH20  Pressure Support:  [10 cmH20] 10 cmH20  Mean Airway Pressure:  [13.8 tfA74-40.8 cmH20] 13.8 cmH20  Last ABG  Checked Readiness to wean: P/F ratio   F/TV   PEEP +8  Sedation on/off:    Problem: Increased secretions and high FiO 2 requirements     Pertinent History and labs reviewed :71 y/o F with history of stage IV COPD restrictive lung disease, stage 4 kidney disease , Diverticulosis of the colon       Treatment /Intervention given:Reduce FiO2 to 45        Sydney Ngo M.D  eICU Physician  5:27 AM  7.5/39/72/30/96% SIMV 20/350/+10/FiO2 50%  Decrease Resp Rate to 14 , discussed with RT

## 2017-10-18 NOTE — PLAN OF CARE
Problem: Patient Care Overview  Goal: Plan of Care Review  Outcome: Ongoing (interventions implemented as appropriate)  Remains on ventilator, unable to wean.  Pt with expiratory wheezes often.  Off propofol becomes tachycardic 130's, opens eyes, does not follow simple commands.  Placed on Precedex drip instead to help calm.  Stable VS otherwise. Tolerating tube feeds. Pressure ulcer & fall prevention interventions on-going.

## 2017-10-18 NOTE — PLAN OF CARE
Interventions implemented as appropriate. Pt continues to have blood coming out of mouth throughout shift. Pt has soft wrist restraints because of attempts to remove ET tube. Pt continues to be on vent. Pt Blood sugar covered with insulin. No falls or injury. Pt has urinary catheter. Pt administered Furosemide. No signs or symptoms of distress at this time.

## 2017-10-18 NOTE — PLAN OF CARE
Problem: Ventilation, Mechanical Invasive (Adult)  Intervention: Prevent Ventilator-associated Pneumonia  Patient remains on servoi with charted settings. Will continue to monitor.simv rate 20 tidal volume 380 peep +8, fi02 45%

## 2017-10-18 NOTE — PLAN OF CARE
Problem: Patient Care Overview  Goal: Plan of Care Review  Outcome: Ongoing (interventions implemented as appropriate)   10/18/17 2904   Coping/Psychosocial   Plan Of Care Reviewed With patient     Patient remains intubated and sedated with Propofol and Fentanyl. Unable to do thorough sedation vacation because patient gets very tachypniec upon pausing of the sedation. Able to respond with painful stimuli but not following commands. Had 1 episode where heart rate went up to 150's but slowly went back down to low 100's. Ferris noted with adequate urine output. Restraint discontinued after successful trial. No bowel movement. No fall or injury no skin breakdown noted

## 2017-10-18 NOTE — PROGRESS NOTES
[facility]  Critical Care Medicine  Progress Note    Patient Name: Savita Polo  MRN: [id number]  Admission Date: 10/12/2017  Hospital Length of Stay: 5 days  Code Status: Full Code  Attending Provider: Odessa Lovett MD  Primary Care Provider: Nadiya Nava MD   Principal Problem: Acute on chronic respiratory failure with hypoxia and hypercapnia    Subjective:     HPI:  72 year old woman with a complex medical history that includes, cerebrovascular disease, reported history of CHF, stage IV CKD, COPD, DM with neuropathy, obesity, atrial fibrillation, and COPD with a component of chronic respiratory failure on home O2.  She presented to the  ED with shortness of breath.  Prior to admission she had been treated for UTI with nitrofurantoin. In the ED she was given lasix and treated with bipap.    She was admitted to hospital medicine.  On 10/13/17 she was found unresponsive.  Per chart review a code was called and she achieved ROSC after approximately 9 min. She was transferred to ICU for further management.       Hospital/ICU Course:  10/13/17: intubated for acute on chronic respiratory failure  10/16: overnight there were report of bleeding from the oral cavity (nothing from ET tube); tube feeds held due to high residuals;  Could not tolerate sedation vacation this morning-->afib with rvr when propofol held    Interval History/Significant Events:   No overnight events reported.     Review of Systems   Unable to perform ROS: Acuity of condition     Objective:     Vital Signs (Most Recent):  Temp: 97.9 °F (36.6 °C) (10/18/17 1113)  Pulse: 101 (10/18/17 1457)  Resp: 15 (10/18/17 1457)  BP: (!) 123/58 (10/18/17 1432)  SpO2: (!) 90 % (10/18/17 1457) Vital Signs (24h Range):  Temp:  [97 °F (36.1 °C)-99.3 °F (37.4 °C)] 97.9 °F (36.6 °C)  Pulse:  [] 101  Resp:  [0-21] 15  SpO2:  [87 %-100 %] 90 %  BP: ()/(45-71) 123/58   Weight: 90.3 kg (199 lb 1.2 oz)  Body mass index is 38.88  kg/m².      Intake/Output Summary (Last 24 hours) at 10/18/17 1457  Last data filed at 10/18/17 1400   Gross per 24 hour   Intake          2221.46 ml   Output             2330 ml   Net          -108.54 ml       Physical Exam   Constitutional: She appears well-developed.   HENT:   Head: Normocephalic.   ET tube in place   Eyes: Pupils are equal, round, and reactive to light.   Neck: Neck supple. No tracheal deviation present.   Cardiovascular: Normal rate, regular rhythm and normal heart sounds.    No murmur heard.  Pulmonary/Chest: Effort normal and breath sounds normal.   Abdominal: Soft. Bowel sounds are normal. She exhibits no distension. There is no tenderness.   Musculoskeletal: She exhibits no edema.   Skin: Skin is warm and dry.       Vents:  Vent Mode: PRVC (10/18/17 1254)  Set Rate: 15 bmp (10/18/17 1254)  Vt Set: 380 mL (10/18/17 1254)  Pressure Support: 10 cmH20 (10/18/17 0930)  PEEP/CPAP: 5 cmH20 (10/18/17 1254)  Oxygen Concentration (%): 40 (10/18/17 1500)  Peak Airway Pressure: 43.1 cmH2O (10/18/17 1254)  Total Ve: 5.7 mL (10/18/17 1254)  F/VT Ratio<105 (RSBI): (!) 41.78 (10/18/17 1254)  Lines/Drains/Airways     Drain                 NG/OG Tube 10/13/17 0855 5 days         Urethral Catheter 10/13/17 0900 Latex 16 Fr. 5 days          Airway                 Airway 5 days         Airway - Non-Surgical 10/13/17 0830 Endotracheal Tube 5 days          Peripheral Intravenous Line                 Peripheral IV - Single Lumen 10/18/17 0130 Left;Anterior Other less than 1 day         Peripheral IV - Single Lumen 10/18/17 0400 Left;Anterior;Other (Comment) Other less than 1 day              Significant Labs:    CBC/Anemia Profile:    Recent Labs  Lab 10/17/17  0654   WBC 14.45*   HGB 11.9*   HCT 35.6*   *   MCV 95   RDW 15.3*        Chemistries:    Recent Labs  Lab 10/17/17  0654      K 4.7      CO2 25   BUN 65*   CREATININE 1.1   CALCIUM 8.2*   ALBUMIN 2.1*   PHOS 3.2       All pertinent labs  within the past 24 hours have been reviewed.    Significant Imaging:  I have reviewed all pertinent imaging results/findings within the past 24 hours.    Assessment/Plan:     Pulmonary   * Acute on chronic respiratory failure with hypoxia and hypercapnia    · Oxygen requirements continue to improve.  FiO2 40% with PEEP of 5 and patient is maintaining an O2 Sat>88%.  · Patient is on 4L continuous oxygen at home.  · Goal O2 Sat at or above 88%.  · Needs sedation vacation and then further assessment  · Continue initiating precedex tomorrow AM and then slow weaning of propofol.  · Maintain net negative fluid balance.  · Given history of COPD, it is not unreasonable to treat with a short course of steroids.  · Continue nebulizer treatments.          Renal/   UTI due to Klebsiella species    As above        Urinary tract infection with hematuria    Herrera sensitive Klebsiella pneumonia UTI currently on meropenem 2/2 pcn allergy.        Hematology   Chronic anticoagulation    Would hold further anticoagulation given reports of blood from the oral cavity.  If bleeding continues despite normalization of INR, would suggest ENT eval.         Endocrine   Type 2 diabetes mellitus with stage 4 chronic kidney disease    Blood sugars in the 200's.  Continue sliding scale insulin           Critical Care Daily Checklist:    A: Awake: RASS Goal/Actual Goal: RASS Goal: -1-->drowsy  Actual: Chiu Agitation Sedation Scale (RASS): Drowsy   B: Spontaneous Breathing Trial Performed?     C: SAT & SBT Coordinated?  no                      D: Delirium: CAM-ICU Overall CAM-ICU: Negative   E: Early Mobility Performed? No   F: Feeding Goal: Goals: Initiate nutrition within 48 hrs  Status: Nutrition Goal Status: new   Current Diet Order   Procedures    Diet NPO Except for: Medication, Ice Chips, Sips with Medication     Order Specific Question:   Except for     Answer:   Medication     Order Specific Question:   Except for     Answer:   Ice  Chips     Order Specific Question:   Except for     Answer:   Sips with Medication      AS: Analgesia/Sedation Propofol, fentanyl   T: Thromboembolic Prophylaxis lovenox   H: HOB > 300 Yes   U: Stress Ulcer Prophylaxis (if needed) protonix   G: Glucose Control reviewed   B: Bowel Function Stool Occurrence: 0   I: Indwelling Catheter (Lines & Ferris) Necessity reviewed   D: De-escalation of Antimicrobials/Pharmacotherapies reviewed    Plan for the day/ETD Continue supportive care, hold sedation    Code Status:  Family/Goals of Care: Full Code       Critical Care Time: 35 minutes  Critical secondary to Patient has a condition that poses threat to life and bodily function: respiratory failure, encephalopathy      Critical care was time spent personally by me on the following activities: development of treatment plan with patient or surrogate and bedside caregivers, discussions with consultants, evaluation of patient's response to treatment, examination of patient, ordering and performing treatments and interventions, ordering and review of laboratory studies, ordering and review of radiographic studies, pulse oximetry, re-evaluation of patient's condition. This critical care time did not overlap with that of any other provider or involve time for any procedures.     Raisa Gale MD  Critical Care Medicine  [facility]

## 2017-10-18 NOTE — SUBJECTIVE & OBJECTIVE
Interval History/Significant Events:   No overnight events reported.     Review of Systems   Unable to perform ROS: Acuity of condition     Objective:     Vital Signs (Most Recent):  Temp: 97.9 °F (36.6 °C) (10/18/17 1113)  Pulse: 101 (10/18/17 1457)  Resp: 15 (10/18/17 1457)  BP: (!) 123/58 (10/18/17 1432)  SpO2: (!) 90 % (10/18/17 1457) Vital Signs (24h Range):  Temp:  [97 °F (36.1 °C)-99.3 °F (37.4 °C)] 97.9 °F (36.6 °C)  Pulse:  [] 101  Resp:  [0-21] 15  SpO2:  [87 %-100 %] 90 %  BP: ()/(45-71) 123/58   Weight: 90.3 kg (199 lb 1.2 oz)  Body mass index is 38.88 kg/m².      Intake/Output Summary (Last 24 hours) at 10/18/17 1457  Last data filed at 10/18/17 1400   Gross per 24 hour   Intake          2221.46 ml   Output             2330 ml   Net          -108.54 ml       Physical Exam   Constitutional: She appears well-developed.   HENT:   Head: Normocephalic.   ET tube in place   Eyes: Pupils are equal, round, and reactive to light.   Neck: Neck supple. No tracheal deviation present.   Cardiovascular: Normal rate, regular rhythm and normal heart sounds.    No murmur heard.  Pulmonary/Chest: Effort normal and breath sounds normal.   Abdominal: Soft. Bowel sounds are normal. She exhibits no distension. There is no tenderness.   Musculoskeletal: She exhibits no edema.   Skin: Skin is warm and dry.       Vents:  Vent Mode: PRVC (10/18/17 1254)  Set Rate: 15 bmp (10/18/17 1254)  Vt Set: 380 mL (10/18/17 1254)  Pressure Support: 10 cmH20 (10/18/17 0930)  PEEP/CPAP: 5 cmH20 (10/18/17 1254)  Oxygen Concentration (%): 40 (10/18/17 1500)  Peak Airway Pressure: 43.1 cmH2O (10/18/17 1254)  Total Ve: 5.7 mL (10/18/17 1254)  F/VT Ratio<105 (RSBI): (!) 41.78 (10/18/17 1254)  Lines/Drains/Airways     Drain                 NG/OG Tube 10/13/17 0855 5 days         Urethral Catheter 10/13/17 0900 Latex 16 Fr. 5 days          Airway                 Airway 5 days         Airway - Non-Surgical 10/13/17 0830 Endotracheal Tube  5 days          Peripheral Intravenous Line                 Peripheral IV - Single Lumen 10/18/17 0130 Left;Anterior Other less than 1 day         Peripheral IV - Single Lumen 10/18/17 0400 Left;Anterior;Other (Comment) Other less than 1 day              Significant Labs:    CBC/Anemia Profile:    Recent Labs  Lab 10/17/17  0654   WBC 14.45*   HGB 11.9*   HCT 35.6*   *   MCV 95   RDW 15.3*        Chemistries:    Recent Labs  Lab 10/17/17  0654      K 4.7      CO2 25   BUN 65*   CREATININE 1.1   CALCIUM 8.2*   ALBUMIN 2.1*   PHOS 3.2       All pertinent labs within the past 24 hours have been reviewed.    Significant Imaging:  I have reviewed all pertinent imaging results/findings within the past 24 hours.

## 2017-10-18 NOTE — ASSESSMENT & PLAN NOTE
· Oxygen requirements continue to improve.  FiO2 40% with PEEP of 5 and patient is maintaining an O2 Sat>88%.  · Patient is on 4L continuous oxygen at home.  · Goal O2 Sat at or above 88%.  · Needs sedation vacation and then further assessment  · Continue initiating precedex tomorrow AM and then slow weaning of propofol.  · Maintain net negative fluid balance.  · Given history of COPD, it is not unreasonable to treat with a short course of steroids.  · Continue nebulizer treatments.

## 2017-10-18 NOTE — PLAN OF CARE
Problem: Airway, Artificial (Adult)  Intervention: Maintain Airway Patency  Patient received on servoi ventilator with charted settings. All ventilator alarms on, set and functioning. ambu bag and mask at bedside. Oral care done, suctioning done. Will continue to monitor. Patient has 7.0 ETT secured and moved to right side of lip

## 2017-10-19 LAB
ALLENS TEST: ABNORMAL
ALLENS TEST: ABNORMAL
DELSYS: ABNORMAL
DELSYS: ABNORMAL
FIO2: 50
FIO2: 90
HCO3 UR-SCNC: 29.8 MMOL/L (ref 24–28)
HCO3 UR-SCNC: 31.8 MMOL/L (ref 24–28)
INR PPP: 1.1
MIN VOL: 9.4
MIN VOL: ABNORMAL
MODE: ABNORMAL
MODE: ABNORMAL
PCO2 BLDA: 46.7 MMHG (ref 35–45)
PCO2 BLDA: 53.6 MMHG (ref 35–45)
PEEP: 5
PEEP: 5
PH SMN: 7.38 [PH] (ref 7.35–7.45)
PH SMN: 7.41 [PH] (ref 7.35–7.45)
PO2 BLDA: 54 MMHG (ref 80–100)
PO2 BLDA: 68 MMHG (ref 80–100)
POC BE: 4 MMOL/L
POC BE: 5 MMOL/L
POC SATURATED O2: 87 % (ref 95–100)
POC SATURATED O2: 93 % (ref 95–100)
POC TCO2: 31 MMOL/L (ref 23–27)
POC TCO2: 33 MMOL/L (ref 23–27)
POCT GLUCOSE: 145 MG/DL (ref 70–110)
POCT GLUCOSE: 146 MG/DL (ref 70–110)
POCT GLUCOSE: 187 MG/DL (ref 70–110)
POCT GLUCOSE: 196 MG/DL (ref 70–110)
POCT GLUCOSE: 241 MG/DL (ref 70–110)
PROTHROMBIN TIME: 11.7 SEC
PS: 0
PS: 5
SAMPLE: ABNORMAL
SAMPLE: ABNORMAL
SITE: ABNORMAL
SITE: ABNORMAL
SP02: 100
SP02: 40
SPONT RATE: 12
SPONT RATE: 21

## 2017-10-19 PROCEDURE — 20000000 HC ICU ROOM

## 2017-10-19 PROCEDURE — 27000221 HC OXYGEN, UP TO 24 HOURS

## 2017-10-19 PROCEDURE — 36600 WITHDRAWAL OF ARTERIAL BLOOD: CPT

## 2017-10-19 PROCEDURE — 63600175 PHARM REV CODE 636 W HCPCS: Performed by: INTERNAL MEDICINE

## 2017-10-19 PROCEDURE — 99900026 HC AIRWAY MAINTENANCE (STAT)

## 2017-10-19 PROCEDURE — 25000242 PHARM REV CODE 250 ALT 637 W/ HCPCS: Performed by: INTERNAL MEDICINE

## 2017-10-19 PROCEDURE — 25000003 PHARM REV CODE 250: Performed by: INTERNAL MEDICINE

## 2017-10-19 PROCEDURE — 25000003 PHARM REV CODE 250: Performed by: HOSPITALIST

## 2017-10-19 PROCEDURE — 99900035 HC TECH TIME PER 15 MIN (STAT)

## 2017-10-19 PROCEDURE — 82803 BLOOD GASES ANY COMBINATION: CPT

## 2017-10-19 PROCEDURE — 94640 AIRWAY INHALATION TREATMENT: CPT

## 2017-10-19 PROCEDURE — 63600175 PHARM REV CODE 636 W HCPCS: Performed by: HOSPITALIST

## 2017-10-19 PROCEDURE — 36415 COLL VENOUS BLD VENIPUNCTURE: CPT

## 2017-10-19 PROCEDURE — C9113 INJ PANTOPRAZOLE SODIUM, VIA: HCPCS | Performed by: HOSPITALIST

## 2017-10-19 PROCEDURE — 94761 N-INVAS EAR/PLS OXIMETRY MLT: CPT

## 2017-10-19 PROCEDURE — 85610 PROTHROMBIN TIME: CPT

## 2017-10-19 PROCEDURE — 25000003 PHARM REV CODE 250: Performed by: EMERGENCY MEDICINE

## 2017-10-19 RX ORDER — WARFARIN SODIUM 5 MG/1
TABLET ORAL
Qty: 30 TABLET | Refills: 0 | Status: SHIPPED | OUTPATIENT
Start: 2017-10-19 | End: 2017-10-27 | Stop reason: HOSPADM

## 2017-10-19 RX ORDER — METOPROLOL TARTRATE 1 MG/ML
5 INJECTION, SOLUTION INTRAVENOUS ONCE
Status: COMPLETED | OUTPATIENT
Start: 2017-10-19 | End: 2017-10-19

## 2017-10-19 RX ORDER — LORAZEPAM 2 MG/ML
1 INJECTION INTRAMUSCULAR EVERY 4 HOURS PRN
Status: DISCONTINUED | OUTPATIENT
Start: 2017-10-19 | End: 2017-10-23

## 2017-10-19 RX ADMIN — PANTOPRAZOLE SODIUM 40 MG: 40 INJECTION, POWDER, FOR SOLUTION INTRAVENOUS at 08:10

## 2017-10-19 RX ADMIN — LEVOTHYROXINE SODIUM 75 MCG: 75 TABLET ORAL at 05:10

## 2017-10-19 RX ADMIN — IPRATROPIUM BROMIDE AND ALBUTEROL SULFATE 3 ML: .5; 3 SOLUTION RESPIRATORY (INHALATION) at 12:10

## 2017-10-19 RX ADMIN — DEXMEDETOMIDINE HYDROCHLORIDE 0.4 MCG/KG/HR: 4 INJECTION, SOLUTION INTRAVENOUS at 12:10

## 2017-10-19 RX ADMIN — CHLORHEXIDINE GLUCONATE 15 ML: 1.2 RINSE ORAL at 09:10

## 2017-10-19 RX ADMIN — INSULIN ASPART 8 UNITS: 100 INJECTION, SOLUTION INTRAVENOUS; SUBCUTANEOUS at 12:10

## 2017-10-19 RX ADMIN — CHLORHEXIDINE GLUCONATE 15 ML: 1.2 RINSE ORAL at 08:10

## 2017-10-19 RX ADMIN — INSULIN ASPART 8 UNITS: 100 INJECTION, SOLUTION INTRAVENOUS; SUBCUTANEOUS at 01:10

## 2017-10-19 RX ADMIN — METOPROLOL TARTRATE 5 MG: 5 INJECTION INTRAVENOUS at 09:10

## 2017-10-19 RX ADMIN — DEXMEDETOMIDINE HYDROCHLORIDE 1 MCG/KG/HR: 4 INJECTION, SOLUTION INTRAVENOUS at 01:10

## 2017-10-19 RX ADMIN — INSULIN ASPART 4 UNITS: 100 INJECTION, SOLUTION INTRAVENOUS; SUBCUTANEOUS at 05:10

## 2017-10-19 RX ADMIN — DEXMEDETOMIDINE HYDROCHLORIDE 0.89 MCG/KG/HR: 4 INJECTION, SOLUTION INTRAVENOUS at 07:10

## 2017-10-19 RX ADMIN — MEROPENEM AND SODIUM CHLORIDE 1 G: 1 INJECTION, SOLUTION INTRAVENOUS at 12:10

## 2017-10-19 RX ADMIN — MEROPENEM AND SODIUM CHLORIDE 1 G: 1 INJECTION, SOLUTION INTRAVENOUS at 01:10

## 2017-10-19 RX ADMIN — IPRATROPIUM BROMIDE AND ALBUTEROL SULFATE 3 ML: .5; 3 SOLUTION RESPIRATORY (INHALATION) at 07:10

## 2017-10-19 RX ADMIN — ENOXAPARIN SODIUM 40 MG: 100 INJECTION SUBCUTANEOUS at 05:10

## 2017-10-19 RX ADMIN — PRAVASTATIN SODIUM 40 MG: 40 TABLET ORAL at 08:10

## 2017-10-19 RX ADMIN — PREDNISONE 40 MG: 20 TABLET ORAL at 08:10

## 2017-10-19 RX ADMIN — INSULIN ASPART 2 UNITS: 100 INJECTION, SOLUTION INTRAVENOUS; SUBCUTANEOUS at 12:10

## 2017-10-19 RX ADMIN — METOPROLOL TARTRATE 25 MG: 25 TABLET ORAL at 08:10

## 2017-10-19 NOTE — PROGRESS NOTES
[facility] Hospital Medicine  Progress Note    Patient Name: Savita Polo  MRN: [id number]  Patient Class: IP- Inpatient   Admission Date: 10/12/2017  Length of Stay: 6 days  Attending Physician: Odessa Lovett MD  Primary Care Provider: Nadiya Nava MD        Subjective:     Principal Problem:Acute on chronic respiratory failure with hypoxia and hypercapnia    HPI:  Savita Polo is a 72 y.o. female that (in part)  has a past medical history of Anemia in chronic kidney disease(285.21); Anxiety; Atherosclerotic cerebrovascular disease; CHF (congestive heart failure); Chronic respiratory failure with hypoxia; CKD (chronic kidney disease), stage IV; COPD (chronic obstructive pulmonary disease); CRLD (chronic restrictive lung disease); Diabetic peripheral neuropathy; Diabetic retinopathy; Diverticulosis of colon; DJD (degenerative joint disease); Fatty liver; SUSAN (generalized anxiety disorder); Heart attack; History of PSVT (paroxysmal supraventricular tachycardia); Iron deficiency; Joint pain; Keloid cicatrix; Long term (current) use of anticoagulants; Mixed hyperlipidemia; Multiple lung nodules; Obesity, Class I, BMI 30-34.9; On home oxygen therapy; Paroxysmal atrial fibrillation; Renovascular hypertension; Right-sided heart failure; Secondary hyperparathyroidism; Secondary pulmonary hypertension; Stroke; Thyroid disease; Type 2 diabetes mellitus with stage 4 chronic kidney disease; and Vitamin D deficiency disease. Presents to [facility] Emergency Department complaining of shortness of breath.  She lives and [facility].  She is a poorly 93% on 4 L by nasal cannula at her residence.  She is on chronic oxygen has had multiple admissions for respiratory failure.  Worsened with exertion.  No relief given with supplemental oxygen.  Located in the chest.  Moderate to high intensity.  No radiation.  Subacute onset with progressive worsening.  Constant  duration.    In the emergency department chest x-ray, ABG, and routine laboratory studies were obtained.  There was evidence of hypoxia and hypercapnia.  She also had evidence of urinary tract infection.    Hospital medicine has been asked to admit for further evaluation and treatment.       Hospital Course:  73 y/o female admitted from [facility] for SOB.  Started on treatment for CAP/COPD on the floor when she had a cardiac arrest.  Sepsis secondary to UTI with ARDS.  Now in ICU on vent.  Currently on Meropenem.  Nebs and steroids for COPD.    Interval History: patient very alert and following commands. Reaching for ETT and wanting to write something down but too weak to do so.     Review of Systems   Unable to perform ROS: Intubated     Objective:     Vital Signs (Most Recent):  Temp: 96.8 °F (36 °C) (10/19/17 0310)  Pulse: 96 (10/19/17 0801)  Resp: (!) 28 (10/19/17 0801)  BP: (!) 148/76 (10/19/17 0500)  SpO2: 97 % (10/19/17 0801) Vital Signs (24h Range):  Temp:  [96.8 °F (36 °C)-99.1 °F (37.3 °C)] 96.8 °F (36 °C)  Pulse:  [] 96  Resp:  [0-28] 28  SpO2:  [86 %-100 %] 97 %  BP: ()/(49-77) 148/76     Weight: 89.9 kg (198 lb 3.1 oz)  Body mass index is 38.71 kg/m².    Intake/Output Summary (Last 24 hours) at 10/19/17 1143  Last data filed at 10/19/17 0600   Gross per 24 hour   Intake          1544.87 ml   Output             1685 ml   Net          -140.13 ml      Physical Exam   Constitutional: She appears well-developed. No distress.   HENT:   ET tube in place  Dried blood on tongue   Eyes: Conjunctivae are normal. Right eye exhibits no discharge. Left eye exhibits no discharge.   Neck: Neck supple.   Cardiovascular: Normal rate, regular rhythm and normal heart sounds.    Pulmonary/Chest: No stridor.   Mechanically ventilated.  Coarse BS bilaterally  Right LL expiratory wheezing   Abdominal: Soft. Bowel sounds are normal.   Musculoskeletal: She exhibits no edema.   Neurological:   Alert. Following  commands. Nodding yes or no to questions.    Skin: Skin is warm and dry.       Significant Labs: All pertinent labs within the past 24 hours have been reviewed.    Significant Imaging: I have reviewed all pertinent imaging results/findings within the past 24 hours.  I have reviewed and interpreted all pertinent imaging results/findings within the past 24 hours.    Assessment/Plan:      * Acute on chronic respiratory failure with hypoxia and hypercapnia    With chronic hypoxia and pulmonary hypertension, dependent on supplemental O2 at home. With Tobacco abuse. Exacerbated by underlying urinary tract infection 2/2 Klebsiella, without bacteremia. Is off sedation and following commands. Will transition to CPAP trial for at least 30 minutes as patient tends to get agitated if done for longer period of time. Will extubate if appropriate. Continue nebs every 6 hours and prednisone to 40 mg daily with plans to taper. Already on meropenem for UTI (allergic to penicillins). Appreciate further pulm recs for ventilator co-management.         Cardiopulmonary arrest with successful resuscitation    This happened soon after admission on 10/12. Likely secondary to significant hypoxia at the time. ROSC established post ~ 9 mins of ACLS. Neuro intact post arrest therefore hypothermia protocol not indicated           Pulmonary hypertension due to COPD    2/2 chronic tobacco abuse. Is supplemental O2 dependent. No evidence of heart failure        On mechanically assisted ventilation    As above          Urinary tract infection with hematuria    UTI secondary to Klebsiella. No bacteremia.   Continue Meropenem. Will continue for a total of 7-10 days. Currently on day # 7        UTI due to Klebsiella species    As above          Paroxysmal atrial fibrillation    In NSR while on metoprolol             Chronic anticoagulation    Coumadin on hold for oral bleeding that had been ongoing, affecting proper oral care. Transition to prophylactic  lovenox daily              Oral bleeding    As above          Type 2 diabetes mellitus with stage 4 chronic kidney disease    Hyperglycemia worsened by steroids.  Increased nightly Levemir and scheduled novolog.  Glucose should improve with steroid taper.          Mixed hyperlipidemia                Tobacco abuse    Chronic tobacco use  Tobacco cessation counseling              On home oxygen therapy    On 3-4 L at home          SUSAN (generalized anxiety disorder)    On sedation. Anxiolytics on hold                VTE Risk Mitigation         Ordered     enoxaparin injection 40 mg  Daily     Route:  Subcutaneous        10/17/17 0636     Medium Risk of VTE  Once      10/13/17 0402     Place RADHA hose  Until discontinued      10/13/17 0402     Place sequential compression device  Until discontinued      10/13/17 0402        CPAP trial today.     Called sister for update but was sent straight to voice message. Left a message.     Critical care time spent on the evaluation and treatment of severe organ dysfunction, review of pertinent labs and imaging studies, discussions with consulting providers and discussions with patient/family: > 45 minutes.    Odessa Zhong MD  Department of Hospital Medicine   [facility]

## 2017-10-19 NOTE — NURSING
Patient with acute agitation, raising arms up over head, banging on side rails, attempting to pull herself up in bed. Intermittent tachypnea and desaturation to 86% noted. RT at bedside, patient placed back on prior PRVC settings as per weaning trial order due to RASS equal to +2 for greater than 5 minutes, acute SpO2 decrease and tachypnea. Patient following commands, however is continuously agitated. Denies pain. Sedation resumed. Continue to monitor.

## 2017-10-19 NOTE — NURSING
Patient resting comfortably at this time with eyes closed, opens eyes to voice, follows commands, no acute agitation noted, VSS. Continue to monitor.

## 2017-10-19 NOTE — PROGRESS NOTES
Results reported to [facility].   Results for CAM THOMAS (MRN [id number]) as of 10/19/2017 04:57   Ref. Range 10/19/2017 04:51   POC PH Latest Ref Range: 7.35 - 7.45  7.381   POC PCO2 Latest Ref Range: 35 - 45 mmHg 53.6 (H)   POC PO2 Latest Ref Range: 80 - 100 mmHg 54 (LL)   POC BE Latest Ref Range: -2 to 2 mmol/L 5   POC HCO3 Latest Ref Range: 24 - 28 mmol/L 31.8 (H)   POC SATURATED O2 Latest Ref Range: 95 - 100 % 87 (L)   POC TCO2 Latest Ref Range: 23 - 27 mmol/L 33 (H)   FiO2 Unknown 90   PEEP Unknown 5   Sample Unknown ARTERIAL   DelSys Unknown Adult Vent   Allens Test Unknown Pass   Site Unknown RR   Mode Unknown CPAP

## 2017-10-19 NOTE — EICU
Vent Day # 7    71 y/o F Acute on chronic hypoxemic and hypercapneic respiratory failure, COPD with pneumonia Klebsiella UTI sepsis. S/p cardiac arrest with ROSC after 9 minutes. Paroxysmal afib.  Tolerated CPAP earlier but with periods of agitation and increased O2 requirement.    CXR ET in place, possible layered effusion on the left    On CPAP 5/5   10/19/2017 04:51   POC PH 7.381   POC PCO2 53.6 (H)   POC PO2 54 (LL)   POC BE 5   POC HCO3 31.8 (H)   POC SATURATED O2 87 (L)   POC TCO2 33 (H)   FiO2 90   PEEP 5     · Tolerating spontaneous breathing trial but still with moderate oxygen requirement, increased FiO2 to 50%  · Diurese as tolerated  · Consider ultrasound of chest to confirm and quantify effusion and for possible therapeutic thoracentesis if with significant amount  · Might be a candidate for extubation to Bipap

## 2017-10-19 NOTE — ED PROVIDER NOTES
"Encounter Date: 10/12/2017       History     Chief Complaint   Patient presents with    Shortness of Breath     SOB x 3 days, per EMS 88% upon arrival to [facility], pt currently 93% on 4L nasal cannula      72-year-old female with an extensive past medical history including restrictive lung disease, COPD, CKD, CAD, CHF,  paroxysmal A. fib, chronic respiratory failure with hypoxia, and cor pulmonale who presents to the emergency department via EMS from [facility] for evaluation of worsening shortness of breath for the past 3 days.  Per EMS, upon arrival O2 sat 88% RA.         The history is provided by the patient and the EMS personnel.     Review of patient's allergies indicates:   Allergen Reactions    Latex, natural rubber Dermatitis and Rash    Amoxicillin      "They say that but they've given it to me and nothing happens."     Adhesive Itching and Rash     Allergy to adhesive in nicotine patch.     Past Medical History:   Diagnosis Date    Anemia in chronic kidney disease(285.21) 3/21/2017    Anxiety     Atherosclerotic cerebrovascular disease 7/25/2012    CHF (congestive heart failure)     Chronic respiratory failure with hypoxia 8/11/2014    CKD (chronic kidney disease), stage IV 6/23/2016    COPD (chronic obstructive pulmonary disease)     CRLD (chronic restrictive lung disease) 8/11/2014    Diabetic peripheral neuropathy 7/24/2012    Diabetic retinopathy     Diverticulosis of colon     DJD (degenerative joint disease) 7/24/2012    Fatty liver     SUSAN (generalized anxiety disorder) 11/15/2012    Heart attack     History of PSVT (paroxysmal supraventricular tachycardia) 4/1/2014    Cardioverted on 2008     Iron deficiency 11/14/2014    Joint pain     Keloid cicatrix     Long term (current) use of anticoagulants 4/15/2014    Mixed hyperlipidemia 1/4/2016    Multiple lung nodules 8/30/2016    Obesity, Class I, BMI 30-34.9 7/24/2012    On home oxygen therapy 6/22/2013    3L NC "    Paroxysmal atrial fibrillation 1/4/2016    Renovascular hypertension 1/4/2016    Right-sided heart failure 10/26/2014     1 - Normal left ventricular systolic function (EF 65-70%).    2 - Biatrial enlargement.    3 - Right ventricular enlargement with normal systolic function.    4 - Trivial aortic stenosis, MARY = 1.87 cm2, peak velocity = 1.7 m/s, mean gradient = 6.0 mmHg.    5 - The mitral valve is markedly sclerotic with moderately restricted leaflet mobility.    6 - Mild mitral stenosis, MVA = 2.2 cm2.    7 - Trivial to mild mitral regurgitation.    8 - Trivial to mild tricuspid regurgitation.    9 - Increased central venous pressure.    10 - Pulmonary hypertension. The estimated PA systolic pressure is 63 mmHg.     Secondary hyperparathyroidism 6/23/2016    Secondary pulmonary hypertension 3/22/2017    Stroke     Thyroid disease     Type 2 diabetes mellitus with stage 4 chronic kidney disease 2/1/2016    Vitamin D deficiency disease 7/24/2012     Past Surgical History:   Procedure Laterality Date    ABDOMINAL SURGERY      ANKLE SURGERY      CATARACT EXTRACTION  8/17/00    right eye    CATARACT EXTRACTION  6/26/00    left eye    COLONOSCOPY      HYSTERECTOMY       Family History   Problem Relation Age of Onset    Diabetes Mother     Stroke Mother     Hypertension Mother     Melanoma Mother     COPD Sister     Stroke Sister     Diabetes Sister     Hypertension Sister     COPD Brother     Diabetes Brother     Hypertension Brother     Heart disease Maternal Grandfather     No Known Problems Father     No Known Problems Maternal Aunt     No Known Problems Maternal Uncle     No Known Problems Paternal Aunt     Cancer Paternal Uncle     No Known Problems Maternal Grandmother     No Known Problems Paternal Grandmother     No Known Problems Paternal Grandfather     Psoriasis Neg Hx     Lupus Neg Hx     Eczema Neg Hx     Kidney disease Neg Hx     Heart attack Neg Hx      Amblyopia Neg Hx     Blindness Neg Hx     Cataracts Neg Hx     Glaucoma Neg Hx     Macular degeneration Neg Hx     Retinal detachment Neg Hx     Strabismus Neg Hx     Thyroid disease Neg Hx      Social History   Substance Use Topics    Smoking status: Light Tobacco Smoker     Packs/day: 0.50     Years: 20.00     Types: Cigarettes     Last attempt to quit: 2/15/2016    Smokeless tobacco: Never Used      Comment:  on Smoking program    Alcohol use No     Review of Systems   Constitutional: Positive for fatigue. Negative for fever.   Respiratory: Positive for cough, chest tightness, shortness of breath and wheezing.    Cardiovascular: Positive for palpitations. Negative for chest pain and leg swelling.   Gastrointestinal: Negative for abdominal pain, nausea and vomiting.   Genitourinary: Negative for dysuria, flank pain and frequency.   Musculoskeletal: Negative for myalgias.   Skin: Negative for color change and rash.   Allergic/Immunologic: Negative for immunocompromised state.   Neurological: Negative for headaches.   All other systems reviewed and are negative.      Physical Exam     Initial Vitals [10/12/17 1949]   BP Pulse Resp Temp SpO2   (!) 117/55 97 (!) 26 98.3 °F (36.8 °C) (!) 93 %      MAP       75.67         Physical Exam  Nursing note and vitals reviewed.  Constitutional:  Nontoxic elderly female with increased work of breathing.  HENT:    Head: NC/AT    Eyes: Conjunctivae normal.   (-) scleral icterus.              Mouth/Throat: MMM.      Neck: Neck supple, normal rom.    Cardiovascular: RRR  Pulmonary/Chest: Decreased breath sounds bilaterally with prolonged expiratory phase.  Faint expiratory wheezing bilateral lower lung fields.    Abdominal: Soft. ND/NT   (-) CVA tenderness.  Musculoskeletal: FROM of all major joints.  Trace peripheral edema without specific tenderness.  Neurological: A&Ox3, Normal speech.  No acute focal motor deficits.     Skin: Skin is warm and dry.   Psychiatric: normal  mood and affect.      ED Course   Critical Care  Date/Time: 10/12/2017 11:02 PM  Performed by: COBY MOBLEY.  Authorized by: COBY MOBLEY   Direct patient critical care time: 20 minutes  Additional history critical care time: 10 minutes  Ordering / reviewing critical care time: 10 minutes  Documentation critical care time: 10 minutes  Consulting other physicians critical care time: 5 minutes  Total critical care time (exclusive of procedural time) : 55 minutes  Critical care time was exclusive of separately billable procedures and treating other patients.  Critical care was necessary to treat or prevent imminent or life-threatening deterioration of the following conditions: respiratory failure.  Critical care was time spent personally by me on the following activities: development of treatment plan with patient or surrogate, discussions with consultants, evaluation of patient's response to treatment, examination of patient, obtaining history from patient or surrogate, ordering and performing treatments and interventions, ordering and review of laboratory studies, pulse oximetry, ordering and review of radiographic studies, re-evaluation of patient's condition and review of old charts.        Labs Reviewed   CBC W/ AUTO DIFFERENTIAL - Abnormal; Notable for the following:        Result Value    RBC 3.99 (*)      (*)     MCH 33.3 (*)     RDW 15.5 (*)     Platelets 139 (*)     Gran% 76.3 (*)     Lymph% 11.8 (*)     All other components within normal limits   COMPREHENSIVE METABOLIC PANEL - Abnormal; Notable for the following:     Glucose 250 (*)     BUN, Bld 60 (*)     Creatinine 2.1 (*)     Albumin 3.3 (*)     eGFR if  26 (*)     eGFR if non  23 (*)     All other components within normal limits   B-TYPE NATRIURETIC PEPTIDE - Abnormal; Notable for the following:      (*)     All other components within normal limits   PROTIME-INR - Abnormal; Notable for the  following:     Prothrombin Time 20.7 (*)     INR 2.0 (*)     All other components within normal limits   URINALYSIS - Abnormal; Notable for the following:     Color, UA Red (*)     Appearance, UA Cloudy (*)     Protein, UA 2+ (*)     Occult Blood UA 3+ (*)     Nitrite, UA Positive (*)     Leukocytes, UA 3+ (*)     All other components within normal limits   URINALYSIS MICROSCOPIC - Abnormal; Notable for the following:     RBC, UA 20 (*)     WBC, UA >100 (*)     Bacteria, UA Moderate (*)     All other components within normal limits   POCT GLUCOSE - Abnormal; Notable for the following:     POCT Glucose 264 (*)     All other components within normal limits   ISTAT PROCEDURE - Abnormal; Notable for the following:     POC PH 7.212 (*)     POC PCO2 74.8 (*)     POC PO2 68 (*)     POC HCO3 30.0 (*)     POC SATURATED O2 88 (*)     POC TCO2 32 (*)     All other components within normal limits   TROPONIN I   MAGNESIUM   TSH             X-Rays:   Independently Interpreted Readings:   Chest X-Ray: cardiomediastinal silhouette is enlarged the pulmonary vascular congestion.  There is blunting of the CP angle suggesting minimal effusions.  The trachea is midline.  The lungs are symmetrically expanded bilaterally with diffuse bilateral edema.       EKG Readings: (Independently Interpreted)   Initial Reading: No STEMI.   A. fib with RVR, rate 112, nonspecific T-wave abnormalities.      Medical Decision Making:   History:   I obtained history from: EMS provider.  Old Medical Records: I decided to obtain old medical records.  Old Records Summarized: records from clinic visits and other records.  Independently Interpreted Test(s):   I have ordered and independently interpreted X-rays - see prior notes.  I have ordered and independently interpreted EKG Reading(s) - see prior notes  Clinical Tests:   Lab Tests: Ordered and Reviewed  Radiological Study: Ordered and Reviewed  Medical Tests: Ordered and Reviewed      Additional Medical  Decision Makin-year-old female with an extensive past medical history presents the emergency department via EMS from [facility] for evaluation of worsening shortness of breath for the past 3 days.  Initial vitals significant for an O2 sats of 93% on 4 L NC.    Chest x-ray reveals an enlarged cardiomediastinal silhouettes point vascular congestion.  A. fib with RVR noted on EKG.  ABG consistent with acute hypercapnic respiratory failure with hypoxia.   Findings likely multifactorial including COPD, continued tobacco abuse, normal hypertension and cor pulmonale.  IV Solu-Medrol given; albuterol/Atrovent nebs initiated.  Urinalysis consistent with likely UTI-like IV Rocephin given.  Rate control achieved with IV diltiazem.  She will be admitted to medicine for further evaluation and management including scheduled albuterol/Atrovent nebs, IV steroids and continued IV antibiotics.                   ED Course      Clinical Impression:   The primary encounter diagnosis was Hypoxia. Diagnoses of Shortness of breath, Tachycardia, Acute UTI, Acute on chronic congestive heart failure, unspecified congestive heart failure type, Chest pain, Cardiac arrest, and A-fib were also pertinent to this visit.    Disposition:   Disposition: Admitted  Condition: Fair                        King Lawson MD  10/19/17 0932

## 2017-10-19 NOTE — ASSESSMENT & PLAN NOTE
With chronic hypoxia and pulmonary hypertension, dependent on supplemental O2 at home. With Tobacco abuse. Exacerbated by underlying urinary tract infection 2/2 Klebsiella, without bacteremia. Is off sedation and following commands. Will transition to CPAP trial for at least 30 minutes as patient tends to get agitated if done for longer period of time. Will extubate if appropriate. Continue nebs every 6 hours and prednisone to 40 mg daily with plans to taper. Already on meropenem for UTI (allergic to penicillins). Appreciate further pulm recs for ventilator co-management.

## 2017-10-19 NOTE — PROGRESS NOTES
[facility] Hospital Medicine  Progress Note    Patient Name: Savita Polo  MRN: [id number]  Patient Class: IP- Inpatient   Admission Date: 10/12/2017  Length of Stay: 5 days  Attending Physician: Odessa Lovett MD  Primary Care Provider: Nadiya Nava MD        Subjective:     Principal Problem:Acute on chronic respiratory failure with hypoxia and hypercapnia    HPI:  Savita Polo is a 72 y.o. female that (in part)  has a past medical history of Anemia in chronic kidney disease(285.21); Anxiety; Atherosclerotic cerebrovascular disease; CHF (congestive heart failure); Chronic respiratory failure with hypoxia; CKD (chronic kidney disease), stage IV; COPD (chronic obstructive pulmonary disease); CRLD (chronic restrictive lung disease); Diabetic peripheral neuropathy; Diabetic retinopathy; Diverticulosis of colon; DJD (degenerative joint disease); Fatty liver; SUSAN (generalized anxiety disorder); Heart attack; History of PSVT (paroxysmal supraventricular tachycardia); Iron deficiency; Joint pain; Keloid cicatrix; Long term (current) use of anticoagulants; Mixed hyperlipidemia; Multiple lung nodules; Obesity, Class I, BMI 30-34.9; On home oxygen therapy; Paroxysmal atrial fibrillation; Renovascular hypertension; Right-sided heart failure; Secondary hyperparathyroidism; Secondary pulmonary hypertension; Stroke; Thyroid disease; Type 2 diabetes mellitus with stage 4 chronic kidney disease; and Vitamin D deficiency disease. Presents to [facility] Emergency Department complaining of shortness of breath.  She lives and [facility].  She is a poorly 93% on 4 L by nasal cannula at her residence.  She is on chronic oxygen has had multiple admissions for respiratory failure.  Worsened with exertion.  No relief given with supplemental oxygen.  Located in the chest.  Moderate to high intensity.  No radiation.  Subacute onset with progressive worsening.  Constant  duration.    In the emergency department chest x-ray, ABG, and routine laboratory studies were obtained.  There was evidence of hypoxia and hypercapnia.  She also had evidence of urinary tract infection.    Hospital medicine has been asked to admit for further evaluation and treatment.       Hospital Course:  71 y/o female admitted from [facility] for SOB.  Started on treatment for CAP/COPD on the floor when she had a cardiac arrest.  Sepsis secondary to UTI with ARDS.  Now in ICU on vent.  Currently on Meropenem.  Nebs and steroids for COPD.    Interval History: slowly weaning vent settings, maintaining adequate SaO2. BG elevated. On steroids.     Review of Systems   Unable to perform ROS: Intubated     Objective:     Vital Signs (Most Recent):  Temp: 99 °F (37.2 °C) (10/18/17 1915)  Pulse: 98 (10/18/17 2100)  Resp: 15 (10/18/17 2100)  BP: (!) 150/68 (10/18/17 2100)  SpO2: 100 % (10/18/17 2100) Vital Signs (24h Range):  Temp:  [97.9 °F (36.6 °C)-99.1 °F (37.3 °C)] 99 °F (37.2 °C)  Pulse:  [] 98  Resp:  [0-21] 15  SpO2:  [87 %-100 %] 100 %  BP: ()/(45-72) 150/68     Weight: 90.3 kg (199 lb 1.2 oz)  Body mass index is 38.88 kg/m².    Intake/Output Summary (Last 24 hours) at 10/18/17 2204  Last data filed at 10/18/17 2200   Gross per 24 hour   Intake          2153.71 ml   Output             1205 ml   Net           948.71 ml      Physical Exam   Constitutional: She appears well-developed. No distress.   HENT:   ET tube in place  Dried blood on tongue   Eyes: Conjunctivae are normal. Right eye exhibits no discharge. Left eye exhibits no discharge.   Neck: Neck supple.   Cardiovascular: Normal rate, regular rhythm and normal heart sounds.    Pulmonary/Chest: No stridor.   Mechanically ventilated.  Coarse BS bilaterally  Right LL expiratory wheezing   Abdominal: Soft. Bowel sounds are normal.   Musculoskeletal: She exhibits no edema.   Neurological:   Sedated. PinPoint pupils   Skin: Skin is warm and  dry.       Significant Labs: All pertinent labs within the past 24 hours have been reviewed.    Significant Imaging: I have reviewed all pertinent imaging results/findings within the past 24 hours.  I have reviewed and interpreted all pertinent imaging results/findings within the past 24 hours.    Assessment/Plan:      * Acute on chronic respiratory failure with hypoxia and hypercapnia    With chronic hypoxia and pulmonary hypertension, dependent on supplemental O2 at home. With Tobacco abuse. Exacerbated by underlying urinary tract infection 2/2 Klebsiella, without bacteremia. Resp culture without significant findings. Still vent dependent but with improved O2 requirements. Continue vent weaning efforts as tolerated. Weaning off sedation is an issue as she tends to get extremely anxious and does not follow commands. Continue nebs every 6 hours and prednisone to 40 mg daily with plans to taper. Already on meropenem for UTI (allergic to penicillins). Appreciate further pulm recs for ventilator co-management. Sedation vacation in AM        Cardiopulmonary arrest with successful resuscitation    This happened soon after admission on 10/12. Likely secondary to significant hypoxia at the time. ROSC established post ~ 9 mins of ACLS. Neuro intact post arrest therefore hypothermia protocol not indicated           Pulmonary hypertension due to COPD    2/2 chronic tobacco abuse. Is supplemental O2 dependent. No evidence of heart failure        On mechanically assisted ventilation    As above          Urinary tract infection with hematuria    UTI secondary to Klebsiella. No bacteremia.   Continue Meropenem. Will continue for a total of 7-10 days. Currently on day # 6        UTI due to Klebsiella species    As above          Paroxysmal atrial fibrillation    In NSR while on metoprolol and adequate sedation            Chronic anticoagulation    Coumadin on hold for oral bleeding that had been ongoing, affecting proper oral  care. Transition to prophylactic lovenox daily              Oral bleeding    As above          Type 2 diabetes mellitus with stage 4 chronic kidney disease    Hyperglycemia worsened by steroids.  Increased nightly Levemir and scheduled novolog.  Glucose should improve with steroid taper.          Mixed hyperlipidemia                Tobacco abuse    Chronic tobacco use  Tobacco cessation counseling              On home oxygen therapy    On 3-4 L at home          SUSAN (generalized anxiety disorder)    On sedation. Anxiolytics on hold                VTE Risk Mitigation         Ordered     enoxaparin injection 40 mg  Daily     Route:  Subcutaneous        10/17/17 0636     Medium Risk of VTE  Once      10/13/17 0402     Place RADHA hose  Until discontinued      10/13/17 0402     Place sequential compression device  Until discontinued      10/13/17 0402        Sedation vacation in AM. CPAP trial if patient able to stay calm and follow commands     Critical care time spent on the evaluation and treatment of severe organ dysfunction, review of pertinent labs and imaging studies, discussions with consulting providers and discussions with patient/family: > 45 minutes.    Odessa Zhong MD  Department of Hospital Medicine   [facility]

## 2017-10-19 NOTE — ASSESSMENT & PLAN NOTE
Hyperglycemia worsened by steroids.  Increased nightly Levemir and scheduled novolog.  Glucose should improve with steroid taper.

## 2017-10-19 NOTE — PROGRESS NOTES
[facility]  Adult Nutrition  Consult Note    SUMMARY     Recommendations    Recommendation/Intervention:   1. With d/c of propofol: Rec increase TF to 45 ml/hr    2. SLP to advance diet s/p extubation; rec 1800 ADA diet    3. RD to monitor    Goals: Initiate nutrition within 48 hrs  Nutrition Goal Status: goal met  Communication of RD Recs: discussed on rounds    Continuum of Care Plan    Referral to Outpatient Services:  (D/C planning: Too soon to determine)    Reason for Assessment    Reason for Assessment: RD follow-up  Diagnosis:  (Resp. Distress)  Relevent Medical History: CVA, CHF, CKD, COPD, DM, Diverticulitis   Interdisciplinary Rounds: attended     General Information Comments: Patient remains intubated. TF running without residuals. Plan for cPap trial today with hopeful extubation in next 1-2 days.      Nutrition Prescription Ordered    Current Diet Order: NPO     Current Nutrition Support Formula Ordered:  (Peptamen Bariatric)  Current Nutrition Support Rate Ordered: 30 (ml)  Current Nutrition Support Frequency Ordered: ml/hr      Evaluation of Received Nutrients/Fluid Intake    Enteral Calories (kcal): 720  Enteral Protein (gm): 66  Enteral (Free Water) Fluid (mL): 605  Free Water Flush Fluid (mL): 400     Energy Calories Required: not meeting needs    71% kcal needs      Protein Required: meeting needs  Fluid: meeting needs      I/O:  1928/1830       Tolerance: tolerating    Nutrition Risk Screen     Nutrition Risk Screen: no indicators present    Nutrition/Diet History      Food Preferences: MILY   Factors Affecting Nutritional Intake: NPO, on mechanical ventilation     Labs/Tests/Procedures/Meds     Pertinent Labs Reviewed: reviewed   Pertinent Medications Reviewed: reviewed       Physical Findings    Overall Physical Appearance: obese, on ventilator support  Tubes: orogastric tube   Skin: intact (M[place] 19)    Anthropometrics    Temp: 96.8 °F (36 °C)     Height: 5' (152.4  cm)  Weight Method: Bed Scale  Weight: 89.9 kg (198 lb 3.1 oz)     Ideal Body Weight (IBW), Female: 100 lb     % Ideal Body Weight, Female (lb): 203.27 lb  BMI (Calculated): 39.8    Estimated/Assessed Needs    Weight Used For Calorie Calculations: 89.9 kg (198 lb 3.1 oz)       Energy Need Method: [place] (modified) (1440 kcal;1008 kcal for 70%)     RMR ([place]-St. Jeor Equation): 1325.5  RMR (Modified [place] State Equation): 1440.72    Weight Used For Protein Calculations: 92.2 kg (203 lb 4.2 oz)  Protein Requirements: 75-90g     Fluid Need Method: RDA Method      CHO Requirement: 150g     Assessment and Plan    Nutrition Problem  Inadequate Energy Intake     Related to (etiology):   Mechanical Ventilation     Signs and Symptoms (as evidenced by):   NPO     Nutrition Diagnosis Status:   Continues (TF initiated)      Monitor and Evaluation    Food and Nutrient Intake: energy intake, food and beverage intake, enteral nutrition intake  Food and Nutrient Adminstration: diet order, enteral and parenteral nutrition administration  Knowledge/Beliefs/Attitudes: food and nutrition knowledge/skill  Physical Activity and Function: nutrition-related ADLs and IADLs  Anthropometric Measurements: weight, weight change  Biochemical Data, Medical Tests and Procedures: electrolyte and renal panel, glucose/endocrine profile  Nutrition-Focused Physical Findings: overall appearance    Nutrition Risk    Level of Risk:  (2 x week)    Nutrition Follow-Up    RD Follow-up?: Yes

## 2017-10-19 NOTE — SUBJECTIVE & OBJECTIVE
Interval History: slowly weaning vent settings, maintaining adequate SaO2. BG elevated. On steroids.     Review of Systems   Unable to perform ROS: Intubated     Objective:     Vital Signs (Most Recent):  Temp: 99 °F (37.2 °C) (10/18/17 1915)  Pulse: 98 (10/18/17 2100)  Resp: 15 (10/18/17 2100)  BP: (!) 150/68 (10/18/17 2100)  SpO2: 100 % (10/18/17 2100) Vital Signs (24h Range):  Temp:  [97.9 °F (36.6 °C)-99.1 °F (37.3 °C)] 99 °F (37.2 °C)  Pulse:  [] 98  Resp:  [0-21] 15  SpO2:  [87 %-100 %] 100 %  BP: ()/(45-72) 150/68     Weight: 90.3 kg (199 lb 1.2 oz)  Body mass index is 38.88 kg/m².    Intake/Output Summary (Last 24 hours) at 10/18/17 2204  Last data filed at 10/18/17 2200   Gross per 24 hour   Intake          2153.71 ml   Output             1205 ml   Net           948.71 ml      Physical Exam   Constitutional: She appears well-developed. No distress.   HENT:   ET tube in place  Dried blood on tongue   Eyes: Conjunctivae are normal. Right eye exhibits no discharge. Left eye exhibits no discharge.   Neck: Neck supple.   Cardiovascular: Normal rate, regular rhythm and normal heart sounds.    Pulmonary/Chest: No stridor.   Mechanically ventilated.  Coarse BS bilaterally  Right LL expiratory wheezing   Abdominal: Soft. Bowel sounds are normal.   Musculoskeletal: She exhibits no edema.   Neurological:   Sedated. PinPoint pupils   Skin: Skin is warm and dry.       Significant Labs: All pertinent labs within the past 24 hours have been reviewed.    Significant Imaging: I have reviewed all pertinent imaging results/findings within the past 24 hours.  I have reviewed and interpreted all pertinent imaging results/findings within the past 24 hours.

## 2017-10-19 NOTE — PLAN OF CARE
Problem: Patient Care Overview  Goal: Plan of Care Review  Outcome: Ongoing (interventions implemented as appropriate)  Patient awake  Nodding head to questions and following simple commands. Vital signs stable. Patient repositioned and supported with pillows every two hours. Oral care completed every 4 hours. Patient remains with dry blood in oral cavity. Patient intubated on vent with precedex drip. Patient extubated and placed on bipap at 50% 12/5 as ordered by Dr. Zhong. Also d/cd was ng tube and tube feeding. Speech therapist consulted for swallowing study. No falls or injuries this shift. No skin breakdown this shift. Plan of care reviewed with patient at bedside by nurse and Dr. Zhong.

## 2017-10-19 NOTE — PLAN OF CARE
Problem: Patient Care Overview  Goal: Plan of Care Review  Outcome: Ongoing (interventions implemented as appropriate)  Patient remains in ICU requiring mechanical ventilation. Currently on sedation vacation and PS/CPAP trial to assess readiness for extubation. Patient opens eyes to voice. Intermittently follows commands. Some BUE purposeful movement noted. Remains in rate-controlled atrial flutter. BP stable. Remains on tube feedings @ 30cc/hr, tolerating well with minimal residuals. One smear BM this shift. UOP adequate to hidalgo catheter. SpO2 88-95%, lung sounds coarse with expiratory wheezes bilaterally. Scheduled novolog ordered. Accuchecks q 6. Turned/repositioned q 2 hours to maintain skin integrity. Remains free from hospital acquired falls and injuries.

## 2017-10-19 NOTE — NURSING
Patient continues on sedation vacation, opens eyes spontaneously, follows commands. VSS. Per RT, MD wishes for patient to remain on PS/CPAP at this time with increase in FiO2 to 50%. Tube feedings on hold for levothyroxine administration. Continue to monitor.

## 2017-10-19 NOTE — SUBJECTIVE & OBJECTIVE
Interval History: patient very alert and following commands. Reaching for ETT and wanting to write something down but too weak to do so.     Review of Systems   Unable to perform ROS: Intubated     Objective:     Vital Signs (Most Recent):  Temp: 96.8 °F (36 °C) (10/19/17 0310)  Pulse: 96 (10/19/17 0801)  Resp: (!) 28 (10/19/17 0801)  BP: (!) 148/76 (10/19/17 0500)  SpO2: 97 % (10/19/17 0801) Vital Signs (24h Range):  Temp:  [96.8 °F (36 °C)-99.1 °F (37.3 °C)] 96.8 °F (36 °C)  Pulse:  [] 96  Resp:  [0-28] 28  SpO2:  [86 %-100 %] 97 %  BP: ()/(49-77) 148/76     Weight: 89.9 kg (198 lb 3.1 oz)  Body mass index is 38.71 kg/m².    Intake/Output Summary (Last 24 hours) at 10/19/17 1143  Last data filed at 10/19/17 0600   Gross per 24 hour   Intake          1544.87 ml   Output             1685 ml   Net          -140.13 ml      Physical Exam   Constitutional: She appears well-developed. No distress.   HENT:   ET tube in place  Dried blood on tongue   Eyes: Conjunctivae are normal. Right eye exhibits no discharge. Left eye exhibits no discharge.   Neck: Neck supple.   Cardiovascular: Normal rate, regular rhythm and normal heart sounds.    Pulmonary/Chest: No stridor.   Mechanically ventilated.  Coarse BS bilaterally  Right LL expiratory wheezing   Abdominal: Soft. Bowel sounds are normal.   Musculoskeletal: She exhibits no edema.   Neurological:   Alert. Following commands. Nodding yes or no to questions.    Skin: Skin is warm and dry.       Significant Labs: All pertinent labs within the past 24 hours have been reviewed.    Significant Imaging: I have reviewed all pertinent imaging results/findings within the past 24 hours.  I have reviewed and interpreted all pertinent imaging results/findings within the past 24 hours.

## 2017-10-19 NOTE — ASSESSMENT & PLAN NOTE
With chronic hypoxia and pulmonary hypertension, dependent on supplemental O2 at home. With Tobacco abuse. Exacerbated by underlying urinary tract infection 2/2 Klebsiella, without bacteremia. Resp culture without significant findings. Still vent dependent but with improved O2 requirements. Continue vent weaning efforts as tolerated. Weaning off sedation is an issue as she tends to get extremely anxious and does not follow commands. Continue nebs every 6 hours and prednisone to 40 mg daily with plans to taper. Already on meropenem for UTI (allergic to penicillins). Appreciate further pulm recs for ventilator co-management. Sedation vacation in AM

## 2017-10-19 NOTE — ASSESSMENT & PLAN NOTE
UTI secondary to Klebsiella. No bacteremia.   Continue Meropenem. Will continue for a total of 7-10 days. Currently on day # 6

## 2017-10-19 NOTE — ASSESSMENT & PLAN NOTE
UTI secondary to Klebsiella. No bacteremia.   Continue Meropenem. Will continue for a total of 7-10 days. Currently on day # 7

## 2017-10-20 PROBLEM — Z99.11 ON MECHANICALLY ASSISTED VENTILATION: Status: RESOLVED | Noted: 2017-10-17 | Resolved: 2017-10-20

## 2017-10-20 LAB
ALBUMIN SERPL BCP-MCNC: 2.5 G/DL
ALLENS TEST: ABNORMAL
ANION GAP SERPL CALC-SCNC: 8 MMOL/L
BASOPHILS # BLD AUTO: 0.02 K/UL
BASOPHILS NFR BLD: 0.1 %
BUN SERPL-MCNC: 64 MG/DL
CALCIUM SERPL-MCNC: 8.9 MG/DL
CHLORIDE SERPL-SCNC: 107 MMOL/L
CO2 SERPL-SCNC: 27 MMOL/L
CREAT SERPL-MCNC: 0.9 MG/DL
DELSYS: ABNORMAL
DIFFERENTIAL METHOD: ABNORMAL
EOSINOPHIL # BLD AUTO: 0 K/UL
EOSINOPHIL NFR BLD: 0.1 %
EP: 5
ERYTHROCYTE [DISTWIDTH] IN BLOOD BY AUTOMATED COUNT: 14.4 %
ERYTHROCYTE [SEDIMENTATION RATE] IN BLOOD BY WESTERGREN METHOD: 12 MM/H
EST. GFR  (AFRICAN AMERICAN): >60 ML/MIN/1.73 M^2
EST. GFR  (NON AFRICAN AMERICAN): >60 ML/MIN/1.73 M^2
FIO2: 50
GLUCOSE SERPL-MCNC: 55 MG/DL
HCO3 UR-SCNC: 29.8 MMOL/L (ref 24–28)
HCT VFR BLD AUTO: 40.7 %
HGB BLD-MCNC: 13.2 G/DL
INR PPP: 1.1
IP: 12
LYMPHOCYTES # BLD AUTO: 0.8 K/UL
LYMPHOCYTES NFR BLD: 4.7 %
MCH RBC QN AUTO: 31.9 PG
MCHC RBC AUTO-ENTMCNC: 32.4 G/DL
MCV RBC AUTO: 98 FL
MODE: ABNORMAL
MONOCYTES # BLD AUTO: 0.7 K/UL
MONOCYTES NFR BLD: 4 %
NEUTROPHILS # BLD AUTO: 15.4 K/UL
NEUTROPHILS NFR BLD: 91.1 %
PCO2 BLDA: 55.2 MMHG (ref 35–45)
PH SMN: 7.34 [PH] (ref 7.35–7.45)
PHOSPHATE SERPL-MCNC: 2.9 MG/DL
PLATELET # BLD AUTO: 145 K/UL
PMV BLD AUTO: 10.9 FL
PO2 BLDA: 60 MMHG (ref 80–100)
POC BE: 3 MMOL/L
POC SATURATED O2: 89 % (ref 95–100)
POC TCO2: 31 MMOL/L (ref 23–27)
POCT GLUCOSE: 107 MG/DL (ref 70–110)
POCT GLUCOSE: 134 MG/DL (ref 70–110)
POCT GLUCOSE: 156 MG/DL (ref 70–110)
POCT GLUCOSE: 75 MG/DL (ref 70–110)
POCT GLUCOSE: 95 MG/DL (ref 70–110)
POTASSIUM SERPL-SCNC: 4.9 MMOL/L
PROTHROMBIN TIME: 11.4 SEC
RBC # BLD AUTO: 4.14 M/UL
SAMPLE: ABNORMAL
SITE: ABNORMAL
SODIUM SERPL-SCNC: 142 MMOL/L
WBC # BLD AUTO: 16.91 K/UL

## 2017-10-20 PROCEDURE — 99900035 HC TECH TIME PER 15 MIN (STAT)

## 2017-10-20 PROCEDURE — 36600 WITHDRAWAL OF ARTERIAL BLOOD: CPT

## 2017-10-20 PROCEDURE — 94660 CPAP INITIATION&MGMT: CPT

## 2017-10-20 PROCEDURE — 63600175 PHARM REV CODE 636 W HCPCS: Performed by: INTERNAL MEDICINE

## 2017-10-20 PROCEDURE — 25000003 PHARM REV CODE 250: Performed by: INTERNAL MEDICINE

## 2017-10-20 PROCEDURE — 36415 COLL VENOUS BLD VENIPUNCTURE: CPT

## 2017-10-20 PROCEDURE — C9113 INJ PANTOPRAZOLE SODIUM, VIA: HCPCS | Performed by: HOSPITALIST

## 2017-10-20 PROCEDURE — 63600175 PHARM REV CODE 636 W HCPCS

## 2017-10-20 PROCEDURE — 25000003 PHARM REV CODE 250: Performed by: HOSPITALIST

## 2017-10-20 PROCEDURE — 94761 N-INVAS EAR/PLS OXIMETRY MLT: CPT

## 2017-10-20 PROCEDURE — 63600175 PHARM REV CODE 636 W HCPCS: Performed by: HOSPITALIST

## 2017-10-20 PROCEDURE — 99291 CRITICAL CARE FIRST HOUR: CPT | Mod: ,,, | Performed by: INTERNAL MEDICINE

## 2017-10-20 PROCEDURE — 94640 AIRWAY INHALATION TREATMENT: CPT

## 2017-10-20 PROCEDURE — 80069 RENAL FUNCTION PANEL: CPT

## 2017-10-20 PROCEDURE — 85610 PROTHROMBIN TIME: CPT

## 2017-10-20 PROCEDURE — 27000221 HC OXYGEN, UP TO 24 HOURS

## 2017-10-20 PROCEDURE — 20000000 HC ICU ROOM

## 2017-10-20 PROCEDURE — 25000242 PHARM REV CODE 250 ALT 637 W/ HCPCS: Performed by: INTERNAL MEDICINE

## 2017-10-20 PROCEDURE — 85025 COMPLETE CBC W/AUTO DIFF WBC: CPT

## 2017-10-20 RX ORDER — WARFARIN SODIUM 5 MG/1
5 TABLET ORAL DAILY
Status: DISCONTINUED | OUTPATIENT
Start: 2017-10-20 | End: 2017-10-23

## 2017-10-20 RX ORDER — FUROSEMIDE 10 MG/ML
40 INJECTION INTRAMUSCULAR; INTRAVENOUS ONCE
Status: COMPLETED | OUTPATIENT
Start: 2017-10-20 | End: 2017-10-20

## 2017-10-20 RX ORDER — DILTIAZEM HCL 1 MG/ML
2.5 INJECTION, SOLUTION INTRAVENOUS CONTINUOUS
Status: DISCONTINUED | OUTPATIENT
Start: 2017-10-20 | End: 2017-10-20

## 2017-10-20 RX ORDER — METOPROLOL TARTRATE 1 MG/ML
10 INJECTION, SOLUTION INTRAVENOUS ONCE
Status: COMPLETED | OUTPATIENT
Start: 2017-10-20 | End: 2017-10-20

## 2017-10-20 RX ORDER — ENOXAPARIN SODIUM 100 MG/ML
1 INJECTION SUBCUTANEOUS
Status: DISCONTINUED | OUTPATIENT
Start: 2017-10-20 | End: 2017-10-20

## 2017-10-20 RX ORDER — DILTIAZEM HYDROCHLORIDE 5 MG/ML
10 INJECTION INTRAVENOUS ONCE
Status: COMPLETED | OUTPATIENT
Start: 2017-10-20 | End: 2017-10-20

## 2017-10-20 RX ORDER — ENOXAPARIN SODIUM 100 MG/ML
1 INJECTION SUBCUTANEOUS
Status: DISCONTINUED | OUTPATIENT
Start: 2017-10-20 | End: 2017-10-23

## 2017-10-20 RX ADMIN — MEROPENEM AND SODIUM CHLORIDE 1 G: 1 INJECTION, SOLUTION INTRAVENOUS at 01:10

## 2017-10-20 RX ADMIN — FUROSEMIDE 40 MG: 10 INJECTION, SOLUTION INTRAMUSCULAR; INTRAVENOUS at 12:10

## 2017-10-20 RX ADMIN — CHLORHEXIDINE GLUCONATE 15 ML: 1.2 RINSE ORAL at 09:10

## 2017-10-20 RX ADMIN — IPRATROPIUM BROMIDE AND ALBUTEROL SULFATE 3 ML: .5; 3 SOLUTION RESPIRATORY (INHALATION) at 01:10

## 2017-10-20 RX ADMIN — MEROPENEM AND SODIUM CHLORIDE 1 G: 1 INJECTION, SOLUTION INTRAVENOUS at 12:10

## 2017-10-20 RX ADMIN — INSULIN ASPART 1 UNITS: 100 INJECTION, SOLUTION INTRAVENOUS; SUBCUTANEOUS at 11:10

## 2017-10-20 RX ADMIN — AMIODARONE HYDROCHLORIDE 1 MG/MIN: 1.8 INJECTION, SOLUTION INTRAVENOUS at 12:10

## 2017-10-20 RX ADMIN — ENOXAPARIN SODIUM 90 MG: 100 INJECTION SUBCUTANEOUS at 10:10

## 2017-10-20 RX ADMIN — METHYLPREDNISOLONE SODIUM SUCCINATE 40 MG: 40 INJECTION, POWDER, FOR SOLUTION INTRAMUSCULAR; INTRAVENOUS at 10:10

## 2017-10-20 RX ADMIN — DILTIAZEM HYDROCHLORIDE 10 MG: 5 INJECTION INTRAVENOUS at 03:10

## 2017-10-20 RX ADMIN — AMIODARONE HYDROCHLORIDE 0.5 MG/MIN: 1.8 INJECTION, SOLUTION INTRAVENOUS at 07:10

## 2017-10-20 RX ADMIN — LORAZEPAM 1 MG: 2 INJECTION INTRAMUSCULAR; INTRAVENOUS at 12:10

## 2017-10-20 RX ADMIN — WARFARIN SODIUM 5 MG: 5 TABLET ORAL at 05:10

## 2017-10-20 RX ADMIN — PANTOPRAZOLE SODIUM 40 MG: 40 INJECTION, POWDER, FOR SOLUTION INTRAVENOUS at 09:10

## 2017-10-20 RX ADMIN — DILTIAZEM HYDROCHLORIDE 2.5 MG/HR: 5 INJECTION INTRAVENOUS at 09:10

## 2017-10-20 RX ADMIN — METOPROLOL TARTRATE 10 MG: 5 INJECTION INTRAVENOUS at 01:10

## 2017-10-20 RX ADMIN — METOPROLOL TARTRATE 25 MG: 25 TABLET ORAL at 08:10

## 2017-10-20 RX ADMIN — AMIODARONE HYDROCHLORIDE 150 MG: 1.5 INJECTION, SOLUTION INTRAVENOUS at 12:10

## 2017-10-20 RX ADMIN — IPRATROPIUM BROMIDE AND ALBUTEROL SULFATE 3 ML: .5; 3 SOLUTION RESPIRATORY (INHALATION) at 07:10

## 2017-10-20 RX ADMIN — IPRATROPIUM BROMIDE AND ALBUTEROL SULFATE 3 ML: .5; 3 SOLUTION RESPIRATORY (INHALATION) at 08:10

## 2017-10-20 RX ADMIN — CHLORHEXIDINE GLUCONATE 15 ML: 1.2 RINSE ORAL at 08:10

## 2017-10-20 NOTE — ASSESSMENT & PLAN NOTE
With chronic hypoxia and pulmonary hypertension, dependent on supplemental O2 at home. With Tobacco abuse. Exacerbated by underlying urinary tract infection 2/2 Klebsiella, without bacteremia. Extubated to BiPAP on 10/19. Trial of high flow for short period of time then back to BiPAP. Will diurese with one dose of lasix now. Continue nebs every 6 hours and solumedrol 40 mg TID (cannot take PO yet) with plans to taper. Already on meropenem for UTI (allergic to penicillins). Appreciate further pulm recs for ventilator co-management.

## 2017-10-20 NOTE — ASSESSMENT & PLAN NOTE
Patient has a psychiatric history.  Reasonable to restart home meds-> xanax (low dose to prevent withdrawal) and Abilify.

## 2017-10-20 NOTE — SUBJECTIVE & OBJECTIVE
Interval History/Significant Events:   Extubated yesterday.  Comfortable on bipap, but respiratory rate is in the low 30's.    Review of Systems   Unable to perform ROS: Mental status change     Objective:     Vital Signs (Most Recent):  Temp: 99 °F (37.2 °C) (10/20/17 0800)  Pulse: 95 (10/20/17 1325)  Resp: (!) 25 (10/20/17 1325)  BP: (!) 142/79 (10/20/17 1244)  SpO2: (!) 93 % (10/20/17 1325) Vital Signs (24h Range):  Temp:  [97.6 °F (36.4 °C)-99 °F (37.2 °C)] 99 °F (37.2 °C)  Pulse:  [] 95  Resp:  [11-38] 25  SpO2:  [70 %-96 %] 93 %  BP: (132-178)/() 142/79   Weight: 89.9 kg (198 lb 3.1 oz)  Body mass index is 38.71 kg/m².      Intake/Output Summary (Last 24 hours) at 10/20/17 1338  Last data filed at 10/20/17 1000   Gross per 24 hour   Intake            59.75 ml   Output             2160 ml   Net         -2100.25 ml       Physical Exam   Constitutional: She is oriented to person, place, and time. She appears well-developed and well-nourished.   HENT:   Head: Normocephalic and atraumatic.   On bipap via full facemask   Eyes: EOM are normal. Pupils are equal, round, and reactive to light.   Neck: Normal range of motion.   Cardiovascular:   Irregularly irregular  tachycardic   Pulmonary/Chest: Effort normal and breath sounds normal.   Abdominal: Soft. Bowel sounds are normal. She exhibits no distension. There is no tenderness.   Musculoskeletal: She exhibits no edema.   Neurological: She is alert and oriented to person, place, and time.   Skin: Skin is warm and dry.   Psychiatric: She has a normal mood and affect. Her behavior is normal.   Vitals reviewed.      Vents:  Vent Mode: NIV+ PC (10/19/17 1631)  Set Rate: 12 bmp (10/20/17 0441)  Vt Set: 380 mL (10/19/17 0801)  Pressure Support: 40 cmH20 (10/19/17 1257)  PEEP/CPAP: 5 cmH20 (10/20/17 0441)  Oxygen Concentration (%): 50 (10/20/17 1030)  Peak Airway Pressure: 14.4 cmH2O (10/20/17 0441)  Total Ve: 6.2 mL (10/20/17 0441)  F/VT Ratio<105 (RSBI): (!)  88.24 (10/20/17 0441)  Lines/Drains/Airways     Drain                 Urethral Catheter 10/13/17 0900 Latex 16 Fr. 7 days          Airway                 Airway 7 days          Peripheral Intravenous Line                 Peripheral IV - Single Lumen 10/18/17 0130 Left;Anterior Other 2 days         Peripheral IV - Single Lumen 10/18/17 0400 Left;Anterior;Other (Comment) Other 2 days              Significant Labs:    CBC/Anemia Profile:  No results for input(s): WBC, HGB, HCT, PLT, MCV, RDW, IRON, FERRITIN, RETIC, FOLATE, [id number], OCCULTBLOOD in the last 48 hours.     Chemistries:    Recent Labs  Lab 10/20/17  1213      K 4.9      CO2 27   BUN 64*   CREATININE 0.9   CALCIUM 8.9   ALBUMIN 2.5*   PHOS 2.9       All pertinent labs within the past 24 hours have been reviewed.    Significant Imaging:  I have reviewed and interpreted all pertinent imaging results/findings within the past 24 hours.

## 2017-10-20 NOTE — PLAN OF CARE
Patient apparently did not receive rehab services s/p CVA in April. Record indicates that [facility] and has had psychiatric services thru [facility] since her CVA. Her coumadin/ INR labs followed by [facility].

## 2017-10-20 NOTE — PROGRESS NOTES
[facility] Hospital Medicine  Progress Note    Patient Name: Savita Polo  MRN: [id number]  Patient Class: IP- Inpatient   Admission Date: 10/12/2017  Length of Stay: 7 days  Attending Physician: Odessa Lovett MD  Primary Care Provider: Nadiya Nava MD        Subjective:     Principal Problem:Acute on chronic respiratory failure with hypoxia and hypercapnia    HPI:  Savita Polo is a 72 y.o. female that (in part)  has a past medical history of Anemia in chronic kidney disease(285.21); Anxiety; Atherosclerotic cerebrovascular disease; CHF (congestive heart failure); Chronic respiratory failure with hypoxia; CKD (chronic kidney disease), stage IV; COPD (chronic obstructive pulmonary disease); CRLD (chronic restrictive lung disease); Diabetic peripheral neuropathy; Diabetic retinopathy; Diverticulosis of colon; DJD (degenerative joint disease); Fatty liver; SUSAN (generalized anxiety disorder); Heart attack; History of PSVT (paroxysmal supraventricular tachycardia); Iron deficiency; Joint pain; Keloid cicatrix; Long term (current) use of anticoagulants; Mixed hyperlipidemia; Multiple lung nodules; Obesity, Class I, BMI 30-34.9; On home oxygen therapy; Paroxysmal atrial fibrillation; Renovascular hypertension; Right-sided heart failure; Secondary hyperparathyroidism; Secondary pulmonary hypertension; Stroke; Thyroid disease; Type 2 diabetes mellitus with stage 4 chronic kidney disease; and Vitamin D deficiency disease. Presents to [facility] Department complaining of shortness of breath.  She lives and [facility].  She is a poorly 93% on 4 L by nasal cannula at her residence.  She is on chronic oxygen has had multiple admissions for respiratory failure.  Worsened with exertion.  No relief given with supplemental oxygen.  Located in the chest.  Moderate to high intensity.  No radiation.  Subacute onset with progressive worsening.  Constant  duration.    In the emergency department chest x-ray, ABG, and routine laboratory studies were obtained.  There was evidence of hypoxia and hypercapnia.  She also had evidence of urinary tract infection.    Hospital medicine has been asked to admit for further evaluation and treatment.       Hospital Course:  Ms Polo presented with acute on chronic respiratory failure secondary to pneumonia.     Interval History: patient very alert and following commands. Changed to high flow temporarily, then back to BiPAP. In Afib. No longer with oral bleed.     Review of Systems   Const: No fever, chills  MSK: weakness  Card: tachycardia  Pulm: shortness of breath  Psych: no issues      Objective:     Vital Signs (Most Recent):  Temp: 98.6 °F (37 °C) (10/20/17 1600)  Pulse: (!) 113 (10/20/17 1600)  Resp: 19 (10/20/17 1600)  BP: (!) 153/67 (10/20/17 1600)  SpO2: 96 % (10/20/17 1600) Vital Signs (24h Range):  Temp:  [97.6 °F (36.4 °C)-99 °F (37.2 °C)] 98.6 °F (37 °C)  Pulse:  [] 113  Resp:  [11-38] 19  SpO2:  [70 %-96 %] 96 %  BP: (123-178)/() 153/67     Weight: 89.9 kg (198 lb 3.1 oz)  Body mass index is 38.71 kg/m².    Intake/Output Summary (Last 24 hours) at 10/20/17 8853  Last data filed at 10/20/17 1000   Gross per 24 hour   Intake            50.75 ml   Output             1560 ml   Net         -1509.25 ml      Physical Exam   Constitutional: She appears well-developed. No distress.   HENT:   BiPAP in Place   Eyes: Conjunctivae are normal. Right eye exhibits no discharge. Left eye exhibits no discharge.   Neck: Neck supple.   Cardiovascular: Normal rate, regular rhythm and normal heart sounds.    Pulmonary/Chest: Effort normal. No stridor.   Mechanically ventilated.  Coarse BS bilaterally  Right LL expiratory wheezing   Abdominal: Soft. Bowel sounds are normal.   Musculoskeletal: She exhibits no edema.   Neurological:   Alert. Following commands. Oriented to person, place, time and President's name.    Skin:  Skin is warm and dry.   Nursing note and vitals reviewed.      Significant Labs: All pertinent labs within the past 24 hours have been reviewed.    Significant Imaging: I have reviewed all pertinent imaging results/findings within the past 24 hours.  I have reviewed and interpreted all pertinent imaging results/findings within the past 24 hours.    Assessment/Plan:      * Acute on chronic respiratory failure with hypoxia and hypercapnia    With chronic hypoxia and pulmonary hypertension, dependent on supplemental O2 at home. With Tobacco abuse. Exacerbated by underlying urinary tract infection 2/2 Klebsiella, without bacteremia. Extubated to BiPAP on 10/19. Trial of high flow for short period of time then back to BiPAP. Will diurese with one dose of lasix now. Continue nebs every 6 hours and solumedrol 40 mg TID (cannot take PO yet) with plans to taper. Already on meropenem for UTI (allergic to penicillins). Appreciate further pulm recs for ventilator co-management.         Cardiopulmonary arrest with successful resuscitation    This happened soon after admission on 10/12. Likely secondary to significant hypoxia at the time. ROSC established post ~ 9 mins of ACLS. Neuro intact post arrest therefore hypothermia protocol not indicated           Pulmonary hypertension due to COPD    2/2 chronic tobacco abuse. Is supplemental O2 dependent. No evidence of heart failure        Urinary tract infection with hematuria    UTI secondary to Klebsiella. No bacteremia.   Continue Meropenem. Will continue for a total of 10 days. Currently on day # 8        UTI due to Klebsiella species    As above          Paroxysmal atrial fibrillation    In AFib. Not controlled with diltiazem infusion. Amiodarone started instead.           Chronic anticoagulation    Start full dose lovenox and coumadin (if able to take PO). Oral bleeding resolved              Oral bleeding    As above          Type 2 diabetes mellitus with stage 4 chronic kidney  disease    Hyperglycemia worsened by steroids.  Adjust insulin. Patient is NPO  Glucose should improve with steroid taper.          Mixed hyperlipidemia                Tobacco abuse    Chronic tobacco use  Tobacco cessation counseling              On home oxygen therapy    On 3-4 L at home          SUSAN (generalized anxiety disorder)    No acute issues                VTE Risk Mitigation         Ordered     enoxaparin injection 90 mg  Every 12 hours (non-standard times)     Route:  Subcutaneous        10/20/17 1619     warfarin (COUMADIN) tablet 5 mg  Daily     Route:  Oral        10/20/17 0939     Medium Risk of VTE  Once      10/13/17 0402     Place RADHA hose  Until discontinued      10/13/17 0402     Place sequential compression device  Until discontinued      10/13/17 0402          Critical care time spent on the evaluation and treatment of severe organ dysfunction, review of pertinent labs and imaging studies, discussions with consulting providers and discussions with patient/family: > 45 minutes.    Odessa Zhong MD  Department of Hospital Medicine   [facility]

## 2017-10-20 NOTE — NURSING
"2150- RN attempted swallow study with patient in preparation for po metoprolol administration. Patient unable to swallow. HR in 130s, BP elevated. Notified Dr. Ochoa, orders received for one time dose of metoprolol 5mg IV.     0000- Dr. Ochoa notified of patient's c/o anxiety, states she is unable to rest because she "feels scared." Notified of continued elevated HR in 130s and continued elevated BP. Ativan 1mg IV q4 hours prn ordered. Notified Dr. Ochoa of patient's inability to receive scheduled nebulizer tx due to elevated HR.     0011- PRN dose of ativan administered.     0114- Dr. Ochoa notified patient with continued elevated HR and BP.     0147- Dr. Ochoa on unit to assess patient, metoprolol 10mg IVx1 ordered.     0157- Metoprolol 10mg IV administered per order.     0215- HR now 102-110 range. Continue to monitor.   "

## 2017-10-20 NOTE — PROGRESS NOTES
Received report from CRISTIANO Strauss. Pt on noted settings on the Servo I. No skin breakdown noted at that time. Pt tolerated well; no distress noted at that time. Tx held pt heart rate 126-134. Rn made aware

## 2017-10-20 NOTE — ASSESSMENT & PLAN NOTE
UTI secondary to Klebsiella. No bacteremia.   Continue Meropenem. Will continue for a total of 10 days. Currently on day # 8

## 2017-10-20 NOTE — PROGRESS NOTES
ABG results are as follows   Results for CAM THOMAS (MRN [id number]) as of 10/20/2017 05:27   Ref. Range 10/20/2017 04:41   POC PH Latest Ref Range: 7.35 - 7.45  7.341 (L)   POC PCO2 Latest Ref Range: 35 - 45 mmHg 55.2 (H)   POC PO2 Latest Ref Range: 80 - 100 mmHg 60 (L)   POC BE Latest Ref Range: -2 to 2 mmol/L 3   POC HCO3 Latest Ref Range: 24 - 28 mmol/L 29.8 (H)   POC SATURATED O2 Latest Ref Range: 95 - 100 % 89 (L)   POC TCO2 Latest Ref Range: 23 - 27 mmol/L 31 (H)   FiO2 Unknown 50   Sample Unknown ARTERIAL   DelSys Unknown CPAP/BiPAP   Allens Test Unknown Pass   Site Unknown RR   Mode Unknown BiPAP

## 2017-10-20 NOTE — PROGRESS NOTES
[facility]  Critical Care Medicine  Progress Note    Patient Name: Savita Polo  MRN: [id number]  Admission Date: 10/12/2017  Hospital Length of Stay: 7 days  Code Status: Full Code  Attending Provider: Odessa Lovett MD  Primary Care Provider: Nadiya Nava MD   Principal Problem: Acute on chronic respiratory failure with hypoxia and hypercapnia    Subjective:     HPI:  72 year old woman with a complex medical history that includes, cerebrovascular disease, reported history of CHF, stage IV CKD, COPD, DM with neuropathy, obesity, atrial fibrillation, and COPD with a component of chronic respiratory failure on home O2.  She presented to the  ED with shortness of breath.  Prior to admission she had been treated for UTI with nitrofurantoin. In the ED she was given lasix and treated with bipap.    She was admitted to hospital medicine.  On 10/13/17 she was found unresponsive.  Per chart review a code was called and she achieved ROSC after approximately 9 min. She was transferred to ICU for further management.       Hospital/ICU Course:  10/13/17: intubated for acute on chronic respiratory failure  10/16: overnight there were report of bleeding from the oral cavity (nothing from ET tube); tube feeds held due to high residuals;  Could not tolerate sedation vacation this morning-->afib with rvr when propofol held  10/19: extubated to bipap    Interval History/Significant Events:   Extubated yesterday.  Comfortable on bipap, but respiratory rate is in the low 30's.    Review of Systems   Unable to perform ROS: Mental status change     Objective:     Vital Signs (Most Recent):  Temp: 99 °F (37.2 °C) (10/20/17 0800)  Pulse: 95 (10/20/17 1325)  Resp: (!) 25 (10/20/17 1325)  BP: (!) 142/79 (10/20/17 1244)  SpO2: (!) 93 % (10/20/17 1325) Vital Signs (24h Range):  Temp:  [97.6 °F (36.4 °C)-99 °F (37.2 °C)] 99 °F (37.2 °C)  Pulse:  [] 95  Resp:  [11-38] 25  SpO2:  [70 %-96 %] 93 %  BP:  (132-178)/() 142/79   Weight: 89.9 kg (198 lb 3.1 oz)  Body mass index is 38.71 kg/m².      Intake/Output Summary (Last 24 hours) at 10/20/17 1338  Last data filed at 10/20/17 1000   Gross per 24 hour   Intake            59.75 ml   Output             2160 ml   Net         -2100.25 ml       Physical Exam   Constitutional: She is oriented to person, place, and time. She appears well-developed and well-nourished.   HENT:   Head: Normocephalic and atraumatic.   On bipap via full facemask   Eyes: EOM are normal. Pupils are equal, round, and reactive to light.   Neck: Normal range of motion.   Cardiovascular:   Irregularly irregular  tachycardic   Pulmonary/Chest: Effort normal and breath sounds normal.   Abdominal: Soft. Bowel sounds are normal. She exhibits no distension. There is no tenderness.   Musculoskeletal: She exhibits no edema.   Neurological: She is alert and oriented to person, place, and time.   Skin: Skin is warm and dry.   Psychiatric: She has a normal mood and affect. Her behavior is normal.   Vitals reviewed.      Vents:  Vent Mode: NIV+ PC (10/19/17 1631)  Set Rate: 12 bmp (10/20/17 0441)  Vt Set: 380 mL (10/19/17 0801)  Pressure Support: 40 cmH20 (10/19/17 1257)  PEEP/CPAP: 5 cmH20 (10/20/17 0441)  Oxygen Concentration (%): 50 (10/20/17 1030)  Peak Airway Pressure: 14.4 cmH2O (10/20/17 0441)  Total Ve: 6.2 mL (10/20/17 0441)  F/VT Ratio<105 (RSBI): (!) 88.24 (10/20/17 0441)  Lines/Drains/Airways     Drain                 Urethral Catheter 10/13/17 0900 Latex 16 Fr. 7 days          Airway                 Airway 7 days          Peripheral Intravenous Line                 Peripheral IV - Single Lumen 10/18/17 0130 Left;Anterior Other 2 days         Peripheral IV - Single Lumen 10/18/17 0400 Left;Anterior;Other (Comment) Other 2 days              Significant Labs:    CBC/Anemia Profile:  No results for input(s): WBC, HGB, HCT, PLT, MCV, RDW, IRON, FERRITIN, RETIC, FOLATE, [id number], OCCULTBLOOD in  the last 48 hours.     Chemistries:    Recent Labs  Lab 10/20/17  1213      K 4.9      CO2 27   BUN 64*   CREATININE 0.9   CALCIUM 8.9   ALBUMIN 2.5*   PHOS 2.9       All pertinent labs within the past 24 hours have been reviewed.    Significant Imaging:  I have reviewed and interpreted all pertinent imaging results/findings within the past 24 hours.    Assessment/Plan:     Psychiatric   SUSAN (generalized anxiety disorder)    Patient has a psychiatric history.  Reasonable to restart home meds-> xanax (low dose to prevent withdrawal) and Abilify.        Pulmonary   * Acute on chronic respiratory failure with hypoxia and hypercapnia    · Extubated to bipap yesterday. Doing okay this afternoon.  · Would not wean too aggressively given her arrhythmia.   · Patient is on 4L continuous oxygen at home.  · Goal O2 Sat at or above 88%.  · Maintain net negative fluid balance.  · Continue methylprednisone  · Continue nebulizer treatments.          Cardiac/Vascular   Paroxysmal atrial fibrillation    Atrial fibrillation--> in RVR this morning  Agree with diltiazem infusion.        Renal/   Urinary tract infection with hematuria    Herrera sensitive Klebsiella pneumonia UTI currently on meropenem 2/2 pcn allergy.        UTI due to Klebsiella species    Pan sensitive Klebsiella pneumonia UTI currently on meropenem 2/2 pcn allergy.        Hematology   Chronic anticoagulation    Would hold further anticoagulation given reports of blood from the oral cavity.  If bleeding continues despite normalization of INR, would suggest ENT eval.         Endocrine   Type 2 diabetes mellitus with stage 4 chronic kidney disease    Blood sugars in the 200's.  Continue sliding scale insulin           Critical Care Daily Checklist:    A: Awake: RASS Goal/Actual Goal: RASS Goal: (P) 0-->alert and calm  Actual: Chiu Agitation Sedation Scale (RASS): Alert and calm   B: Spontaneous Breathing Trial Performed?     C: SAT & SBT Coordinated?  N/A                       D: Delirium: CAM-ICU Overall CAM-ICU: Negative   E: Early Mobility Performed? No   F: Feeding Goal: Goals: Initiate nutrition within 48 hrs  Status: Nutrition Goal Status: goal met   Current Diet Order   Procedures    Diet NPO Except for: Medication, Ice Chips, Sips with Medication     Order Specific Question:   Except for     Answer:   Medication     Order Specific Question:   Except for     Answer:   Ice Chips     Order Specific Question:   Except for     Answer:   Sips with Medication      AS: Analgesia/Sedation On hold   T: Thromboembolic Prophylaxis lovenox   H: HOB > 300 Yes   U: Stress Ulcer Prophylaxis (if needed) protonix   G: Glucose Control reviewed   B: Bowel Function Stool Occurrence: 0   I: Indwelling Catheter (Lines & Ferris) Necessity reviewed   D: De-escalation of Antimicrobials/Pharmacotherapies N/S   ` Plan for the day/ETD Wean from bipap    Code Status:  Family/Goals of Care: Full Code       Critical Care Time: 35 minutes  Critical secondary to Patient has a condition that poses threat to life and bodily function: Acute respiratory failure.      Critical care was time spent personally by me on the following activities: development of treatment plan with patient or surrogate and bedside caregivers, discussions with consultants, evaluation of patient's response to treatment, examination of patient, ordering and performing treatments and interventions, ordering and review of laboratory studies, ordering and review of radiographic studies, pulse oximetry, re-evaluation of patient's condition. This critical care time did not overlap with that of any other provider or involve time for any procedures.     Raisa Gale MD  Critical Care Medicine  [facility]

## 2017-10-20 NOTE — PLAN OF CARE
10/20/17 1223   Discharge Reassessment   Assessment Type Discharge Planning Reassessment   Provided patient/caregiver education on the expected discharge date and the discharge plan Yes   Do you have any problems affording any of your prescribed medications? No   Discharge Plan A Skilled Nursing Facility   Discharge Plan B Rehab   Patient choice form signed by patient/caregiver No   Can the patient answer the patient profile reliably? No, cognitively impaired   How does the patient rate their overall health at the present time? Fair   Describe the patient's ability to walk at the present time. Does not walk or unable to take any steps at all   How often would a person be available to care for the patient? Occasionally   Number of comorbid conditions (as recorded on the chart) Five or more   During the past month, has the patient often been bothered by feeling down, depressed or hopeless? No  (record)   During the past month, has the patient often been bothered by little interest or pleasure in doing things? No  (record)   patient remains in ICU, off vent, mainly with BIPAP.   On 10/19 ARABELLA spoke briefly with sister Vida and extensively with friend Ronaldo. ARABELLA clarified living situation and events leading up to this admission. Ronaldo offered most of the information.   Patient lives on 1st floor of Ronaldo's home, he on the second floor. They share a grandchild (adult) from patient's daughter and Ronaldo's son. Patient's daughter is incarcerated with plan to return home in Jan 2018. The grand daughter and her small son now live in [facility] [place] [facility].   Patient was independent until April 2017 when she had CVA. She requested help of Ronaldo to discharge home. Ronaldo reports she has needs too high for his care at times. Additionally, patient mental status has been affected including altered perceptions that include at times beliefs that Ronaldo is sneaking women into his part of the home thru tubes or the walls. She had started calling  the police with eventual result in police somehow referring patient for psychiatric evaluation. She went to [facility] 3 days prior to this hospital admission.   Ronaldo expresses concern that he is not well enough to provide the physical care for patient. Prior to this admission, he reports tried to have patient's sisters take her to their homes, they were unable to do so. Ronaldo was also worried previously about telling patient that she cannot come home as he worries will affect his relationship with their grand daughter. He now believes that grand daughter will support his needs.   SW explained to Vida earlier and to Ronaldo on 10/19 that depending upon patient needs, may qualify for SNF. Patient will need further evaluation once she progresses with her respiratory status. SW explained that this may help all of them (patient, Ronaldo, Vida) decide what the best plan is.   SW/CM will continue to follow in ICU and assist as needed.

## 2017-10-20 NOTE — ASSESSMENT & PLAN NOTE
Hyperglycemia worsened by steroids.  Adjust insulin. Patient is NPO  Glucose should improve with steroid taper.

## 2017-10-20 NOTE — SUBJECTIVE & OBJECTIVE
Interval History: patient very alert and following commands. Changed to high flow temporarily, then back to BiPAP. In Afib. No longer with oral bleed.     Review of Systems   Unable to perform ROS: Intubated     Objective:     Vital Signs (Most Recent):  Temp: 98.6 °F (37 °C) (10/20/17 1600)  Pulse: (!) 113 (10/20/17 1600)  Resp: 19 (10/20/17 1600)  BP: (!) 153/67 (10/20/17 1600)  SpO2: 96 % (10/20/17 1600) Vital Signs (24h Range):  Temp:  [97.6 °F (36.4 °C)-99 °F (37.2 °C)] 98.6 °F (37 °C)  Pulse:  [] 113  Resp:  [11-38] 19  SpO2:  [70 %-96 %] 96 %  BP: (123-178)/() 153/67     Weight: 89.9 kg (198 lb 3.1 oz)  Body mass index is 38.71 kg/m².    Intake/Output Summary (Last 24 hours) at 10/20/17 1753  Last data filed at 10/20/17 1000   Gross per 24 hour   Intake            50.75 ml   Output             1560 ml   Net         -1509.25 ml      Physical Exam   Constitutional: She appears well-developed. No distress.   HENT:   BiPAP in Place   Eyes: Conjunctivae are normal. Right eye exhibits no discharge. Left eye exhibits no discharge.   Neck: Neck supple.   Cardiovascular: Normal rate, regular rhythm and normal heart sounds.    Pulmonary/Chest: Effort normal. No stridor.   Mechanically ventilated.  Coarse BS bilaterally  Right LL expiratory wheezing   Abdominal: Soft. Bowel sounds are normal.   Musculoskeletal: She exhibits no edema.   Neurological:   Alert. Following commands. Oriented to person, place, time and President's name.    Skin: Skin is warm and dry.   Nursing note and vitals reviewed.      Significant Labs: All pertinent labs within the past 24 hours have been reviewed.    Significant Imaging: I have reviewed all pertinent imaging results/findings within the past 24 hours.  I have reviewed and interpreted all pertinent imaging results/findings within the past 24 hours.

## 2017-10-20 NOTE — PLAN OF CARE
"Problem: Patient Care Overview  Goal: Plan of Care Review  Outcome: Ongoing (interventions implemented as appropriate)  Patient remains in ICU. Extubated on previous shift. Remains on bipap with adequate SpO2. Unable to wean from bipap as patient becomes dyspneic immediately upon mask removal. Attempted swallow study, unable to perform. Patient stating, "I can't swallow" and requesting for bipap mask to be replaced. HR elevated throughout shift, remains in atrial flutter. Metoprolol 5mg IV and metoprolol 10mg IV administered per order with briefly positive effect. Cardizem 10mg IV administered with positive effect of HR decrease to 80s, patient continues with HR in low 100s at this time. Respiratory unable to administer scheduled duoneb treatment due to elevated heart rate- MD aware, orders to continue to administer duonebs despite elevation in HR. BP slightly elevated intermittently, MD aware. Patient alert throughout shift, has not slept. Intermittently anxious and fearful, prn ativan administered x1 with minimal effect. Patient oriented to self and can state she is in the hospital, however is disoriented to time and situation. Follows commands. Denies pain. UOP adequate. No BM. Accuchecks q 6. Turned/repositioned q2 hours to maintain skin integrity. Remains free from hospital acquired falls and injuries.       "

## 2017-10-21 PROBLEM — R44.1 VISUAL HALLUCINATIONS: Status: ACTIVE | Noted: 2017-10-21

## 2017-10-21 LAB
ALBUMIN SERPL BCP-MCNC: 2.6 G/DL
ALBUMIN SERPL BCP-MCNC: 2.8 G/DL
ANION GAP SERPL CALC-SCNC: 10 MMOL/L
ANION GAP SERPL CALC-SCNC: 6 MMOL/L
BASOPHILS # BLD AUTO: 0.01 K/UL
BASOPHILS NFR BLD: 0.1 %
BUN SERPL-MCNC: 62 MG/DL
BUN SERPL-MCNC: 64 MG/DL
CALCIUM SERPL-MCNC: 9.2 MG/DL
CALCIUM SERPL-MCNC: 9.4 MG/DL
CHLORIDE SERPL-SCNC: 104 MMOL/L
CHLORIDE SERPL-SCNC: 105 MMOL/L
CO2 SERPL-SCNC: 26 MMOL/L
CO2 SERPL-SCNC: 29 MMOL/L
CREAT SERPL-MCNC: 1.2 MG/DL
CREAT SERPL-MCNC: 1.2 MG/DL
DIFFERENTIAL METHOD: ABNORMAL
EOSINOPHIL # BLD AUTO: 0 K/UL
EOSINOPHIL NFR BLD: 0 %
ERYTHROCYTE [DISTWIDTH] IN BLOOD BY AUTOMATED COUNT: 14.5 %
EST. GFR  (AFRICAN AMERICAN): 52 ML/MIN/1.73 M^2
EST. GFR  (AFRICAN AMERICAN): 52 ML/MIN/1.73 M^2
EST. GFR  (NON AFRICAN AMERICAN): 45 ML/MIN/1.73 M^2
EST. GFR  (NON AFRICAN AMERICAN): 45 ML/MIN/1.73 M^2
GLUCOSE SERPL-MCNC: 132 MG/DL
GLUCOSE SERPL-MCNC: 196 MG/DL
HCT VFR BLD AUTO: 41.9 %
HGB BLD-MCNC: 13.6 G/DL
INR PPP: 1.2
LYMPHOCYTES # BLD AUTO: 1.1 K/UL
LYMPHOCYTES NFR BLD: 8.4 %
MCH RBC QN AUTO: 32.1 PG
MCHC RBC AUTO-ENTMCNC: 32.5 G/DL
MCV RBC AUTO: 99 FL
MONOCYTES # BLD AUTO: 1.2 K/UL
MONOCYTES NFR BLD: 9 %
NEUTROPHILS # BLD AUTO: 10.9 K/UL
NEUTROPHILS NFR BLD: 82.5 %
PHOSPHATE SERPL-MCNC: 3.2 MG/DL
PHOSPHATE SERPL-MCNC: 3.4 MG/DL
PLATELET # BLD AUTO: 162 K/UL
PMV BLD AUTO: 10.7 FL
POCT GLUCOSE: 141 MG/DL (ref 70–110)
POCT GLUCOSE: 202 MG/DL (ref 70–110)
POCT GLUCOSE: 203 MG/DL (ref 70–110)
POCT GLUCOSE: 205 MG/DL (ref 70–110)
POCT GLUCOSE: 207 MG/DL (ref 70–110)
POTASSIUM SERPL-SCNC: 4.6 MMOL/L
POTASSIUM SERPL-SCNC: 5.8 MMOL/L
PROTHROMBIN TIME: 12.6 SEC
RBC # BLD AUTO: 4.24 M/UL
SODIUM SERPL-SCNC: 140 MMOL/L
SODIUM SERPL-SCNC: 140 MMOL/L
WBC # BLD AUTO: 13.15 K/UL

## 2017-10-21 PROCEDURE — 80069 RENAL FUNCTION PANEL: CPT

## 2017-10-21 PROCEDURE — 36415 COLL VENOUS BLD VENIPUNCTURE: CPT

## 2017-10-21 PROCEDURE — 20000000 HC ICU ROOM

## 2017-10-21 PROCEDURE — 63600175 PHARM REV CODE 636 W HCPCS: Performed by: INTERNAL MEDICINE

## 2017-10-21 PROCEDURE — 94761 N-INVAS EAR/PLS OXIMETRY MLT: CPT

## 2017-10-21 PROCEDURE — 63600175 PHARM REV CODE 636 W HCPCS: Performed by: HOSPITALIST

## 2017-10-21 PROCEDURE — 25000003 PHARM REV CODE 250: Performed by: EMERGENCY MEDICINE

## 2017-10-21 PROCEDURE — C9113 INJ PANTOPRAZOLE SODIUM, VIA: HCPCS | Performed by: HOSPITALIST

## 2017-10-21 PROCEDURE — 99900035 HC TECH TIME PER 15 MIN (STAT)

## 2017-10-21 PROCEDURE — 25000242 PHARM REV CODE 250 ALT 637 W/ HCPCS: Performed by: INTERNAL MEDICINE

## 2017-10-21 PROCEDURE — 25000003 PHARM REV CODE 250: Performed by: INTERNAL MEDICINE

## 2017-10-21 PROCEDURE — 94660 CPAP INITIATION&MGMT: CPT

## 2017-10-21 PROCEDURE — 85025 COMPLETE CBC W/AUTO DIFF WBC: CPT

## 2017-10-21 PROCEDURE — 85610 PROTHROMBIN TIME: CPT

## 2017-10-21 PROCEDURE — 27000221 HC OXYGEN, UP TO 24 HOURS

## 2017-10-21 PROCEDURE — 94640 AIRWAY INHALATION TREATMENT: CPT

## 2017-10-21 RX ORDER — HALOPERIDOL 5 MG/ML
1 INJECTION INTRAMUSCULAR ONCE
Status: COMPLETED | OUTPATIENT
Start: 2017-10-21 | End: 2017-10-21

## 2017-10-21 RX ORDER — METOPROLOL TARTRATE 1 MG/ML
5 INJECTION, SOLUTION INTRAVENOUS EVERY 6 HOURS
Status: DISCONTINUED | OUTPATIENT
Start: 2017-10-21 | End: 2017-10-22

## 2017-10-21 RX ADMIN — INSULIN ASPART 2 UNITS: 100 INJECTION, SOLUTION INTRAVENOUS; SUBCUTANEOUS at 11:10

## 2017-10-21 RX ADMIN — METHYLPREDNISOLONE SODIUM SUCCINATE 40 MG: 40 INJECTION, POWDER, FOR SOLUTION INTRAMUSCULAR; INTRAVENOUS at 08:10

## 2017-10-21 RX ADMIN — PRAVASTATIN SODIUM 40 MG: 40 TABLET ORAL at 08:10

## 2017-10-21 RX ADMIN — IPRATROPIUM BROMIDE AND ALBUTEROL SULFATE 3 ML: .5; 3 SOLUTION RESPIRATORY (INHALATION) at 07:10

## 2017-10-21 RX ADMIN — IPRATROPIUM BROMIDE AND ALBUTEROL SULFATE 3 ML: .5; 3 SOLUTION RESPIRATORY (INHALATION) at 12:10

## 2017-10-21 RX ADMIN — IPRATROPIUM BROMIDE AND ALBUTEROL SULFATE 3 ML: .5; 3 SOLUTION RESPIRATORY (INHALATION) at 08:10

## 2017-10-21 RX ADMIN — CHLORHEXIDINE GLUCONATE 15 ML: 1.2 RINSE ORAL at 08:10

## 2017-10-21 RX ADMIN — LEVOTHYROXINE SODIUM 75 MCG: 75 TABLET ORAL at 05:10

## 2017-10-21 RX ADMIN — HALOPERIDOL LACTATE 1 MG: 5 INJECTION, SOLUTION INTRAMUSCULAR at 08:10

## 2017-10-21 RX ADMIN — METOPROLOL TARTRATE 5 MG: 5 INJECTION INTRAVENOUS at 05:10

## 2017-10-21 RX ADMIN — AMIODARONE HYDROCHLORIDE 0.5 MG/MIN: 1.8 INJECTION, SOLUTION INTRAVENOUS at 03:10

## 2017-10-21 RX ADMIN — METOPROLOL TARTRATE 5 MG: 5 INJECTION INTRAVENOUS at 11:10

## 2017-10-21 RX ADMIN — PANTOPRAZOLE SODIUM 40 MG: 40 INJECTION, POWDER, FOR SOLUTION INTRAVENOUS at 08:10

## 2017-10-21 RX ADMIN — ENOXAPARIN SODIUM 90 MG: 100 INJECTION SUBCUTANEOUS at 11:10

## 2017-10-21 RX ADMIN — MEROPENEM AND SODIUM CHLORIDE 1 G: 1 INJECTION, SOLUTION INTRAVENOUS at 01:10

## 2017-10-21 RX ADMIN — LORAZEPAM 1 MG: 2 INJECTION INTRAMUSCULAR; INTRAVENOUS at 02:10

## 2017-10-21 RX ADMIN — SODIUM POLYSTYRENE SULFONATE 15 G: 15 SUSPENSION ORAL; RECTAL at 05:10

## 2017-10-21 RX ADMIN — INSULIN ASPART 4 UNITS: 100 INJECTION, SOLUTION INTRAVENOUS; SUBCUTANEOUS at 06:10

## 2017-10-21 RX ADMIN — IPRATROPIUM BROMIDE AND ALBUTEROL SULFATE 3 ML: .5; 3 SOLUTION RESPIRATORY (INHALATION) at 01:10

## 2017-10-21 RX ADMIN — WARFARIN SODIUM 5 MG: 5 TABLET ORAL at 05:10

## 2017-10-21 RX ADMIN — CHLORHEXIDINE GLUCONATE 15 ML: 1.2 RINSE ORAL at 09:10

## 2017-10-21 RX ADMIN — METOPROLOL TARTRATE 5 MG: 5 INJECTION INTRAVENOUS at 06:10

## 2017-10-21 RX ADMIN — INSULIN ASPART 4 UNITS: 100 INJECTION, SOLUTION INTRAVENOUS; SUBCUTANEOUS at 12:10

## 2017-10-21 NOTE — ASSESSMENT & PLAN NOTE
Difficult to control due to hypoxia. On amiodarone infusion and trial in metoprolol IV. Treat hypoxia

## 2017-10-21 NOTE — ASSESSMENT & PLAN NOTE
UTI secondary to Klebsiella. No bacteremia.   Continue Meropenem. Will continue for a total of 10 days. Currently on day # 9

## 2017-10-21 NOTE — PLAN OF CARE
Problem: Patient Care Overview  Goal: Plan of Care Review  Outcome: Ongoing (interventions implemented as appropriate)  Patient awake and restless most of the night. Ativan given once with no relief. Oriented to self only. Remains in A flutter. -135. Afebrile. Remains on BIPAP 50% 12/5. Swallowed oral medication and water with no issues. No falls or new skin breakdown throughout shift. Plan of care reviewed with patient.

## 2017-10-21 NOTE — SUBJECTIVE & OBJECTIVE
Interval History: with visual hallucinations this morning. Able to tolerate high flow for about 3-4 hours, then had to be placed back on BiPAP. Persistent tachyarrhythmia. Is on full dose lovenox.     Review of Systems   Respiratory: Positive for shortness of breath and wheezing.    Cardiovascular: Negative.    Gastrointestinal: Negative.    Neurological: Negative.    Psychiatric/Behavioral: Positive for confusion and hallucinations.     Objective:     Vital Signs (Most Recent):  Temp: 98.4 °F (36.9 °C) (10/21/17 1100)  Pulse: (!) 113 (10/21/17 1300)  Resp: (!) 47 (10/21/17 1300)  BP: 120/73 (10/21/17 1300)  SpO2: (!) 91 % (10/21/17 1300) Vital Signs (24h Range):  Temp:  [98.4 °F (36.9 °C)-98.8 °F (37.1 °C)] 98.4 °F (36.9 °C)  Pulse:  [] 113  Resp:  [16-47] 47  SpO2:  [77 %-97 %] 91 %  BP: (120-184)/() 120/73     Weight: 89.9 kg (198 lb 3.1 oz)  Body mass index is 38.71 kg/m².    Intake/Output Summary (Last 24 hours) at 10/21/17 1429  Last data filed at 10/21/17 1400   Gross per 24 hour   Intake           920.23 ml   Output             2770 ml   Net         -1849.77 ml      Physical Exam   Constitutional: She appears well-developed. No distress.   HENT:   BiPAP in Place   Eyes: Conjunctivae are normal. Right eye exhibits no discharge. Left eye exhibits no discharge.   Neck: Neck supple.   Cardiovascular: Normal rate, regular rhythm and normal heart sounds.    Pulmonary/Chest: Effort normal. No stridor.   Coarse BS bilaterally  Right LL expiratory wheezing   Abdominal: Soft. Bowel sounds are normal.   Musculoskeletal: She exhibits no edema.   Neurological:   Alert. Following commands but actively hallucinating.    Skin: Skin is warm and dry.   Nursing note and vitals reviewed.      Significant Labs: All pertinent labs within the past 24 hours have been reviewed.    Significant Imaging: I have reviewed all pertinent imaging results/findings within the past 24 hours.  I have reviewed and interpreted all  pertinent imaging results/findings within the past 24 hours.

## 2017-10-21 NOTE — PROGRESS NOTES
[facility] Hospital Medicine  Progress Note    Patient Name: Savita Polo  MRN: [id number]  Patient Class: IP- Inpatient   Admission Date: 10/12/2017  Length of Stay: 8 days  Attending Physician: Odessa Lovett MD  Primary Care Provider: Nadiya Nava MD        Subjective:     Principal Problem:Acute on chronic respiratory failure with hypoxia and hypercapnia    HPI:  Savita Polo is a 72 y.o. female that (in part)  has a past medical history of Anemia in chronic kidney disease(285.21); Anxiety; Atherosclerotic cerebrovascular disease; CHF (congestive heart failure); Chronic respiratory failure with hypoxia; CKD (chronic kidney disease), stage IV; COPD (chronic obstructive pulmonary disease); CRLD (chronic restrictive lung disease); Diabetic peripheral neuropathy; Diabetic retinopathy; Diverticulosis of colon; DJD (degenerative joint disease); Fatty liver; SUSAN (generalized anxiety disorder); Heart attack; History of PSVT (paroxysmal supraventricular tachycardia); Iron deficiency; Joint pain; Keloid cicatrix; Long term (current) use of anticoagulants; Mixed hyperlipidemia; Multiple lung nodules; Obesity, Class I, BMI 30-34.9; On home oxygen therapy; Paroxysmal atrial fibrillation; Renovascular hypertension; Right-sided heart failure; Secondary hyperparathyroidism; Secondary pulmonary hypertension; Stroke; Thyroid disease; Type 2 diabetes mellitus with stage 4 chronic kidney disease; and Vitamin D deficiency disease. Presents to [facility] Emergency Department complaining of shortness of breath.  She lives and [facility].  She is a poorly 93% on 4 L by nasal cannula at her residence.  She is on chronic oxygen has had multiple admissions for respiratory failure.  Worsened with exertion.  No relief given with supplemental oxygen.  Located in the chest.  Moderate to high intensity.  No radiation.  Subacute onset with progressive worsening.  Constant  duration.    In the emergency department chest x-ray, ABG, and routine laboratory studies were obtained.  There was evidence of hypoxia and hypercapnia.  She also had evidence of urinary tract infection.    Hospital medicine has been asked to admit for further evaluation and treatment.       Hospital Course:  Ms Polo presented with acute on chronic respiratory failure secondary to pneumonia.     Interval History: with visual hallucinations this morning. Able to tolerate high flow for about 3-4 hours, then had to be placed back on BiPAP. Persistent tachyarrhythmia. Is on full dose lovenox.     Review of Systems   Respiratory: Positive for shortness of breath and wheezing.    Cardiovascular: Negative.    Gastrointestinal: Negative.    Neurological: Negative.    Psychiatric/Behavioral: Positive for confusion and hallucinations.     Objective:     Vital Signs (Most Recent):  Temp: 98.4 °F (36.9 °C) (10/21/17 1100)  Pulse: (!) 113 (10/21/17 1300)  Resp: (!) 47 (10/21/17 1300)  BP: 120/73 (10/21/17 1300)  SpO2: (!) 91 % (10/21/17 1300) Vital Signs (24h Range):  Temp:  [98.4 °F (36.9 °C)-98.8 °F (37.1 °C)] 98.4 °F (36.9 °C)  Pulse:  [] 113  Resp:  [16-47] 47  SpO2:  [77 %-97 %] 91 %  BP: (120-184)/() 120/73     Weight: 89.9 kg (198 lb 3.1 oz)  Body mass index is 38.71 kg/m².    Intake/Output Summary (Last 24 hours) at 10/21/17 1429  Last data filed at 10/21/17 1400   Gross per 24 hour   Intake           920.23 ml   Output             2770 ml   Net         -1849.77 ml      Physical Exam   Constitutional: She appears well-developed. No distress.   HENT:   BiPAP in Place   Eyes: Conjunctivae are normal. Right eye exhibits no discharge. Left eye exhibits no discharge.   Neck: Neck supple.   Cardiovascular: Normal rate, regular rhythm and normal heart sounds.    Pulmonary/Chest: Effort normal. No stridor.   Coarse BS bilaterally  Right LL expiratory wheezing   Abdominal: Soft. Bowel sounds are normal.    Musculoskeletal: She exhibits no edema.   Neurological:   Alert. Following commands but actively hallucinating.    Skin: Skin is warm and dry.   Nursing note and vitals reviewed.      Significant Labs: All pertinent labs within the past 24 hours have been reviewed.    Significant Imaging: I have reviewed all pertinent imaging results/findings within the past 24 hours.  I have reviewed and interpreted all pertinent imaging results/findings within the past 24 hours.    Assessment/Plan:      * Acute on chronic respiratory failure with hypoxia and hypercapnia    With chronic hypoxia and pulmonary hypertension, dependent on supplemental O2 at home. With Tobacco abuse. Exacerbated by underlying urinary tract infection 2/2 Klebsiella, without bacteremia. Extubated to BiPAP on 10/19. Trial of high flow for short period of time then back to BiPAP. Will diurese with one dose of lasix now. Continue nebs every 6 hours and solumedrol 40 mg TID (cannot take PO yet) with plans to taper. Already on meropenem for UTI (allergic to penicillins). Appreciate further pulm recs for ventilator co-management.         Cardiopulmonary arrest with successful resuscitation    This happened soon after admission on 10/12. Likely secondary to significant hypoxia at the time. ROSC established post ~ 9 mins of ACLS. Neuro intact post arrest therefore hypothermia protocol not indicated           Pulmonary hypertension due to COPD    2/2 chronic tobacco abuse. Is supplemental O2 dependent. No evidence of heart failure        Urinary tract infection with hematuria    UTI secondary to Klebsiella. No bacteremia.   Continue Meropenem. Will continue for a total of 10 days. Currently on day # 9        UTI due to Klebsiella species    As above          Paroxysmal atrial fibrillation    Difficult to control due to hypoxia. On amiodarone infusion and trial in metoprolol IV. Treat hypoxia            Chronic anticoagulation    Start full dose lovenox and  coumadin (if able to take PO). Oral bleeding resolved              Visual hallucinations    She's had this prior to admission but not evident up until today. Clearly visual. Low dose haldol now. Needs to sleep.         Oral bleeding    As above          Type 2 diabetes mellitus with stage 4 chronic kidney disease    Hyperglycemia worsened by steroids.  Adjust insulin. Patient is NPO  Glucose should improve with steroid taper.          Mixed hyperlipidemia                Tobacco abuse    Chronic tobacco use  Tobacco cessation counseling              On home oxygen therapy    On 3-4 L at home          SUSAN (generalized anxiety disorder)    No acute issues                VTE Risk Mitigation         Ordered     enoxaparin injection 90 mg  Every 12 hours (non-standard times)     Route:  Subcutaneous        10/20/17 1619     warfarin (COUMADIN) tablet 5 mg  Daily     Route:  Oral        10/20/17 0939     Medium Risk of VTE  Once      10/13/17 0402     Place RADHA hose  Until discontinued      10/13/17 0402     Place sequential compression device  Until discontinued      10/13/17 0402        Will reattempt to place back on high flow in AM. If fails to be on high flow for the day, will call sister to suggest hospice care. Will consider NG tube placement given NPO status for 2 days now.     Critical care time spent on the evaluation and treatment of severe organ dysfunction, review of pertinent labs and imaging studies, discussions with consulting providers and discussions with patient/family: > 35 minutes.    Odessa Zhong MD  Department of Hospital Medicine   [facility]

## 2017-10-21 NOTE — PLAN OF CARE
Problem: Patient Care Overview  Goal: Plan of Care Review  Outcome: Ongoing (interventions implemented as appropriate)  Patient awake and alert but confused at times. Patient answers some questions appropriately and then says some things that does not make sense. Patient insisting that her check book is on the table but its really the [facility] blue folder for discharge papers. Patient had elevated heart rate  Last night and was treated with iv medications. This morning heart rate remained elevated and patient started on Cardizem drip. This was unsuccessful in bringing down heart rate so amino drip was started this afternoon.    Heart rate did come down below 100 but went back up after o2 was weaned to high flow nasal cannula. Patient remains npo but did take medications with some juice. Remains on bipap and will try again to wean tomorrow. No falls,no injuries this shift. No skin breakdown noted this shift. Plan of care reviewed with patient at bedside.

## 2017-10-21 NOTE — ASSESSMENT & PLAN NOTE
She's had this prior to admission but not evident up until today. Clearly visual. Low dose haldol now. Needs to sleep.

## 2017-10-22 LAB
ALBUMIN SERPL BCP-MCNC: 2.6 G/DL
ANION GAP SERPL CALC-SCNC: 14 MMOL/L
BUN SERPL-MCNC: 53 MG/DL
CALCIUM SERPL-MCNC: 8.8 MG/DL
CHLORIDE SERPL-SCNC: 109 MMOL/L
CO2 SERPL-SCNC: 21 MMOL/L
CREAT SERPL-MCNC: 1.1 MG/DL
EST. GFR  (AFRICAN AMERICAN): 58 ML/MIN/1.73 M^2
EST. GFR  (NON AFRICAN AMERICAN): 50 ML/MIN/1.73 M^2
GLUCOSE SERPL-MCNC: 152 MG/DL
INR PPP: 1.8
PHOSPHATE SERPL-MCNC: 2.7 MG/DL
POCT GLUCOSE: 175 MG/DL (ref 70–110)
POCT GLUCOSE: 211 MG/DL (ref 70–110)
POCT GLUCOSE: 212 MG/DL (ref 70–110)
POCT GLUCOSE: 227 MG/DL (ref 70–110)
POTASSIUM SERPL-SCNC: 5 MMOL/L
PROTHROMBIN TIME: 19.1 SEC
SODIUM SERPL-SCNC: 144 MMOL/L

## 2017-10-22 PROCEDURE — 80069 RENAL FUNCTION PANEL: CPT

## 2017-10-22 PROCEDURE — 25000003 PHARM REV CODE 250: Performed by: INTERNAL MEDICINE

## 2017-10-22 PROCEDURE — 94640 AIRWAY INHALATION TREATMENT: CPT

## 2017-10-22 PROCEDURE — 94660 CPAP INITIATION&MGMT: CPT

## 2017-10-22 PROCEDURE — 25000242 PHARM REV CODE 250 ALT 637 W/ HCPCS: Performed by: INTERNAL MEDICINE

## 2017-10-22 PROCEDURE — 63600175 PHARM REV CODE 636 W HCPCS: Performed by: INTERNAL MEDICINE

## 2017-10-22 PROCEDURE — C9113 INJ PANTOPRAZOLE SODIUM, VIA: HCPCS | Performed by: HOSPITALIST

## 2017-10-22 PROCEDURE — 99900035 HC TECH TIME PER 15 MIN (STAT)

## 2017-10-22 PROCEDURE — 20000000 HC ICU ROOM

## 2017-10-22 PROCEDURE — 63600175 PHARM REV CODE 636 W HCPCS: Performed by: HOSPITALIST

## 2017-10-22 PROCEDURE — 27100171 HC OXYGEN HIGH FLOW UP TO 24 HOURS

## 2017-10-22 PROCEDURE — 25000003 PHARM REV CODE 250: Performed by: EMERGENCY MEDICINE

## 2017-10-22 PROCEDURE — 27000221 HC OXYGEN, UP TO 24 HOURS

## 2017-10-22 PROCEDURE — 85610 PROTHROMBIN TIME: CPT

## 2017-10-22 PROCEDURE — 36415 COLL VENOUS BLD VENIPUNCTURE: CPT

## 2017-10-22 PROCEDURE — 94761 N-INVAS EAR/PLS OXIMETRY MLT: CPT

## 2017-10-22 RX ORDER — METOPROLOL TARTRATE 1 MG/ML
5 INJECTION, SOLUTION INTRAVENOUS EVERY 6 HOURS
Status: DISCONTINUED | OUTPATIENT
Start: 2017-10-22 | End: 2017-10-22

## 2017-10-22 RX ORDER — METOPROLOL TARTRATE 1 MG/ML
10 INJECTION, SOLUTION INTRAVENOUS EVERY 6 HOURS
Status: DISCONTINUED | OUTPATIENT
Start: 2017-10-22 | End: 2017-10-27 | Stop reason: HOSPADM

## 2017-10-22 RX ORDER — MEROPENEM AND SODIUM CHLORIDE 1 G/50ML
1 INJECTION, SOLUTION INTRAVENOUS
Status: COMPLETED | OUTPATIENT
Start: 2017-10-22 | End: 2017-10-22

## 2017-10-22 RX ORDER — METOPROLOL TARTRATE 1 MG/ML
10 INJECTION, SOLUTION INTRAVENOUS EVERY 6 HOURS
Status: DISCONTINUED | OUTPATIENT
Start: 2017-10-22 | End: 2017-10-22

## 2017-10-22 RX ADMIN — ENOXAPARIN SODIUM 90 MG: 100 INJECTION SUBCUTANEOUS at 10:10

## 2017-10-22 RX ADMIN — METOPROLOL TARTRATE 10 MG: 5 INJECTION INTRAVENOUS at 11:10

## 2017-10-22 RX ADMIN — CHLORHEXIDINE GLUCONATE 15 ML: 1.2 RINSE ORAL at 08:10

## 2017-10-22 RX ADMIN — INSULIN ASPART 2 UNITS: 100 INJECTION, SOLUTION INTRAVENOUS; SUBCUTANEOUS at 06:10

## 2017-10-22 RX ADMIN — AMIODARONE HYDROCHLORIDE 0.5 MG/MIN: 1.8 INJECTION, SOLUTION INTRAVENOUS at 02:10

## 2017-10-22 RX ADMIN — METHYLPREDNISOLONE SODIUM SUCCINATE 40 MG: 40 INJECTION, POWDER, FOR SOLUTION INTRAMUSCULAR; INTRAVENOUS at 08:10

## 2017-10-22 RX ADMIN — INSULIN ASPART 2 UNITS: 100 INJECTION, SOLUTION INTRAVENOUS; SUBCUTANEOUS at 11:10

## 2017-10-22 RX ADMIN — METHYLPREDNISOLONE SODIUM SUCCINATE 40 MG: 40 INJECTION, POWDER, FOR SOLUTION INTRAMUSCULAR; INTRAVENOUS at 09:10

## 2017-10-22 RX ADMIN — ENOXAPARIN SODIUM 90 MG: 100 INJECTION SUBCUTANEOUS at 11:10

## 2017-10-22 RX ADMIN — IPRATROPIUM BROMIDE AND ALBUTEROL SULFATE 3 ML: .5; 3 SOLUTION RESPIRATORY (INHALATION) at 01:10

## 2017-10-22 RX ADMIN — IPRATROPIUM BROMIDE AND ALBUTEROL SULFATE 3 ML: .5; 3 SOLUTION RESPIRATORY (INHALATION) at 08:10

## 2017-10-22 RX ADMIN — PRAVASTATIN SODIUM 40 MG: 40 TABLET ORAL at 08:10

## 2017-10-22 RX ADMIN — LEVOTHYROXINE SODIUM 75 MCG: 75 TABLET ORAL at 06:10

## 2017-10-22 RX ADMIN — METOPROLOL TARTRATE 10 MG: 5 INJECTION INTRAVENOUS at 06:10

## 2017-10-22 RX ADMIN — CHLORHEXIDINE GLUCONATE 15 ML: 1.2 RINSE ORAL at 09:10

## 2017-10-22 RX ADMIN — MEROPENEM AND SODIUM CHLORIDE 1 G: 1 INJECTION, SOLUTION INTRAVENOUS at 01:10

## 2017-10-22 RX ADMIN — INSULIN ASPART 4 UNITS: 100 INJECTION, SOLUTION INTRAVENOUS; SUBCUTANEOUS at 06:10

## 2017-10-22 RX ADMIN — METOPROLOL TARTRATE 5 MG: 5 INJECTION INTRAVENOUS at 06:10

## 2017-10-22 RX ADMIN — MEROPENEM AND SODIUM CHLORIDE 1 G: 1 INJECTION, SOLUTION INTRAVENOUS at 12:10

## 2017-10-22 RX ADMIN — WARFARIN SODIUM 5 MG: 5 TABLET ORAL at 04:10

## 2017-10-22 RX ADMIN — PANTOPRAZOLE SODIUM 40 MG: 40 INJECTION, POWDER, FOR SOLUTION INTRAVENOUS at 08:10

## 2017-10-22 RX ADMIN — AMIODARONE HYDROCHLORIDE 0.5 MG/MIN: 1.8 INJECTION, SOLUTION INTRAVENOUS at 03:10

## 2017-10-22 RX ADMIN — INSULIN ASPART 4 UNITS: 100 INJECTION, SOLUTION INTRAVENOUS; SUBCUTANEOUS at 12:10

## 2017-10-22 NOTE — SUBJECTIVE & OBJECTIVE
Interval History: Did not sleep much last night and still with visual hallucinations, but cooperative. Is tolerating pills and did well with bedside swallow eval done by nursing. INR 1.8. No overt bleeding. Tachycardia still an issue.     Review of Systems   Respiratory: Positive for shortness of breath and wheezing.    Cardiovascular: Negative.    Gastrointestinal: Negative.    Neurological: Negative.    Psychiatric/Behavioral: Positive for confusion and hallucinations.     Objective:     Vital Signs (Most Recent):  Temp: 98.6 °F (37 °C) (10/22/17 0700)  Pulse: (!) 115 (10/22/17 1000)  Resp: (!) 59 (10/22/17 0940)  BP: (!) 161/74 (10/22/17 1000)  SpO2: (!) 94 % (10/22/17 1000) Vital Signs (24h Range):  Temp:  [98.2 °F (36.8 °C)-98.9 °F (37.2 °C)] 98.6 °F (37 °C)  Pulse:  [] 115  Resp:  [8-59] 59  SpO2:  [84 %-98 %] 94 %  BP: (120-186)/() 161/74     Weight: 83.5 kg (184 lb 1.4 oz)  Body mass index is 35.95 kg/m².    Intake/Output Summary (Last 24 hours) at 10/22/17 1041  Last data filed at 10/22/17 1000   Gross per 24 hour   Intake            665.8 ml   Output             1725 ml   Net          -1059.2 ml      Physical Exam   Constitutional: She appears well-developed. No distress.   HENT:   BiPAP in Place   Eyes: Conjunctivae are normal. Right eye exhibits no discharge. Left eye exhibits no discharge.   Neck: Neck supple.   Cardiovascular: Normal rate, regular rhythm and normal heart sounds.    Pulmonary/Chest: Effort normal. No stridor.   Coarse BS bilaterally  Right LL expiratory wheezing   Abdominal: Soft. Bowel sounds are normal.   Musculoskeletal: She exhibits no edema.   Neurological:   Alert. Following commands but actively hallucinating.    Skin: Skin is warm and dry.   Nursing note and vitals reviewed.      Significant Labs: All pertinent labs within the past 24 hours have been reviewed.    Significant Imaging: I have reviewed all pertinent imaging results/findings within the past 24 hours.  I  have reviewed and interpreted all pertinent imaging results/findings within the past 24 hours.

## 2017-10-22 NOTE — PLAN OF CARE
Problem: Patient Care Overview  Goal: Plan of Care Review  Outcome: Ongoing (interventions implemented as appropriate)  Patient remains in ICU on BIPAP 50%, 12/5. Confused and talkative throughout shift; requires frequent reorientation.  Hypertensive; scheduled lopressor administered per order with improvement noted.  Amiodarone infusing at 0.5 mg/min; HR remains 110s-120s. Afebrile. CBG monitored as ordered; PRN SSI administered.  Restraints remain in place for patient safety. [place] intact with adequate UO. Remains free from falls, new skin breakdown, and other hospital acquired injuries this shift.

## 2017-10-22 NOTE — PROGRESS NOTES
[facility] Hospital Medicine  Progress Note    Patient Name: Savita Polo  MRN: [id number]  Patient Class: IP- Inpatient   Admission Date: 10/12/2017  Length of Stay: 9 days  Attending Physician: Odessa Lovett MD  Primary Care Provider: Nadiya Nava MD        Subjective:     Principal Problem:Acute on chronic respiratory failure with hypoxia and hypercapnia    HPI:  Savita Polo is a 72 y.o. female that (in part)  has a past medical history of Anemia in chronic kidney disease(285.21); Anxiety; Atherosclerotic cerebrovascular disease; CHF (congestive heart failure); Chronic respiratory failure with hypoxia; CKD (chronic kidney disease), stage IV; COPD (chronic obstructive pulmonary disease); CRLD (chronic restrictive lung disease); Diabetic peripheral neuropathy; Diabetic retinopathy; Diverticulosis of colon; DJD (degenerative joint disease); Fatty liver; SUSAN (generalized anxiety disorder); Heart attack; History of PSVT (paroxysmal supraventricular tachycardia); Iron deficiency; Joint pain; Keloid cicatrix; Long term (current) use of anticoagulants; Mixed hyperlipidemia; Multiple lung nodules; Obesity, Class I, BMI 30-34.9; On home oxygen therapy; Paroxysmal atrial fibrillation; Renovascular hypertension; Right-sided heart failure; Secondary hyperparathyroidism; Secondary pulmonary hypertension; Stroke; Thyroid disease; Type 2 diabetes mellitus with stage 4 chronic kidney disease; and Vitamin D deficiency disease. Presents to [facility] Emergency Department complaining of shortness of breath.  She lives and [facility].  She is a poorly 93% on 4 L by nasal cannula at her residence.  She is on chronic oxygen has had multiple admissions for respiratory failure.  Worsened with exertion.  No relief given with supplemental oxygen.  Located in the chest.  Moderate to high intensity.  No radiation.  Subacute onset with progressive worsening.  Constant  duration.    In the emergency department chest x-ray, ABG, and routine laboratory studies were obtained.  There was evidence of hypoxia and hypercapnia.  She also had evidence of urinary tract infection.    Hospital medicine has been asked to admit for further evaluation and treatment.       Hospital Course:  Ms Polo presented with acute on chronic respiratory failure secondary to pneumonia.     Interval History: Did not sleep much last night and still with visual hallucinations, but cooperative. Is tolerating pills and did well with bedside swallow eval done by nursing. INR 1.8. No overt bleeding. Tachycardia still an issue.     Review of Systems   Respiratory: Positive for shortness of breath and wheezing.    Cardiovascular: Negative.    Gastrointestinal: Negative.    Neurological: Negative.    Psychiatric/Behavioral: Positive for confusion and hallucinations.     Objective:     Vital Signs (Most Recent):  Temp: 98.6 °F (37 °C) (10/22/17 0700)  Pulse: (!) 115 (10/22/17 1000)  Resp: (!) 59 (10/22/17 0940)  BP: (!) 161/74 (10/22/17 1000)  SpO2: (!) 94 % (10/22/17 1000) Vital Signs (24h Range):  Temp:  [98.2 °F (36.8 °C)-98.9 °F (37.2 °C)] 98.6 °F (37 °C)  Pulse:  [] 115  Resp:  [8-59] 59  SpO2:  [84 %-98 %] 94 %  BP: (120-186)/() 161/74     Weight: 83.5 kg (184 lb 1.4 oz)  Body mass index is 35.95 kg/m².    Intake/Output Summary (Last 24 hours) at 10/22/17 1041  Last data filed at 10/22/17 1000   Gross per 24 hour   Intake            665.8 ml   Output             1725 ml   Net          -1059.2 ml      Physical Exam   Constitutional: She appears well-developed. No distress.   HENT:   BiPAP in Place   Eyes: Conjunctivae are normal. Right eye exhibits no discharge. Left eye exhibits no discharge.   Neck: Neck supple.   Cardiovascular: Normal rate, regular rhythm and normal heart sounds.    Pulmonary/Chest: Effort normal. No stridor.   Coarse BS bilaterally  Right LL expiratory wheezing   Abdominal:  Soft. Bowel sounds are normal.   Musculoskeletal: She exhibits no edema.   Neurological:   Alert. Following commands but actively hallucinating.    Skin: Skin is warm and dry.   Nursing note and vitals reviewed.      Significant Labs: All pertinent labs within the past 24 hours have been reviewed.    Significant Imaging: I have reviewed all pertinent imaging results/findings within the past 24 hours.  I have reviewed and interpreted all pertinent imaging results/findings within the past 24 hours.    Assessment/Plan:      * Acute on chronic respiratory failure with hypoxia and hypercapnia    With chronic hypoxia and pulmonary hypertension, dependent on supplemental O2 at home. With Tobacco abuse. Exacerbated by underlying urinary tract infection 2/2 Klebsiella, without bacteremia. Extubated to BiPAP on 10/19. Will start a pureed diet today and aim for high flow during meals and then back on BiPAP when not eating. Continue nebs every 6 hours, elevate HOB > 30 degrees and continue solumedrol 40 mg with taper. Is slow to improve, however expected given underlying chronic hypoxemic respiratory failure. Already on meropenem for UTI (allergic to penicillins).         Cardiopulmonary arrest with successful resuscitation    This happened soon after admission on 10/12. Likely secondary to significant hypoxia at the time. ROSC established post ~ 9 mins of ACLS. Neuro intact post arrest therefore hypothermia protocol not indicated           Pulmonary hypertension due to COPD    2/2 chronic tobacco abuse. Is supplemental O2 dependent. No evidence of heart failure        Urinary tract infection with hematuria    UTI secondary to Klebsiella. No bacteremia.   Continue Meropenem. Will continue for a total of 10 days. Currently on day # 10        UTI due to Klebsiella species    As above          Paroxysmal atrial fibrillation    Difficult to control due to hypoxia. On amiodarone infusion and metoprolol IV. Increase dose of  metoprolol. Treat hypoxia            Chronic anticoagulation     full dose lovenox and coumadin. INR 1.8 today. Oral bleeding resolved              Visual hallucinations    She's had this prior to admission but not evident up until today. Clearly visual. Low dose haldol now. Needs to sleep.         Oral bleeding    As above          Type 2 diabetes mellitus with stage 4 chronic kidney disease    Hyperglycemia worsened by steroids.  Glucose should improve with steroid taper.  Trial of pureed diet today. Adjust insulin for goal < 180          Mixed hyperlipidemia                Tobacco abuse    Chronic tobacco use  Tobacco cessation counseling              On home oxygen therapy    On 3-4 L at home          SUSAN (generalized anxiety disorder)    No acute issues                VTE Risk Mitigation         Ordered     enoxaparin injection 90 mg  Every 12 hours (non-standard times)     Route:  Subcutaneous        10/20/17 1619     warfarin (COUMADIN) tablet 5 mg  Daily     Route:  Oral        10/20/17 0939     Medium Risk of VTE  Once      10/13/17 0402     Place RADHA hose  Until discontinued      10/13/17 0402     Place sequential compression device  Until discontinued      10/13/17 0402        Continue ICU care.     Critical care time spent on the evaluation and treatment of severe organ dysfunction, review of pertinent labs and imaging studies, discussions with consulting providers and discussions with patient/family: > 35 minutes.    Odessa Zhong MD  Department of Hospital Medicine   [facility]

## 2017-10-22 NOTE — ASSESSMENT & PLAN NOTE
Difficult to control due to hypoxia. On amiodarone infusion and metoprolol IV. Increase dose of metoprolol. Treat hypoxia

## 2017-10-22 NOTE — PLAN OF CARE
Pt continues to be on BIPAP. Amioderone drip still going. Pt pulled IV from L finger. New 20GA PIV started in LFA. One 20GA IV dc'd from L Chest. Pt continues to be confused and attempts to pull out medical devices. Pt had 2 bowel movements today. Pt remains NPO except for medication. Interventions implemented as appropriate. Pt shows no signs or symptoms of distress.

## 2017-10-22 NOTE — ASSESSMENT & PLAN NOTE
UTI secondary to Klebsiella. No bacteremia.   Continue Meropenem. Will continue for a total of 10 days. Currently on day # 10

## 2017-10-22 NOTE — ASSESSMENT & PLAN NOTE
Hyperglycemia worsened by steroids.  Glucose should improve with steroid taper.  Trial of pureed diet today. Adjust insulin for goal < 180

## 2017-10-22 NOTE — ASSESSMENT & PLAN NOTE
With chronic hypoxia and pulmonary hypertension, dependent on supplemental O2 at home. With Tobacco abuse. Exacerbated by underlying urinary tract infection 2/2 Klebsiella, without bacteremia. Extubated to BiPAP on 10/19. Will start a pureed diet today and aim for high flow during meals and then back on BiPAP when not eating. Continue nebs every 6 hours, elevate HOB > 30 degrees and continue solumedrol 40 mg with taper. Is slow to improve, however expected given underlying chronic hypoxemic respiratory failure. Already on meropenem for UTI (allergic to penicillins).

## 2017-10-23 PROBLEM — F05 DELIRIUM DUE TO ANOTHER MEDICAL CONDITION: Status: ACTIVE | Noted: 2017-10-21

## 2017-10-23 LAB
ALLENS TEST: ABNORMAL
BASOPHILS # BLD AUTO: 0.01 K/UL
BASOPHILS NFR BLD: 0.1 %
DELSYS: ABNORMAL
DIFFERENTIAL METHOD: ABNORMAL
EOSINOPHIL # BLD AUTO: 0 K/UL
EOSINOPHIL NFR BLD: 0 %
EP: 5
ERYTHROCYTE [DISTWIDTH] IN BLOOD BY AUTOMATED COUNT: 14.2 %
ERYTHROCYTE [SEDIMENTATION RATE] IN BLOOD BY WESTERGREN METHOD: 12 MM/H
FIO2: 0.5
HCO3 UR-SCNC: 30.8 MMOL/L (ref 24–28)
HCT VFR BLD AUTO: 39.6 %
HGB BLD-MCNC: 13.3 G/DL
INR PPP: 3.6
IP: 12
LYMPHOCYTES # BLD AUTO: 0.8 K/UL
LYMPHOCYTES NFR BLD: 5.3 %
MCH RBC QN AUTO: 32.8 PG
MCHC RBC AUTO-ENTMCNC: 33.6 G/DL
MCV RBC AUTO: 98 FL
MIN VOL: 14.6
MODE: ABNORMAL
MONOCYTES # BLD AUTO: 0.6 K/UL
MONOCYTES NFR BLD: 3.7 %
NEUTROPHILS # BLD AUTO: 13.5 K/UL
NEUTROPHILS NFR BLD: 90.9 %
PCO2 BLDA: 45.8 MMHG (ref 35–45)
PH SMN: 7.44 [PH] (ref 7.35–7.45)
PLATELET # BLD AUTO: 160 K/UL
PMV BLD AUTO: 11.3 FL
PO2 BLDA: 60 MMHG (ref 80–100)
POC BE: 6 MMOL/L
POC SATURATED O2: 91 % (ref 95–100)
POC TCO2: 32 MMOL/L (ref 23–27)
POCT GLUCOSE: 203 MG/DL (ref 70–110)
POCT GLUCOSE: 224 MG/DL (ref 70–110)
POCT GLUCOSE: 229 MG/DL (ref 70–110)
PROTHROMBIN TIME: 36.4 SEC
RBC # BLD AUTO: 4.06 M/UL
SAMPLE: ABNORMAL
SITE: ABNORMAL
SP02: 92
WBC # BLD AUTO: 14.81 K/UL

## 2017-10-23 PROCEDURE — 63600175 PHARM REV CODE 636 W HCPCS: Performed by: INTERNAL MEDICINE

## 2017-10-23 PROCEDURE — 85025 COMPLETE CBC W/AUTO DIFF WBC: CPT

## 2017-10-23 PROCEDURE — 25000003 PHARM REV CODE 250: Performed by: INTERNAL MEDICINE

## 2017-10-23 PROCEDURE — S0171 BUMETANIDE 0.5 MG: HCPCS | Performed by: INTERNAL MEDICINE

## 2017-10-23 PROCEDURE — 90792 PSYCH DIAG EVAL W/MED SRVCS: CPT | Mod: GC,,, | Performed by: PSYCHIATRY & NEUROLOGY

## 2017-10-23 PROCEDURE — 99233 SBSQ HOSP IP/OBS HIGH 50: CPT | Mod: ,,, | Performed by: INTERNAL MEDICINE

## 2017-10-23 PROCEDURE — 25000242 PHARM REV CODE 250 ALT 637 W/ HCPCS: Performed by: INTERNAL MEDICINE

## 2017-10-23 PROCEDURE — 85610 PROTHROMBIN TIME: CPT

## 2017-10-23 PROCEDURE — 94660 CPAP INITIATION&MGMT: CPT

## 2017-10-23 PROCEDURE — 27000221 HC OXYGEN, UP TO 24 HOURS

## 2017-10-23 PROCEDURE — 20000000 HC ICU ROOM

## 2017-10-23 PROCEDURE — 36415 COLL VENOUS BLD VENIPUNCTURE: CPT

## 2017-10-23 PROCEDURE — 99900035 HC TECH TIME PER 15 MIN (STAT)

## 2017-10-23 PROCEDURE — C9113 INJ PANTOPRAZOLE SODIUM, VIA: HCPCS | Performed by: HOSPITALIST

## 2017-10-23 PROCEDURE — 63600175 PHARM REV CODE 636 W HCPCS: Performed by: HOSPITALIST

## 2017-10-23 PROCEDURE — 25000003 PHARM REV CODE 250: Performed by: EMERGENCY MEDICINE

## 2017-10-23 PROCEDURE — 94640 AIRWAY INHALATION TREATMENT: CPT

## 2017-10-23 RX ORDER — HALOPERIDOL 5 MG/ML
2 INJECTION INTRAMUSCULAR ONCE AS NEEDED
Status: ACTIVE | OUTPATIENT
Start: 2017-10-23 | End: 2017-10-23

## 2017-10-23 RX ORDER — BUMETANIDE 0.25 MG/ML
2 INJECTION INTRAMUSCULAR; INTRAVENOUS ONCE
Status: COMPLETED | OUTPATIENT
Start: 2017-10-23 | End: 2017-10-23

## 2017-10-23 RX ORDER — HALOPERIDOL 5 MG/ML
5 INJECTION INTRAMUSCULAR ONCE AS NEEDED
Status: DISCONTINUED | OUTPATIENT
Start: 2017-10-23 | End: 2017-10-23

## 2017-10-23 RX ORDER — HALOPERIDOL 5 MG/ML
1 INJECTION INTRAMUSCULAR EVERY 6 HOURS PRN
Status: DISCONTINUED | OUTPATIENT
Start: 2017-10-23 | End: 2017-10-23

## 2017-10-23 RX ORDER — HYDRALAZINE HYDROCHLORIDE 20 MG/ML
10 INJECTION INTRAMUSCULAR; INTRAVENOUS EVERY 8 HOURS
Status: DISCONTINUED | OUTPATIENT
Start: 2017-10-23 | End: 2017-10-27 | Stop reason: HOSPADM

## 2017-10-23 RX ORDER — HALOPERIDOL 5 MG/ML
2 INJECTION INTRAMUSCULAR EVERY 6 HOURS PRN
Status: DISCONTINUED | OUTPATIENT
Start: 2017-10-23 | End: 2017-10-23

## 2017-10-23 RX ORDER — AMIODARONE HYDROCHLORIDE 200 MG/1
200 TABLET ORAL 2 TIMES DAILY
Status: DISCONTINUED | OUTPATIENT
Start: 2017-10-23 | End: 2017-10-27 | Stop reason: HOSPADM

## 2017-10-23 RX ORDER — LABETALOL HYDROCHLORIDE 5 MG/ML
10 INJECTION, SOLUTION INTRAVENOUS EVERY 6 HOURS PRN
Status: DISCONTINUED | OUTPATIENT
Start: 2017-10-23 | End: 2017-10-27 | Stop reason: HOSPADM

## 2017-10-23 RX ORDER — HYDRALAZINE HYDROCHLORIDE 20 MG/ML
10 INJECTION INTRAMUSCULAR; INTRAVENOUS EVERY 6 HOURS PRN
Status: DISCONTINUED | OUTPATIENT
Start: 2017-10-23 | End: 2017-10-23

## 2017-10-23 RX ORDER — LABETALOL HYDROCHLORIDE 5 MG/ML
10 INJECTION, SOLUTION INTRAVENOUS ONCE
Status: COMPLETED | OUTPATIENT
Start: 2017-10-23 | End: 2017-10-23

## 2017-10-23 RX ORDER — FUROSEMIDE 10 MG/ML
40 INJECTION INTRAMUSCULAR; INTRAVENOUS ONCE
Status: COMPLETED | OUTPATIENT
Start: 2017-10-23 | End: 2017-10-23

## 2017-10-23 RX ORDER — QUETIAPINE FUMARATE 25 MG/1
25 TABLET, FILM COATED ORAL NIGHTLY
Status: DISCONTINUED | OUTPATIENT
Start: 2017-10-23 | End: 2017-10-27 | Stop reason: HOSPADM

## 2017-10-23 RX ORDER — OLANZAPINE 10 MG/2ML
5 INJECTION, POWDER, FOR SOLUTION INTRAMUSCULAR NIGHTLY PRN
Status: DISCONTINUED | OUTPATIENT
Start: 2017-10-23 | End: 2017-10-27 | Stop reason: HOSPADM

## 2017-10-23 RX ADMIN — IPRATROPIUM BROMIDE AND ALBUTEROL SULFATE 3 ML: .5; 3 SOLUTION RESPIRATORY (INHALATION) at 12:10

## 2017-10-23 RX ADMIN — HYDRALAZINE HYDROCHLORIDE 10 MG: 20 INJECTION INTRAMUSCULAR; INTRAVENOUS at 02:10

## 2017-10-23 RX ADMIN — HYDRALAZINE HYDROCHLORIDE 10 MG: 20 INJECTION INTRAMUSCULAR; INTRAVENOUS at 07:10

## 2017-10-23 RX ADMIN — AMIODARONE HYDROCHLORIDE 200 MG: 200 TABLET ORAL at 11:10

## 2017-10-23 RX ADMIN — IPRATROPIUM BROMIDE AND ALBUTEROL SULFATE 3 ML: .5; 3 SOLUTION RESPIRATORY (INHALATION) at 07:10

## 2017-10-23 RX ADMIN — HALOPERIDOL LACTATE 2 MG: 5 INJECTION, SOLUTION INTRAMUSCULAR at 07:10

## 2017-10-23 RX ADMIN — LABETALOL HYDROCHLORIDE 10 MG: 5 INJECTION, SOLUTION INTRAVENOUS at 02:10

## 2017-10-23 RX ADMIN — PANTOPRAZOLE SODIUM 40 MG: 40 INJECTION, POWDER, FOR SOLUTION INTRAVENOUS at 09:10

## 2017-10-23 RX ADMIN — QUETIAPINE FUMARATE 25 MG: 25 TABLET, FILM COATED ORAL at 11:10

## 2017-10-23 RX ADMIN — METOPROLOL TARTRATE 10 MG: 5 INJECTION INTRAVENOUS at 05:10

## 2017-10-23 RX ADMIN — AMIODARONE HYDROCHLORIDE 0.5 MG/MIN: 1.8 INJECTION, SOLUTION INTRAVENOUS at 03:10

## 2017-10-23 RX ADMIN — HYDRALAZINE HYDROCHLORIDE 10 MG: 20 INJECTION INTRAMUSCULAR; INTRAVENOUS at 11:10

## 2017-10-23 RX ADMIN — AMIODARONE HYDROCHLORIDE 200 MG: 200 TABLET ORAL at 09:10

## 2017-10-23 RX ADMIN — CHLORHEXIDINE GLUCONATE 15 ML: 1.2 RINSE ORAL at 09:10

## 2017-10-23 RX ADMIN — HALOPERIDOL LACTATE 1 MG: 5 INJECTION, SOLUTION INTRAMUSCULAR at 12:10

## 2017-10-23 RX ADMIN — HYDRALAZINE HYDROCHLORIDE 10 MG: 20 INJECTION INTRAMUSCULAR; INTRAVENOUS at 01:10

## 2017-10-23 RX ADMIN — LEVOTHYROXINE SODIUM 75 MCG: 75 TABLET ORAL at 05:10

## 2017-10-23 RX ADMIN — PRAVASTATIN SODIUM 40 MG: 40 TABLET ORAL at 09:10

## 2017-10-23 RX ADMIN — METOPROLOL TARTRATE 10 MG: 5 INJECTION INTRAVENOUS at 12:10

## 2017-10-23 RX ADMIN — INSULIN ASPART 4 UNITS: 100 INJECTION, SOLUTION INTRAVENOUS; SUBCUTANEOUS at 12:10

## 2017-10-23 RX ADMIN — METOPROLOL TARTRATE 10 MG: 5 INJECTION INTRAVENOUS at 06:10

## 2017-10-23 RX ADMIN — IPRATROPIUM BROMIDE AND ALBUTEROL SULFATE 3 ML: .5; 3 SOLUTION RESPIRATORY (INHALATION) at 01:10

## 2017-10-23 RX ADMIN — METHYLPREDNISOLONE SODIUM SUCCINATE 40 MG: 40 INJECTION, POWDER, FOR SOLUTION INTRAMUSCULAR; INTRAVENOUS at 09:10

## 2017-10-23 RX ADMIN — INSULIN ASPART 4 UNITS: 100 INJECTION, SOLUTION INTRAVENOUS; SUBCUTANEOUS at 05:10

## 2017-10-23 RX ADMIN — INSULIN ASPART 4 UNITS: 100 INJECTION, SOLUTION INTRAVENOUS; SUBCUTANEOUS at 06:10

## 2017-10-23 RX ADMIN — BUMETANIDE 2 MG: 0.25 INJECTION INTRAMUSCULAR; INTRAVENOUS at 02:10

## 2017-10-23 RX ADMIN — CHLORHEXIDINE GLUCONATE 15 ML: 1.2 RINSE ORAL at 11:10

## 2017-10-23 RX ADMIN — FUROSEMIDE 40 MG: 10 INJECTION, SOLUTION INTRAMUSCULAR; INTRAVENOUS at 10:10

## 2017-10-23 NOTE — SUBJECTIVE & OBJECTIVE
Interval History: Still with insomnia and visual hallucinations. Tolerated diet well yesterday. Is back on BiPAP when not eating. Afebrile.     Review of Systems   Respiratory: Positive for shortness of breath and wheezing.    Cardiovascular: Negative.    Gastrointestinal: Negative.    Neurological: Negative.    Psychiatric/Behavioral: Positive for confusion and hallucinations.     Objective:     Vital Signs (Most Recent):  Temp: 97.9 °F (36.6 °C) (10/23/17 0700)  Pulse: 109 (10/23/17 1000)  Resp: (!) 32 (10/23/17 1000)  BP: (!) 153/61 (10/23/17 1000)  SpO2: 96 % (10/23/17 1000) Vital Signs (24h Range):  Temp:  [97.6 °F (36.4 °C)-99.1 °F (37.3 °C)] 97.9 °F (36.6 °C)  Pulse:  [] 109  Resp:  [24-52] 32  SpO2:  [90 %-97 %] 96 %  BP: (149-181)/() 153/61     Weight: 83.5 kg (184 lb 1.4 oz)  Body mass index is 35.95 kg/m².    Intake/Output Summary (Last 24 hours) at 10/23/17 1028  Last data filed at 10/23/17 1000   Gross per 24 hour   Intake           518.28 ml   Output             3070 ml   Net         -2551.72 ml      Physical Exam   Constitutional: She appears well-developed. No distress.   HENT:   BiPAP in Place   Eyes: Conjunctivae are normal. Right eye exhibits no discharge. Left eye exhibits no discharge.   Neck: Neck supple.   Cardiovascular: Normal rate, regular rhythm and normal heart sounds.    Pulmonary/Chest: Effort normal. No stridor.   Coarse BS bilaterally  Right LL expiratory wheezing   Abdominal: Soft. Bowel sounds are normal.   Musculoskeletal: She exhibits no edema.   Neurological:   Alert. Following commands but actively hallucinating.    Skin: Skin is warm and dry.   Nursing note and vitals reviewed.      Significant Labs: All pertinent labs within the past 24 hours have been reviewed.    Significant Imaging: I have reviewed all pertinent imaging results/findings within the past 24 hours.  I have reviewed and interpreted all pertinent imaging results/findings within the past 24 hours.

## 2017-10-23 NOTE — PROGRESS NOTES
[facility] Hospital Medicine  Progress Note    Patient Name: Savita Polo  MRN: [id number]  Patient Class: IP- Inpatient   Admission Date: 10/12/2017  Length of Stay: 10 days  Attending Physician: Odessa Lovett MD  Primary Care Provider: Nadiya Nava MD        Subjective:     Principal Problem:Acute on chronic respiratory failure with hypoxia and hypercapnia    HPI:  Savita Polo is a 72 y.o. female that (in part)  has a past medical history of Anemia in chronic kidney disease(285.21); Anxiety; Atherosclerotic cerebrovascular disease; CHF (congestive heart failure); Chronic respiratory failure with hypoxia; CKD (chronic kidney disease), stage IV; COPD (chronic obstructive pulmonary disease); CRLD (chronic restrictive lung disease); Diabetic peripheral neuropathy; Diabetic retinopathy; Diverticulosis of colon; DJD (degenerative joint disease); Fatty liver; SUSAN (generalized anxiety disorder); Heart attack; History of PSVT (paroxysmal supraventricular tachycardia); Iron deficiency; Joint pain; Keloid cicatrix; Long term (current) use of anticoagulants; Mixed hyperlipidemia; Multiple lung nodules; Obesity, Class I, BMI 30-34.9; On home oxygen therapy; Paroxysmal atrial fibrillation; Renovascular hypertension; Right-sided heart failure; Secondary hyperparathyroidism; Secondary pulmonary hypertension; Stroke; Thyroid disease; Type 2 diabetes mellitus with stage 4 chronic kidney disease; and Vitamin D deficiency disease. Presents to [facility] Department complaining of shortness of breath.  She lives and [facility].  She is a poorly 93% on 4 L by nasal cannula at her residence.  She is on chronic oxygen has had multiple admissions for respiratory failure.  Worsened with exertion.  No relief given with supplemental oxygen.  Located in the chest.  Moderate to high intensity.  No radiation.  Subacute onset with progressive worsening.  Constant  duration.    In the emergency department chest x-ray, ABG, and routine laboratory studies were obtained.  There was evidence of hypoxia and hypercapnia.  She also had evidence of urinary tract infection.    Hospital medicine has been asked to admit for further evaluation and treatment.       Hospital Course:  Ms Polo presented with acute on chronic respiratory failure with hypoxemia secondary to pneumonia. Soon after admission patient had a cardiac arrest. Was intubated and transferred to ICU. Pulmonary process thought to be a combination of pneumonia and ARDS, as she still decompensated despite diuresis and BiPAP on admission. Treated with steroids and duo-nebs while intubated. UCx with > 100,000 Klebsiella pneumoniae. Blood cultures negative and respiratory culture with no growth although moderate WBCs. Treated with meropenem (see allergy list) for total of 10 days. Extubated to BiPAP on 10/19. Attempted to wean to high flow, but not tolerate for long. Developed vivid visual hallucinations and insomnia, complicating clinical course. Despite this, she was able to initiate a pureed diet with honey thickened fluids while on high flow, but then placed on BiPAP when not eating. Intermittent diuresis. Persistently in AFib with RVR. On amiodarone and BB. On full anticoagulation with coumadin, which was held on 10/23 for INR > 3. Psychiatry consulted for delirium and insomnia. Prognosis is poor overall and unlikely to achieve meaningful recovery despite adequate medical treatment. Palliative care consulted for goals of care. Patient is full code     Interval History: Still with insomnia and visual hallucinations. Tolerated diet well yesterday. Is back on BiPAP when not eating. Afebrile.     Review of Systems   Respiratory: Positive for shortness of breath and wheezing.    Cardiovascular: Negative.    Gastrointestinal: Negative.    Neurological: Negative.    Psychiatric/Behavioral: Positive for confusion and  hallucinations.     Objective:     Vital Signs (Most Recent):  Temp: 97.9 °F (36.6 °C) (10/23/17 0700)  Pulse: 109 (10/23/17 1000)  Resp: (!) 32 (10/23/17 1000)  BP: (!) 153/61 (10/23/17 1000)  SpO2: 96 % (10/23/17 1000) Vital Signs (24h Range):  Temp:  [97.6 °F (36.4 °C)-99.1 °F (37.3 °C)] 97.9 °F (36.6 °C)  Pulse:  [] 109  Resp:  [24-52] 32  SpO2:  [90 %-97 %] 96 %  BP: (149-181)/() 153/61     Weight: 83.5 kg (184 lb 1.4 oz)  Body mass index is 35.95 kg/m².    Intake/Output Summary (Last 24 hours) at 10/23/17 1028  Last data filed at 10/23/17 1000   Gross per 24 hour   Intake           518.28 ml   Output             3070 ml   Net         -2551.72 ml      Physical Exam   Constitutional: She appears well-developed. No distress.   HENT:   BiPAP in Place   Eyes: Conjunctivae are normal. Right eye exhibits no discharge. Left eye exhibits no discharge.   Neck: Neck supple.   Cardiovascular: Normal rate, regular rhythm and normal heart sounds.    Pulmonary/Chest: Effort normal. No stridor.   Coarse BS bilaterally  Right LL expiratory wheezing   Abdominal: Soft. Bowel sounds are normal.   Musculoskeletal: She exhibits no edema.   Neurological:   Alert. Following commands but actively hallucinating.    Skin: Skin is warm and dry.   Nursing note and vitals reviewed.      Significant Labs: All pertinent labs within the past 24 hours have been reviewed.    Significant Imaging: I have reviewed all pertinent imaging results/findings within the past 24 hours.  I have reviewed and interpreted all pertinent imaging results/findings within the past 24 hours.    Assessment/Plan:      * Acute on chronic respiratory failure with hypoxia and hypercapnia    With chronic hypoxia and pulmonary hypertension, dependent on supplemental O2 at home. With Tobacco abuse. Exacerbated by underlying urinary tract infection 2/2 Klebsiella, without bacteremia. Extubated to BiPAP on 10/19. Continue pureed diet high flow during meals and  then back on BiPAP when not eating. Continue nebs every 6 hours, elevate HOB > 30 degrees and continue solumedrol 40 mg with taper. Is slow to improve, however expected given underlying chronic hypoxemic respiratory failure. Completed abx therapy for UTI. Will diurese        Cardiopulmonary arrest with successful resuscitation    This happened soon after admission on 10/12. Likely secondary to significant hypoxia at the time. ROSC established post ~ 9 mins of ACLS. Neuro intact post arrest therefore hypothermia protocol not indicated           Pulmonary hypertension due to COPD    2/2 chronic tobacco abuse. Is supplemental O2 dependent. No evidence of heart failure        Urinary tract infection with hematuria    UTI secondary to Klebsiella. No bacteremia.   Completed 10 days of meropenem on 10/22        UTI due to Klebsiella species    As above          Paroxysmal atrial fibrillation    Difficult to control due to hypoxia. Transition amiodarone to PO and continue metoprolol IV at current dose. Patient is a poor candidate for cardioversion given tenuous respiratory status.           Chronic anticoagulation    INR > 3 today. Hold anticoagulants. INR in AM              Visual hallucinations    She's had this prior to admission but not evident up until extubation. Clearly visual and has not slept in > 2 days. Will change steroids to daily only and see if this was contributing to insomnia. Psych consulted for co-management. Might need neuro consult as well. Will follow recs        Oral bleeding    As above          Type 2 diabetes mellitus with stage 4 chronic kidney disease    Hyperglycemia worsened by steroids.  Glucose should improve with steroid taper.  Tolerating pureed diet with diabetic restrictions. Adjust insulin for goal < 180          Mixed hyperlipidemia                Tobacco abuse    Chronic tobacco use  Tobacco cessation counseling              On home oxygen therapy    On 3-4 L at home          SUSAN  (generalized anxiety disorder)    No acute issues                VTE Risk Mitigation         Ordered     Medium Risk of VTE  Once      10/13/17 0402     Place RADHA hose  Until discontinued      10/13/17 0402     Place sequential compression device  Until discontinued      10/13/17 0402        Called patient's sister for an update and to discuss goals of care but no answer. Left a voice message.     Critical care time spent on the evaluation and treatment of severe organ dysfunction, review of pertinent labs and imaging studies, discussions with consulting providers and discussions with patient/family: > 35 minutes.    Odessa Zhong MD  Department of Hospital Medicine   [facility]

## 2017-10-23 NOTE — ASSESSMENT & PLAN NOTE
Difficult to control due to hypoxia. Transition amiodarone to PO and continue metoprolol IV at current dose. Patient is a poor candidate for cardioversion given tenuous respiratory status.

## 2017-10-23 NOTE — ASSESSMENT & PLAN NOTE
With chronic hypoxia and pulmonary hypertension, dependent on supplemental O2 at home. With Tobacco abuse. Exacerbated by underlying urinary tract infection 2/2 Klebsiella, without bacteremia. Extubated to BiPAP on 10/19. Continue pureed diet high flow during meals and then back on BiPAP when not eating. Continue nebs every 6 hours, elevate HOB > 30 degrees and continue solumedrol 40 mg with taper. Is slow to improve, however expected given underlying chronic hypoxemic respiratory failure. Completed abx therapy for UTI.

## 2017-10-23 NOTE — PROGRESS NOTES
"0020 - Pt restless, anxious.  BP elevated.  Dr. Ochoa notified.  New orders received.    0145 - Pt with increased work of breathing, respiratory rate 38-40.  Pt states "I can't breathe and I feel dizzy".  Bipap in place, O2 sat 93%.  Dr. Ochoa notified.  New orders received.  Respiratory therapist notified of order for ABG    0200 - Dr. Ochoa at bedside to assess patient, reviewed chest xray.  New orders received.    0330 - Pt resting calmly, states she feels better.  Will monitor  "

## 2017-10-23 NOTE — ASSESSMENT & PLAN NOTE
Hyperglycemia worsened by steroids.  Glucose should improve with steroid taper.  Tolerating pureed diet with diabetic restrictions. Adjust insulin for goal < 180

## 2017-10-23 NOTE — ASSESSMENT & PLAN NOTE
With chronic hypoxia and pulmonary hypertension, dependent on supplemental O2 at home. With Tobacco abuse. Exacerbated by underlying urinary tract infection 2/2 Klebsiella, without bacteremia. Extubated to BiPAP on 10/19. Continue pureed diet high flow during meals and then back on BiPAP when not eating. Continue nebs every 6 hours, elevate HOB > 30 degrees and continue solumedrol 40 mg with taper. Is slow to improve, however expected given underlying chronic hypoxemic respiratory failure. Completed abx therapy for UTI. Will diurese as needed.changed BIPAP setting per pulmonology.

## 2017-10-23 NOTE — CONSULTS
"[facility]  Psychiatry  Consult Note    Patient Name: Savita Polo  MRN: [id number]   Code Status: Full Code  Admission Date: 10/12/2017  Hospital Length of Stay: 10 days  Attending Physician: Jefry Mendoza MD  Primary Care Provider: Nadiya Nava MD    Current Legal Status: N/A    Patient information was obtained from patient and ER records.   Inpatient consult to Psychiatry  Consult performed by: ROXANNE RODRIGUEZ  Consult ordered by: JEFRY MENDOZA      Patient was seen, interviewed, discussed for 60 minutes of time.    Subjective:     Principal Problem:Acute on chronic respiratory failure with hypoxia and hypercapnia    Chief Complaint:  Altered mental status     HPI: Patient Savita Polo presented to the hospital with respiratory failure after a significant recent course of hospitalizations.  She was admitted to the hospital from [facility] inpatient psychiatric facility.  She initially presented with shortness of breath which progressed overnight of admit and evolved into full code.  She was revived and sent to ICU for further care and monitoring.  She is having trouble maintaining her oxygen saturation and if now on BIPAP.  She is also in restraints because she is frequently found picking at the bedsheets and pulling out lines.  She is somewhat redirectable but also noted to be irritable.  I am able to have the BIPAP removed for a short conversation but she is a very difficult historian.  She is oriented to name and "[facility]" only.  She says that she is on the [facility] and is not able to tell me the month or year.  She is not sure why she is in the hospital.  She is not able to tell me where she lives, with whom she lives, or where she is from.  She is noted to be having trouble breathing, so BIPAP is resumed.  During our conversation, she is irritable and at the end says, "I am done with you".  Nursing reports that she is seeing little children and dogs " running around the room.  She told me that she has a dog hiding under the bed.    Review of her chart shows that she is followed by outpatient psychiatry for Generalized Anxiety Disorder by Dr. Kruger at [facility].  Her prescriptions include Xanax 0.5mg TID and Abilify 5mg daily.  Unknown compliance or if she was properly weaned off of Xanax at her hospitalization at [facility].    Hospital Course: No notes on file         Patient History           Medical as of 10/23/2017     Past Medical History     Diagnosis Date Comments Source    Anemia in chronic kidney disease(285.21) 3/21/2017 -- Provider    Anxiety -- -- Provider    Atherosclerotic cerebrovascular disease 7/25/2012 -- Provider    CHF (congestive heart failure) -- -- Provider    Chronic respiratory failure with hypoxia 8/11/2014 -- Provider    CKD (chronic kidney disease), stage IV 6/23/2016 -- Provider    COPD (chronic obstructive pulmonary disease) -- -- Provider    CRLD (chronic restrictive lung disease) 8/11/2014 -- Provider    Diabetic peripheral neuropathy 7/24/2012 -- Provider    Diabetic retinopathy -- -- Provider    Diverticulosis of colon -- -- Provider    DJD (degenerative joint disease) 7/24/2012 -- Provider    Fatty liver -- -- Provider    SUSAN (generalized anxiety disorder) 11/15/2012 -- Provider    Heart attack -- -- Provider    History of PSVT (paroxysmal supraventricular tachycardia) 4/1/2014 Cardioverted on 2008  Provider    Iron deficiency 11/14/2014 -- Provider    Joint pain -- -- Provider    Keloid cicatrix -- -- Provider    Long term (current) use of anticoagulants 4/15/2014 -- Provider    Mixed hyperlipidemia 1/4/2016 -- Provider    Multiple lung nodules 8/30/2016 -- Provider    Obesity, Class I, BMI 30-34.9 7/24/2012 -- Provider    On home oxygen therapy 6/22/2013 3L NC Provider    Paroxysmal atrial fibrillation 1/4/2016 -- Provider    Renovascular hypertension 1/4/2016 -- Provider    Right-sided heart failure  10/26/2014  1 - Normal left ventricular systolic function (EF 65-70%).    2 - Biatrial enlargement.    3 - Right ventricular enlargement with normal systolic function.    4 - Trivial aortic stenosis, MARY = 1.87 cm2, peak velocity = 1.7 m/s, mean gradient = 6.0 mmHg.    5 - The mitral valve is markedly sclerotic with moderately restricted leaflet mobility.    6 - Mild mitral stenosis, MVA = 2.2 cm2.    7 - Trivial to mild mitral regurgitation.    8 - Trivial to mild tricuspid regurgitation.    9 - Increased central venous pressure.    10 - Pulmonary hypertension. The estimated PA systolic pressure is 63 mmHg.  Provider    Secondary hyperparathyroidism 6/23/2016 -- Provider    Secondary pulmonary hypertension 3/22/2017 -- Provider    Stroke -- -- Provider    Thyroid disease -- -- Provider    Type 2 diabetes mellitus with stage 4 chronic kidney disease 2/1/2016 -- Provider    Vitamin D deficiency disease 7/24/2012 -- Provider          Pertinent Negatives     Diagnosis Date Noted Comments Source    Amblyopia 8/4/2014 -- Provider    Cataract 8/4/2014 -- Provider    Glaucoma 8/4/2014 -- Provider    Macular degeneration 8/4/2014 -- Provider    Retinal detachment 8/4/2014 -- Provider    Strabismus 8/4/2014 -- Provider    Uveitis 8/4/2014 -- Provider                  Surgical as of 10/23/2017     Past Surgical History     Procedure Laterality Date Comments Source    HYSTERECTOMY -- -- -- Provider    ANKLE SURGERY -- -- -- Provider    ABDOMINAL SURGERY -- -- -- Provider    CATARACT EXTRACTION -- 8/17/00 right eye Provider    CATARACT EXTRACTION -- 6/26/00 left eye Provider    COLONOSCOPY -- -- -- Provider                  Family as of 10/23/2017     Problem Relation Name Age of Onset Comments Source    Diabetes Mother -- -- -- Provider    Stroke Mother -- -- -- Provider    Hypertension Mother -- -- -- Provider    Melanoma Mother -- -- -- Provider    COPD Sister -- -- -- Provider    Stroke Sister -- -- -- Provider    Diabetes  Sister -- -- -- Provider    Hypertension Sister -- -- -- Provider    COPD Brother -- -- -- Provider    Diabetes Brother -- -- -- Provider    Hypertension Brother -- -- -- Provider    Heart disease Maternal Grandfather -- -- -- Provider    No Known Problems Father -- -- -- Provider    No Known Problems Maternal Aunt -- -- -- Provider    No Known Problems Maternal Uncle -- -- -- Provider    No Known Problems Paternal Aunt -- -- -- Provider    Cancer Paternal Uncle -- -- -- Provider    No Known Problems Maternal Grandmother -- -- -- Provider    No Known Problems Paternal Grandmother -- -- -- Provider    No Known Problems Paternal Grandfather -- -- -- Provider    Psoriasis Neg Hx -- -- -- Provider    Lupus Neg Hx -- -- -- Provider    Eczema Neg Hx -- -- -- Provider    Kidney disease Neg Hx -- -- -- Provider    Heart attack Neg Hx -- -- -- Provider    Amblyopia Neg Hx -- -- -- Provider    Blindness Neg Hx -- -- -- Provider    Cataracts Neg Hx -- -- -- Provider    Glaucoma Neg Hx -- -- -- Provider    Macular degeneration Neg Hx -- -- -- Provider    Retinal detachment Neg Hx -- -- -- Provider    Strabismus Neg Hx -- -- -- Provider    Thyroid disease Neg Hx -- -- -- Provider            Tobacco Use as of 10/23/2017     Smoking Status Smoking Start Date Smoking Quit Date Packs/day Years Used    Light Tobacco Smoker -- 2/15/2016 0.50 20.00    Types Comments Smokeless Tobacco Status Smokeless Tobacco Quit Date Source     Cigarettes  on Smoking program Never Used -- Provider            Alcohol Use as of 10/23/2017     Alcohol Use Drinks/Week Alcohol/Week Comments Source    No 0 Standard drinks or equivalent 0.0 oz -- Provider            Drug Use as of 10/23/2017     Drug Use Types Frequency Comments Source    No -- -- -- Provider            Sexual Activity as of 10/23/2017     Sexually Active Birth Control Partners Comments Source    Not Asked -- -- -- Provider            Activities of Daily Living as of 10/23/2017      Activities of Daily Living Question Response Comments Source    Are you pregnant or think you may be? No -- Provider    Breast-feeding No -- Provider            Social Documentation as of 10/23/2017     - has a daughter who she is currently lives with  Source: Provider           Occupational as of 10/23/2017     Occupation Employer Comments Source    Retired  -- -- Provider            Socioeconomic as of 10/23/2017     Marital Status Spouse Name Number of Children Years Education Preferred Language Ethnicity Race Source     -- -- -- English /White White Provider         Pertinent History Q A Comments    as of 10/23/2017 Lives with      Place in Birth Order      Lives in      Number of Siblings      Raised by      Legal Involvement      Childhood Trauma      Criminal History of      Financial Status      Highest Level of Education      Does patient have access to a firearm?       Service      Primary Leisure Activity      Spirituality       Past Medical History:   Diagnosis Date    Anemia in chronic kidney disease(285.21) 3/21/2017    Anxiety     Atherosclerotic cerebrovascular disease 7/25/2012    CHF (congestive heart failure)     Chronic respiratory failure with hypoxia 8/11/2014    CKD (chronic kidney disease), stage IV 6/23/2016    COPD (chronic obstructive pulmonary disease)     CRLD (chronic restrictive lung disease) 8/11/2014    Diabetic peripheral neuropathy 7/24/2012    Diabetic retinopathy     Diverticulosis of colon     DJD (degenerative joint disease) 7/24/2012    Fatty liver     SUSAN (generalized anxiety disorder) 11/15/2012    Heart attack     History of PSVT (paroxysmal supraventricular tachycardia) 4/1/2014    Cardioverted on 2008     Iron deficiency 11/14/2014    Joint pain     Keloid cicatrix     Long term (current) use of anticoagulants 4/15/2014    Mixed hyperlipidemia 1/4/2016    Multiple lung nodules 8/30/2016    Obesity, Class I, BMI  30-34.9 7/24/2012    On home oxygen therapy 6/22/2013    3L NC    Paroxysmal atrial fibrillation 1/4/2016    Renovascular hypertension 1/4/2016    Right-sided heart failure 10/26/2014     1 - Normal left ventricular systolic function (EF 65-70%).    2 - Biatrial enlargement.    3 - Right ventricular enlargement with normal systolic function.    4 - Trivial aortic stenosis, MARY = 1.87 cm2, peak velocity = 1.7 m/s, mean gradient = 6.0 mmHg.    5 - The mitral valve is markedly sclerotic with moderately restricted leaflet mobility.    6 - Mild mitral stenosis, MVA = 2.2 cm2.    7 - Trivial to mild mitral regurgitation.    8 - Trivial to mild tricuspid regurgitation.    9 - Increased central venous pressure.    10 - Pulmonary hypertension. The estimated PA systolic pressure is 63 mmHg.     Secondary hyperparathyroidism 6/23/2016    Secondary pulmonary hypertension 3/22/2017    Stroke     Thyroid disease     Type 2 diabetes mellitus with stage 4 chronic kidney disease 2/1/2016    Vitamin D deficiency disease 7/24/2012     Past Surgical History:   Procedure Laterality Date    ABDOMINAL SURGERY      ANKLE SURGERY      CATARACT EXTRACTION  8/17/00    right eye    CATARACT EXTRACTION  6/26/00    left eye    COLONOSCOPY      HYSTERECTOMY       Family History     Problem Relation (Age of Onset)    COPD Sister, Brother    Cancer Paternal Uncle    Diabetes Mother, Sister, Brother    Heart disease Maternal Grandfather    Hypertension Mother, Sister, Brother    Melanoma Mother    No Known Problems Father, Maternal Aunt, Maternal Uncle, Paternal Aunt, Maternal Grandmother, Paternal Grandmother, Paternal Grandfather    Stroke Mother, Sister        Social History Main Topics    Smoking status: Light Tobacco Smoker     Packs/day: 0.50     Years: 20.00     Types: Cigarettes     Last attempt to quit: 2/15/2016    Smokeless tobacco: Never Used      Comment:  on Smoking program    Alcohol use No    Drug use: No     "Sexual activity: Not on file     Review of patient's allergies indicates:   Allergen Reactions    Latex, natural rubber Dermatitis and Rash    Amoxicillin      "They say that but they've given it to me and nothing happens."     Adhesive Itching and Rash     Allergy to adhesive in nicotine patch.       No current facility-administered medications on file prior to encounter.      Current Outpatient Prescriptions on File Prior to Encounter   Medication Sig    alprazolam (XANAX) 0.5 MG tablet TAKE 1 TABLET BY MOUTH 3 TIMES A DAY (Patient taking differently: 0.25 mg 3 (three) times daily. TAKE 1 TABLET BY MOUTH 3 TIMES A DAY)    diltiaZEM (CARDIZEM CD) 240 MG 24 hr capsule Take 1 capsule (240 mg total) by mouth once daily.    FERROUS SULFATE ORAL Take 1 tablet by mouth once daily.    fluticasone-salmeterol 500-50 mcg/dose (ADVAIR DISKUS) 500-50 mcg/dose DsDv diskus inhaler Inhale 1 puff into the lungs 2 (two) times daily. Controller    furosemide (LASIX) 20 MG tablet take 1 tablet by mouth twice a day    levothyroxine (SYNTHROID) 75 MCG tablet take 1 tablet by mouth every morning ON AN EMPTY STOMACH    lisinopril (PRINIVIL,ZESTRIL) 5 MG tablet TAKE 1 TABLET BY MOUTH DAILY FOR HIGH BLOOD PRESSURE    metoprolol tartrate (LOPRESSOR) 25 MG tablet take 1 tablet by mouth twice a day    albuterol (PROAIR HFA) 90 mcg/actuation inhaler Inhale 2 puffs into the lungs every 6 (six) hours as needed for Wheezing or Shortness of Breath.    albuterol-ipratropium 2.5mg-0.5mg/3mL (DUO-NEB) 0.5 mg-3 mg(2.5 mg base)/3 mL nebulizer solution Take 3 mLs by nebulization every 4 (four) hours as needed for Wheezing. Rescue    cetirizine (ZYRTEC) 10 MG tablet Take 1 tablet (10 mg total) by mouth once daily.    ergocalciferol (ERGOCALCIFEROL) 50,000 unit Cap Take 1 capsule (50,000 Units total) by mouth every 7 days.    fluticasone (FLONASE) 50 mcg/actuation nasal spray 1 spray by Each Nare route once daily.    insulin aspart " "(NOVOLOG FLEXPEN) 100 unit/mL InPn pen 26 units AC    insulin detemir (LEVEMIR FLEXTOUCH) 100 unit/mL (3 mL) SubQ InPn pen Inject 26 Units into the skin every evening.    ondansetron (ZOFRAN) 4 MG tablet Take 1 tablet (4 mg total) by mouth every 8 (eight) hours as needed for Nausea.    pen needle, diabetic, safety (NOVOFINE AUTOCOVER) 30 gauge x 1/3" Ndle Uses 4 a day    pravastatin (PRAVACHOL) 40 MG tablet take 1 tablet by mouth once daily    triamcinolone acetonide 0.1% (KENALOG) 0.1 % ointment Apply topically 2 (two) times daily.    warfarin (COUMADIN) 2.5 MG tablet Take 2.5 mg by mouth once daily. Except take 5 mg on Wed     Psychotherapeutics     Start     Stop Route Frequency Ordered    10/23/17 0101  haloperidol lactate injection 2 mg      -- IV Every 6 hours PRN 10/23/17 0102        Review of Systems   Unable to perform ROS: Mental status change     Strengths and Liabilities: Liability: Patient has poor judgment    Objective:     Vital Signs (Most Recent):  Temp: 97.8 °F (36.6 °C) (10/23/17 1100)  Pulse: 104 (10/23/17 1300)  Resp: (!) 28 (10/23/17 1300)  BP: (!) 165/79 (10/23/17 1300)  SpO2: 96 % (10/23/17 1300) Vital Signs (24h Range):  Temp:  [97.6 °F (36.4 °C)-98.3 °F (36.8 °C)] 97.8 °F (36.6 °C)  Pulse:  [] 104  Resp:  [24-52] 28  SpO2:  [92 %-97 %] 96 %  BP: (149-187)/() 165/79     Height: 5' (152.4 cm)  Weight: 83.5 kg (184 lb 1.4 oz)  Body mass index is 35.95 kg/m².      Intake/Output Summary (Last 24 hours) at 10/23/17 1352  Last data filed at 10/23/17 1300   Gross per 24 hour   Intake           408.18 ml   Output             3460 ml   Net         -3051.82 ml       Physical Exam     Significant Labs:   Last 24 Hours:   Recent Lab Results       10/23/17  1214 10/23/17  0537 10/23/17  0150 10/23/17  0143 10/22/17  2313      Allens Test   Pass       Baso #    0.01      Basophil%    0.1      Site   RR       DelSys   CPAP/BiPAP       Differential Method    Automated      Eos #    0.0   "    Eosinophil%    0.0      EP   5       FiO2   0.50       Gran #    13.5(H)      Gran%    90.9(H)      Hematocrit    39.6      Hemoglobin    13.3      Coumadin Monitoring INR    3.6  Comment:  Coumadin Therapy:  2.0 - 3.0 for INR for all indicators except mechanical heart valves  and antiphospholipid syndromes which should use 2.5 - 3.5.  (H)      IP   12       Lymph #    0.8(L)      Lymph%    5.3(L)      MCH    32.8(H)      MCHC    33.6      MCV    98      Min Vol   14.6       Mode   BiPAP       Mono #    0.6      Mono%    3.7(L)      MPV    11.3      Platelets    160      POC BE   6       POC HCO3   30.8(H)       POC PCO2   45.8(H)       POC PH   7.436       POC PO2   60(L)       POC SATURATED O2   91(L)       POC TCO2   32(H)       POCT Glucose 203(H) 224(H)   211(H)     Protime    36.4(H)      Rate   12       RBC    4.06      RDW    14.2      Sample   ARTERIAL       Sp02   92       WBC    14.81(H)                  10/22/17  1800      Allens Test      Baso #      Basophil%      Site      DelSys      Differential Method      Eos #      Eosinophil%      EP      FiO2      Gran #      Gran%      Hematocrit      Hemoglobin      Coumadin Monitoring INR      IP      Lymph #      Lymph%      MCH      MCHC      MCV      Min Vol      Mode      Mono #      Mono%      MPV      Platelets      POC BE      POC HCO3      POC PCO2      POC PH      POC PO2      POC SATURATED O2      POC TCO2      POCT Glucose 227(H)     Protime      Rate      RBC      RDW      Sample      Sp02      WBC          All pertinent labs within the past 24 hours have been reviewed.    Significant Imaging: I have reviewed all pertinent imaging results/findings within the past 24 hours.    Assessment/Plan:     Delirium due to another medical condition    Patient's symptoms are consistent with a delirium of unknown etiology.  She is seeing small children and animals in the room.  Also with pinpoint pupils and a dark room.  She is noted to be picking at her  bedsheets and pulling at the lines.  These are all indicative of a delirium of sorts.  Best form of treatment for delirium would be to treat the underlying causes.  There are likely multiple causes in her case.  Most likely is hypoxic delirium.  Another option would be embolic event of some sort.  Also would consider sudden stoppage of her Xanax.  She may no longer be in autonomic withdrawals but may have prolonged confusion and misperception of reality.  Avoid benzos and anticholinergics if at all possible.  Try to keep awake during the day with lights on, shades up, and stimulation (TV, talking).  Allow her to rest at night.  Agree with treatment for her confusion.  Haldol is a good option but note that her QTc interval increased from 400ms to 486ms.  Would recommend for her to try quetiapine 25mg nightly for a couple nights to see if this helps.  If not or unable to tolerate PO, utilize olanzapine 5mg IM nightly.  These atypical antipsychotics won't have as significant effect (but still can) on her QTc interval.  Recheck EKG every couple days for monitoring purposes.  Can still utilize Haldol up to 2mg IM every few hours as needed for non-redirectable behaviors.  Continues to utilize restraints for patient safety.  I will inform her treating psychiatrist of her hospitalization.          SUSAN (generalized anxiety disorder)    We will resume care of this after she improves medically.             Shen Vieira MD   Psychiatry  [facility]

## 2017-10-23 NOTE — HPI
"Patient Savita Polo presented to the hospital with respiratory failure after a significant recent course of hospitalizations.  She was admitted to the hospital from [facility] inpatient [facility] facility.  She initially presented with shortness of breath which progressed overnight of admit and evolved into full code.  She was revived and sent to ICU for further care and monitoring.  She is having trouble maintaining her oxygen saturation and if now on BIPAP.  She is also in restraints because she is frequently found picking at the bedsheets and pulling out lines.  She is somewhat redirectable but also noted to be irritable.  I am able to have the BIPAP removed for a short conversation but she is a very difficult historian.  She is oriented to name and "[facility]" only.  She says that she is on the [facility] and is not able to tell me the month or year.  She is not sure why she is in the hospital.  She is not able to tell me where she lives, with whom she lives, or where she is from.  She is noted to be having trouble breathing, so BIPAP is resumed.  During our conversation, she is irritable and at the end says, "I am done with you".  Nursing reports that she is seeing little children and dogs running around the room.  She told me that she has a dog hiding under the bed.    Review of her chart shows that she is followed by outpatient psychiatry for Generalized Anxiety Disorder by Dr. Kruger at [facility].  Her prescriptions include Xanax 0.5mg TID and Abilify 5mg daily.  Unknown compliance or if she was properly weaned off of Xanax at her hospitalization at [facility].  "

## 2017-10-23 NOTE — PROGRESS NOTES
[facility]  Adult Nutrition  Consult Note    SUMMARY     Recommendations    Recommendation/Intervention:   1. SLP exam as able       - advance diet as able to Cardiac.  2. Consider boost plus bid with thickner to aid with kcal/pro intake   3. RD to monitor    Goals: Meet 85% EEN  Nutrition Goal Status: progressing towards goal  Communication of RD Recs: discussed on rounds    Continuum of Care Plan    Referral to Outpatient Services:  (D/C planning: Too soon to determine)    Reason for Assessment    Reason for Assessment: RD follow-up  Diagnosis:  (Resp. Distress)  Relevent Medical History: CVA, CHF, CKD, COPD, DM, Diverticulitis   Interdisciplinary Rounds: attended     General Information Comments: Patient extubated but remains on Bipap. Diet advanced to pureed with honey thick liquids and put on HFNC to eat and take meds. Unable to do SLP exam at this time. Tolerated 50% of meal. Patient disoriented and hallucinating.      Nutrition Prescription Ordered    Current Diet Order: Pureed  Nutrition Order Comments: honey thick liquids  Current Nutrition Support Formula Ordered:  (Peptamen Bariatric)  Current Nutrition Support Rate Ordered:  (d/c)  Current Nutrition Support Frequency Ordered: ml/hr        Evaluation of Received Nutrients/Fluid Intake     I/O: 645/2770      Tolerance: tolerating  % Intake of Estimated Energy Needs: 50-75%}  % Meal Intake: 50%     Nutrition Risk Screen     Nutrition Risk Screen: no indicators present    Nutrition/Diet History     Food Preferences: MILY   Factors Affecting Nutritional Intake: impaired cognitive status/motor control (Bipap)     Labs/Tests/Procedures/Meds     Pertinent Labs Reviewed: reviewed   Pertinent Medications Reviewed: reviewed     Physical Findings    Overall Physical Appearance: obese, on oxygen therapy  Tubes:  (-)   Skin: intact (Mynor 19)    Anthropometrics    Temp: 97.9 °F (36.6 °C)     Height: 5' (152.4 cm)  Weight Method: Bed Scale  Weight:  83.5 kg (184 lb 1.4 oz)     Ideal Body Weight (IBW), Female: 100 lb     % Ideal Body Weight, Female (lb): 203.27 lb  BMI (Calculated): 39.8     Estimated/Assessed Needs    Weight Used For Calorie Calculations: 83.5 kg (184 lb 1.4 oz)      Energy Calorie Requirements (kcal): 3541-9015 kcal  Energy Need Method: Bamberg-St Jeor    RMR (Bamberg-St. Jeor Equation): 1261.5    Weight Used For Protein Calculations: 83.5 kg (184 lb 1.4 oz)  Protein Requirements: 70-85 g     Fluid Need Method: RDA Method        RDA Method (mL): 1500       CHO Requirement: 200g     Assessment and Plan    Nutrition Dx: Inadequate Energy Intake r/t mechanical ventilation as evidenced by NPO status  Nutrition Diagnosis Status: Improving    Monitor and Evaluation    Food and Nutrient Intake: energy intake, food and beverage intake, enteral nutrition intake  Food and Nutrient Adminstration: diet order, enteral and parenteral nutrition administration  Knowledge/Beliefs/Attitudes: food and nutrition knowledge/skill  Physical Activity and Function: nutrition-related ADLs and IADLs  Anthropometric Measurements: weight, weight change  Biochemical Data, Medical Tests and Procedures: electrolyte and renal panel, glucose/endocrine profile  Nutrition-Focused Physical Findings: overall appearance    Nutrition Risk    Level of Risk:  (2 x week)    Nutrition Follow-Up    RD Follow-up?: Yes

## 2017-10-23 NOTE — SUBJECTIVE & OBJECTIVE
Patient History           Medical as of 10/23/2017     Past Medical History     Diagnosis Date Comments Source    Anemia in chronic kidney disease(285.21) 3/21/2017 -- Provider    Anxiety -- -- Provider    Atherosclerotic cerebrovascular disease 7/25/2012 -- Provider    CHF (congestive heart failure) -- -- Provider    Chronic respiratory failure with hypoxia 8/11/2014 -- Provider    CKD (chronic kidney disease), stage IV 6/23/2016 -- Provider    COPD (chronic obstructive pulmonary disease) -- -- Provider    CRLD (chronic restrictive lung disease) 8/11/2014 -- Provider    Diabetic peripheral neuropathy 7/24/2012 -- Provider    Diabetic retinopathy -- -- Provider    Diverticulosis of colon -- -- Provider    DJD (degenerative joint disease) 7/24/2012 -- Provider    Fatty liver -- -- Provider    SUSAN (generalized anxiety disorder) 11/15/2012 -- Provider    Heart attack -- -- Provider    History of PSVT (paroxysmal supraventricular tachycardia) 4/1/2014 Cardioverted on 2008  Provider    Iron deficiency 11/14/2014 -- Provider    Joint pain -- -- Provider    Keloid cicatrix -- -- Provider    Long term (current) use of anticoagulants 4/15/2014 -- Provider    Mixed hyperlipidemia 1/4/2016 -- Provider    Multiple lung nodules 8/30/2016 -- Provider    Obesity, Class I, BMI 30-34.9 7/24/2012 -- Provider    On home oxygen therapy 6/22/2013 3L NC Provider    Paroxysmal atrial fibrillation 1/4/2016 -- Provider    Renovascular hypertension 1/4/2016 -- Provider    Right-sided heart failure 10/26/2014  1 - Normal left ventricular systolic function (EF 65-70%).    2 - Biatrial enlargement.    3 - Right ventricular enlargement with normal systolic function.    4 - Trivial aortic stenosis, MARY = 1.87 cm2, peak velocity = 1.7 m/s, mean gradient = 6.0 mmHg.    5 - The mitral valve is markedly sclerotic with moderately restricted leaflet mobility.    6 - Mild mitral stenosis, MVA = 2.2 cm2.    7 - Trivial to mild mitral regurgitation.     8 - Trivial to mild tricuspid regurgitation.    9 - Increased central venous pressure.    10 - Pulmonary hypertension. The estimated PA systolic pressure is 63 mmHg.  Provider    Secondary hyperparathyroidism 6/23/2016 -- Provider    Secondary pulmonary hypertension 3/22/2017 -- Provider    Stroke -- -- Provider    Thyroid disease -- -- Provider    Type 2 diabetes mellitus with stage 4 chronic kidney disease 2/1/2016 -- Provider    Vitamin D deficiency disease 7/24/2012 -- Provider          Pertinent Negatives     Diagnosis Date Noted Comments Source    Amblyopia 8/4/2014 -- Provider    Cataract 8/4/2014 -- Provider    Glaucoma 8/4/2014 -- Provider    Macular degeneration 8/4/2014 -- Provider    Retinal detachment 8/4/2014 -- Provider    Strabismus 8/4/2014 -- Provider    Uveitis 8/4/2014 -- Provider                  Surgical as of 10/23/2017     Past Surgical History     Procedure Laterality Date Comments Source    HYSTERECTOMY -- -- -- Provider    ANKLE SURGERY -- -- -- Provider    ABDOMINAL SURGERY -- -- -- Provider    CATARACT EXTRACTION -- 8/17/00 right eye Provider    CATARACT EXTRACTION -- 6/26/00 left eye Provider    COLONOSCOPY -- -- -- Provider                  Family as of 10/23/2017     Problem Relation Name Age of Onset Comments Source    Diabetes Mother -- -- -- Provider    Stroke Mother -- -- -- Provider    Hypertension Mother -- -- -- Provider    Melanoma Mother -- -- -- Provider    COPD Sister -- -- -- Provider    Stroke Sister -- -- -- Provider    Diabetes Sister -- -- -- Provider    Hypertension Sister -- -- -- Provider    COPD Brother -- -- -- Provider    Diabetes Brother -- -- -- Provider    Hypertension Brother -- -- -- Provider    Heart disease Maternal Grandfather -- -- -- Provider    No Known Problems Father -- -- -- Provider    No Known Problems Maternal Aunt -- -- -- Provider    No Known Problems Maternal Uncle -- -- -- Provider    No Known Problems Paternal Aunt -- -- -- Provider     Cancer Paternal Uncle -- -- -- Provider    No Known Problems Maternal Grandmother -- -- -- Provider    No Known Problems Paternal Grandmother -- -- -- Provider    No Known Problems Paternal Grandfather -- -- -- Provider    Psoriasis Neg Hx -- -- -- Provider    Lupus Neg Hx -- -- -- Provider    Eczema Neg Hx -- -- -- Provider    Kidney disease Neg Hx -- -- -- Provider    Heart attack Neg Hx -- -- -- Provider    Amblyopia Neg Hx -- -- -- Provider    Blindness Neg Hx -- -- -- Provider    Cataracts Neg Hx -- -- -- Provider    Glaucoma Neg Hx -- -- -- Provider    Macular degeneration Neg Hx -- -- -- Provider    Retinal detachment Neg Hx -- -- -- Provider    Strabismus Neg Hx -- -- -- Provider    Thyroid disease Neg Hx -- -- -- Provider            Tobacco Use as of 10/23/2017     Smoking Status Smoking Start Date Smoking Quit Date Packs/day Years Used    Light Tobacco Smoker -- 2/15/2016 0.50 20.00    Types Comments Smokeless Tobacco Status Smokeless Tobacco Quit Date Source     Cigarettes  on Smoking program Never Used -- Provider            Alcohol Use as of 10/23/2017     Alcohol Use Drinks/Week Alcohol/Week Comments Source    No 0 Standard drinks or equivalent 0.0 oz -- Provider            Drug Use as of 10/23/2017     Drug Use Types Frequency Comments Source    No -- -- -- Provider            Sexual Activity as of 10/23/2017     Sexually Active Birth Control Partners Comments Source    Not Asked -- -- -- Provider            Activities of Daily Living as of 10/23/2017     Activities of Daily Living Question Response Comments Source    Are you pregnant or think you may be? No -- Provider    Breast-feeding No -- Provider            Social Documentation as of 10/23/2017     - has a daughter who she is currently lives with  Source: Provider           Occupational as of 10/23/2017     Occupation Employer Comments Source    Retired  -- -- Provider            Socioeconomic as of 10/23/2017     Marital Status Spouse  Name Number of Children Years Education Preferred Language Ethnicity Race Source     -- -- -- English /White White Provider         Pertinent History Q A Comments    as of 10/23/2017 Lives with      Place in Birth Order      Lives in      Number of Siblings      Raised by      Legal Involvement      Childhood Trauma      Criminal History of      Financial Status      Highest Level of Education      Does patient have access to a firearm?       Service      Primary Leisure Activity      Spirituality       Past Medical History:   Diagnosis Date    Anemia in chronic kidney disease(285.21) 3/21/2017    Anxiety     Atherosclerotic cerebrovascular disease 7/25/2012    CHF (congestive heart failure)     Chronic respiratory failure with hypoxia 8/11/2014    CKD (chronic kidney disease), stage IV 6/23/2016    COPD (chronic obstructive pulmonary disease)     CRLD (chronic restrictive lung disease) 8/11/2014    Diabetic peripheral neuropathy 7/24/2012    Diabetic retinopathy     Diverticulosis of colon     DJD (degenerative joint disease) 7/24/2012    Fatty liver     SUSAN (generalized anxiety disorder) 11/15/2012    Heart attack     History of PSVT (paroxysmal supraventricular tachycardia) 4/1/2014    Cardioverted on 2008     Iron deficiency 11/14/2014    Joint pain     Keloid cicatrix     Long term (current) use of anticoagulants 4/15/2014    Mixed hyperlipidemia 1/4/2016    Multiple lung nodules 8/30/2016    Obesity, Class I, BMI 30-34.9 7/24/2012    On home oxygen therapy 6/22/2013    3L NC    Paroxysmal atrial fibrillation 1/4/2016    Renovascular hypertension 1/4/2016    Right-sided heart failure 10/26/2014     1 - Normal left ventricular systolic function (EF 65-70%).    2 - Biatrial enlargement.    3 - Right ventricular enlargement with normal systolic function.    4 - Trivial aortic stenosis, MARY = 1.87 cm2, peak velocity = 1.7 m/s, mean gradient = 6.0 mmHg.    5 - The  "mitral valve is markedly sclerotic with moderately restricted leaflet mobility.    6 - Mild mitral stenosis, MVA = 2.2 cm2.    7 - Trivial to mild mitral regurgitation.    8 - Trivial to mild tricuspid regurgitation.    9 - Increased central venous pressure.    10 - Pulmonary hypertension. The estimated PA systolic pressure is 63 mmHg.     Secondary hyperparathyroidism 6/23/2016    Secondary pulmonary hypertension 3/22/2017    Stroke     Thyroid disease     Type 2 diabetes mellitus with stage 4 chronic kidney disease 2/1/2016    Vitamin D deficiency disease 7/24/2012     Past Surgical History:   Procedure Laterality Date    ABDOMINAL SURGERY      ANKLE SURGERY      CATARACT EXTRACTION  8/17/00    right eye    CATARACT EXTRACTION  6/26/00    left eye    COLONOSCOPY      HYSTERECTOMY       Family History     Problem Relation (Age of Onset)    COPD Sister, Brother    Cancer Paternal Uncle    Diabetes Mother, Sister, Brother    Heart disease Maternal Grandfather    Hypertension Mother, Sister, Brother    Melanoma Mother    No Known Problems Father, Maternal Aunt, Maternal Uncle, Paternal Aunt, Maternal Grandmother, Paternal Grandmother, Paternal Grandfather    Stroke Mother, Sister        Social History Main Topics    Smoking status: Light Tobacco Smoker     Packs/day: 0.50     Years: 20.00     Types: Cigarettes     Last attempt to quit: 2/15/2016    Smokeless tobacco: Never Used      Comment:  on Smoking program    Alcohol use No    Drug use: No    Sexual activity: Not on file     Review of patient's allergies indicates:   Allergen Reactions    Latex, natural rubber Dermatitis and Rash    Amoxicillin      "They say that but they've given it to me and nothing happens."     Adhesive Itching and Rash     Allergy to adhesive in nicotine patch.       No current facility-administered medications on file prior to encounter.      Current Outpatient Prescriptions on File Prior to Encounter   Medication Sig " "   alprazolam (XANAX) 0.5 MG tablet TAKE 1 TABLET BY MOUTH 3 TIMES A DAY (Patient taking differently: 0.25 mg 3 (three) times daily. TAKE 1 TABLET BY MOUTH 3 TIMES A DAY)    diltiaZEM (CARDIZEM CD) 240 MG 24 hr capsule Take 1 capsule (240 mg total) by mouth once daily.    FERROUS SULFATE ORAL Take 1 tablet by mouth once daily.    fluticasone-salmeterol 500-50 mcg/dose (ADVAIR DISKUS) 500-50 mcg/dose DsDv diskus inhaler Inhale 1 puff into the lungs 2 (two) times daily. Controller    furosemide (LASIX) 20 MG tablet take 1 tablet by mouth twice a day    levothyroxine (SYNTHROID) 75 MCG tablet take 1 tablet by mouth every morning ON AN EMPTY STOMACH    lisinopril (PRINIVIL,ZESTRIL) 5 MG tablet TAKE 1 TABLET BY MOUTH DAILY FOR HIGH BLOOD PRESSURE    metoprolol tartrate (LOPRESSOR) 25 MG tablet take 1 tablet by mouth twice a day    albuterol (PROAIR HFA) 90 mcg/actuation inhaler Inhale 2 puffs into the lungs every 6 (six) hours as needed for Wheezing or Shortness of Breath.    albuterol-ipratropium 2.5mg-0.5mg/3mL (DUO-NEB) 0.5 mg-3 mg(2.5 mg base)/3 mL nebulizer solution Take 3 mLs by nebulization every 4 (four) hours as needed for Wheezing. Rescue    cetirizine (ZYRTEC) 10 MG tablet Take 1 tablet (10 mg total) by mouth once daily.    ergocalciferol (ERGOCALCIFEROL) 50,000 unit Cap Take 1 capsule (50,000 Units total) by mouth every 7 days.    fluticasone (FLONASE) 50 mcg/actuation nasal spray 1 spray by Each Nare route once daily.    insulin aspart (NOVOLOG FLEXPEN) 100 unit/mL InPn pen 26 units AC    insulin detemir (LEVEMIR FLEXTOUCH) 100 unit/mL (3 mL) SubQ InPn pen Inject 26 Units into the skin every evening.    ondansetron (ZOFRAN) 4 MG tablet Take 1 tablet (4 mg total) by mouth every 8 (eight) hours as needed for Nausea.    pen needle, diabetic, safety (NOVOFINE AUTOCOVER) 30 gauge x 1/3" Ndle Uses 4 a day    pravastatin (PRAVACHOL) 40 MG tablet take 1 tablet by mouth once daily    triamcinolone " acetonide 0.1% (KENALOG) 0.1 % ointment Apply topically 2 (two) times daily.    warfarin (COUMADIN) 2.5 MG tablet Take 2.5 mg by mouth once daily. Except take 5 mg on Wed     Psychotherapeutics     Start     Stop Route Frequency Ordered    10/23/17 0101  haloperidol lactate injection 2 mg      -- IV Every 6 hours PRN 10/23/17 0102        Review of Systems   Unable to perform ROS: Mental status change     Strengths and Liabilities: Liability: Patient has poor judgment    Objective:     Vital Signs (Most Recent):  Temp: 97.8 °F (36.6 °C) (10/23/17 1100)  Pulse: 104 (10/23/17 1300)  Resp: (!) 28 (10/23/17 1300)  BP: (!) 165/79 (10/23/17 1300)  SpO2: 96 % (10/23/17 1300) Vital Signs (24h Range):  Temp:  [97.6 °F (36.4 °C)-98.3 °F (36.8 °C)] 97.8 °F (36.6 °C)  Pulse:  [] 104  Resp:  [24-52] 28  SpO2:  [92 %-97 %] 96 %  BP: (149-187)/() 165/79     Height: 5' (152.4 cm)  Weight: 83.5 kg (184 lb 1.4 oz)  Body mass index is 35.95 kg/m².      Intake/Output Summary (Last 24 hours) at 10/23/17 1352  Last data filed at 10/23/17 1300   Gross per 24 hour   Intake           408.18 ml   Output             3460 ml   Net         -3051.82 ml       Physical Exam     Significant Labs:   Last 24 Hours:   Recent Lab Results       10/23/17  1214 10/23/17  0537 10/23/17  0150 10/23/17  0143 10/22/17  2313      Allens Test   Pass       Baso #    0.01      Basophil%    0.1      Site   RR       DelSys   CPAP/BiPAP       Differential Method    Automated      Eos #    0.0      Eosinophil%    0.0      EP   5       FiO2   0.50       Gran #    13.5(H)      Gran%    90.9(H)      Hematocrit    39.6      Hemoglobin    13.3      Coumadin Monitoring INR    3.6  Comment:  Coumadin Therapy:  2.0 - 3.0 for INR for all indicators except mechanical heart valves  and antiphospholipid syndromes which should use 2.5 - 3.5.  (H)      IP   12       Lymph #    0.8(L)      Lymph%    5.3(L)      MCH    32.8(H)      MCHC    33.6      MCV    98      Min  Vol   14.6       Mode   BiPAP       Mono #    0.6      Mono%    3.7(L)      MPV    11.3      Platelets    160      POC BE   6       POC HCO3   30.8(H)       POC PCO2   45.8(H)       POC PH   7.436       POC PO2   60(L)       POC SATURATED O2   91(L)       POC TCO2   32(H)       POCT Glucose 203(H) 224(H)   211(H)     Protime    36.4(H)      Rate   12       RBC    4.06      RDW    14.2      Sample   ARTERIAL       Sp02   92       WBC    14.81(H)                  10/22/17  1800      Allens Test      Baso #      Basophil%      Site      DelSys      Differential Method      Eos #      Eosinophil%      EP      FiO2      Gran #      Gran%      Hematocrit      Hemoglobin      Coumadin Monitoring INR      IP      Lymph #      Lymph%      MCH      MCHC      MCV      Min Vol      Mode      Mono #      Mono%      MPV      Platelets      POC BE      POC HCO3      POC PCO2      POC PH      POC PO2      POC SATURATED O2      POC TCO2      POCT Glucose 227(H)     Protime      Rate      RBC      RDW      Sample      Sp02      WBC          All pertinent labs within the past 24 hours have been reviewed.    Significant Imaging: I have reviewed all pertinent imaging results/findings within the past 24 hours.

## 2017-10-23 NOTE — ASSESSMENT & PLAN NOTE
She's had this prior to admission but not evident up until extubation. Clearly visual and has not slept in > 2 days. Will change steroids to daily only and see if this was contributing to insomnia. Psych consulted for co-management. Might need neuro consult as well. Will follow recs

## 2017-10-23 NOTE — ASSESSMENT & PLAN NOTE
Patient's symptoms are consistent with a delirium of unknown etiology.  She is seeing small children and animals in the room.  Also with pinpoint pupils and a dark room.  She is noted to be picking at her bedsheets and pulling at the lines.  These are all indicative of a delirium of sorts.  Best form of treatment for delirium would be to treat the underlying causes.  There are likely multiple causes in her case.  Most likely is hypoxic delirium.  Another option would be embolic event of some sort.  Also would consider sudden stoppage of her Xanax.  She may no longer be in autonomic withdrawals but may have prolonged confusion and misperception of reality.  Avoid benzos and anticholinergics if at all possible.  Try to keep awake during the day with lights on, shades up, and stimulation (TV, talking).  Allow her to rest at night.  Agree with treatment for her confusion.  Haldol is a good option but note that her QTc interval increased from 400ms to 486ms.  Would recommend for her to try quetiapine 25mg nightly for a couple nights to see if this helps.  If not or unable to tolerate PO, utilize olanzapine 5mg IM nightly.  These atypical antipsychotics won't have as significant effect (but still can) on her QTc interval.  Recheck EKG every couple days for monitoring purposes.  Can still utilize Haldol up to 2mg IM every few hours as needed for non-redirectable behaviors.  Continues to utilize restraints for patient safety.  I will inform her treating psychiatrist of her hospitalization.

## 2017-10-23 NOTE — CONSULTS
Consult Note  Palliative Care      Consult Requested By: Odessa Lovett MD  Reason for Consult:      Goals of care and advance directives    SUBJECTIVE:     History of Present Illness:    CC:           Chief Complaint   Patient presents with    Shortness of Breath       SOB x 3 days, per EMS 88% upon arrival to [facility],  currently 93% on 4L nasal cannula          HISTORY OF PRESENT ILLNESS:      Savita Polo is a 72 y.o. female that (in part)  has a past medical history of Anemia in chronic kidney disease(285.21); Anxiety; Atherosclerotic cerebrovascular disease; CHF (congestive heart failure); Chronic respiratory failure with hypoxia; CKD (chronic kidney disease), stage IV; COPD (chronic obstructive pulmonary disease); CRLD (chronic restrictive lung disease); Diabetic peripheral neuropathy; Diabetic retinopathy; Diverticulosis of colon; DJD (degenerative joint disease); Fatty liver; SUSAN (generalized anxiety disorder); Heart attack; History of PSVT (paroxysmal supraventricular tachycardia); Iron deficiency; Joint pain; Keloid cicatrix; Long term (current) use of anticoagulants; Mixed hyperlipidemia; Multiple lung nodules; Obesity, Class I, BMI 30-34.9; On home oxygen therapy; Paroxysmal atrial fibrillation; Renovascular hypertension; Right-sided heart failure; Secondary hyperparathyroidism; Secondary pulmonary hypertension; Stroke; Thyroid disease; Type 2 diabetes mellitus with stage 4 chronic kidney disease; and Vitamin D deficiency disease. Presents to [facility] complaining of shortness of breath.  She lives and [facility] nursing home.  She is a poorly 93% on 4 L by nasal cannula at her residence.  She is on chronic oxygen has had multiple admissions for respiratory failure.  Worsened with exertion.  No relief given with supplemental oxygen.  Located in the chest.  Moderate to high intensity.  No radiation.  Subacute onset with progressive worsening.  Constant  duration.     In the emergency department chest x-ray, ABG, and routine laboratory studies were obtained.  There was evidence of hypoxia and hypercapnia.  She also had evidence of urinary tract infection.     Hospital medicine has been asked to admit for further evaluation and treatment.    Patient was admitted with acuate on chronic hypoxic hypercapnic respiratory failure, , UTI, DM with CKD 4, right sided heart failure, PAF, generalized anxiety, mixed hyperlipidemia, smoker.      Of note, patient had CODE BLUE/PEA arrest 10/13/17, apnea with cyanosis, pulseless, was coded with Epi x 1, ROSC about 9 minutes, intubated, and sent to ICU.         Hospital Course:  Ms Polo presented with acute on chronic respiratory failure with hypoxemia secondary to pneumonia. Soon after admission patient had a cardiac arrest. Was intubated and transferred to ICU. Pulmonary process thought to be a combination of pneumonia and ARDS, as she still decompensated despite diuresis and BiPAP on admission. Treated with steroids and duo-nebs while intubated. UCx with > 100,000 Klebsiella pneumoniae. Blood cultures negative and respiratory culture with no growth although moderate WBCs. Treated with meropenem (see allergy list) for total of 10 days. Extubated to BiPAP on 10/19. Attempted to wean to high flow, but not tolerate for long. Developed vivid visual hallucinations and insomnia, complicating clinical course. Despite this, she was able to initiate a pureed diet with honey thickened fluids while on high flow, but then placed on BiPAP when not eating. Intermittent diuresis. Persistently in AFib with RVR. On amiodarone and BB. On full anticoagulation with coumadin, which was held on 10/23 for INR > 3. Psychiatry consulted for delirium and insomnia. Prognosis is poor overall and unlikely to achieve meaningful recovery despite adequate medical treatment. Palliative care consulted for goals of care. Patient is full code      Interval  History: Still with insomnia and visual hallucinations. Tolerated diet well yesterday. Is back on BiPAP when not eating. Afebrile.             Past Medical History:   Diagnosis Date    Anemia in chronic kidney disease(285.21) 3/21/2017    Anxiety     Atherosclerotic cerebrovascular disease 7/25/2012    CHF (congestive heart failure)     Chronic respiratory failure with hypoxia 8/11/2014    CKD (chronic kidney disease), stage IV 6/23/2016    COPD (chronic obstructive pulmonary disease)     CRLD (chronic restrictive lung disease) 8/11/2014    Diabetic peripheral neuropathy 7/24/2012    Diabetic retinopathy     Diverticulosis of colon     DJD (degenerative joint disease) 7/24/2012    Fatty liver     SUSAN (generalized anxiety disorder) 11/15/2012    Heart attack     History of PSVT (paroxysmal supraventricular tachycardia) 4/1/2014    Cardioverted on 2008     Iron deficiency 11/14/2014    Joint pain     Keloid cicatrix     Long term (current) use of anticoagulants 4/15/2014    Mixed hyperlipidemia 1/4/2016    Multiple lung nodules 8/30/2016    Obesity, Class I, BMI 30-34.9 7/24/2012    On home oxygen therapy 6/22/2013    3L NC    Paroxysmal atrial fibrillation 1/4/2016    Renovascular hypertension 1/4/2016    Right-sided heart failure 10/26/2014     1 - Normal left ventricular systolic function (EF 65-70%).    2 - Biatrial enlargement.    3 - Right ventricular enlargement with normal systolic function.    4 - Trivial aortic stenosis, MARY = 1.87 cm2, peak velocity = 1.7 m/s, mean gradient = 6.0 mmHg.    5 - The mitral valve is markedly sclerotic with moderately restricted leaflet mobility.    6 - Mild mitral stenosis, MVA = 2.2 cm2.    7 - Trivial to mild mitral regurgitation.    8 - Trivial to mild tricuspid regurgitation.    9 - Increased central venous pressure.    10 - Pulmonary hypertension. The estimated PA systolic pressure is 63 mmHg.     Secondary hyperparathyroidism 6/23/2016     Secondary pulmonary hypertension 3/22/2017    Stroke     Thyroid disease     Type 2 diabetes mellitus with stage 4 chronic kidney disease 2016    Vitamin D deficiency disease 2012     Past Surgical History:   Procedure Laterality Date    ABDOMINAL SURGERY      ANKLE SURGERY      CATARACT EXTRACTION  00    right eye    CATARACT EXTRACTION  00    left eye    COLONOSCOPY      HYSTERECTOMY       Family History   Problem Relation Age of Onset    Diabetes Mother     Stroke Mother     Hypertension Mother     Melanoma Mother     COPD Sister     Stroke Sister     Diabetes Sister     Hypertension Sister     COPD Brother     Diabetes Brother     Hypertension Brother     Heart disease Maternal Grandfather     No Known Problems Father     No Known Problems Maternal Aunt     No Known Problems Maternal Uncle     No Known Problems Paternal Aunt     Cancer Paternal Uncle     No Known Problems Maternal Grandmother     No Known Problems Paternal Grandmother     No Known Problems Paternal Grandfather     Psoriasis Neg Hx     Lupus Neg Hx     Eczema Neg Hx     Kidney disease Neg Hx     Heart attack Neg Hx     Amblyopia Neg Hx     Blindness Neg Hx     Cataracts Neg Hx     Glaucoma Neg Hx     Macular degeneration Neg Hx     Retinal detachment Neg Hx     Strabismus Neg Hx     Thyroid disease Neg Hx      Social History   Substance Use Topics    Smoking status: Light Tobacco Smoker     Packs/day: 0.50     Years: 20.00     Types: Cigarettes     Last attempt to quit: 2/15/2016    Smokeless tobacco: Never Used      Comment:  on Smoking program    Alcohol use No       Mental Status:  Oriented to self (first name only) and ; disoriented to time/place/situation. Verbal to very simple questions, follows commands.  Hallucinating (visual).      Palliative Performance Score:  30+        OBJECTIVE:     Pain Assessment/symptom management:  AMS.  Denies SOB.  Denies pain.  Hallucinations  "+.  Assessment limited by patient's AMS and inability to tolerate being off Bipap for longer than 2 minutes or so.      Decision-Making Capacity:   Patient unable to speak on her own behalf.  Disoriented, delirium, AMS.    Advanced Directives:  Living Will:  LW document dated 14 and 5-Wishes dated 5/3/14, which appear to be filled out and signed by the patient, but NO WITNESS SIGNATURES ON IT.      Do Not Resuscitate Status: FULL CODE      Medical Power of :  There is an [facility] document dated 14, in EPIC but it is INVALID BECAUSE THERE ARE NO WITNESS SIGNATURES ON IT.  However, this may help guide us as to her wishes since THE PATIENT DID SIGN THE FORM.        Living Arrangements:  Was living at home with friend Ronaldo Driscoll, after her stroke 2017, then admitted to [facility] a few days prior to admit here.      Psychosocial, Spiritual, Cultural: Patient unable to answer complex questions.     Patient's most important priorities:  Patient's biggest concerns/fears:  Previous death/end of life care history:  Patient's goals/hopes:      ASSESSMENT/PLAN:     Patient lying quietly in bed, appears older than stated age, and looks chronically ill.  Odd facial expressions, wide-eyed looking around,  at times.  She is able to state her first name, but not last, correct , but disoriented to time/place/situation.  She denies pain.  She denies feeling SOB - currently on Bipap.  Removed it briefly - maybe 2 minutes and she desats down to low 80's even with NC in place.  She is hallucinating - some nonsensical speech about "zuhou's" - which she says "they are a [place] and do things....".  Redirected to her tv show, and time/place/situation.  Otherwise she is calm and pleasant.  Bilateral soft wrist restraints on.    Respiratory effort is even and unlabored on Bipap.  Lungs are slightly coarse, diminished.  Heart tones are audible, regular, a-flutter on monitor.  Abdomen is soft, slightly " obese, nondistended and nontender with hypoactive bowel sounds.  No appreciable peripheral edema - skin from mid shins to feet is a little reddened discolored and leathery, BUE with scattered ecchymosis.      Family not at bedside and patient unable to answer complex questions, cannot tolerate being off of Bipap, altered mental status.      I spoke to her sister Vida Sandy ([phone]) by telephone and we set up a family meeting for tomorrow, Tuesday 10/24/17 at 1:00 p.m.      Plan:  Educate.  Literature.  Goals of care and code status discussion.  Support. Consult .    Recommendations:   Family meeting tomorrow 10/24/17 at 1:00 p.m., will discuss goals of care and code status.     FULL CODE STATUS for now.   Will need [place], or other valid advance directives if she is able to sign prior to discharge.     ? Neuro consult?   Consult Spiritual Care.   Palliative Care will continue to follow.    Thank you for this consult and the opportunity to participate in Mrs. Polo's care.      Discussed above with ARABELLA Cordoba.      Libby Powell, COLBYN, RN, CCRN, CHPN   Palliative Care Nurse Coordinator   [facility]  [phone]    Time Spent:  15 minutes face to face assessment; 40 minutes record review, charting, team discussion.

## 2017-10-23 NOTE — PLAN OF CARE
Pt continues to be on BIPAP. Pt does not tolerated being off BIPAP for long. Amioderone drip still going.  New 20GA PIV started in Rt Forearm. Pt continues to be confused and attempts to pull out medical devices. Pt had 1 smear of bowel movement today. Pt on pureed diabetic 1800 diet. Interventions implemented as appropriate. Pt shows no signs or symptoms of distress.

## 2017-10-23 NOTE — PLAN OF CARE
Problem: Patient Care Overview  Goal: Plan of Care Review  Outcome: Ongoing (interventions implemented as appropriate)  Pt confused, oriented to self and place only.  Restless throughout night; prn haldol ordered and administered with minimal effect.  Episode of increased work of breathing, tachypnea - ABG and chest xray done.  Bipap settings adjusted and one dose of bumex given with positive effect.  No skin breakdown or injury observed; safety precautions maintained.

## 2017-10-24 LAB
ALBUMIN SERPL BCP-MCNC: 2.6 G/DL
ANION GAP SERPL CALC-SCNC: 9 MMOL/L
BASOPHILS # BLD AUTO: 0.01 K/UL
BASOPHILS NFR BLD: 0.1 %
BUN SERPL-MCNC: 54 MG/DL
CALCIUM SERPL-MCNC: 9.2 MG/DL
CHLORIDE SERPL-SCNC: 107 MMOL/L
CO2 SERPL-SCNC: 31 MMOL/L
CREAT SERPL-MCNC: 1.2 MG/DL
DIFFERENTIAL METHOD: ABNORMAL
EOSINOPHIL # BLD AUTO: 0 K/UL
EOSINOPHIL NFR BLD: 0 %
ERYTHROCYTE [DISTWIDTH] IN BLOOD BY AUTOMATED COUNT: 14.3 %
EST. GFR  (AFRICAN AMERICAN): 52 ML/MIN/1.73 M^2
EST. GFR  (NON AFRICAN AMERICAN): 45 ML/MIN/1.73 M^2
GLUCOSE SERPL-MCNC: 207 MG/DL
HCT VFR BLD AUTO: 35.7 %
HGB BLD-MCNC: 12 G/DL
INR PPP: 5
LYMPHOCYTES # BLD AUTO: 1.6 K/UL
LYMPHOCYTES NFR BLD: 8.2 %
MCH RBC QN AUTO: 32.5 PG
MCHC RBC AUTO-ENTMCNC: 33.6 G/DL
MCV RBC AUTO: 97 FL
MONOCYTES # BLD AUTO: 0.6 K/UL
MONOCYTES NFR BLD: 3.2 %
NEUTROPHILS # BLD AUTO: 16.9 K/UL
NEUTROPHILS NFR BLD: 88.5 %
PHOSPHATE SERPL-MCNC: 2.9 MG/DL
PLATELET # BLD AUTO: 129 K/UL
PMV BLD AUTO: 10.7 FL
POCT GLUCOSE: 225 MG/DL (ref 70–110)
POCT GLUCOSE: 231 MG/DL (ref 70–110)
POCT GLUCOSE: 233 MG/DL (ref 70–110)
POCT GLUCOSE: 252 MG/DL (ref 70–110)
POTASSIUM SERPL-SCNC: 4 MMOL/L
PROTHROMBIN TIME: 49.9 SEC
RBC # BLD AUTO: 3.69 M/UL
SODIUM SERPL-SCNC: 147 MMOL/L
WBC # BLD AUTO: 19.12 K/UL

## 2017-10-24 PROCEDURE — 63600175 PHARM REV CODE 636 W HCPCS: Performed by: INTERNAL MEDICINE

## 2017-10-24 PROCEDURE — 99900035 HC TECH TIME PER 15 MIN (STAT)

## 2017-10-24 PROCEDURE — 25000003 PHARM REV CODE 250: Performed by: INTERNAL MEDICINE

## 2017-10-24 PROCEDURE — 94761 N-INVAS EAR/PLS OXIMETRY MLT: CPT

## 2017-10-24 PROCEDURE — 25000003 PHARM REV CODE 250: Performed by: HOSPITALIST

## 2017-10-24 PROCEDURE — 20000000 HC ICU ROOM

## 2017-10-24 PROCEDURE — 99231 SBSQ HOSP IP/OBS SF/LOW 25: CPT | Mod: ,,, | Performed by: PSYCHIATRY & NEUROLOGY

## 2017-10-24 PROCEDURE — C9113 INJ PANTOPRAZOLE SODIUM, VIA: HCPCS | Performed by: HOSPITALIST

## 2017-10-24 PROCEDURE — 99233 SBSQ HOSP IP/OBS HIGH 50: CPT | Mod: ,,, | Performed by: INTERNAL MEDICINE

## 2017-10-24 PROCEDURE — 25000003 PHARM REV CODE 250: Performed by: EMERGENCY MEDICINE

## 2017-10-24 PROCEDURE — 36415 COLL VENOUS BLD VENIPUNCTURE: CPT

## 2017-10-24 PROCEDURE — 94003 VENT MGMT INPAT SUBQ DAY: CPT

## 2017-10-24 PROCEDURE — 85025 COMPLETE CBC W/AUTO DIFF WBC: CPT

## 2017-10-24 PROCEDURE — 94640 AIRWAY INHALATION TREATMENT: CPT

## 2017-10-24 PROCEDURE — 63600175 PHARM REV CODE 636 W HCPCS: Performed by: HOSPITALIST

## 2017-10-24 PROCEDURE — 94660 CPAP INITIATION&MGMT: CPT

## 2017-10-24 PROCEDURE — 80069 RENAL FUNCTION PANEL: CPT

## 2017-10-24 PROCEDURE — 27000221 HC OXYGEN, UP TO 24 HOURS

## 2017-10-24 PROCEDURE — 25000242 PHARM REV CODE 250 ALT 637 W/ HCPCS: Performed by: INTERNAL MEDICINE

## 2017-10-24 PROCEDURE — 85610 PROTHROMBIN TIME: CPT

## 2017-10-24 RX ORDER — CLONIDINE 0.3 MG/24H
1 PATCH, EXTENDED RELEASE TRANSDERMAL
Status: DISCONTINUED | OUTPATIENT
Start: 2017-10-24 | End: 2017-10-27 | Stop reason: HOSPADM

## 2017-10-24 RX ADMIN — INSULIN ASPART 4 UNITS: 100 INJECTION, SOLUTION INTRAVENOUS; SUBCUTANEOUS at 11:10

## 2017-10-24 RX ADMIN — IPRATROPIUM BROMIDE AND ALBUTEROL SULFATE 3 ML: .5; 3 SOLUTION RESPIRATORY (INHALATION) at 08:10

## 2017-10-24 RX ADMIN — CHLORHEXIDINE GLUCONATE 15 ML: 1.2 RINSE ORAL at 08:10

## 2017-10-24 RX ADMIN — IPRATROPIUM BROMIDE AND ALBUTEROL SULFATE 3 ML: .5; 3 SOLUTION RESPIRATORY (INHALATION) at 01:10

## 2017-10-24 RX ADMIN — METOPROLOL TARTRATE 10 MG: 5 INJECTION INTRAVENOUS at 06:10

## 2017-10-24 RX ADMIN — CLONIDINE 1 PATCH: 0.3 PATCH TRANSDERMAL at 08:10

## 2017-10-24 RX ADMIN — HYDRALAZINE HYDROCHLORIDE: 20 INJECTION INTRAMUSCULAR; INTRAVENOUS at 10:10

## 2017-10-24 RX ADMIN — AMIODARONE HYDROCHLORIDE 200 MG: 200 TABLET ORAL at 08:10

## 2017-10-24 RX ADMIN — METHYLPREDNISOLONE SODIUM SUCCINATE 40 MG: 40 INJECTION, POWDER, FOR SOLUTION INTRAMUSCULAR; INTRAVENOUS at 08:10

## 2017-10-24 RX ADMIN — INSULIN ASPART 3 UNITS: 100 INJECTION, SOLUTION INTRAVENOUS; SUBCUTANEOUS at 11:10

## 2017-10-24 RX ADMIN — QUETIAPINE FUMARATE 25 MG: 25 TABLET, FILM COATED ORAL at 09:10

## 2017-10-24 RX ADMIN — HYDRALAZINE HYDROCHLORIDE 10 MG: 20 INJECTION INTRAMUSCULAR; INTRAVENOUS at 02:10

## 2017-10-24 RX ADMIN — CHLORHEXIDINE GLUCONATE 15 ML: 1.2 RINSE ORAL at 09:10

## 2017-10-24 RX ADMIN — HYDRALAZINE HYDROCHLORIDE 10 MG: 20 INJECTION INTRAMUSCULAR; INTRAVENOUS at 06:10

## 2017-10-24 RX ADMIN — LEVOTHYROXINE SODIUM 75 MCG: 75 TABLET ORAL at 05:10

## 2017-10-24 RX ADMIN — PANTOPRAZOLE SODIUM 40 MG: 40 INJECTION, POWDER, FOR SOLUTION INTRAVENOUS at 08:10

## 2017-10-24 RX ADMIN — INSULIN ASPART 4 UNITS: 100 INJECTION, SOLUTION INTRAVENOUS; SUBCUTANEOUS at 06:10

## 2017-10-24 RX ADMIN — METOPROLOL TARTRATE 10 MG: 5 INJECTION INTRAVENOUS at 12:10

## 2017-10-24 RX ADMIN — IPRATROPIUM BROMIDE AND ALBUTEROL SULFATE 3 ML: .5; 3 SOLUTION RESPIRATORY (INHALATION) at 12:10

## 2017-10-24 RX ADMIN — METOPROLOL TARTRATE 10 MG: 5 INJECTION INTRAVENOUS at 05:10

## 2017-10-24 RX ADMIN — PRAVASTATIN SODIUM 40 MG: 40 TABLET ORAL at 08:10

## 2017-10-24 RX ADMIN — INSULIN DETEMIR 12 UNITS: 100 INJECTION, SOLUTION SUBCUTANEOUS at 11:10

## 2017-10-24 RX ADMIN — IPRATROPIUM BROMIDE AND ALBUTEROL SULFATE 3 ML: .5; 3 SOLUTION RESPIRATORY (INHALATION) at 07:10

## 2017-10-24 RX ADMIN — METOPROLOL TARTRATE 10 MG: 5 INJECTION INTRAVENOUS at 11:10

## 2017-10-24 RX ADMIN — AMIODARONE HYDROCHLORIDE 200 MG: 200 TABLET ORAL at 09:10

## 2017-10-24 NOTE — ASSESSMENT & PLAN NOTE
Patient's symptoms are consistent with a delirium of unknown etiology.  She is seeing small children and animals in the room.  Also with pinpoint pupils and a dark room.  She is noted to be picking at her bedsheets and pulling at the lines.  These are all indicative of a delirium of sorts.  Best form of treatment for delirium would be to treat the underlying causes.  There are likely multiple causes in her case.  Most likely is hypoxic delirium.  Another option would be embolic event of some sort.  Also would consider sudden stoppage of her Xanax causing withdrawal delirium.  She may no longer be in autonomic withdrawals but may have prolonged confusion and misperception of reality.  Avoid benzos and anticholinergics if at all possible.  Try to keep awake during the day with lights on, shades up, and stimulation (TV, talking).  Allow her to rest at night.  Agree with treatment for her confusion.  Haldol is a good option but note that her QTc interval increased from 400ms to 486ms.  Would recommend for her to try quetiapine 25mg nightly for a couple nights to see if this helps.  If not or unable to tolerate PO, utilize olanzapine 5mg IM nightly.  These atypical antipsychotics won't have as significant effect (but still can) on her QTc interval.  Recheck EKG every couple days for monitoring purposes.  Can still utilize Haldol up to 2mg IM every few hours as needed for non-redirectable behaviors.  Continues to utilize restraints for patient safety.

## 2017-10-24 NOTE — ASSESSMENT & PLAN NOTE
She's had this prior to admission but not evident up until extubation. Clearly visual and has not slept in > 2 days. Will change steroids to daily only and see if this was contributing to insomnia. Psych consulted for co-management. Might need neuro consult as well. Will follow anushka,on seroquel and she slept better last night.

## 2017-10-24 NOTE — PLAN OF CARE
Problem: Patient Care Overview  Goal: Plan of Care Review  Outcome: Ongoing (interventions implemented as appropriate)  Pt remains confused, oriented to self only.  Visualizes animals in the room that are not present.  However, pt calmer tonight, able to sleep for a few hours.  Bilateral wrist restraints remain in place for patient safety as she pulls at lines and bipap.  Bipap worn throughout night.  Ferris intact with good urine output.  1 small BM overnight.  No skin breakdown or injury observed, safety precautions maintained.

## 2017-10-24 NOTE — PLAN OF CARE
10/24/17 1013   Discharge Reassessment   Assessment Type Discharge Planning Reassessment   Provided patient/caregiver education on the expected discharge date and the discharge plan No   Do you have any problems affording any of your prescribed medications? No   Discharge Plan A Skilled Nursing Facility   Discharge Plan B Rehab   Patient choice form signed by patient/caregiver No   Can the patient answer the patient profile reliably? No, cognitively impaired   How does the patient rate their overall health at the present time? Fair  (record)   Describe the patient's ability to walk at the present time. Does not walk or unable to take any steps at all   How often would a person be available to care for the patient? Occasionally   Number of comorbid conditions (as recorded on the chart) Five or more   During the past month, has the patient often been bothered by feeling down, depressed or hopeless? No  (record)   During the past month, has the patient often been bothered by little interest or pleasure in doing things? No  (record)   patient progressing in ICU. SW reviewed chart, reviewed in bed huddle. Palliative Care RN has been consulted for goals of care, has 1 pm appointment with patient sister Vida Sandy. Patient is off vent, using BiPAP except for meal. Disoriented.   SW called [facility], will obtain PASRR. Patient with dx of generalized anxiety disorder that expect will need level 2.   Will print facility list from Medicare web site for SNF and IPR to provide sister when she is visiting today. Patient does not appear to have capacity to make decisions at this time.   SW will follow in ICU and assist as needed

## 2017-10-24 NOTE — PROGRESS NOTES
PALLIATIVE CARE PROGRESS NOTE:    Brief bedside visit.  Remains on Bipap and continues to desat quickly when off.  Bedside RN reports patient was able to eat a little breakfast on 10L HFNC this morning.  Patient more oriented today to self, hospital, year (but forgets month), follows commands, denies hallucinations.  Denies SOB and denies pain.      Discussed with Dr. Clarke.  Family meeting later today at 1:00 p.m.    Libby Powell, COLBYN, RN, CCRN, PN   Palliative Care Nurse Coordinator   [facility]  [phone]

## 2017-10-24 NOTE — SUBJECTIVE & OBJECTIVE
Interval History: slightly improved per nursing    Family History     Problem Relation (Age of Onset)    COPD Sister, Brother    Cancer Paternal Uncle    Diabetes Mother, Sister, Brother    Heart disease Maternal Grandfather    Hypertension Mother, Sister, Brother    Melanoma Mother    No Known Problems Father, Maternal Aunt, Maternal Uncle, Paternal Aunt, Maternal Grandmother, Paternal Grandmother, Paternal Grandfather    Stroke Mother, Sister        Social History Main Topics    Smoking status: Light Tobacco Smoker     Packs/day: 0.50     Years: 20.00     Types: Cigarettes     Last attempt to quit: 2/15/2016    Smokeless tobacco: Never Used      Comment:  on Smoking program    Alcohol use No    Drug use: No    Sexual activity: Not on file     Psychotherapeutics     Start     Stop Route Frequency Ordered    10/23/17 2100  quetiapine tablet 25 mg      -- Oral Nightly 10/23/17 1722    10/23/17 1821  haloperidol lactate injection 2 mg      10/23 2359 IV Once as needed 10/23/17 1722    10/23/17 1819  olanzapine injection 5 mg      -- IM Nightly PRN 10/23/17 1722           Review of Systems   Unable to perform ROS: Mental status change     Objective:     Vital Signs (Most Recent):  Temp: 98.9 °F (37.2 °C) (10/24/17 1101)  Pulse: (!) 112 (10/24/17 1311)  Resp: (!) 25 (10/24/17 1311)  BP: 139/64 (10/24/17 1458)  SpO2: (!) 93 % (10/24/17 1311) Vital Signs (24h Range):  Temp:  [96.9 °F (36.1 °C)-98.9 °F (37.2 °C)] 98.9 °F (37.2 °C)  Pulse:  [] 112  Resp:  [20-75] 25  SpO2:  [87 %-98 %] 93 %  BP: (132-180)/() 139/64     Height: 5' (152.4 cm)  Weight: 83.5 kg (184 lb 1.4 oz)  Body mass index is 35.95 kg/m².      Intake/Output Summary (Last 24 hours) at 10/24/17 1521  Last data filed at 10/24/17 0900   Gross per 24 hour   Intake               15 ml   Output             1342 ml   Net            -1327 ml       Physical Exam   Psychiatric:   EXAMINATION    CONSTITUTIONAL  General Appearance: hospital attire;  BIPAP; sleeping    MUSCULOSKELETAL  Muscle Strength and Tone: weakened  Abnormal Involuntary Movements: none noted  Gait and Station: not observed    PSYCHIATRIC MENTAL STATUS EXAM   Level of Consciousness: sleeping  Orientation: none  Grooming: poor  Psychomotor Behavior: sedated  Speech: none  Language: not observed  Mood: not obtained  Affect: flat  Thought Process: none  Associations: not done  Thought Content: unable to obtain  Memory: limited  Attention: limited  Fund of Knowledge: limited  Insight: poor to situation  Judgment: poor to care         Significant Labs:   Last 24 Hours:   Recent Lab Results       10/24/17  1148 10/24/17  0609 10/24/17  0211 10/23/17  1801      Albumin   2.6(L)      Anion Gap   9      Baso #   0.01      Basophil%   0.1      BUN, Bld   54(H)      Calcium   9.2      Chloride   107      CO2   31(H)      Creatinine   1.2      Differential Method   Automated      eGFR if    52(A)      eGFR if non    45  Comment:  Calculation used to obtain the estimated glomerular filtration  rate (eGFR) is the CKD-EPI equation. Since race is unknown   in our information system, the eGFR values for   -American and Non--American patients are given   for each creatinine result.  (A)      Eos #   0.0      Eosinophil%   0.0      Glucose   207(H)      Gran #   16.9(H)      Gran%   88.5(H)      Hematocrit   35.7(L)      Hemoglobin   12.0      Coumadin Monitoring INR   5.0  Comment:  Coumadin Therapy:  2.0 - 3.0 for INR for all indicators except mechanical heart valves  and antiphospholipid syndromes which should use 2.5 - 3.5.  INR critical result(s) called and verbal readback obtained from   RAYMUNDO KELLER, 10/24/2017 03:15  (HH)      Lymph #   1.6      Lymph%   8.2(L)      MCH   32.5(H)      MCHC   33.6      MCV   97      Mono #   0.6      Mono%   3.2(L)      MPV   10.7      Phosphorus   2.9      Platelets   129(L)      POCT Glucose 231(H) 225(H)  229(H)      Potassium   4.0      Protime   49.9(H)      RBC   3.69(L)      RDW   14.3      Sodium   147(H)      WBC   19.12(H)          All pertinent labs within the past 24 hours have been reviewed.    Significant Imaging: I have reviewed all pertinent imaging results/findings within the past 24 hours.

## 2017-10-24 NOTE — PROGRESS NOTES
"[facility]  Psychiatry  Progress Note    Patient Name: Savita Polo  MRN: [id number]   Code Status: Full Code  Admission Date: 10/12/2017  Hospital Length of Stay: 11 days  Expected Discharge Date:   Attending Physician: Angel Hernandez MD  Primary Care Provider: Nadiya Nava MD    Current Legal Status: N/A    Patient information was obtained from patient.     Subjective:     Principal Problem:Acute on chronic respiratory failure with hypoxia and hypercapnia    Chief Complaint: altered mental status    HPI: Patient Savita Polo presented to the hospital with respiratory failure after a significant recent course of hospitalizations.  She was admitted to the hospital from [facility] inpatient [facility].  She initially presented with shortness of breath which progressed overnight of admit and evolved into full code.  She was revived and sent to ICU for further care and monitoring.  She is having trouble maintaining her oxygen saturation and if now on BIPAP.  She is also in restraints because she is frequently found picking at the bedsheets and pulling out lines.  She is somewhat redirectable but also noted to be irritable.  I am able to have the BIPAP removed for a short conversation but she is a very difficult historian.  She is oriented to name and [facility].  She says that she is on the [facility] and is not able to tell me the month or year.  She is not sure why she is in the hospital.  She is not able to tell me where she lives, with whom she lives, or where she is from.  She is noted to be having trouble breathing, so BIPAP is resumed.  During our conversation, she is irritable and at the end says, "I am done with you".  Nursing reports that she is seeing little children and dogs running around the room.  She told me that she has a dog hiding under the bed.    Review of her chart shows that she is followed by outpatient psychiatry for Generalized " Anxiety Disorder by Dr. Kruger at [facility].  Her prescriptions include Xanax 0.5mg TID and Abilify 5mg daily.  Unknown compliance or if she was properly weaned off of Xanax at her hospitalization at [facility].    Hospital Course: 10/24:  Patient still with BIPAP and unable to participate in conversation.  Did get quetiapine last night and slept better.  Nursing reports that she is much calmer and somewhat better oriented today.    Interval History: slightly improved per nursing    Family History     Problem Relation (Age of Onset)    COPD Sister, Brother    Cancer Paternal Uncle    Diabetes Mother, Sister, Brother    Heart disease Maternal Grandfather    Hypertension Mother, Sister, Brother    Melanoma Mother    No Known Problems Father, Maternal Aunt, Maternal Uncle, Paternal Aunt, Maternal Grandmother, Paternal Grandmother, Paternal Grandfather    Stroke Mother, Sister        Social History Main Topics    Smoking status: Light Tobacco Smoker     Packs/day: 0.50     Years: 20.00     Types: Cigarettes     Last attempt to quit: 2/15/2016    Smokeless tobacco: Never Used      Comment:  on Smoking program    Alcohol use No    Drug use: No    Sexual activity: Not on file     Psychotherapeutics     Start     Stop Route Frequency Ordered    10/23/17 2100  quetiapine tablet 25 mg      -- Oral Nightly 10/23/17 1722    10/23/17 1821  haloperidol lactate injection 2 mg      10/23 2359 IV Once as needed 10/23/17 1722    10/23/17 1819  olanzapine injection 5 mg      -- IM Nightly PRN 10/23/17 1722           Review of Systems   Unable to perform ROS: Mental status change     Objective:     Vital Signs (Most Recent):  Temp: 98.9 °F (37.2 °C) (10/24/17 1101)  Pulse: (!) 112 (10/24/17 1311)  Resp: (!) 25 (10/24/17 1311)  BP: 139/64 (10/24/17 1458)  SpO2: (!) 93 % (10/24/17 1311) Vital Signs (24h Range):  Temp:  [96.9 °F (36.1 °C)-98.9 °F (37.2 °C)] 98.9 °F (37.2 °C)  Pulse:  [] 112  Resp:  [20-75]  25  SpO2:  [87 %-98 %] 93 %  BP: (132-180)/() 139/64     Height: 5' (152.4 cm)  Weight: 83.5 kg (184 lb 1.4 oz)  Body mass index is 35.95 kg/m².      Intake/Output Summary (Last 24 hours) at 10/24/17 1521  Last data filed at 10/24/17 0900   Gross per 24 hour   Intake               15 ml   Output             1342 ml   Net            -1327 ml       Physical Exam   Psychiatric:   EXAMINATION    CONSTITUTIONAL  General Appearance: hospital attire; BIPAP; sleeping    MUSCULOSKELETAL  Muscle Strength and Tone: weakened  Abnormal Involuntary Movements: none noted  Gait and Station: not observed    PSYCHIATRIC MENTAL STATUS EXAM   Level of Consciousness: sleeping  Orientation: none  Grooming: poor  Psychomotor Behavior: sedated  Speech: none  Language: not observed  Mood: not obtained  Affect: flat  Thought Process: none  Associations: not done  Thought Content: unable to obtain  Memory: limited  Attention: limited  Fund of Knowledge: limited  Insight: poor to situation  Judgment: poor to care         Significant Labs:   Last 24 Hours:   Recent Lab Results       10/24/17  1148 10/24/17  0609 10/24/17  0211 10/23/17  1801      Albumin   2.6(L)      Anion Gap   9      Baso #   0.01      Basophil%   0.1      BUN, Bld   54(H)      Calcium   9.2      Chloride   107      CO2   31(H)      Creatinine   1.2      Differential Method   Automated      eGFR if    52(A)      eGFR if non    45  Comment:  Calculation used to obtain the estimated glomerular filtration  rate (eGFR) is the CKD-EPI equation. Since race is unknown   in our information system, the eGFR values for   -American and Non--American patients are given   for each creatinine result.  (A)      Eos #   0.0      Eosinophil%   0.0      Glucose   207(H)      Gran #   16.9(H)      Gran%   88.5(H)      Hematocrit   35.7(L)      Hemoglobin   12.0      Coumadin Monitoring INR   5.0  Comment:  Coumadin Therapy:  2.0 - 3.0 for INR  for all indicators except mechanical heart valves  and antiphospholipid syndromes which should use 2.5 - 3.5.  INR critical result(s) called and verbal readback obtained from   RAYMUNDO HERNANDEZNADO, 10/24/2017 03:15  (HH)      Lymph #   1.6      Lymph%   8.2(L)      MCH   32.5(H)      MCHC   33.6      MCV   97      Mono #   0.6      Mono%   3.2(L)      MPV   10.7      Phosphorus   2.9      Platelets   129(L)      POCT Glucose 231(H) 225(H)  229(H)     Potassium   4.0      Protime   49.9(H)      RBC   3.69(L)      RDW   14.3      Sodium   147(H)      WBC   19.12(H)          All pertinent labs within the past 24 hours have been reviewed.    Significant Imaging: I have reviewed all pertinent imaging results/findings within the past 24 hours.    Assessment/Plan:     Delirium due to another medical condition    Patient's symptoms are consistent with a delirium of unknown etiology.  She is seeing small children and animals in the room.  Also with pinpoint pupils and a dark room.  She is noted to be picking at her bedsheets and pulling at the lines.  These are all indicative of a delirium of sorts.  Best form of treatment for delirium would be to treat the underlying causes.  There are likely multiple causes in her case.  Most likely is hypoxic delirium.  Another option would be embolic event of some sort.  Also would consider sudden stoppage of her Xanax causing withdrawal delirium.  She may no longer be in autonomic withdrawals but may have prolonged confusion and misperception of reality.  Avoid benzos and anticholinergics if at all possible.  Try to keep awake during the day with lights on, shades up, and stimulation (TV, talking).  Allow her to rest at night.  Agree with treatment for her confusion.  Haldol is a good option but note that her QTc interval increased from 400ms to 486ms.  Would recommend for her to try quetiapine 25mg nightly for a couple nights to see if this helps.  If not or unable to tolerate PO, utilize  olanzapine 5mg IM nightly.  These atypical antipsychotics won't have as significant effect (but still can) on her QTc interval.  Recheck EKG every couple days for monitoring purposes.  Can still utilize Haldol up to 2mg IM every few hours as needed for non-redirectable behaviors.  Continues to utilize restraints for patient safety.        SUSAN (generalized anxiety disorder)    We will resume care of this after she improves medically.  DO NOT RESTART BENZOS.  This will increase her risk of delirium and confusion.  She is beyond the realm of withdrawals now and restarting Xanax will not provide any benefit.             Need for Continued Hospitalization:   No need for inpatient psychiatric hospitalization. Continue medical care as per the primary team.    Anticipated Disposition: Nursing Facility    Shen Vieira MD   Psychiatry  [facility]

## 2017-10-24 NOTE — ASSESSMENT & PLAN NOTE
· Remains extubated to bipap but requires increased FIO2 => currently 70%.  · PEEP up to 8 but would titrate upward as tolerated to goal of 10-12.   · Patient is on 4L continuous oxygen at home.  · Goal SpO2 ~88%.  · Maintain net negative fluid balance as long as renal function can tolerate.  · Continue methylprednisone  · Continue nebulizer treatments.    Given severity of respiratory failure and oxygen requirements, it is appropriate to review goals of care with the patient's family.  I am worried that her present respiratory status is unlikely to reverse even with intubation/ventilatory support.

## 2017-10-24 NOTE — ASSESSMENT & PLAN NOTE
Known pulmonary hypertension at baseline that may reflect cor pulmonale from lung disease or mixed with diastolic CHF and increased central venous pressure.

## 2017-10-24 NOTE — PROGRESS NOTES
[facility] Hospital Medicine  Progress Note    Patient Name: Savita Polo  MRN: [id number]  Patient Class: IP- Inpatient   Admission Date: 10/12/2017  Length of Stay: 11 days  Attending Physician: Angel Hernandez MD  Primary Care Provider: Nadiya Nava MD        Subjective:     Principal Problem:Acute on chronic respiratory failure with hypoxia and hypercapnia    HPI:  Savita Polo is a 72 y.o. female that (in part)  has a past medical history of Anemia in chronic kidney disease(285.21); Anxiety; Atherosclerotic cerebrovascular disease; CHF (congestive heart failure); Chronic respiratory failure with hypoxia; CKD (chronic kidney disease), stage IV; COPD (chronic obstructive pulmonary disease); CRLD (chronic restrictive lung disease); Diabetic peripheral neuropathy; Diabetic retinopathy; Diverticulosis of colon; DJD (degenerative joint disease); Fatty liver; SUSAN (generalized anxiety disorder); Heart attack; History of PSVT (paroxysmal supraventricular tachycardia); Iron deficiency; Joint pain; Keloid cicatrix; Long term (current) use of anticoagulants; Mixed hyperlipidemia; Multiple lung nodules; Obesity, Class I, BMI 30-34.9; On home oxygen therapy; Paroxysmal atrial fibrillation; Renovascular hypertension; Right-sided heart failure; Secondary hyperparathyroidism; Secondary pulmonary hypertension; Stroke; Thyroid disease; Type 2 diabetes mellitus with stage 4 chronic kidney disease; and Vitamin D deficiency disease. Presents to [facility] complaining of shortness of breath.  She lives and [facility].  She is a poorly 93% on 4 L by nasal cannula at her residence.  She is on chronic oxygen has had multiple admissions for respiratory failure.  Worsened with exertion.  No relief given with supplemental oxygen.  Located in the chest.  Moderate to high intensity.  No radiation.  Subacute onset with progressive worsening.   Constant duration.    In the emergency department chest x-ray, ABG, and routine laboratory studies were obtained.  There was evidence of hypoxia and hypercapnia.  She also had evidence of urinary tract infection.    Hospital medicine has been asked to admit for further evaluation and treatment.       Hospital Course:  Ms Polo presented with acute on chronic respiratory failure with hypoxemia secondary to pneumonia. Soon after admission patient had a cardiac arrest. Was intubated and transferred to ICU. Pulmonary process thought to be a combination of pneumonia and ARDS, as she still decompensated despite diuresis and BiPAP on admission. Treated with steroids and duo-nebs while intubated. UCx with > 100,000 Klebsiella pneumoniae. Blood cultures negative and respiratory culture with no growth although moderate WBCs. Treated with meropenem (see allergy list) for total of 10 days. Extubated to BiPAP on 10/19. Attempted to wean to high flow, but not tolerate for long. Developed vivid visual hallucinations and insomnia, complicating clinical course. Despite this, she was able to initiate a pureed diet with honey thickened fluids while on high flow, but then placed on BiPAP when not eating. Intermittent diuresis. Persistently in AFib with RVR. On amiodarone and BB. On full anticoagulation with coumadin, which was held on 10/23 for INR > 3. Psychiatry consulted for delirium and insomnia. Prognosis is poor overall and unlikely to achieve meaningful recovery despite adequate medical treatment. Palliative care consulted for goals of care. Patient is full code ,family meeting with palliative care today.    Interval History: Still with visual hallucinations. Tolerated diet well yesterday. Is back on BiPAP when not eating. Afebrile.had some sleep over night.    Review of Systems   Respiratory: Positive for shortness of breath and wheezing.    Cardiovascular: Negative.    Gastrointestinal: Negative.    Neurological:  Negative.    Psychiatric/Behavioral: Positive for confusion and hallucinations.     Objective:     Vital Signs (Most Recent):  Temp: 98.2 °F (36.8 °C) (10/24/17 0400)  Pulse: 105 (10/24/17 0603)  Resp: (!) 23 (10/24/17 0603)  BP: (!) 153/72 (10/24/17 0602)  SpO2: 96 % (10/24/17 0603) Vital Signs (24h Range):  Temp:  [96.9 °F (36.1 °C)-98.2 °F (36.8 °C)] 98.2 °F (36.8 °C)  Pulse:  [] 105  Resp:  [21-75] 23  SpO2:  [90 %-99 %] 96 %  BP: (134-187)/() 153/72     Weight: 83.5 kg (184 lb 1.4 oz)  Body mass index is 35.95 kg/m².    Intake/Output Summary (Last 24 hours) at 10/24/17 0701  Last data filed at 10/24/17 0600   Gross per 24 hour   Intake           102.58 ml   Output             2685 ml   Net         -2582.42 ml      Physical Exam   Constitutional: She appears well-developed. No distress.   HENT:   BiPAP in Place   Eyes: Conjunctivae are normal. Right eye exhibits no discharge. Left eye exhibits no discharge.   Neck: Neck supple.   Cardiovascular: Normal rate, regular rhythm and normal heart sounds.    Pulmonary/Chest: Effort normal. No stridor.   Coarse BS bilaterally  Right LL expiratory wheezing   Abdominal: Soft. Bowel sounds are normal.   Musculoskeletal: She exhibits no edema.   Neurological:   Alert. Following commands but actively hallucinating.    Skin: Skin is warm and dry.   Nursing note and vitals reviewed.      Significant Labs: All pertinent labs within the past 24 hours have been reviewed.    Significant Imaging: I have reviewed all pertinent imaging results/findings within the past 24 hours.  I have reviewed and interpreted all pertinent imaging results/findings within the past 24 hours.    Assessment/Plan:      * Acute on chronic respiratory failure with hypoxia and hypercapnia    With chronic hypoxia and pulmonary hypertension, dependent on supplemental O2 at home. With Tobacco abuse. Exacerbated by underlying urinary tract infection 2/2 Klebsiella, without bacteremia. Extubated to  BiPAP on 10/19. Continue pureed diet high flow during meals and then back on BiPAP when not eating. Continue nebs every 6 hours, elevate HOB > 30 degrees and continue solumedrol 40 mg with taper. Is slow to improve, however expected given underlying chronic hypoxemic respiratory failure. Completed abx therapy for UTI. Will diurese as needed.changed BIPAP setting per pulmonology.        Delirium due to another medical condition    She's had this prior to admission but not evident up until extubation. Clearly visual and has not slept in > 2 days. Will change steroids to daily only and see if this was contributing to insomnia. Psych consulted for co-management. Might need neuro consult as well. Will follow recs,on seroquel and she slept better last night.        Oral bleeding    As above          UTI due to Klebsiella species    As above          Cardiopulmonary arrest with successful resuscitation    This happened soon after admission on 10/12. Likely secondary to significant hypoxia at the time. ROSC established post ~ 9 mins of ACLS. Neuro intact post arrest therefore hypothermia protocol not indicated           Chronic anticoagulation    INR > 3 today. Hold anticoagulants. INR in AM              Urinary tract infection with hematuria    UTI secondary to Klebsiella. No bacteremia.   Completed 10 days of meropenem on 10/22        Type 2 diabetes mellitus with stage 4 chronic kidney disease    Hyperglycemia worsened by steroids.  Glucose should improve with steroid taper.  Tolerating pureed diet with diabetic restrictions. Adjust insulin for goal < 180          Mixed hyperlipidemia                Paroxysmal atrial fibrillation    Difficult to control due to hypoxia. Transition amiodarone to PO and continue metoprolol IV at current dose. Patient is a poor candidate for cardioversion given tenuous respiratory status.           Pulmonary hypertension due to COPD    2/2 chronic tobacco abuse. Is supplemental O2 dependent.  No evidence of heart failure        Tobacco abuse    Chronic tobacco use  Tobacco cessation counseling              On home oxygen therapy    On 3-4 L at home          SUSAN (generalized anxiety disorder)    No acute issues                VTE Risk Mitigation         Ordered     Medium Risk of VTE  Once      10/13/17 0402     Place RADHA hose  Until discontinued      10/13/17 0402     Place sequential compression device  Until discontinued      10/13/17 0402          Critical care time spent on the evaluation and treatment of severe organ dysfunction, review of pertinent labs and imaging studies, discussions with consulting providers and discussions with patient/family: 30  minutes.    Angel Hernandez MD  Department of Hospital Medicine   [facility]

## 2017-10-24 NOTE — CHAPLAIN
Met with family (sister and brother-in-law) for family meeting with Palliative Medicine. Emotional and spiritual support provided.    Attempted to contact daughter of patient who is incarcerated in [facility].   not in today so daughter could not be brought to the phone.  Patient's daughter is Mariana Mason.  Telephone number for care home is [phone].

## 2017-10-24 NOTE — SUBJECTIVE & OBJECTIVE
Interval History: Still with visual hallucinations. Tolerated diet well yesterday. Is back on BiPAP when not eating. Afebrile.had some sleep over night.    Review of Systems   Respiratory: Positive for shortness of breath and wheezing.    Cardiovascular: Negative.    Gastrointestinal: Negative.    Neurological: Negative.    Psychiatric/Behavioral: Positive for confusion and hallucinations.     Objective:     Vital Signs (Most Recent):  Temp: 98.2 °F (36.8 °C) (10/24/17 0400)  Pulse: 105 (10/24/17 0603)  Resp: (!) 23 (10/24/17 0603)  BP: (!) 153/72 (10/24/17 0602)  SpO2: 96 % (10/24/17 0603) Vital Signs (24h Range):  Temp:  [96.9 °F (36.1 °C)-98.2 °F (36.8 °C)] 98.2 °F (36.8 °C)  Pulse:  [] 105  Resp:  [21-75] 23  SpO2:  [90 %-99 %] 96 %  BP: (134-187)/() 153/72     Weight: 83.5 kg (184 lb 1.4 oz)  Body mass index is 35.95 kg/m².    Intake/Output Summary (Last 24 hours) at 10/24/17 0701  Last data filed at 10/24/17 0600   Gross per 24 hour   Intake           102.58 ml   Output             2685 ml   Net         -2582.42 ml      Physical Exam   Constitutional: She appears well-developed. No distress.   HENT:   BiPAP in Place   Eyes: Conjunctivae are normal. Right eye exhibits no discharge. Left eye exhibits no discharge.   Neck: Neck supple.   Cardiovascular: Normal rate, regular rhythm and normal heart sounds.    Pulmonary/Chest: Effort normal. No stridor.   Coarse BS bilaterally  Right LL expiratory wheezing   Abdominal: Soft. Bowel sounds are normal.   Musculoskeletal: She exhibits no edema.   Neurological:   Alert. Following commands but actively hallucinating.    Skin: Skin is warm and dry.   Nursing note and vitals reviewed.      Significant Labs: All pertinent labs within the past 24 hours have been reviewed.    Significant Imaging: I have reviewed all pertinent imaging results/findings within the past 24 hours.  I have reviewed and interpreted all pertinent imaging results/findings within the past  24 hours.

## 2017-10-24 NOTE — ASSESSMENT & PLAN NOTE
· Remains extubated to bipap but requires increased FIO2.  · Consider increased expiratory pressure (PEEP) with settings something like 15/10 as goal.   · Patient is on 4L continuous oxygen at home.  · Goal O2 Sat at or above 88%.  · Maintain net negative fluid balance as long as renal function can tolerate.  · Continue methylprednisone  · Continue nebulizer treatments.

## 2017-10-24 NOTE — SUBJECTIVE & OBJECTIVE
Interval History/Significant Events:      Remains on BiPAP with high FIO2.  Findings consistent with severe delirium.    Review of Systems   Unable to perform ROS: Acuity of condition     Objective:     Vital Signs (Most Recent):  Temp: 98.1 °F (36.7 °C) (10/23/17 1500)  Pulse: 110 (10/23/17 1957)  Resp: (!) 22 (10/23/17 1957)  BP: (!) 168/79 (10/23/17 1832)  SpO2: 97 % (10/23/17 1957) Vital Signs (24h Range):  Temp:  [97.6 °F (36.4 °C)-98.3 °F (36.8 °C)] 98.1 °F (36.7 °C)  Pulse:  [] 110  Resp:  [22-52] 22  SpO2:  [92 %-99 %] 97 %  BP: (134-187)/() 168/79   Weight: 83.5 kg (184 lb 1.4 oz)  Body mass index is 35.95 kg/m².      Intake/Output Summary (Last 24 hours) at 10/23/17 2006  Last data filed at 10/23/17 1800   Gross per 24 hour   Intake           291.28 ml   Output             4065 ml   Net         -3773.72 ml       Physical Exam   Constitutional: No distress.   Cardiovascular: Normal rate, regular rhythm and normal heart sounds.  Exam reveals no gallop.    No murmur heard.  Pulmonary/Chest: She is in respiratory distress. She has no wheezes. She has no rales.   Full face BiPAP mask in place.   Abdominal: Soft. Bowel sounds are normal. She exhibits no distension.   Musculoskeletal: She exhibits no edema.   Nursing note and vitals reviewed.      Vents:  Vent Mode: NIV+ PC (10/19/17 1631)  Set Rate: 12 bmp (10/22/17 2000)  Vt Set: 380 mL (10/19/17 0801)  Pressure Support: 40 cmH20 (10/19/17 1257)  PEEP/CPAP: 5 cmH20 (10/22/17 2000)  Oxygen Concentration (%): 70 (10/23/17 1900)  Peak Airway Pressure: 12.6 cmH2O (10/22/17 2000)  Total Ve: 11 mL (10/22/17 2000)  F/VT Ratio<105 (RSBI): (!) 82.05 (10/22/17 2000)       Lines/Drains/Airways     Drain                 Urethral Catheter 10/13/17 0900 Latex 16 Fr. 10 days          Airway                 Airway 10 days          Peripheral Intravenous Line                 Peripheral IV - Single Lumen 10/21/17 1300 Left;Anterior Forearm 2 days         Peripheral  IV - Single Lumen 10/22/17 1840 Right Forearm 1 day              Significant Labs:    CBC/Anemia Profile:    Recent Labs  Lab 10/23/17  0143   WBC 14.81*   HGB 13.3   HCT 39.6      MCV 98   RDW 14.2        Chemistries:    Recent Labs  Lab 10/22/17  0931      K 5.0      CO2 21*   BUN 53*   CREATININE 1.1   CALCIUM 8.8   ALBUMIN 2.6*   PHOS 2.7       ABGs:   Recent Labs  Lab 10/23/17  0150   PH 7.436   PCO2 45.8*   HCO3 30.8*   POCSATURATED 91*   BE 6       Significant Imaging:  I have reviewed all pertinent imaging results/findings within the past 24 hours.      CXR shows bilateral alveolar/interstitial infiltrates consistent with pulmonary edema (cardiogenic vs non-cardiogenic).  Prior CT scan from 2016 reviewed and does not show changes suggesting chronic pulmonary fibrosis, so this is more likely to be acute pulmonary insult.

## 2017-10-24 NOTE — PROGRESS NOTES
[facility]  Critical Care Medicine  Progress Note    Patient Name: Savita Polo  MRN: [id number]  Admission Date: 10/12/2017  Hospital Length of Stay: 11 days  Code Status: Full Code  Attending Provider: Angel Hernandez MD  Primary Care Provider: Nadiya Nava MD   Principal Problem: Acute on chronic respiratory failure with hypoxia and hypercapnia    Subjective:     HPI:  72 year old woman with a complex medical history that includes, cerebrovascular disease, reported history of CHF, stage IV CKD, COPD, DM with neuropathy, obesity, atrial fibrillation, and COPD with a component of chronic respiratory failure on home O2.  She presented to the  ED with shortness of breath.  Prior to admission she had been treated for UTI with nitrofurantoin. In the ED she was given lasix and treated with bipap.    She was admitted to hospital medicine.  On 10/13/17 she was found unresponsive.  Per chart review a code was called and she achieved ROSC after approximately 9 min. She was transferred to ICU for further management.       Hospital/ICU Course:  10/13/17: intubated for acute on chronic respiratory failure  10/16: overnight there were report of bleeding from the oral cavity (nothing from ET tube); tube feeds held due to high residuals;  Could not tolerate sedation vacation this morning-->afib with rvr when propofol held  10/19: extubated to bipap    She remains extubated but requires FIO2 70% on BiPAP to maintain SpO2.    Interval History/Significant Events:  Remains essentially BiPAP dependent due to marked desaturation on nasal cannula.    Review of Systems   Unable to perform ROS: Other (Unable to remove full face BiPAP mask)     Objective:     Vital Signs (Most Recent):  Temp: 98.9 °F (37.2 °C) (10/24/17 1101)  Pulse: 109 (10/24/17 1200)  Resp: (!) 32 (10/24/17 1145)  BP: (!) 147/65 (10/24/17 1220)  SpO2: (!) 93 % (10/24/17 1145) Vital Signs (24h Range):  Temp:  [96.9 °F (36.1 °C)-98.9 °F  (37.2 °C)] 98.9 °F (37.2 °C)  Pulse:  [] 109  Resp:  [20-75] 32  SpO2:  [87 %-99 %] 93 %  BP: (132-180)/() 147/65   Weight: 83.5 kg (184 lb 1.4 oz)  Body mass index is 35.95 kg/m².      Intake/Output Summary (Last 24 hours) at 10/24/17 1233  Last data filed at 10/24/17 0900   Gross per 24 hour   Intake               40 ml   Output             1917 ml   Net            -1877 ml       Physical Exam   Cardiovascular: Exam reveals no gallop.    No murmur heard.  -120   Pulmonary/Chest: No respiratory distress. She has no wheezes. She has rales.   Respiratory rate ~20 on BiPAP.   Musculoskeletal: She exhibits no edema.   Psychiatric: She has a normal mood and affect.   Nursing note and vitals reviewed.      Vents:  Vent Mode: NIV+ PC (10/19/17 1631)  Set Rate: 12 bmp (10/24/17 0600)  Vt Set: 380 mL (10/19/17 0801)  Pressure Support: 40 cmH20 (10/19/17 1257)  PEEP/CPAP: 8 cmH20 (10/24/17 0600)  Oxygen Concentration (%): 70 (10/24/17 1220)  Peak Airway Pressure: 18.9 cmH2O (10/24/17 0600)  Total Ve: 12.2 mL (10/24/17 0600)  F/VT Ratio<105 (RSBI): (!) 58.46 (10/24/17 0600)     Lines/Drains/Airways     Drain                 Urethral Catheter 10/13/17 0900 Latex 16 Fr. 11 days          Airway                 Airway 11 days          Peripheral Intravenous Line                 Peripheral IV - Single Lumen 10/21/17 1300 Left;Anterior Forearm 2 days         Peripheral IV - Single Lumen 10/22/17 1840 Right Forearm 1 day              Significant Labs:    CBC/Anemia Profile:    Recent Labs  Lab 10/23/17  0143 10/24/17  0211   WBC 14.81* 19.12*   HGB 13.3 12.0   HCT 39.6 35.7*    129*   MCV 98 97   RDW 14.2 14.3        Chemistries:    Recent Labs  Lab 10/24/17  0211   *   K 4.0      CO2 31*   BUN 54*   CREATININE 1.2   CALCIUM 9.2   ALBUMIN 2.6*   PHOS 2.9       POCT Glucose:   Recent Labs  Lab 10/23/17  1801 10/24/17  0609 10/24/17  1148   POCTGLUCOSE 229* 225* 231*     All pertinent labs within  the past 24 hours have been reviewed.    Significant Imaging:  I have reviewed all pertinent imaging results/findings within the past 24 hours.  No new imaging is available.    Assessment/Plan:     Psychiatric   SUSAN (generalized anxiety disorder)    Patient has a psychiatric history.  Reasonable to restart home meds-> xanax (low dose to prevent withdrawal) and Abilify.        Pulmonary   * Acute on chronic respiratory failure with hypoxia and hypercapnia    · Remains extubated to bipap but requires increased FIO2 => currently 70%.  · PEEP up to 8 but would titrate upward as tolerated to goal of 10-12.   · Patient is on 4L continuous oxygen at home.  · Goal SpO2 ~88%.  · Maintain net negative fluid balance as long as renal function can tolerate.  · Continue methylprednisone  · Continue nebulizer treatments.    Given severity of respiratory failure and oxygen requirements, it is appropriate to review goals of care with the patient's family.  I am worried that her present respiratory status is unlikely to reverse even with intubation/ventilatory support.          Pulmonary hypertension due to COPD    Known pulmonary hypertension at baseline that may reflect cor pulmonale from lung disease or mixed with diastolic CHF and increased central venous pressure.        Cardiac/Vascular   Cardiopulmonary arrest with successful resuscitation    Remains non-focal and tolerates po intake        Paroxysmal atrial fibrillation    Atrial fibrillation--> in RVR this morning  Agree with diltiazem infusion.        Renal/   UTI due to Klebsiella species    Pan sensitive Klebsiella pneumonia UTI currently on meropenem 2/2 pcn allergy.        Urinary tract infection with hematuria    Herrera sensitive Klebsiella pneumonia UTI currently on meropenem 2/2 pcn allergy.        Hematology   Chronic anticoagulation    INR up to 5.0 today, so warfarin is being held.        Endocrine   Type 2 diabetes mellitus with stage 4 chronic kidney disease     Blood sugars in the 200's while on iv steroids.  Continue detemir insulin 12 units nightly and sliding scale insulin           Critical Care Daily Checklist:    A: Awake: RASS Goal/Actual Goal: RASS Goal: 0-->alert and calm  Actual: Chiu Agitation Sedation Scale (RASS): Alert and calm   B: Spontaneous Breathing Trial Performed?  BiPAP   C: SAT & SBT Coordinated?  Not applicable   D: Delirium: CAM-ICU Overall CAM-ICU: Negative   E: Early Mobility Performed? No   F: Feeding Goal: Goals: Meet 85% EEN  Status: Nutrition Goal Status: progressing towards goal   Current Diet Order   Procedures    Diet Pureed Diabetic 1800     Honey thickened liquids     Order Specific Question:   Additional restrictions:     Answer:   Diabetic 1800      AS: Analgesia/Sedation None   T: Thromboembolic Prophylaxis Warfarin stopped today due to INR 5.0   H: HOB > 300 No   U: Stress Ulcer Prophylaxis (if needed) Pantoprazole IV   G: Glucose Control Detemir + sliding scale insulin   B: Bowel Function Stool Occurrence: 1   I: Indwelling Catheter (Lines & Ferris) Necessity As above   D: De-escalation of Antimicrobials/Pharmacotherapies None    Plan for the day/ETD Increase expiratory pressure prn    Code Status:  Family/Goals of Care: Full Code  Family discussion re: goals of care is appropriate.        Josh Hoover MD  Critical Care Medicine  [facility]

## 2017-10-24 NOTE — ASSESSMENT & PLAN NOTE
We will resume care of this after she improves medically.  DO NOT RESTART BENZOS.  This will increase her risk of delirium and confusion.  She is beyond the realm of withdrawals now and restarting Xanax will not provide any benefit.

## 2017-10-24 NOTE — ASSESSMENT & PLAN NOTE
Blood sugars in the 200's while on iv steroids.  Continue detemir insulin 12 units nightly and sliding scale insulin

## 2017-10-24 NOTE — HOSPITAL COURSE
10/24:  Patient still with BIPAP and unable to participate in conversation.  Did get quetiapine last night and slept better.  Nursing reports that she is much calmer and somewhat better oriented today.
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
## 2017-10-24 NOTE — PROGRESS NOTES
[facility]  Critical Care Medicine  Progress Note    Patient Name: Savita Polo  MRN: [id number]  Admission Date: 10/12/2017  Hospital Length of Stay: 10 days  Code Status: Full Code  Attending Provider: Odessa Lovett MD  Primary Care Provider: Nadiya Nava MD   Principal Problem: Acute on chronic respiratory failure with hypoxia and hypercapnia    Subjective:     HPI:  72 year old woman with a complex medical history that includes, cerebrovascular disease, reported history of CHF, stage IV CKD, COPD, DM with neuropathy, obesity, atrial fibrillation, and COPD with a component of chronic respiratory failure on home O2.  She presented to the  ED with shortness of breath.  Prior to admission she had been treated for UTI with nitrofurantoin. In the ED she was given lasix and treated with bipap.    She was admitted to hospital medicine.  On 10/13/17 she was found unresponsive.  Per chart review a code was called and she achieved ROSC after approximately 9 min. She was transferred to ICU for further management.       Hospital/ICU Course:  10/13/17: intubated for acute on chronic respiratory failure  10/16: overnight there were report of bleeding from the oral cavity (nothing from ET tube); tube feeds held due to high residuals;  Could not tolerate sedation vacation this morning-->afib with rvr when propofol held  10/19: extubated to bipap    She remains extubated but requires FIO2 70% on BiPAP to maintain SpO2.    Interval History/Significant Events:      Remains on BiPAP with high FIO2.  Findings consistent with severe delirium.    Review of Systems   Unable to perform ROS: Acuity of condition     Objective:     Vital Signs (Most Recent):  Temp: 98.1 °F (36.7 °C) (10/23/17 1500)  Pulse: 110 (10/23/17 1957)  Resp: (!) 22 (10/23/17 1957)  BP: (!) 168/79 (10/23/17 1832)  SpO2: 97 % (10/23/17 1957) Vital Signs (24h Range):  Temp:  [97.6 °F (36.4 °C)-98.3 °F (36.8 °C)] 98.1 °F (36.7 °C)  Pulse:   [] 110  Resp:  [22-52] 22  SpO2:  [92 %-99 %] 97 %  BP: (134-187)/() 168/79   Weight: 83.5 kg (184 lb 1.4 oz)  Body mass index is 35.95 kg/m².      Intake/Output Summary (Last 24 hours) at 10/23/17 2006  Last data filed at 10/23/17 1800   Gross per 24 hour   Intake           291.28 ml   Output             4065 ml   Net         -3773.72 ml       Physical Exam   Constitutional: No distress.   Cardiovascular: Normal rate, regular rhythm and normal heart sounds.  Exam reveals no gallop.    No murmur heard.  Pulmonary/Chest: She is in respiratory distress. She has no wheezes. She has no rales.   Full face BiPAP mask in place.   Abdominal: Soft. Bowel sounds are normal. She exhibits no distension.   Musculoskeletal: She exhibits no edema.   Nursing note and vitals reviewed.      Vents:  Vent Mode: NIV+ PC (10/19/17 1631)  Set Rate: 12 bmp (10/22/17 2000)  Vt Set: 380 mL (10/19/17 0801)  Pressure Support: 40 cmH20 (10/19/17 1257)  PEEP/CPAP: 5 cmH20 (10/22/17 2000)  Oxygen Concentration (%): 70 (10/23/17 1900)  Peak Airway Pressure: 12.6 cmH2O (10/22/17 2000)  Total Ve: 11 mL (10/22/17 2000)  F/VT Ratio<105 (RSBI): (!) 82.05 (10/22/17 2000)       Lines/Drains/Airways     Drain                 Urethral Catheter 10/13/17 0900 Latex 16 Fr. 10 days          Airway                 Airway 10 days          Peripheral Intravenous Line                 Peripheral IV - Single Lumen 10/21/17 1300 Left;Anterior Forearm 2 days         Peripheral IV - Single Lumen 10/22/17 1840 Right Forearm 1 day              Significant Labs:    CBC/Anemia Profile:    Recent Labs  Lab 10/23/17  0143   WBC 14.81*   HGB 13.3   HCT 39.6      MCV 98   RDW 14.2        Chemistries:    Recent Labs  Lab 10/22/17  0931      K 5.0      CO2 21*   BUN 53*   CREATININE 1.1   CALCIUM 8.8   ALBUMIN 2.6*   PHOS 2.7       ABGs:   Recent Labs  Lab 10/23/17  0150   PH 7.436   PCO2 45.8*   HCO3 30.8*   POCSATURATED 91*   BE 6        Significant Imaging:  I have reviewed all pertinent imaging results/findings within the past 24 hours.      CXR shows bilateral alveolar/interstitial infiltrates consistent with pulmonary edema (cardiogenic vs non-cardiogenic).  Prior CT scan from 2016 reviewed and does not show changes suggesting chronic pulmonary fibrosis, so this is more likely to be acute pulmonary insult.    Assessment/Plan:     Psychiatric   SUSAN (generalized anxiety disorder)    Patient has a psychiatric history.  Reasonable to restart home meds-> xanax (low dose to prevent withdrawal) and Abilify.        Pulmonary   * Acute on chronic respiratory failure with hypoxia and hypercapnia    · Remains extubated to bipap but requires increased FIO2.  · Consider increased expiratory pressure (PEEP) with settings something like 15/10 as goal.   · Patient is on 4L continuous oxygen at home.  · Goal O2 Sat at or above 88%.  · Maintain net negative fluid balance as long as renal function can tolerate.  · Continue methylprednisone  · Continue nebulizer treatments.          Pulmonary hypertension due to COPD    Known pulmonary hypertension at baseline that may reflect cor pulmonale from lung disease or mixed with diastolic CHF and increased central venous pressure.        Cardiac/Vascular   Paroxysmal atrial fibrillation    Atrial fibrillation--> in RVR this morning  Agree with diltiazem infusion.        Renal/   UTI due to Klebsiella species    Pan sensitive Klebsiella pneumonia UTI currently on meropenem 2/2 pcn allergy.        Urinary tract infection with hematuria    Herrera sensitive Klebsiella pneumonia UTI currently on meropenem 2/2 pcn allergy.        Hematology   Chronic anticoagulation    Would hold further anticoagulation given reports of blood from the oral cavity.  If bleeding continues despite normalization of INR, would suggest ENT eval.         Endocrine   Type 2 diabetes mellitus with stage 4 chronic kidney disease    Blood sugars in  the 200's.  Continue sliding scale insulin           Critical Care Daily Checklist:    A: Awake: RASS Goal/Actual Goal: RASS Goal: 0-->alert and calm  Actual: Chiu Agitation Sedation Scale (RASS): Alert and calm   B: Spontaneous Breathing Trial Performed? Not applicable   C: SAT & SBT Coordinated?  Not applicable                      D: Delirium: CAM-ICU Overall CAM-ICU: Negative   E: Early Mobility Performed? No   F: Feeding Goal: Goals: Meet 85% EEN  Status: Nutrition Goal Status: progressing towards goal   Current Diet Order   Procedures    Diet Pureed Diabetic 1800     Honey thickened liquids     Order Specific Question:   Additional restrictions:     Answer:   Diabetic 1800      AS: Analgesia/Sedation Seroquel nightly and Haloperidol prn   T: Thromboembolic Prophylaxis None   H: HOB > 300 No   U: Stress Ulcer Prophylaxis (if needed) Pantoprazole   G: Glucose Control Glucoses ~200 on steroids   B: Bowel Function Stool Occurrence: 1   I: Indwelling Catheter (Lines & Ferris) Necessity As above   D: De-escalation of Antimicrobials/Pharmacotherapies On no antimicrobials    Plan for the day/ETD Diurese as tolerated; delirium measures    Code Status:  Family/Goals of Care: Full Code  Prognosis for recovery seems very poor.        Josh Hoover MD  Critical Care Medicine  [facility]

## 2017-10-24 NOTE — PLAN OF CARE
Pt continues to be confused. Converted to  PO amioderone from IV drip. Pt refused to eat meals today. Dr Vieira evaluated patient today. Pt continues on BIPAP. Desats O2 levels quickly on high flow NC. Interventions implemented as appropriate. All safety measures intact. Pt shows no signs or symptoms of distress.

## 2017-10-25 LAB
ALBUMIN SERPL BCP-MCNC: 2.7 G/DL
ALP SERPL-CCNC: 71 U/L
ALT SERPL W/O P-5'-P-CCNC: 35 U/L
ANION GAP SERPL CALC-SCNC: 10 MMOL/L
AST SERPL-CCNC: 26 U/L
BASOPHILS # BLD AUTO: 0.02 K/UL
BASOPHILS NFR BLD: 0.1 %
BILIRUB SERPL-MCNC: 1.2 MG/DL
BUN SERPL-MCNC: 55 MG/DL
CALCIUM SERPL-MCNC: 9 MG/DL
CHLORIDE SERPL-SCNC: 109 MMOL/L
CO2 SERPL-SCNC: 30 MMOL/L
CREAT SERPL-MCNC: 1.3 MG/DL
DIFFERENTIAL METHOD: ABNORMAL
EOSINOPHIL # BLD AUTO: 0 K/UL
EOSINOPHIL NFR BLD: 0 %
ERYTHROCYTE [DISTWIDTH] IN BLOOD BY AUTOMATED COUNT: 14.6 %
EST. GFR  (AFRICAN AMERICAN): 47 ML/MIN/1.73 M^2
EST. GFR  (NON AFRICAN AMERICAN): 41 ML/MIN/1.73 M^2
GLUCOSE SERPL-MCNC: 223 MG/DL
HCT VFR BLD AUTO: 34.5 %
HGB BLD-MCNC: 11.3 G/DL
INR PPP: 3.7
LYMPHOCYTES # BLD AUTO: 0.7 K/UL
LYMPHOCYTES NFR BLD: 3.4 %
MCH RBC QN AUTO: 31.7 PG
MCHC RBC AUTO-ENTMCNC: 32.8 G/DL
MCV RBC AUTO: 97 FL
MONOCYTES # BLD AUTO: 1.2 K/UL
MONOCYTES NFR BLD: 5.7 %
NEUTROPHILS # BLD AUTO: 18.8 K/UL
NEUTROPHILS NFR BLD: 90.8 %
PLATELET # BLD AUTO: 122 K/UL
PMV BLD AUTO: 10.8 FL
POCT GLUCOSE: 192 MG/DL (ref 70–110)
POCT GLUCOSE: 208 MG/DL (ref 70–110)
POCT GLUCOSE: 225 MG/DL (ref 70–110)
POCT GLUCOSE: 245 MG/DL (ref 70–110)
POTASSIUM SERPL-SCNC: 3.5 MMOL/L
PROT SERPL-MCNC: 6.9 G/DL
PROTHROMBIN TIME: 37.1 SEC
RBC # BLD AUTO: 3.57 M/UL
SODIUM SERPL-SCNC: 149 MMOL/L
WBC # BLD AUTO: 20.66 K/UL

## 2017-10-25 PROCEDURE — 80053 COMPREHEN METABOLIC PANEL: CPT

## 2017-10-25 PROCEDURE — 25000003 PHARM REV CODE 250: Performed by: INTERNAL MEDICINE

## 2017-10-25 PROCEDURE — 99900035 HC TECH TIME PER 15 MIN (STAT)

## 2017-10-25 PROCEDURE — 85610 PROTHROMBIN TIME: CPT

## 2017-10-25 PROCEDURE — 94761 N-INVAS EAR/PLS OXIMETRY MLT: CPT

## 2017-10-25 PROCEDURE — 36415 COLL VENOUS BLD VENIPUNCTURE: CPT

## 2017-10-25 PROCEDURE — 63600175 PHARM REV CODE 636 W HCPCS: Performed by: HOSPITALIST

## 2017-10-25 PROCEDURE — 25000003 PHARM REV CODE 250: Performed by: EMERGENCY MEDICINE

## 2017-10-25 PROCEDURE — 99233 SBSQ HOSP IP/OBS HIGH 50: CPT | Mod: ,,, | Performed by: INTERNAL MEDICINE

## 2017-10-25 PROCEDURE — 20000000 HC ICU ROOM

## 2017-10-25 PROCEDURE — 25000242 PHARM REV CODE 250 ALT 637 W/ HCPCS: Performed by: INTERNAL MEDICINE

## 2017-10-25 PROCEDURE — 94640 AIRWAY INHALATION TREATMENT: CPT

## 2017-10-25 PROCEDURE — C9113 INJ PANTOPRAZOLE SODIUM, VIA: HCPCS | Performed by: HOSPITALIST

## 2017-10-25 PROCEDURE — 63600175 PHARM REV CODE 636 W HCPCS: Performed by: INTERNAL MEDICINE

## 2017-10-25 PROCEDURE — 94660 CPAP INITIATION&MGMT: CPT

## 2017-10-25 PROCEDURE — 27000221 HC OXYGEN, UP TO 24 HOURS

## 2017-10-25 PROCEDURE — 85025 COMPLETE CBC W/AUTO DIFF WBC: CPT

## 2017-10-25 RX ADMIN — CHLORHEXIDINE GLUCONATE 15 ML: 1.2 RINSE ORAL at 09:10

## 2017-10-25 RX ADMIN — HYDRALAZINE HYDROCHLORIDE 10 MG: 20 INJECTION INTRAMUSCULAR; INTRAVENOUS at 09:10

## 2017-10-25 RX ADMIN — INSULIN ASPART 2 UNITS: 100 INJECTION, SOLUTION INTRAVENOUS; SUBCUTANEOUS at 06:10

## 2017-10-25 RX ADMIN — METOPROLOL TARTRATE 10 MG: 5 INJECTION INTRAVENOUS at 06:10

## 2017-10-25 RX ADMIN — METOPROLOL TARTRATE 10 MG: 5 INJECTION INTRAVENOUS at 01:10

## 2017-10-25 RX ADMIN — QUETIAPINE FUMARATE 25 MG: 25 TABLET, FILM COATED ORAL at 09:10

## 2017-10-25 RX ADMIN — LEVOTHYROXINE SODIUM 75 MCG: 75 TABLET ORAL at 06:10

## 2017-10-25 RX ADMIN — IPRATROPIUM BROMIDE AND ALBUTEROL SULFATE 3 ML: .5; 3 SOLUTION RESPIRATORY (INHALATION) at 07:10

## 2017-10-25 RX ADMIN — INSULIN DETEMIR 12 UNITS: 100 INJECTION, SOLUTION SUBCUTANEOUS at 09:10

## 2017-10-25 RX ADMIN — METOPROLOL TARTRATE 10 MG: 5 INJECTION INTRAVENOUS at 11:10

## 2017-10-25 RX ADMIN — INSULIN ASPART 2 UNITS: 100 INJECTION, SOLUTION INTRAVENOUS; SUBCUTANEOUS at 11:10

## 2017-10-25 RX ADMIN — INSULIN ASPART 4 UNITS: 100 INJECTION, SOLUTION INTRAVENOUS; SUBCUTANEOUS at 05:10

## 2017-10-25 RX ADMIN — IPRATROPIUM BROMIDE AND ALBUTEROL SULFATE 3 ML: .5; 3 SOLUTION RESPIRATORY (INHALATION) at 08:10

## 2017-10-25 RX ADMIN — INSULIN ASPART 4 UNITS: 100 INJECTION, SOLUTION INTRAVENOUS; SUBCUTANEOUS at 12:10

## 2017-10-25 RX ADMIN — IPRATROPIUM BROMIDE AND ALBUTEROL SULFATE 3 ML: .5; 3 SOLUTION RESPIRATORY (INHALATION) at 01:10

## 2017-10-25 RX ADMIN — IPRATROPIUM BROMIDE AND ALBUTEROL SULFATE 3 ML: .5; 3 SOLUTION RESPIRATORY (INHALATION) at 02:10

## 2017-10-25 RX ADMIN — METOPROLOL TARTRATE 10 MG: 5 INJECTION INTRAVENOUS at 05:10

## 2017-10-25 RX ADMIN — METOPROLOL TARTRATE 10 MG: 5 INJECTION INTRAVENOUS at 12:10

## 2017-10-25 RX ADMIN — PRAVASTATIN SODIUM 40 MG: 40 TABLET ORAL at 09:10

## 2017-10-25 RX ADMIN — METHYLPREDNISOLONE SODIUM SUCCINATE 40 MG: 40 INJECTION, POWDER, FOR SOLUTION INTRAMUSCULAR; INTRAVENOUS at 09:10

## 2017-10-25 RX ADMIN — HYDRALAZINE HYDROCHLORIDE 10 MG: 20 INJECTION INTRAMUSCULAR; INTRAVENOUS at 06:10

## 2017-10-25 RX ADMIN — HYDRALAZINE HYDROCHLORIDE 10 MG: 20 INJECTION INTRAMUSCULAR; INTRAVENOUS at 03:10

## 2017-10-25 RX ADMIN — AMIODARONE HYDROCHLORIDE 200 MG: 200 TABLET ORAL at 09:10

## 2017-10-25 RX ADMIN — PANTOPRAZOLE SODIUM 40 MG: 40 INJECTION, POWDER, FOR SOLUTION INTRAVENOUS at 09:10

## 2017-10-25 NOTE — PLAN OF CARE
Problem: Patient Care Overview  Goal: Plan of Care Review  Outcome: Ongoing (interventions implemented as appropriate)  Pt has remained in A-flutter today. Pt remains confused today. Pt remains with Bipap with FIO2 at 70%. I attempted to wean multiple times today but pt with sats 80's with high flow O2. Pt with poor appetite. Pt has remained safe and free of injury today. Pt has no new skin breakdown noted.

## 2017-10-25 NOTE — PROGRESS NOTES
PALLIATIVE CARE PROGRESS NOTE:    Telephone conference was held with Palliative Care,  Clotilde, ARABELLA Cordoba, and patient's daughter Adore (who is incarcerated) with the correction  [employer].      Status update regarding medical condition, refractory acute on chronic hypoxic/hypercapnic respiratory failure after cpr/intubation resuscitation and unable to wean off of Bipap 70% for past 6 days, with multiple underlying comorbidities.  Explained poor prognosis.   Daughter able to verbalize good understanding.    Discussed goals of care, and daughter agrees with team recommendation for comfort/hospice care.  We discussed option of inpatient hospice care, where further weaning of the Bipap can be done.      We discussed code status; she is in agreement for DNR. She does NOT WANT CPR/RESUSCITATION.  We discussed the LaPOST document and she is agreeable to having patient's sister Vida sign the document.  Explained that all of the advance directives her mother completed are invalidated because they lack signatures, but as the LW, HCHPOA, and 5-wishes are  written in the patient's own handwriting, they do help guide us in knowing what her EOL wishes are.      Emotional support was provided throughout the conversation.  richard Mabry discussed with richard Wooten the possibility of having a compassionate visit for Adore.  ARABELLA Cordoba will follow-up on hospice arrangements.      Plan/recommendations:   Continue gentle supportive care for now.   Continue weaning efforts from Bipap.   CHANGE CODE STATUS TO DNR.   Needs LaPOST (to be signed by patient's sister Vida).     INPATIENT HOSPICE (likely on Friday)    Discussed above with Dr. Clarek.  Also discussed recommendation for hospice care with Dr. Hoover earlier today and advised of plans for telephone conference with family.         Libby Powell, BSN, RN, CCRN, CHPN   Palliative Care Nurse Coordinator   [facility]  [phone]

## 2017-10-25 NOTE — PROGRESS NOTES
[facility]  Critical Care Medicine  Progress Note    Patient Name: Savita Polo  MRN: [id number]  Admission Date: 10/12/2017  Hospital Length of Stay: 12 days  Code Status: Full Code  Attending Provider: Angel Hernandez MD  Primary Care Provider: Nadiya Nava MD   Principal Problem: Acute on chronic respiratory failure with hypoxia and hypercapnia    Subjective:     HPI:  72 year old woman with a complex medical history that includes, cerebrovascular disease, reported history of CHF, stage IV CKD, COPD, DM with neuropathy, obesity, atrial fibrillation, and COPD with a component of chronic respiratory failure on home O2.  She presented to the  ED with shortness of breath.  Prior to admission she had been treated for UTI with nitrofurantoin. In the ED she was given lasix and treated with bipap.    She was admitted to hospital medicine.  On 10/13/17 she was found unresponsive.  Per chart review a code was called and she achieved ROSC after approximately 9 min. She was transferred to ICU for further management.       Hospital/ICU Course:  10/13/17: intubated for acute on chronic respiratory failure  10/16: overnight there were report of bleeding from the oral cavity (nothing from ET tube); tube feeds held due to high residuals;  Could not tolerate sedation vacation this morning-->afib with rvr when propofol held  10/19: extubated to bipap    She remains extubated but requires FIO2 70% on BiPAP to maintain SpO2.    Interval History/Significant Events:  Complains of feeling cold; tolerating NIPPV mask well today.      Review of Systems   Constitutional: Negative for fever.   Respiratory: Negative for cough, choking and shortness of breath.    Cardiovascular: Negative for chest pain and leg swelling.     Objective:     Vital Signs (Most Recent):  Temp: 97.3 °F (36.3 °C) (10/25/17 0800)  Pulse: 105 (10/25/17 0930)  Resp: (!) 48 (10/25/17 0930)  BP: 135/63 (10/25/17 0930)  SpO2: (!) 91 %  (10/25/17 0930) Vital Signs (24h Range):  Temp:  [97.3 °F (36.3 °C)-99 °F (37.2 °C)] 97.3 °F (36.3 °C)  Pulse:  [] 105  Resp:  [22-60] 48  SpO2:  [88 %-96 %] 91 %  BP: (128-170)/(60-82) 135/63   Weight: 79.1 kg (174 lb 6.1 oz)  Body mass index is 34.06 kg/m².      Intake/Output Summary (Last 24 hours) at 10/25/17 1037  Last data filed at 10/25/17 0500   Gross per 24 hour   Intake              240 ml   Output              830 ml   Net             -590 ml       Physical Exam   Constitutional: No distress.   HENT:   Full face mask in place for NIPPV.   Cardiovascular: Exam reveals no gallop.    No murmur heard.  HR ~110   Pulmonary/Chest: Effort normal and breath sounds normal. She has no wheezes. She has no rales.   Abdominal: Soft. Bowel sounds are normal. She exhibits no distension. There is no tenderness.   Musculoskeletal: She exhibits no edema.   Neurological: She is alert.   Nursing note and vitals reviewed.      Vents:  Vent Mode: NIV+ PC (10/25/17 0400)  Set Rate: 12 bmp (10/25/17 0400)  Vt Set: 380 mL (10/19/17 0801)  Pressure Support: 40 cmH20 (10/19/17 1257)  PEEP/CPAP: 8 cmH20 (10/25/17 0400)  Oxygen Concentration (%): 70 (10/25/17 0930)  Peak Airway Pressure: 15.3 cmH2O (10/25/17 0400)  Total Ve: 11.6 mL (10/25/17 0400)  F/VT Ratio<105 (RSBI): (!) 92.5 (10/25/17 0400)     Lines/Drains/Airways     Drain                 Urethral Catheter 10/13/17 0900 Latex 16 Fr. 12 days          Peripheral Intravenous Line                 Peripheral IV - Single Lumen 10/21/17 1300 Left;Anterior Forearm 3 days         Peripheral IV - Single Lumen 10/22/17 1840 Right Forearm 2 days              Significant Labs:    CBC/Anemia Profile:    Recent Labs  Lab 10/24/17  0211 10/25/17  0142   WBC 19.12* 20.66*   HGB 12.0 11.3*   HCT 35.7* 34.5*   * 122*   MCV 97 97   RDW 14.3 14.6*        Chemistries:    Recent Labs  Lab 10/24/17  0211 10/25/17  0142   * 149*   K 4.0 3.5    109   CO2 31* 30*   BUN 54* 55*    CREATININE 1.2 1.3   CALCIUM 9.2 9.0   ALBUMIN 2.6* 2.7*   PROT  --  6.9   BILITOT  --  1.2*   ALKPHOS  --  71   ALT  --  35   AST  --  26   PHOS 2.9  --        All pertinent labs within the past 24 hours have been reviewed.    Significant Imaging:  I have reviewed all pertinent imaging results/findings within the past 24 hours.  No new imaging available.    Assessment/Plan:     Psychiatric   SUSAN (generalized anxiety disorder)    Patient has a psychiatric history.  Reasonable to restart home meds-> xanax (low dose to prevent withdrawal) and Abilify.        Pulmonary   * Acute on chronic respiratory failure with hypoxia and hypercapnia    · Remains extubated to bipap but requires increased FIO2 => currently 70%.  · PEEP increased to 10 today and will try FIO2 at 65%.   · Patient is on 4L continuous oxygen at home.  · Goal SpO2 ~88%.  · Maintain net negative fluid balance as long as renal function can tolerate => current creatinine 1.3.  · Continue methylprednisone  · Continue nebulizer treatments.    Given severity of respiratory failure and oxygen requirements, it remains appropriate to review goals of care with the patient's family.  I am worried that her present respiratory status is unlikely to reverse even with intubation/ventilatory support.          Pulmonary hypertension due to COPD    Known pulmonary hypertension at baseline that may reflect cor pulmonale from lung disease or mixed with diastolic CHF and increased central venous pressure.        Cardiac/Vascular   Cardiopulmonary arrest with successful resuscitation    Remains non-focal and tolerates po intake        Paroxysmal atrial fibrillation    Remains on amiodarone.  HR still 110s.        Renal/   UTI due to Klebsiella species    Pan sensitive Klebsiella pneumonia UTI currently on meropenem 2/2 pcn allergy.        Urinary tract infection with hematuria    Herrera sensitive Klebsiella pneumonia UTI currently on meropenem 2/2 pcn allergy.         Hematology   Chronic anticoagulation    INR 5.0 yesterday and 3.7 today with warfarin on hold.  No evidence of active bleeding.        Endocrine   Type 2 diabetes mellitus with stage 4 chronic kidney disease    Blood sugars still elevated (190-250) while on iv steroids.  Continue detemir insulin 12 units nightly and sliding scale insulin           Critical Care Daily Checklist:    A: Awake: RASS Goal/Actual Goal: RASS Goal: 0-->alert and calm  Actual: Chiu Agitation Sedation Scale (RASS): Alert and calm   B: Spontaneous Breathing Trial Performed? Not applicable   C: SAT & SBT Coordinated?  Not applicable                      D: Delirium: CAM-ICU Overall CAM-ICU: Negative   E: Early Mobility Performed? No   F: Feeding Goal: Goals: Meet 85% EEN  Status: Nutrition Goal Status: progressing towards goal   Current Diet Order   Procedures    Diet Pureed Diabetic 1800     Honey thickened liquids     Order Specific Question:   Additional restrictions:     Answer:   Diabetic 1800      AS: Analgesia/Sedation None   T: Thromboembolic Prophylaxis INR 3.7 with warfarin on hold   H: HOB > 300 Yes   U: Stress Ulcer Prophylaxis (if needed) Pantoprazole iv   G: Glucose Control Glucoses running 200-250 on detemir   B: Bowel Function Stool Occurrence: 1   I: Indwelling Catheter (Lines & Ferris) Necessity As above   D: De-escalation of Antimicrobials/Pharmacotherapies Off all antibiotics    Plan for the day/ETD PEEP to 10    Code Status:  Family/Goals of Care: Full Code  Continue family discussions regarding possible inpatient hospice disposition        Josh Hoover MD  Critical Care Medicine  [facility]

## 2017-10-25 NOTE — PLAN OF CARE
Problem: NPPV/CPAP (Adult)  Goal: Signs and Symptoms of Listed Potential Problems Will be Absent, Minimized or Managed (NPPV/CPAP)  Signs and symptoms of listed potential problems will be absent, minimized or managed by discharge/transition of care (reference NPPV/CPAP (Adult) CPG).   Outcome: Ongoing (interventions implemented as appropriate)  Patient received on non-invasive servoi ventilator with charted settings. All alarms on, set and functioning. Aerosol treatment given as ordered. Will continue to monitor. NIV pressure control, fi02 70%

## 2017-10-25 NOTE — ASSESSMENT & PLAN NOTE
Blood sugars still elevated (190-250) while on iv steroids.  Continue detemir insulin 12 units nightly and sliding scale insulin

## 2017-10-25 NOTE — ASSESSMENT & PLAN NOTE
With chronic hypoxia and pulmonary hypertension, dependent on supplemental O2 at home. With Tobacco abuse. Exacerbated by underlying urinary tract infection 2/2 Klebsiella, without bacteremia. Extubated to BiPAP on 10/19. Continue pureed diet high flow during meals and then back on BiPAP when not eating. Continue nebs every 6 hours, elevate HOB > 30 degrees and continue solumedrol 40 mg with taper. Is slow to improve, however expected given underlying chronic hypoxemic respiratory failure. Completed abx therapy for UTI. Will diurese as needed.changed BIPAP setting per pulmonology.discussing with family dn palliative care is in progress,discussing with daughter is still in progress,she is in half-way and contacting her is difficult.

## 2017-10-25 NOTE — PLAN OF CARE
Problem: Patient Care Overview  Goal: Individualization & Mutuality  Outcome: Ongoing (interventions implemented as appropriate)  Free from injury    Problem: Pressure Ulcer Risk (Mynor Scale) (Adult,Obstetrics,Pediatric)  Goal: Skin Integrity  Patient will demonstrate the desired outcomes by discharge/transition of care.   Outcome: Ongoing (interventions implemented as appropriate)  Skin fragile and with multiple bruises, no pressure ulcers noted, will continue turning frequently and providing incontinent care when needed    Problem: Restraint, Nonbehavioral (nonviolent)  Goal: Clinical Justification  Outcome: Ongoing (interventions implemented as appropriate)  Pulls on mask when restraint left loosely on, O2 sats quickly go down to low 80's when she is not on  Bipap, even if it is for a few seconds.  Needs reinforcement to keep bipap on at all times.

## 2017-10-25 NOTE — PROGRESS NOTES
[facility] Hospital Medicine  Progress Note    Patient Name: Savita Polo  MRN: [id number]  Patient Class: IP- Inpatient   Admission Date: 10/12/2017  Length of Stay: 12 days  Attending Physician: Angel Hernandez MD  Primary Care Provider: Nadiya Nava MD        Subjective:     Principal Problem:Acute on chronic respiratory failure with hypoxia and hypercapnia    HPI:  Savita Polo is a 72 y.o. female that (in part)  has a past medical history of Anemia in chronic kidney disease(285.21); Anxiety; Atherosclerotic cerebrovascular disease; CHF (congestive heart failure); Chronic respiratory failure with hypoxia; CKD (chronic kidney disease), stage IV; COPD (chronic obstructive pulmonary disease); CRLD (chronic restrictive lung disease); Diabetic peripheral neuropathy; Diabetic retinopathy; Diverticulosis of colon; DJD (degenerative joint disease); Fatty liver; SUSAN (generalized anxiety disorder); Heart attack; History of PSVT (paroxysmal supraventricular tachycardia); Iron deficiency; Joint pain; Keloid cicatrix; Long term (current) use of anticoagulants; Mixed hyperlipidemia; Multiple lung nodules; Obesity, Class I, BMI 30-34.9; On home oxygen therapy; Paroxysmal atrial fibrillation; Renovascular hypertension; Right-sided heart failure; Secondary hyperparathyroidism; Secondary pulmonary hypertension; Stroke; Thyroid disease; Type 2 diabetes mellitus with stage 4 chronic kidney disease; and Vitamin D deficiency disease. Presents to [facility] Emergency Department complaining of shortness of breath.  She lives and [facility].  She is a poorly 93% on 4 L by nasal cannula at her residence.  She is on chronic oxygen has had multiple admissions for respiratory failure.  Worsened with exertion.  No relief given with supplemental oxygen.  Located in the chest.  Moderate to high intensity.  No radiation.  Subacute onset with progressive worsening.   Constant duration.    In the emergency department chest x-ray, ABG, and routine laboratory studies were obtained.  There was evidence of hypoxia and hypercapnia.  She also had evidence of urinary tract infection.    Hospital medicine has been asked to admit for further evaluation and treatment.       Hospital Course:  Ms Polo presented with acute on chronic respiratory failure with hypoxemia secondary to pneumonia. Soon after admission patient had a cardiac arrest. Was intubated and transferred to ICU. Pulmonary process thought to be a combination of pneumonia and ARDS, as she still decompensated despite diuresis and BiPAP on admission. Treated with steroids and duo-nebs while intubated. UCx with > 100,000 Klebsiella pneumoniae. Blood cultures negative and respiratory culture with no growth although moderate WBCs. Treated with meropenem (see allergy list) for total of 10 days. Extubated to BiPAP on 10/19. Attempted to wean to high flow, but not tolerate for long. Developed vivid visual hallucinations and insomnia, complicating clinical course. Despite this, she was able to initiate a pureed diet with honey thickened fluids while on high flow, but then placed on BiPAP when not eating. Intermittent diuresis. Persistently in AFib with RVR. On amiodarone and BB. On full anticoagulation with coumadin, which was held on 10/23 for INR > 3. Psychiatry consulted for delirium and insomnia. Prognosis is poor overall and unlikely to achieve meaningful recovery despite adequate medical treatment. Palliative care consulted for goals of care. Patient is full code ,family meeting with sister and  palliative care done yesterday,need still discuss with daughter,her INR is improved,but she is still on BIPAP.VH,AH improved.    Interval History: her VH,AH improved,appear lept good over night,she is still on BIPAP.    Review of Systems   Respiratory: Positive for shortness of breath and wheezing.    Cardiovascular: Negative.     Gastrointestinal: Negative.    Neurological: Negative.    Psychiatric/Behavioral: Negative for confusion and hallucinations.     Objective:     Vital Signs (Most Recent):  Temp: 99 °F (37.2 °C) (10/24/17 2330)  Pulse: 104 (10/25/17 0730)  Resp: (!) 26 (10/25/17 0730)  BP: 136/82 (10/25/17 0630)  SpO2: (!) 92 % (10/25/17 0730) Vital Signs (24h Range):  Temp:  [98.2 °F (36.8 °C)-99 °F (37.2 °C)] 99 °F (37.2 °C)  Pulse:  [] 104  Resp:  [21-60] 26  SpO2:  [87 %-97 %] 92 %  BP: (128-170)/(60-82) 136/82     Weight: 79.1 kg (174 lb 6.1 oz)  Body mass index is 34.06 kg/m².    Intake/Output Summary (Last 24 hours) at 10/25/17 0807  Last data filed at 10/25/17 0500   Gross per 24 hour   Intake              240 ml   Output              911 ml   Net             -671 ml      Physical Exam   Constitutional: She appears well-developed. No distress.   HENT:   BiPAP in Place   Eyes: Conjunctivae are normal. Right eye exhibits no discharge. Left eye exhibits no discharge.   Neck: Neck supple.   Cardiovascular: Normal rate, regular rhythm and normal heart sounds.    Pulmonary/Chest: Effort normal. No stridor.   Coarse BS bilaterally  Right LL expiratory wheezing   Abdominal: Soft. Bowel sounds are normal.   Musculoskeletal: She exhibits no edema.   Neurological:   Alert. Following commands but actively hallucinating.    Skin: Skin is warm and dry.   Nursing note and vitals reviewed.      Significant Labs: All pertinent labs within the past 24 hours have been reviewed.    Significant Imaging: I have reviewed all pertinent imaging results/findings within the past 24 hours.  I have reviewed and interpreted all pertinent imaging results/findings within the past 24 hours.    Assessment/Plan:      * Acute on chronic respiratory failure with hypoxia and hypercapnia    With chronic hypoxia and pulmonary hypertension, dependent on supplemental O2 at home. With Tobacco abuse. Exacerbated by underlying urinary tract infection 2/2  Klebsiella, without bacteremia. Extubated to BiPAP on 10/19. Continue pureed diet high flow during meals and then back on BiPAP when not eating. Continue nebs every 6 hours, elevate HOB > 30 degrees and continue solumedrol 40 mg with taper. Is slow to improve, however expected given underlying chronic hypoxemic respiratory failure. Completed abx therapy for UTI. Will diurese as needed.changed BIPAP setting per pulmonology.discussing with family dn palliative care is in progress,discussing with daughter is still in progress,she is in longterm and contacting her is difficult.        Delirium due to another medical condition    She's had this prior to admission but not evident up until extubation. Clearly visual and has not slept in > 2 days. Will change steroids to daily only and see if this was contributing to insomnia. Psych consulted for co-management. Might need neuro consult as well. Will follow recs,on seroquel and she slept better last night.        Oral bleeding    As above          UTI due to Klebsiella species    As above          Cardiopulmonary arrest with successful resuscitation    This happened soon after admission on 10/12. Likely secondary to significant hypoxia at the time. ROSC established post ~ 9 mins of ACLS. Neuro intact post arrest therefore hypothermia protocol not indicated           Chronic anticoagulation    INR > 3 today. Hold anticoagulants. INR in AM              Urinary tract infection with hematuria    UTI secondary to Klebsiella. No bacteremia.   Completed 10 days of meropenem on 10/22        Type 2 diabetes mellitus with stage 4 chronic kidney disease    Hyperglycemia worsened by steroids.  Glucose should improve with steroid taper.  Tolerating pureed diet with diabetic restrictions. Adjust insulin for goal < 180          Mixed hyperlipidemia                Paroxysmal atrial fibrillation    Difficult to control due to hypoxia. Transition amiodarone to PO and continue metoprolol IV at  current dose. Patient is a poor candidate for cardioversion given tenuous respiratory status.           Pulmonary hypertension due to COPD    2/2 chronic tobacco abuse. Is supplemental O2 dependent. No evidence of heart failure        Tobacco abuse    Chronic tobacco use  Tobacco cessation counseling              On home oxygen therapy    On 3-4 L at home          SUSAN (generalized anxiety disorder)    No acute issues                VTE Risk Mitigation         Ordered     Medium Risk of VTE  Once      10/13/17 0402     Place RADHA hose  Until discontinued      10/13/17 0402     Place sequential compression device  Until discontinued      10/13/17 0402          Critical care time spent on the evaluation and treatment of severe organ dysfunction, review of pertinent labs and imaging studies, discussions with consulting providers and discussions with patient/family: 30 minutes.    Angel Hernandez MD  Department of Hospital Medicine   [facility]

## 2017-10-25 NOTE — SUBJECTIVE & OBJECTIVE
Interval History/Significant Events:  Complains of feeling cold; tolerating NIPPV mask well today.      Review of Systems   Constitutional: Negative for fever.   Respiratory: Negative for cough, choking and shortness of breath.    Cardiovascular: Negative for chest pain and leg swelling.     Objective:     Vital Signs (Most Recent):  Temp: 97.3 °F (36.3 °C) (10/25/17 0800)  Pulse: 105 (10/25/17 0930)  Resp: (!) 48 (10/25/17 0930)  BP: 135/63 (10/25/17 0930)  SpO2: (!) 91 % (10/25/17 0930) Vital Signs (24h Range):  Temp:  [97.3 °F (36.3 °C)-99 °F (37.2 °C)] 97.3 °F (36.3 °C)  Pulse:  [] 105  Resp:  [22-60] 48  SpO2:  [88 %-96 %] 91 %  BP: (128-170)/(60-82) 135/63   Weight: 79.1 kg (174 lb 6.1 oz)  Body mass index is 34.06 kg/m².      Intake/Output Summary (Last 24 hours) at 10/25/17 1037  Last data filed at 10/25/17 0500   Gross per 24 hour   Intake              240 ml   Output              830 ml   Net             -590 ml       Physical Exam   Constitutional: No distress.   HENT:   Full face mask in place for NIPPV.   Cardiovascular: Exam reveals no gallop.    No murmur heard.  HR ~110   Pulmonary/Chest: Effort normal and breath sounds normal. She has no wheezes. She has no rales.   Abdominal: Soft. Bowel sounds are normal. She exhibits no distension. There is no tenderness.   Musculoskeletal: She exhibits no edema.   Neurological: She is alert.   Nursing note and vitals reviewed.      Vents:  Vent Mode: NIV+ PC (10/25/17 0400)  Set Rate: 12 bmp (10/25/17 0400)  Vt Set: 380 mL (10/19/17 0801)  Pressure Support: 40 cmH20 (10/19/17 1257)  PEEP/CPAP: 8 cmH20 (10/25/17 0400)  Oxygen Concentration (%): 70 (10/25/17 0930)  Peak Airway Pressure: 15.3 cmH2O (10/25/17 0400)  Total Ve: 11.6 mL (10/25/17 0400)  F/VT Ratio<105 (RSBI): (!) 92.5 (10/25/17 0400)     Lines/Drains/Airways     Drain                 Urethral Catheter 10/13/17 0900 Latex 16 Fr. 12 days          Peripheral Intravenous Line                  Peripheral IV - Single Lumen 10/21/17 1300 Left;Anterior Forearm 3 days         Peripheral IV - Single Lumen 10/22/17 1840 Right Forearm 2 days              Significant Labs:    CBC/Anemia Profile:    Recent Labs  Lab 10/24/17  0211 10/25/17  0142   WBC 19.12* 20.66*   HGB 12.0 11.3*   HCT 35.7* 34.5*   * 122*   MCV 97 97   RDW 14.3 14.6*        Chemistries:    Recent Labs  Lab 10/24/17  0211 10/25/17  0142   * 149*   K 4.0 3.5    109   CO2 31* 30*   BUN 54* 55*   CREATININE 1.2 1.3   CALCIUM 9.2 9.0   ALBUMIN 2.6* 2.7*   PROT  --  6.9   BILITOT  --  1.2*   ALKPHOS  --  71   ALT  --  35   AST  --  26   PHOS 2.9  --        All pertinent labs within the past 24 hours have been reviewed.    Significant Imaging:  I have reviewed all pertinent imaging results/findings within the past 24 hours.  No new imaging available.

## 2017-10-25 NOTE — PROGRESS NOTES
SW met briefly with sister Vida during her meeting with Palliative Care RN and . Provided the list of SNF and IPR from Medicare.gov that discussed previously with sister. SW aware that patient not progressing well, may become hospice candidate. This is being addressed by Palliative RN with sister and will be discussed with other family members. SW will assist as needed.

## 2017-10-25 NOTE — PROGRESS NOTES
SW participated in call with patient daughter Adore Mason and  Valentina Chavez at prison in [facility] along with Palliative Care RN Raisa and  Clotilde at this facility. Team updated patient daughter regarding patient condition. Prison  and hospital  will work toward goal for daughter to visit patient prior to patient transfer to hospice. SW will assist as needed

## 2017-10-25 NOTE — SUBJECTIVE & OBJECTIVE
Interval History: her VH,AH improved,appear lept good over night,she is still on BIPAP.    Review of Systems   Respiratory: Positive for shortness of breath and wheezing.    Cardiovascular: Negative.    Gastrointestinal: Negative.    Neurological: Negative.    Psychiatric/Behavioral: Negative for confusion and hallucinations.     Objective:     Vital Signs (Most Recent):  Temp: 99 °F (37.2 °C) (10/24/17 2330)  Pulse: 104 (10/25/17 0730)  Resp: (!) 26 (10/25/17 0730)  BP: 136/82 (10/25/17 0630)  SpO2: (!) 92 % (10/25/17 0730) Vital Signs (24h Range):  Temp:  [98.2 °F (36.8 °C)-99 °F (37.2 °C)] 99 °F (37.2 °C)  Pulse:  [] 104  Resp:  [21-60] 26  SpO2:  [87 %-97 %] 92 %  BP: (128-170)/(60-82) 136/82     Weight: 79.1 kg (174 lb 6.1 oz)  Body mass index is 34.06 kg/m².    Intake/Output Summary (Last 24 hours) at 10/25/17 0807  Last data filed at 10/25/17 0500   Gross per 24 hour   Intake              240 ml   Output              911 ml   Net             -671 ml      Physical Exam   Constitutional: She appears well-developed. No distress.   HENT:   BiPAP in Place   Eyes: Conjunctivae are normal. Right eye exhibits no discharge. Left eye exhibits no discharge.   Neck: Neck supple.   Cardiovascular: Normal rate, regular rhythm and normal heart sounds.    Pulmonary/Chest: Effort normal. No stridor.   Coarse BS bilaterally  Right LL expiratory wheezing   Abdominal: Soft. Bowel sounds are normal.   Musculoskeletal: She exhibits no edema.   Neurological:   Alert. Following commands but actively hallucinating.    Skin: Skin is warm and dry.   Nursing note and vitals reviewed.      Significant Labs: All pertinent labs within the past 24 hours have been reviewed.    Significant Imaging: I have reviewed all pertinent imaging results/findings within the past 24 hours.  I have reviewed and interpreted all pertinent imaging results/findings within the past 24 hours.

## 2017-10-25 NOTE — UM SECONDARY REVIEW
Medical Affairs    IP Extended Stay > 10     Hospital Day # 13  72 year old woman with a complex medical history that includes, cerebrovascular disease, reported history of CHF, stage IV CKD, COPD, DM with neuropathy, obesity, atrial fibrillation, and COPD with a component of chronic respiratory failure on home O2.  She presented to the  ED with shortness of breath.  Prior to admission she had been treated for UTI with nitrofurantoin. In the ED she was given lasix and treated with bipap.    She was admitted to hospital medicine.  On 10/13/17 she was found unresponsive.  Per chart review a code was called and she achieved ROSC after approximately 9 min. She was transferred to ICU for further management.         Hospital/ICU Course:  10/13/17: intubated for acute on chronic respiratory failure  10/16: overnight there were report of bleeding from the oral cavity (nothing from ET tube); tube feeds held due to high residuals;  Could not tolerate sedation vacation this morning-->afib with rvr when propofol held  10/19: extubated to bipap     She remains extubated but requires FIO2 70% on BiPAP to maintain SpO2.     Interval History/Significant Events:  Complains of feeling cold; tolerating NIPPV mask well today.    LOS: approved w/o recommendations due to severity of clinical picture

## 2017-10-25 NOTE — ASSESSMENT & PLAN NOTE
· Remains extubated to bipap but requires increased FIO2 => currently 70%.  · PEEP increased to 10 today and will try FIO2 at 65%.   · Patient is on 4L continuous oxygen at home.  · Goal SpO2 ~88%.  · Maintain net negative fluid balance as long as renal function can tolerate => current creatinine 1.3.  · Continue methylprednisone  · Continue nebulizer treatments.    Given severity of respiratory failure and oxygen requirements, it remains appropriate to review goals of care with the patient's family.  I am worried that her present respiratory status is unlikely to reverse even with intubation/ventilatory support.

## 2017-10-25 NOTE — PROGRESS NOTES
PALLIATIVE CARE PROGRESS NOTE:    Met with patient's sister Vida.  Relayed information discussed earlier today during telephone conference with patient's daughter and her agreement with DNR and inpatient hospice care.      We reviewed the [facility] document in detail, it was signed, and placed in the blue folder to be scanned into EPIC after Dr. Clarke's signature. The DNR reflects the patient's own wishes.      Notified Dr. Clarke of [facility], and plan for transfer to inpatient hospice on Friday.    Libby Powell, COLBYN, RN, CCRN, CHPN   Palliative Care Nurse Coordinator   [facility]  [phone]

## 2017-10-26 LAB
ALBUMIN SERPL BCP-MCNC: 2.7 G/DL
ALP SERPL-CCNC: 67 U/L
ALT SERPL W/O P-5'-P-CCNC: 31 U/L
ANION GAP SERPL CALC-SCNC: 8 MMOL/L
AST SERPL-CCNC: 21 U/L
BASOPHILS # BLD AUTO: 0.01 K/UL
BASOPHILS NFR BLD: 0.1 %
BILIRUB SERPL-MCNC: 1.2 MG/DL
BUN SERPL-MCNC: 58 MG/DL
CALCIUM SERPL-MCNC: 9 MG/DL
CHLORIDE SERPL-SCNC: 111 MMOL/L
CO2 SERPL-SCNC: 31 MMOL/L
CREAT SERPL-MCNC: 1.4 MG/DL
DIFFERENTIAL METHOD: ABNORMAL
EOSINOPHIL # BLD AUTO: 0 K/UL
EOSINOPHIL NFR BLD: 0 %
ERYTHROCYTE [DISTWIDTH] IN BLOOD BY AUTOMATED COUNT: 15.2 %
EST. GFR  (AFRICAN AMERICAN): 43 ML/MIN/1.73 M^2
EST. GFR  (NON AFRICAN AMERICAN): 37 ML/MIN/1.73 M^2
GLUCOSE SERPL-MCNC: 247 MG/DL
HCT VFR BLD AUTO: 34.6 %
HGB BLD-MCNC: 11 G/DL
INR PPP: 4
LYMPHOCYTES # BLD AUTO: 0.6 K/UL
LYMPHOCYTES NFR BLD: 3.1 %
MCH RBC QN AUTO: 31.8 PG
MCHC RBC AUTO-ENTMCNC: 31.8 G/DL
MCV RBC AUTO: 100 FL
MONOCYTES # BLD AUTO: 0.9 K/UL
MONOCYTES NFR BLD: 4.9 %
NEUTROPHILS # BLD AUTO: 16.6 K/UL
NEUTROPHILS NFR BLD: 91.9 %
PLATELET # BLD AUTO: 97 K/UL
PMV BLD AUTO: 11.5 FL
POCT GLUCOSE: 200 MG/DL (ref 70–110)
POCT GLUCOSE: 221 MG/DL (ref 70–110)
POCT GLUCOSE: 321 MG/DL (ref 70–110)
POTASSIUM SERPL-SCNC: 3.5 MMOL/L
PROT SERPL-MCNC: 7 G/DL
PROTHROMBIN TIME: 40.6 SEC
RBC # BLD AUTO: 3.46 M/UL
SODIUM SERPL-SCNC: 150 MMOL/L
WBC # BLD AUTO: 18.05 K/UL

## 2017-10-26 PROCEDURE — 25000003 PHARM REV CODE 250: Performed by: EMERGENCY MEDICINE

## 2017-10-26 PROCEDURE — 85025 COMPLETE CBC W/AUTO DIFF WBC: CPT

## 2017-10-26 PROCEDURE — 63600175 PHARM REV CODE 636 W HCPCS: Performed by: INTERNAL MEDICINE

## 2017-10-26 PROCEDURE — 36415 COLL VENOUS BLD VENIPUNCTURE: CPT

## 2017-10-26 PROCEDURE — 25000003 PHARM REV CODE 250: Performed by: INTERNAL MEDICINE

## 2017-10-26 PROCEDURE — 63600175 PHARM REV CODE 636 W HCPCS: Performed by: EMERGENCY MEDICINE

## 2017-10-26 PROCEDURE — 80053 COMPREHEN METABOLIC PANEL: CPT

## 2017-10-26 PROCEDURE — 94640 AIRWAY INHALATION TREATMENT: CPT

## 2017-10-26 PROCEDURE — 85610 PROTHROMBIN TIME: CPT

## 2017-10-26 PROCEDURE — C9113 INJ PANTOPRAZOLE SODIUM, VIA: HCPCS | Performed by: HOSPITALIST

## 2017-10-26 PROCEDURE — 99900035 HC TECH TIME PER 15 MIN (STAT)

## 2017-10-26 PROCEDURE — 63600175 PHARM REV CODE 636 W HCPCS: Performed by: HOSPITALIST

## 2017-10-26 PROCEDURE — 25000242 PHARM REV CODE 250 ALT 637 W/ HCPCS: Performed by: INTERNAL MEDICINE

## 2017-10-26 PROCEDURE — 20000000 HC ICU ROOM

## 2017-10-26 PROCEDURE — 27000221 HC OXYGEN, UP TO 24 HOURS

## 2017-10-26 PROCEDURE — 94660 CPAP INITIATION&MGMT: CPT

## 2017-10-26 RX ADMIN — HYDRALAZINE HYDROCHLORIDE 10 MG: 20 INJECTION INTRAMUSCULAR; INTRAVENOUS at 05:10

## 2017-10-26 RX ADMIN — AMIODARONE HYDROCHLORIDE 200 MG: 200 TABLET ORAL at 09:10

## 2017-10-26 RX ADMIN — IPRATROPIUM BROMIDE AND ALBUTEROL SULFATE 3 ML: .5; 3 SOLUTION RESPIRATORY (INHALATION) at 07:10

## 2017-10-26 RX ADMIN — PRAVASTATIN SODIUM 40 MG: 40 TABLET ORAL at 09:10

## 2017-10-26 RX ADMIN — METOPROLOL TARTRATE 10 MG: 5 INJECTION INTRAVENOUS at 06:10

## 2017-10-26 RX ADMIN — METOPROLOL TARTRATE 10 MG: 5 INJECTION INTRAVENOUS at 12:10

## 2017-10-26 RX ADMIN — QUETIAPINE FUMARATE 25 MG: 25 TABLET, FILM COATED ORAL at 09:10

## 2017-10-26 RX ADMIN — IPRATROPIUM BROMIDE AND ALBUTEROL SULFATE 3 ML: .5; 3 SOLUTION RESPIRATORY (INHALATION) at 12:10

## 2017-10-26 RX ADMIN — METOPROLOL TARTRATE 10 MG: 5 INJECTION INTRAVENOUS at 05:10

## 2017-10-26 RX ADMIN — LEVOTHYROXINE SODIUM 75 MCG: 75 TABLET ORAL at 05:10

## 2017-10-26 RX ADMIN — INSULIN ASPART 8 UNITS: 100 INJECTION, SOLUTION INTRAVENOUS; SUBCUTANEOUS at 06:10

## 2017-10-26 RX ADMIN — IPRATROPIUM BROMIDE AND ALBUTEROL SULFATE 3 ML: .5; 3 SOLUTION RESPIRATORY (INHALATION) at 01:10

## 2017-10-26 RX ADMIN — CHLORHEXIDINE GLUCONATE 15 ML: 1.2 RINSE ORAL at 09:10

## 2017-10-26 RX ADMIN — METOPROLOL TARTRATE 10 MG: 5 INJECTION INTRAVENOUS at 11:10

## 2017-10-26 RX ADMIN — INSULIN ASPART 4 UNITS: 100 INJECTION, SOLUTION INTRAVENOUS; SUBCUTANEOUS at 05:10

## 2017-10-26 RX ADMIN — INSULIN ASPART 2 UNITS: 100 INJECTION, SOLUTION INTRAVENOUS; SUBCUTANEOUS at 12:10

## 2017-10-26 RX ADMIN — HYDRALAZINE HYDROCHLORIDE 10 MG: 20 INJECTION INTRAMUSCULAR; INTRAVENOUS at 03:10

## 2017-10-26 RX ADMIN — INSULIN ASPART 2 UNITS: 100 INJECTION, SOLUTION INTRAVENOUS; SUBCUTANEOUS at 11:10

## 2017-10-26 RX ADMIN — HYDRALAZINE HYDROCHLORIDE 20 MG: 20 INJECTION INTRAMUSCULAR; INTRAVENOUS at 09:10

## 2017-10-26 RX ADMIN — METHYLPREDNISOLONE SODIUM SUCCINATE 40 MG: 40 INJECTION, POWDER, FOR SOLUTION INTRAMUSCULAR; INTRAVENOUS at 09:10

## 2017-10-26 RX ADMIN — PANTOPRAZOLE SODIUM 40 MG: 40 INJECTION, POWDER, FOR SOLUTION INTRAVENOUS at 09:10

## 2017-10-26 NOTE — CONSULTS
Spoke with Vida Sandy after her meetings with both hospice companies.  She has selected the [facility] and will meet with their representative tomorrow AM to sign paperwork in anticipation of a transfer to inpatient hospice.  ARABELLA will finalize the referral with Darlyn Arana.

## 2017-10-26 NOTE — PLAN OF CARE
Problem: Patient Care Overview  Goal: Plan of Care Review  Outcome: Ongoing (interventions implemented as appropriate)  Patient is in the ICU. Patient is on BiPAP 65% and maintained her goal of sats >88%. Patient is a&ox4. Patient has been in afib throughout the shift. Patient has tolerated her pureed food well throughout the night. Patient's urine output is adequate. One BM (small amount) throughout the night. Patient is free from any hospital acquired falls or skin breakdown this shift. Patient updated on plan of care.

## 2017-10-26 NOTE — CONSULTS
SW has referred patient to both [facility] and [facility] for inpatient hospice.  Confirmed receipt of referral with [facility] admissions and left a message for [facility] admissions to confirm receipt of referral.    1200- confirmed receipt of referral at [facility].    1450-  Pt's sister, Vida Sandy, has a 3:30 meeting with the [facility] and a 4:30 meeting with [facility].  She will decide then which company to proceed with.

## 2017-10-26 NOTE — PLAN OF CARE
Problem: Patient Care Overview  Goal: Plan of Care Review  Outcome: Ongoing (interventions implemented as appropriate)  Pt remains in the ICU this shift. Remains on BIPAP, attempted to wean but unable to due to quickly desats. Bilateral wrist restraints remain in place, attempts to pull lines. DNR now, plan is for possible hospice placement on Friday. No falls, injuires, or new skin breakdown, precautions maintained for all.

## 2017-10-26 NOTE — PROGRESS NOTES
Pt was received on BiPAP 16/10@65%. Pt has total face mask no redness noticed around mask. Small redness on bridge of nose.

## 2017-10-26 NOTE — CARE UPDATE
Pt received on bipap through the servo I vent.  Settings bipap 16/10 at 65%.  Pt is alert and follows commands.  All alarms are on and working.  Will continue to monitor

## 2017-10-26 NOTE — PROGRESS NOTES
[facility] Hospital Medicine  Progress Note    Patient Name: Savita Polo  MRN: [id number]  Patient Class: IP- Inpatient   Admission Date: 10/12/2017  Length of Stay: 13 days  Attending Physician: Angel Hernandez MD  Primary Care Provider: Nadiya Nava MD        Subjective:     Principal Problem:Acute on chronic respiratory failure with hypoxia and hypercapnia    HPI:  Savita Polo is a 72 y.o. female that (in part)  has a past medical history of Anemia in chronic kidney disease(285.21); Anxiety; Atherosclerotic cerebrovascular disease; CHF (congestive heart failure); Chronic respiratory failure with hypoxia; CKD (chronic kidney disease), stage IV; COPD (chronic obstructive pulmonary disease); CRLD (chronic restrictive lung disease); Diabetic peripheral neuropathy; Diabetic retinopathy; Diverticulosis of colon; DJD (degenerative joint disease); Fatty liver; SUSAN (generalized anxiety disorder); Heart attack; History of PSVT (paroxysmal supraventricular tachycardia); Iron deficiency; Joint pain; Keloid cicatrix; Long term (current) use of anticoagulants; Mixed hyperlipidemia; Multiple lung nodules; Obesity, Class I, BMI 30-34.9; On home oxygen therapy; Paroxysmal atrial fibrillation; Renovascular hypertension; Right-sided heart failure; Secondary hyperparathyroidism; Secondary pulmonary hypertension; Stroke; Thyroid disease; Type 2 diabetes mellitus with stage 4 chronic kidney disease; and Vitamin D deficiency disease. Presents to [facility] Emergency Department complaining of shortness of breath.  She lives and [facility].  She is a poorly 93% on 4 L by nasal cannula at her residence.  She is on chronic oxygen has had multiple admissions for respiratory failure.  Worsened with exertion.  No relief given with supplemental oxygen.  Located in the chest.  Moderate to high intensity.  No radiation.  Subacute onset with progressive worsening.   Constant duration.    In the emergency department chest x-ray, ABG, and routine laboratory studies were obtained.  There was evidence of hypoxia and hypercapnia.  She also had evidence of urinary tract infection.    Hospital medicine has been asked to admit for further evaluation and treatment.       Hospital Course:  Ms Polo presented with acute on chronic respiratory failure with hypoxemia secondary to pneumonia. Soon after admission patient had a cardiac arrest. Was intubated and transferred to ICU. Pulmonary process thought to be a combination of pneumonia and ARDS, as she still decompensated despite diuresis and BiPAP on admission. Treated with steroids and duo-nebs while intubated. UCx with > 100,000 Klebsiella pneumoniae. Blood cultures negative and respiratory culture with no growth although moderate WBCs. Treated with meropenem (see allergy list) for total of 10 days. Extubated to BiPAP on 10/19. Attempted to wean to high flow, but not tolerate for long. Developed vivid visual hallucinations and insomnia, complicating clinical course. Despite this, she was able to initiate a pureed diet with honey thickened fluids while on high flow, but then placed on BiPAP when not eating. Intermittent diuresis. Persistently in AFib with RVR. On amiodarone and BB. On full anticoagulation with coumadin, which was held on 10/23 for INR > 3. Psychiatry consulted for delirium and insomnia. Prognosis is poor overall and unlikely to achieve meaningful recovery despite adequate medical treatment. Palliative care consulted for goals of care. Patient is full code ,family meeting with sister and  palliative care done yesterday,need still discuss with daughter,her INR is improved,but she is still on BIPAP.VH,AH improved.  Family agree with DNR status,[facility] in AM.    Interval History: her VH,AH improved,appear lept good over night,she is still on BIPAP.    Review of Systems   Respiratory: Positive for shortness of  breath. Negative for wheezing.    Cardiovascular: Negative.    Gastrointestinal: Negative.    Neurological: Negative.    Psychiatric/Behavioral: Negative for confusion and hallucinations.     Objective:     Vital Signs (Most Recent):  Temp: 98.2 °F (36.8 °C) (10/26/17 0732)  Pulse: (!) 111 (10/26/17 0945)  Resp: (!) 41 (10/26/17 0945)  BP: (!) 121/59 (10/26/17 0900)  SpO2: (!) 93 % (10/26/17 0945) Vital Signs (24h Range):  Temp:  [97.3 °F (36.3 °C)-98.2 °F (36.8 °C)] 98.2 °F (36.8 °C)  Pulse:  [] 111  Resp:  [22-62] 41  SpO2:  [86 %-96 %] 93 %  BP: (121-167)/(59-78) 121/59     Weight: 76.7 kg (169 lb 1.5 oz)  Body mass index is 33.02 kg/m².    Intake/Output Summary (Last 24 hours) at 10/26/17 0951  Last data filed at 10/26/17 0935   Gross per 24 hour   Intake              715 ml   Output              945 ml   Net             -230 ml      Physical Exam   Constitutional: She appears well-developed. No distress.   HENT:   BiPAP in Place   Eyes: Conjunctivae are normal. Right eye exhibits no discharge. Left eye exhibits no discharge.   Neck: Neck supple.   Cardiovascular: Normal rate, regular rhythm and normal heart sounds.    Pulmonary/Chest: Effort normal. No stridor.   Coarse BS bilaterally  Right LL expiratory wheezing   Abdominal: Soft. Bowel sounds are normal.   Musculoskeletal: She exhibits no edema.   Neurological:   Alert. Following commands but actively hallucinating.    Skin: Skin is warm and dry.   Nursing note and vitals reviewed.      Significant Labs: All pertinent labs within the past 24 hours have been reviewed.    Significant Imaging: I have reviewed all pertinent imaging results/findings within the past 24 hours.  I have reviewed and interpreted all pertinent imaging results/findings within the past 24 hours.    Assessment/Plan:      * Acute on chronic respiratory failure with hypoxia and hypercapnia    With chronic hypoxia and pulmonary hypertension, dependent on supplemental O2 at home. With  Tobacco abuse. Exacerbated by underlying urinary tract infection 2/2 Klebsiella, without bacteremia. Extubated to BiPAP on 10/19. Continue pureed diet high flow during meals and then back on BiPAP when not eating. Continue nebs every 6 hours, elevate HOB > 30 degrees and continue solumedrol 40 mg with taper. Is slow to improve, however expected given underlying chronic hypoxemic respiratory failure. Completed abx therapy for UTI. Will diurese as needed.changed BIPAP setting per pulmonology.discussing with family dn palliative care is in progress,discussing with daughter is still in progress,she is in [place] and contacting her is difficult.family agree with DNR status,likely Hospice in AM.        Delirium due to another medical condition    She's had this prior to admission but not evident up until extubation. Clearly visual and has not slept in > 2 days. Will change steroids to daily only and see if this was contributing to insomnia. Psych consulted for co-management. Might need neuro consult as well. Will follow recs,on seroquel and she slept better last night.        Oral bleeding    As above          UTI due to Klebsiella species    As above          Cardiopulmonary arrest with successful resuscitation    This happened soon after admission on 10/12. Likely secondary to significant hypoxia at the time. ROSC established post ~ 9 mins of ACLS. Neuro intact post arrest therefore hypothermia protocol not indicated           Chronic anticoagulation    INR > 3 today. Hold anticoagulants. INR in AM              Urinary tract infection with hematuria    UTI secondary to Klebsiella. No bacteremia.   Completed 10 days of meropenem on 10/22        Type 2 diabetes mellitus with stage 4 chronic kidney disease    Hyperglycemia worsened by steroids.  Glucose should improve with steroid taper.  Tolerating pureed diet with diabetic restrictions. Adjust insulin for goal < 180          Mixed hyperlipidemia                Paroxysmal  atrial fibrillation    Difficult to control due to hypoxia. Transition amiodarone to PO and continue metoprolol IV at current dose. Patient is a poor candidate for cardioversion given tenuous respiratory status.           Pulmonary hypertension due to COPD    2/2 chronic tobacco abuse. Is supplemental O2 dependent. No evidence of heart failure        Tobacco abuse    Chronic tobacco use  Tobacco cessation counseling              On home oxygen therapy    On 3-4 L at home          SUSAN (generalized anxiety disorder)    No acute issues                VTE Risk Mitigation         Ordered     Medium Risk of VTE  Once      10/13/17 0402     Place RADHA hose  Until discontinued      10/13/17 0402     Place sequential compression device  Until discontinued      10/13/17 0402          Critical care time spent on the evaluation and treatment of severe organ dysfunction, review of pertinent labs and imaging studies, discussions with consulting providers and discussions with patient/family: 30  minutes.    Angel Hernandez MD  Department of Hospital Medicine   [facility]

## 2017-10-26 NOTE — PROGRESS NOTES
[facility]  Adult Nutrition  Progress Note    SUMMARY     Recommendations    Recommendation/Intervention:   1) Continue pureed, diabetic 1800 calorie diet with honey thick liquid.   -Consider appetite stimulant if po intake remains <70% estimated energy needs.   2) Continue Boost Plus TID.   3) RD to monitor.    Goals:   1) Pt will meet >=70% of estimated energy needs.   2) Pt will continue to tolerate diet.    Nutrition Goal Status: progressing towards goal  Communication of RD Recs: other (comment)    Continuum of Care Plan    Referral to Outpatient Services:  (D/C planning: Too soon to determine)    Reason for Assessment    Reason for Assessment: RD follow-up  Diagnosis:  (Resp. Distress)  Relevent Medical History: CVA, CHF, CKD, COPD, DM, Diverticulitis   Interdisciplinary Rounds: attended     General Information Comments: Pt states to be tolerating pureed diet. Pt also states they like the ONS, but all were unopened in room visit. Calculated pt is only consuming 44% of estimated energy needs. Pt to go to hospice tomorrow per MD.     Nutrition Prescription Ordered    Current Diet Order: Pureed Diabetic 1800 todd diet  Nutrition Order Comments: honey thick liquids  Oral Nutrition Supplement: Boost Plus TID     Evaluation of Received Nutrients/Fluid Intake    Energy Calories Required: not meeting needs  Protein Required: not meeting needs      I/O: 590/1095    Comments: LBM 10/26  Tolerance: tolerating    % Intake of Estimated Energy Needs: 25 - 50 %  % Meal Intake: 50%     Nutrition Risk Screen     Nutrition Risk Screen: no indicators present    Nutrition/Diet History    Patient Reported Diet/Restrictions/Preferences:  (alecia)     Food Preferences: ALECIA     Labs/Tests/Procedures/Meds    Diagnostic Test/Procedure Review: reviewed, pertinent  Pertinent Labs Reviewed: reviewed  Pertinent Labs Comments: Elevated glucose, POCT glucose, and sodium.  Pertinent Medications Reviewed: reviewed    Physical  Findings    Overall Physical Appearance: edematous, on oxygen therapy  Oral/Mouth Cavity: tooth/teeth missing  Skin: other (see comments) (skin bruised, scabbed, tears per RN notes. Mynor 16)    Anthropometrics    Temp: 98.2 °F (36.8 °C)     Height: 5' (152.4 cm)  Weight Method: Bed Scale  Weight: 76.7 kg (169 lb 1.5 oz)     Ideal Body Weight (IBW), Female: 100 lb     % Ideal Body Weight, Female (lb): 203.27 lb  BMI (Calculated): 39.8     Estimated/Assessed Needs    Weight Used For Calorie Calculations: 76.7 kg (169 lb 1.5 oz)   Height (cm): 152.4 cm  Energy Calorie Requirements (kcal): 1614  Energy Need Method: [place] State     RMR (Downing-St. Jeor Equation): 1193.5  RMR ([place] State Equation): 1431.79  RMR (Modified [place] State Equation): 1440.72  Weight Used For Protein Calculations: 76.7 kg (169 lb 1.5 oz)  Protein Requirements: 76-92 g (1-1.2 g/kg)    Fluid Requirements (mL): 1614  Fluid Need Method: RDA Method  RDA Method (mL): 1614     CHO Requirement: 200g     Assessment and Plan    Nutrition Diagnosis  Problem: Inadequate oral intake  Etiology: Increased nutrient needs and lack of consumption  Signs and Symptoms: 44% of po intake  Nutrition Status: new    Monitor and Evaluation    Food and Nutrient Intake: energy intake, food and beverage intake, enteral nutrition intake  Food and Nutrient Adminstration: diet order, enteral and parenteral nutrition administration  Knowledge/Beliefs/Attitudes: food and nutrition knowledge/skill  Physical Activity and Function: nutrition-related ADLs and IADLs  Anthropometric Measurements: weight, weight change  Biochemical Data, Medical Tests and Procedures: electrolyte and renal panel, glucose/endocrine profile  Nutrition-Focused Physical Findings: overall appearance    Nutrition Risk    Level of Risk: other (see comments) (f/u 2X/wk)    Nutrition Follow-Up    RD Follow-up?: Yes      Debora Mckeon, dietetic intern  I certify that I directed the dietetic intern in service  delivery and guided them using my skilled judgment. As the cosigning dietitian, I have reviewed the dietetic interns documentation and am responsible for the treatment, assessment, and plan.    Sylvia Baesley RD

## 2017-10-26 NOTE — PLAN OF CARE
"Problem: Diabetes, Type 2 (Adult)  Goal: Signs and Symptoms of Listed Potential Problems Will be Absent, Minimized or Managed (Diabetes, Type 2)  Signs and symptoms of listed potential problems will be absent, minimized or managed by discharge/transition of care (reference Diabetes, Type 2 (Adult) CPG).   Outcome: Ongoing (interventions implemented as appropriate)  VSS. Unable to be weaned from BiPAP. Increased from 65% to 70%. O2 sats dropped to 78% when attempting both high flow cannula and 100% non rebreather.  Refuses all but a few bites of meals. Likes sprite zero and applesauce. Insulin given with meals. Glucose remains elevated. No falls or injuries. Restraints not needed. Confused, but easily reoriented. Patient made aware that her sister chose [facility] for her. Pt didn't understand. Asked about the yellow pipes in the sink (none in room) and when reoriented realized there weren't any. She states she's "talking funny." Pleasant and cooperative.       "

## 2017-10-26 NOTE — SUBJECTIVE & OBJECTIVE
Interval History: her VH,AH improved,appear lept good over night,she is still on BIPAP.    Review of Systems   Respiratory: Positive for shortness of breath. Negative for wheezing.    Cardiovascular: Negative.    Gastrointestinal: Negative.    Neurological: Negative.    Psychiatric/Behavioral: Negative for confusion and hallucinations.     Objective:     Vital Signs (Most Recent):  Temp: 98.2 °F (36.8 °C) (10/26/17 0732)  Pulse: (!) 111 (10/26/17 0945)  Resp: (!) 41 (10/26/17 0945)  BP: (!) 121/59 (10/26/17 0900)  SpO2: (!) 93 % (10/26/17 0945) Vital Signs (24h Range):  Temp:  [97.3 °F (36.3 °C)-98.2 °F (36.8 °C)] 98.2 °F (36.8 °C)  Pulse:  [] 111  Resp:  [22-62] 41  SpO2:  [86 %-96 %] 93 %  BP: (121-167)/(59-78) 121/59     Weight: 76.7 kg (169 lb 1.5 oz)  Body mass index is 33.02 kg/m².    Intake/Output Summary (Last 24 hours) at 10/26/17 0951  Last data filed at 10/26/17 0935   Gross per 24 hour   Intake              715 ml   Output              945 ml   Net             -230 ml      Physical Exam   Constitutional: She appears well-developed. No distress.   HENT:   BiPAP in Place   Eyes: Conjunctivae are normal. Right eye exhibits no discharge. Left eye exhibits no discharge.   Neck: Neck supple.   Cardiovascular: Normal rate, regular rhythm and normal heart sounds.    Pulmonary/Chest: Effort normal. No stridor.   Coarse BS bilaterally  Right LL expiratory wheezing   Abdominal: Soft. Bowel sounds are normal.   Musculoskeletal: She exhibits no edema.   Neurological:   Alert. Following commands but actively hallucinating.    Skin: Skin is warm and dry.   Nursing note and vitals reviewed.      Significant Labs: All pertinent labs within the past 24 hours have been reviewed.    Significant Imaging: I have reviewed all pertinent imaging results/findings within the past 24 hours.  I have reviewed and interpreted all pertinent imaging results/findings within the past 24 hours.

## 2017-10-26 NOTE — ASSESSMENT & PLAN NOTE
With chronic hypoxia and pulmonary hypertension, dependent on supplemental O2 at home. With Tobacco abuse. Exacerbated by underlying urinary tract infection 2/2 Klebsiella, without bacteremia. Extubated to BiPAP on 10/19. Continue pureed diet high flow during meals and then back on BiPAP when not eating. Continue nebs every 6 hours, elevate HOB > 30 degrees and continue solumedrol 40 mg with taper. Is slow to improve, however expected given underlying chronic hypoxemic respiratory failure. Completed abx therapy for UTI. Will diurese as needed.changed BIPAP setting per pulmonology.discussing with family dn palliative care is in progress,discussing with daughter is still in progress,she is in [facility] and contacting her is difficult.family agree with DNR status,likely Hospice in AM.

## 2017-10-27 VITALS
SYSTOLIC BLOOD PRESSURE: 128 MMHG | WEIGHT: 169.06 LBS | TEMPERATURE: 97 F | OXYGEN SATURATION: 86 % | HEART RATE: 104 BPM | RESPIRATION RATE: 34 BRPM | DIASTOLIC BLOOD PRESSURE: 60 MMHG | BODY MASS INDEX: 33.19 KG/M2 | HEIGHT: 60 IN

## 2017-10-27 LAB
ALBUMIN SERPL BCP-MCNC: 2.7 G/DL
ALP SERPL-CCNC: 66 U/L
ALT SERPL W/O P-5'-P-CCNC: 31 U/L
ANION GAP SERPL CALC-SCNC: 9 MMOL/L
AST SERPL-CCNC: 22 U/L
BASOPHILS # BLD AUTO: 0.01 K/UL
BASOPHILS NFR BLD: 0.1 %
BILIRUB SERPL-MCNC: 1.2 MG/DL
BUN SERPL-MCNC: 56 MG/DL
CALCIUM SERPL-MCNC: 8.7 MG/DL
CHLORIDE SERPL-SCNC: 110 MMOL/L
CO2 SERPL-SCNC: 32 MMOL/L
CREAT SERPL-MCNC: 1.3 MG/DL
DIFFERENTIAL METHOD: ABNORMAL
EOSINOPHIL # BLD AUTO: 0 K/UL
EOSINOPHIL NFR BLD: 0 %
ERYTHROCYTE [DISTWIDTH] IN BLOOD BY AUTOMATED COUNT: 15 %
EST. GFR  (AFRICAN AMERICAN): 47 ML/MIN/1.73 M^2
EST. GFR  (NON AFRICAN AMERICAN): 41 ML/MIN/1.73 M^2
GLUCOSE SERPL-MCNC: 214 MG/DL
HCT VFR BLD AUTO: 33.4 %
HGB BLD-MCNC: 10.7 G/DL
INR PPP: 4.4
LYMPHOCYTES # BLD AUTO: 0.7 K/UL
LYMPHOCYTES NFR BLD: 3.6 %
MCH RBC QN AUTO: 31.9 PG
MCHC RBC AUTO-ENTMCNC: 32 G/DL
MCV RBC AUTO: 100 FL
MONOCYTES # BLD AUTO: 0.8 K/UL
MONOCYTES NFR BLD: 4.2 %
NEUTROPHILS # BLD AUTO: 16.8 K/UL
NEUTROPHILS NFR BLD: 92.1 %
PLATELET # BLD AUTO: 96 K/UL
PMV BLD AUTO: 12.2 FL
POCT GLUCOSE: 189 MG/DL (ref 70–110)
POCT GLUCOSE: 196 MG/DL (ref 70–110)
POCT GLUCOSE: 249 MG/DL (ref 70–110)
POTASSIUM SERPL-SCNC: 3.2 MMOL/L
PROT SERPL-MCNC: 6.8 G/DL
PROTHROMBIN TIME: 44.3 SEC
RBC # BLD AUTO: 3.35 M/UL
SODIUM SERPL-SCNC: 151 MMOL/L
WBC # BLD AUTO: 18.22 K/UL

## 2017-10-27 PROCEDURE — 25000003 PHARM REV CODE 250: Performed by: EMERGENCY MEDICINE

## 2017-10-27 PROCEDURE — 80053 COMPREHEN METABOLIC PANEL: CPT

## 2017-10-27 PROCEDURE — 25000242 PHARM REV CODE 250 ALT 637 W/ HCPCS: Performed by: INTERNAL MEDICINE

## 2017-10-27 PROCEDURE — 94660 CPAP INITIATION&MGMT: CPT

## 2017-10-27 PROCEDURE — 25000003 PHARM REV CODE 250: Performed by: INTERNAL MEDICINE

## 2017-10-27 PROCEDURE — 85025 COMPLETE CBC W/AUTO DIFF WBC: CPT

## 2017-10-27 PROCEDURE — C9113 INJ PANTOPRAZOLE SODIUM, VIA: HCPCS | Performed by: HOSPITALIST

## 2017-10-27 PROCEDURE — 99900035 HC TECH TIME PER 15 MIN (STAT)

## 2017-10-27 PROCEDURE — 94761 N-INVAS EAR/PLS OXIMETRY MLT: CPT

## 2017-10-27 PROCEDURE — 36415 COLL VENOUS BLD VENIPUNCTURE: CPT

## 2017-10-27 PROCEDURE — 63600175 PHARM REV CODE 636 W HCPCS: Performed by: INTERNAL MEDICINE

## 2017-10-27 PROCEDURE — 94640 AIRWAY INHALATION TREATMENT: CPT

## 2017-10-27 PROCEDURE — 85610 PROTHROMBIN TIME: CPT

## 2017-10-27 PROCEDURE — 63600175 PHARM REV CODE 636 W HCPCS: Performed by: HOSPITALIST

## 2017-10-27 RX ORDER — CLONIDINE 0.3 MG/24H
1 PATCH, EXTENDED RELEASE TRANSDERMAL
Qty: 4 PATCH | Refills: 11
Start: 2017-10-31 | End: 2018-10-31

## 2017-10-27 RX ORDER — AMIODARONE HYDROCHLORIDE 200 MG/1
200 TABLET ORAL 2 TIMES DAILY
Qty: 60 TABLET | Refills: 11
Start: 2017-10-27 | End: 2018-10-27

## 2017-10-27 RX ORDER — QUETIAPINE FUMARATE 25 MG/1
25 TABLET, FILM COATED ORAL NIGHTLY
Qty: 30 TABLET | Refills: 11 | Status: SHIPPED | OUTPATIENT
Start: 2017-10-27 | End: 2018-10-27

## 2017-10-27 RX ADMIN — PANTOPRAZOLE SODIUM 40 MG: 40 INJECTION, POWDER, FOR SOLUTION INTRAVENOUS at 09:10

## 2017-10-27 RX ADMIN — AMIODARONE HYDROCHLORIDE 200 MG: 200 TABLET ORAL at 09:10

## 2017-10-27 RX ADMIN — METOPROLOL TARTRATE 10 MG: 5 INJECTION INTRAVENOUS at 12:10

## 2017-10-27 RX ADMIN — LEVOTHYROXINE SODIUM 75 MCG: 75 TABLET ORAL at 05:10

## 2017-10-27 RX ADMIN — INSULIN ASPART 2 UNITS: 100 INJECTION, SOLUTION INTRAVENOUS; SUBCUTANEOUS at 11:10

## 2017-10-27 RX ADMIN — INSULIN ASPART 2 UNITS: 100 INJECTION, SOLUTION INTRAVENOUS; SUBCUTANEOUS at 05:10

## 2017-10-27 RX ADMIN — CHLORHEXIDINE GLUCONATE 15 ML: 1.2 RINSE ORAL at 09:10

## 2017-10-27 RX ADMIN — PRAVASTATIN SODIUM 40 MG: 40 TABLET ORAL at 09:10

## 2017-10-27 RX ADMIN — METHYLPREDNISOLONE SODIUM SUCCINATE 40 MG: 40 INJECTION, POWDER, FOR SOLUTION INTRAMUSCULAR; INTRAVENOUS at 09:10

## 2017-10-27 RX ADMIN — METOPROLOL TARTRATE 10 MG: 5 INJECTION INTRAVENOUS at 05:10

## 2017-10-27 RX ADMIN — IPRATROPIUM BROMIDE AND ALBUTEROL SULFATE 3 ML: .5; 3 SOLUTION RESPIRATORY (INHALATION) at 01:10

## 2017-10-27 RX ADMIN — IPRATROPIUM BROMIDE AND ALBUTEROL SULFATE 3 ML: .5; 3 SOLUTION RESPIRATORY (INHALATION) at 07:10

## 2017-10-27 RX ADMIN — HYDRALAZINE HYDROCHLORIDE 10 MG: 20 INJECTION INTRAMUSCULAR; INTRAVENOUS at 05:10

## 2017-10-27 NOTE — PLAN OF CARE
10/27/17 1254   Final Note   Assessment Type Final Discharge Note   Discharge Disposition [facility]   What phone number can be called within the next 1-3 days to see how you are doing after discharge? [phone]   Hospital Follow Up  Appt(s) scheduled? No   Discharge plans and expectations educations in teach back method with documentation complete? No

## 2017-10-27 NOTE — CONSULTS
Hospice orders and discharge summary faxed to Helen at [facility] (fax [phone]).  She will review orders and let SW Supervisor know when to arrange transportation.    1145- Per [facility], transfer approved by [place].  They requested I set up ambulance for 1 pm to allow time for a bipap machine to be delivered to [facility].    1235- [facility] arranged for 1pm transport.  Packet made, DNR order and [place]POST included.  Notified ambulance dispatch of DNR status and pt's need for bipap during transportation. SW present as Aurora, bedside RN called report to Latoya at [facility] ([phone]).    1245- called pt's sister, Vida Sandy to notify her of patient's transfer to [facility] and estimated time of arrival.  Vida was very pleased at the process so far.  SW Supervisor inquired if she would like me to reach out to Chaplain Sugar Chavez at the facility pt's daughter is incarcerated at.  Vida gave me permission to notify Chaplain Chavez that pt was transferring to inpatient hospice today and that I could give the hospice name and contact number for Chaplain Chavez to follow-up with.  ARABELLA called the [place] facility ([phone]) and was transferred to Chaplain Chavez's voicemail.  Left message regarding the transfer and the contact information to follow-up to help connect pt and her daughter if possible.  Also left my contact information for any additional assistance needed.

## 2017-10-27 NOTE — PROGRESS NOTES
Pt remains on bipap through Servo I vent with settings at 16/10 and 65%.  She has a small red marisela on the bridge of her nose but has a total face mask in place. All alarms on and functioning and NARN. RT will continue to monitor.

## 2017-10-27 NOTE — DISCHARGE SUMMARY
[facility] Hospital Medicine  Discharge Summary      Patient Name: Savita Polo  MRN: [id number]  Admission Date: 10/12/2017  Hospital Length of Stay: 14 days  Discharge Date and Time:  10/27/2017 10:08 AM  Attending Physician: Angel Hernandez MD   Discharging Provider: Angel Hernandez MD  Primary Care Provider: Nadiya Nava MD      HPI:   Savita Polo is a 72 y.o. female that (in part)  has a past medical history of Anemia in chronic kidney disease(285.21); Anxiety; Atherosclerotic cerebrovascular disease; CHF (congestive heart failure); Chronic respiratory failure with hypoxia; CKD (chronic kidney disease), stage IV; COPD (chronic obstructive pulmonary disease); CRLD (chronic restrictive lung disease); Diabetic peripheral neuropathy; Diabetic retinopathy; Diverticulosis of colon; DJD (degenerative joint disease); Fatty liver; SUSAN (generalized anxiety disorder); Heart attack; History of PSVT (paroxysmal supraventricular tachycardia); Iron deficiency; Joint pain; Keloid cicatrix; Long term (current) use of anticoagulants; Mixed hyperlipidemia; Multiple lung nodules; Obesity, Class I, BMI 30-34.9; On home oxygen therapy; Paroxysmal atrial fibrillation; Renovascular hypertension; Right-sided heart failure; Secondary hyperparathyroidism; Secondary pulmonary hypertension; Stroke; Thyroid disease; Type 2 diabetes mellitus with stage 4 chronic kidney disease; and Vitamin D deficiency disease. Presents to [facility] complaining of shortness of breath.  She lives and [facility] nursing home.  She is a poorly 93% on 4 L by nasal cannula at her residence.  She is on chronic oxygen has had multiple admissions for respiratory failure.  Worsened with exertion.  No relief given with supplemental oxygen.  Located in the chest.  Moderate to high intensity.  No radiation.  Subacute onset with progressive worsening.  Constant duration.    In the  emergency department chest x-ray, ABG, and routine laboratory studies were obtained.  There was evidence of hypoxia and hypercapnia.  She also had evidence of urinary tract infection.    Hospital medicine has been asked to admit for further evaluation and treatment.       * No surgery found *      Indwelling Lines/Drains at time of discharge:   Lines/Drains/Airways     Drain                 Urethral Catheter 10/13/17 0900 Latex 16 Fr. 14 days              Hospital Course:   Ms Polo presented with acute on chronic respiratory failure with hypoxemia secondary to pneumonia and COPD exacerbation, Soon after admission patient had a cardiac arrest. Was intubated and transferred to ICU. Pulmonary process thought to be a combination of pneumonia and ARDS, as she still decompensated despite diuresis and BiPAP on admission. Treated with steroids and duo-nebs while intubated. UCx with > 100,000 Klebsiella pneumoniae. Blood cultures negative and respiratory culture with no growth although moderate WBCs. Treated with meropenem (see allergy list) for total of 10 days. Extubated to BiPAP on 10/19. Attempted to wean to high flow, but not tolerate for long. Developed vivid visual hallucinations and insomnia, complicating clinical course. Despite this, she was able to initiate a pureed diet with honey thickened fluids while on high flow, but then placed on BiPAP when not eating. Intermittent diuresis. Persistently in AFib with RVR. On amiodarone and BB. On full anticoagulation with coumadin, which was held on 10/23 for INR > 3. Psychiatry consulted for delirium and insomnia. Was started on nightly Seroquel,Prognosis is poor overall and unlikely to achieve meaningful recovery despite adequate medical treatment.she remains on BIPAP. Palliative care consulted for goals of care. Patient was full code ,family meeting with sister and  palliative care done ,needed still discuss with daughter, she was also agree with DNR status and  hospice placement.Patient has been discharged to Hospice.       Consults:   Consults         Status Ordering Provider     Inpatient consult to Palliative Care  Once     Provider:  Libby Powell RN    Completed JEFRY MENDOZA     Inpatient consult to Psychiatry  Once     Provider:  Shen Vieira MD    Completed JEFRY MENDOZA     Inpatient consult to Pulmonology  Once     Provider:  Armond Roldan MD    Completed AKHONDZADEH, ABDOLAZIM     Inpatient consult to Pulmonology  Once     Provider:  (Not yet assigned)    LAMONT Arzola     Inpatient consult to Social Work  Once     Provider:  (Not yet assigned)    Completed AKHONDZADEH, ABDOLAZIM     Inpatient consult to Social Work  Once     Provider:  (Not yet assigned)    Completed AKHONDZADEH, ABDOLAZIM     Inpatient consult to Social Work  Once     Provider:  (Not yet assigned)    Ordered AKHONDZADEH, ABDOLAZIM     IP consult to dietary  Once     Provider:  (Not yet assigned)    Completed SHAHBAZ RESENDEZ          Significant Diagnostic Studies: Labs:   BMP:   Recent Labs  Lab 10/26/17  0138 10/27/17  0226   * 214*   * 151*   K 3.5 3.2*   * 110   CO2 31* 32*   BUN 58* 56*   CREATININE 1.4 1.3   CALCIUM 9.0 8.7    and CBC   Recent Labs  Lab 10/26/17  0138 10/27/17  0226   WBC 18.05* 18.22*   HGB 11.0* 10.7*   HCT 34.6* 33.4*   PLT 97* 96*     Microbiology:   Blood Culture   Lab Results   Component Value Date    LABBLOO No growth after 5 days. 10/13/2017   , Sputum Culture   Lab Results   Component Value Date    GSRESP <10 epithelial cells per low power field. 10/14/2017    GSRESP Moderate WBC's 10/14/2017    GSRESP No organisms seen 10/14/2017    RESPIRATORYC Normal respiratory kalia 10/14/2017    and Urine Culture    Lab Results   Component Value Date    LABURIN No growth 10/13/2017     Radiology: X-Ray: CXR: X-Ray Chest 1 View (CXR):   Results for orders placed or performed during the hospital encounter of  10/12/17   X-Ray Chest 1 View    Narrative    CLINICAL INFORMATION: Shortness of breath.    COMPARISON: 10/21/2017.    FINDINGS: AP portable view of the chest was obtained.    Cardiac silhouette is mildly prominent but appears stable in size.  Persistent central vascular congestion and bilateral interstitial and alveolar opacities, similar in appearance to the recent prior exam. Small bilateral pleural effusions.  No detrimental change in cardiopulmonary status.    Impression    Stable bilateral airspace disease and small pleural effusions. No detrimental change in lung aeration when compared with 10/21/2017.      Electronically signed by: Buck Gomez  Date:     10/23/17  Time:    02:11        Pending Diagnostic Studies:     None        Final Active Diagnoses:    Diagnosis Date Noted POA    PRINCIPAL PROBLEM:  Acute on chronic respiratory failure with hypoxia and hypercapnia [J96.21, J96.22] 04/17/2017 Yes    Delirium due to another medical condition [F05] 10/21/2017 Yes    UTI due to Klebsiella species [N39.0, B96.1] 10/17/2017 Yes    Chronic anticoagulation [Z79.01] 10/13/2017 Not Applicable    Cardiopulmonary arrest with successful resuscitation [I46.9]  Yes    Urinary tract infection with hematuria [N39.0, R31.9] 06/23/2016 Yes    Type 2 diabetes mellitus with stage 4 chronic kidney disease [E11.22, N18.4] 02/01/2016 Yes    Paroxysmal atrial fibrillation [I48.0] 01/04/2016 Yes    Mixed hyperlipidemia [E78.2] 01/04/2016 Yes     Chronic    Pulmonary hypertension due to COPD [I27.23, J44.9] 10/26/2014 Yes    Tobacco abuse [Z72.0] 04/24/2014 Yes     Chronic    On home oxygen therapy [Z99.81] 06/22/2013 Not Applicable    SUSAN (generalized anxiety disorder) [F41.1] 11/15/2012 Yes      Problems Resolved During this Admission:    Diagnosis Date Noted Date Resolved POA      No new Assessment & Plan notes have been filed under this hospital service since the last note was generated.  Service: [facility]  Medicine      Discharged Condition: poor    Disposition: Hospice/Medical Facility    Follow Up:  Follow-up Information     Nadiya Nava MD In 1 week.    Specialty:  Internal Medicine  Contact information:  Br[address] SM[address]  [facility] [address] [phone]                 Patient Instructions:     Diet general     Activity as tolerated       Medications:  Reconciled Home Medications:   Current Discharge Medication List      START taking these medications    Details   amiodarone (PACERONE) 200 MG Tab Take 1 tablet (200 mg total) by mouth 2 (two) times daily.  Qty: 60 tablet, Refills: 11      cloNIDine 0.3 mg/24 hr td ptwk (CATAPRES) 0.3 mg/24 hr Place 1 patch onto the skin every 7 days.  Qty: 4 patch, Refills: 11      QUEtiapine (SEROQUEL) 25 MG Tab Take 1 tablet (25 mg total) by mouth every evening.  Qty: 30 tablet, Refills: 11         CONTINUE these medications which have CHANGED    Details   insulin detemir (LEVEMIR FLEXTOUCH) 100 unit/mL (3 mL) SubQ InPn pen Inject 16 Units into the skin every evening.  Refills: 0         CONTINUE these medications which have NOT CHANGED    Details   alprazolam (XANAX) 0.5 MG tablet TAKE 1 TABLET BY MOUTH 3 TIMES A DAY  Qty: 90 tablet, Refills: 3      aripiprazole (ABILIFY) 2 MG Tab Take 5 mg by mouth once daily.      atorvastatin (LIPITOR) 10 MG tablet Take 10 mg by mouth once daily.      diltiaZEM (CARDIZEM CD) 240 MG 24 hr capsule Take 1 capsule (240 mg total) by mouth once daily.  Qty: 90 capsule, Refills: 4    Associated Diagnoses: Paroxysmal atrial fibrillation      FERROUS SULFATE ORAL Take 1 tablet by mouth once daily.      fluticasone-salmeterol 500-50 mcg/dose (ADVAIR DISKUS) 500-50 mcg/dose DsDv diskus inhaler Inhale 1 puff into the lungs 2 (two) times daily. Controller  Qty: 60 each, Refills: 6    Associated Diagnoses: CRLD (chronic restrictive lung disease)      furosemide (LASIX) 20 MG tablet take 1 tablet by mouth twice a day  Qty: 60 tablet, Refills: 1       levothyroxine (SYNTHROID) 75 MCG tablet take 1 tablet by mouth every morning ON AN EMPTY STOMACH  Qty: 30 tablet, Refills: 1      lisinopril (PRINIVIL,ZESTRIL) 5 MG tablet TAKE 1 TABLET BY MOUTH DAILY FOR HIGH BLOOD PRESSURE  Qty: 90 tablet, Refills: 0      loratadine (CLARITIN) 10 mg tablet Take 10 mg by mouth once daily.      metoprolol tartrate (LOPRESSOR) 25 MG tablet take 1 tablet by mouth twice a day  Qty: 60 tablet, Refills: 1      albuterol (PROAIR HFA) 90 mcg/actuation inhaler Inhale 2 puffs into the lungs every 6 (six) hours as needed for Wheezing or Shortness of Breath.  Qty: 2 Inhaler, Refills: 6    Associated Diagnoses: CRLD (chronic restrictive lung disease)      albuterol-ipratropium 2.5mg-0.5mg/3mL (DUO-NEB) 0.5 mg-3 mg(2.5 mg base)/3 mL nebulizer solution Take 3 mLs by nebulization every 4 (four) hours as needed for Wheezing. Rescue  Qty: 200 vial, Refills: 12    Associated Diagnoses: CRLD (chronic restrictive lung disease)      cetirizine (ZYRTEC) 10 MG tablet Take 1 tablet (10 mg total) by mouth once daily.  Qty: 90 tablet, Refills: 6    Associated Diagnoses: CRLD (chronic restrictive lung disease); Chronic respiratory failure with hypoxia; Multiple lung nodules      ergocalciferol (ERGOCALCIFEROL) 50,000 unit Cap Take 1 capsule (50,000 Units total) by mouth every 7 days.  Qty: 4 capsule, Refills: 12      fluticasone (FLONASE) 50 mcg/actuation nasal spray 1 spray by Each Nare route once daily.  Qty: 16 g, Refills: 12    Associated Diagnoses: CRLD (chronic restrictive lung disease)      insulin aspart (NOVOLOG FLEXPEN) 100 unit/mL InPn pen 26 units AC  Qty: 2 Box, Refills: 12    Associated Diagnoses: Type 2 diabetes mellitus with stage 4 chronic kidney disease, with long-term current use of insulin      ondansetron (ZOFRAN) 4 MG tablet Take 1 tablet (4 mg total) by mouth every 8 (eight) hours as needed for Nausea.  Qty: 12 tablet, Refills: 0      triamcinolone acetonide 0.1% (KENALOG) 0.1 %  "ointment Apply topically 2 (two) times daily.  Qty: 454 g, Refills: 0    Associated Diagnoses: Rash and nonspecific skin eruption         STOP taking these medications       nitrofurantoin, macrocrystal-monohydrate, (MACROBID) 100 MG capsule Comments:   Reason for Stopping:         magnesium hydroxide 400 mg/5 ml (MILK OF MAGNESIA) 400 mg/5 mL Susp Comments:   Reason for Stopping:         pen needle, diabetic, safety (NOVOFINE AUTOCOVER) 30 gauge x 1/3" Ndle Comments:   Reason for Stopping:         pravastatin (PRAVACHOL) 40 MG tablet Comments:   Reason for Stopping:         warfarin (COUMADIN) 2.5 MG tablet Comments:   Reason for Stopping:             Time spent on the discharge of patient: 30  minutes      Angel Hernandez MD  Department of Hospital Medicine  [facility]  "

## 2017-10-27 NOTE — PLAN OF CARE
Problem: Patient Care Overview  Goal: Plan of Care Review  Outcome: Ongoing (interventions implemented as appropriate)  Patient remains in ICU overnight, patient alert and oriented x3. Patient frequently removes BIPAP mask, but easily redirected. Patient complains of hunger throughout shift, however denies applesauce and pudding. Only wants sprite. K level reported to Dr Kong, no new orders. Plans to transfer to [facility] this shift. No falls or injuries, no new skin breakdown noted.

## 2017-10-27 NOTE — PROGRESS NOTES
To [facility] via ambulance.   On  BIPAP for transfer.  Pt's sister and granddaughter aware of transfer.

## 2017-10-27 NOTE — PLAN OF CARE
[facility]  [address]  [place] [address]  [phone] after hours  [phone]  Fax                                   HOSPICE  ORDERS     10/27/2017    Admit to Hospice:  Inpatient Service     Diagnoses:  Active Hospital Problems    Diagnosis  POA    *Acute on chronic respiratory failure with hypoxia and hypercapnia [J96.21, J96.22]  Yes    Delirium due to another medical condition [F05]  Yes    UTI due to Klebsiella species [N39.0, B96.1]  Yes    Chronic anticoagulation [Z79.01]  Not Applicable    Cardiopulmonary arrest with successful resuscitation [I46.9]  Yes    Urinary tract infection with hematuria [N39.0, R31.9]  Yes    Type 2 diabetes mellitus with stage 4 chronic kidney disease [E11.22, N18.4]  Yes    Paroxysmal atrial fibrillation [I48.0]  Yes    Mixed hyperlipidemia [E78.2]  Yes     Chronic    Pulmonary hypertension due to COPD [I27.23, J44.9]  Yes      1 - Normal left ventricular systolic function (EF 65-70%).     2 - Biatrial enlargement.     3 - Right ventricular enlargement with normal systolic function.     4 - Trivial aortic stenosis, MARY = 1.87 cm2, peak velocity = 1.7 m/s, mean gradient = 6.0 mmHg.     5 - The mitral valve is markedly sclerotic with moderately restricted leaflet mobility.     6 - Mild mitral stenosis, MVA = 2.2 cm2.     7 - Trivial to mild mitral regurgitation.     8 - Trivial to mild tricuspid regurgitation.     9 - Increased central venous pressure.     10 - Pulmonary hypertension. The estimated PA systolic pressure is 63 mmHg.       Tobacco abuse [Z72.0]  Yes     Chronic    On home oxygen therapy [Z99.81]  Not Applicable     3L NC      SUSAN (generalized anxiety disorder) [F41.1]  Yes      Resolved Hospital Problems    Diagnosis Date Resolved POA   No resolved problems to display.       Vital Signs: Routine.    Allergies:  Review of patient's allergies indicates:   Allergen Reactions    Latex, natural rubber  "Dermatitis and Rash    Amoxicillin      "They say that but they've given it to me and nothing happens."     Adhesive Itching and Rash     Allergy to adhesive in nicotine patch.       Diet: pleasure diet     Activities: As tolerated    Nursing: Per Hospice Routine    Medications:         Comfort Care Medications Only      nichole KNIGHT,setting 16/10 with FIO2 of 65%.       Savita Polo   Home Medication Instructions SARTHAK:[id number]    Printed on:10/27/17 3516   Medication Information                      albuterol (PROAIR HFA) 90 mcg/actuation inhaler  Inhale 2 puffs into the lungs every 6 (six) hours as needed for Wheezing or Shortness of Breath.                          alprazolam (XANAX) 0.5 MG tablet  TAKE 1 TABLET BY MOUTH 3 TIMES A DAY as needed for anxiety              amiodarone (PACERONE) 200 MG Tab  Take 1 tablet (200 mg total) by mouth 2 (two) times daily.             aripiprazole (ABILIFY) 2 MG Tab  Take 5 mg by mouth once daily.             atorvastatin (LIPITOR) 10 MG tablet  Take 10 mg by mouth once daily.             cetirizine (ZYRTEC) 10 MG tablet  Take 1 tablet (10 mg total) by mouth once daily.             cloNIDine 0.3 mg/24 hr td ptwk (CATAPRES) 0.3 mg/24 hr  Place 1 patch onto the skin every 7 days.             diltiaZEM (CARDIZEM CD) 240 MG 24 hr capsule  Take 1 capsule (240 mg total) by mouth once daily.             ergocalciferol (ERGOCALCIFEROL) 50,000 unit Cap  Take 1 capsule (50,000 Units total) by mouth every 7 days.             FERROUS SULFATE ORAL  Take 1 tablet by mouth once daily.             fluticasone (FLONASE) 50 mcg/actuation nasal spray  1 spray by Each Nare route once daily.             fluticasone-salmeterol 500-50 mcg/dose (ADVAIR DISKUS) 500-50 mcg/dose DsDv diskus inhaler  Inhale 1 puff into the lungs 2 (two) times daily. Controller             furosemide (LASIX) 20 MG tablet  take 1 tablet by mouth twice a day                          insulin detemir (LEVEMIR " FLEXTOUCH) 100 unit/mL (3 mL) SubQ InPn pen  Inject 12 Units into the skin every evening.             levothyroxine (SYNTHROID) 75 MCG tablet  take 1 tablet by mouth every morning ON AN EMPTY STOMACH             lisinopril (PRINIVIL,ZESTRIL) 5 MG tablet  TAKE 1 TABLET BY MOUTH DAILY             loratadine (CLARITIN) 10 mg tablet  Take 10 mg by mouth once daily.             metoprolol tartrate (LOPRESSOR) 25 MG tablet  take 1 tablet by mouth twice a day             ondansetron (ZOFRAN) 4 MG tablet  Take 1 tablet (4 mg total) by mouth every 8 (eight) hours as needed for Nausea.             QUEtiapine (SEROQUEL) 25 MG Tab  Take 1 tablet (25 mg total) by mouth every evening.                                  _________________________________  Angel Hernandez MD  10/27/2017

## 2017-10-27 NOTE — PLAN OF CARE
[facility]  [address]  [place] [address]  [phone] after hours  [phone]  Fax                                   HOSPICE  ORDERS     10/27/2017    Admit to Hospice:  Inpatient Service     Diagnoses:  Active Hospital Problems    Diagnosis  POA    *Acute on chronic respiratory failure with hypoxia and hypercapnia [J96.21, J96.22]  Yes    Delirium due to another medical condition [F05]  Yes    UTI due to Klebsiella species [N39.0, B96.1]  Yes    Chronic anticoagulation [Z79.01]  Not Applicable    Cardiopulmonary arrest with successful resuscitation [I46.9]  Yes    Urinary tract infection with hematuria [N39.0, R31.9]  Yes    Type 2 diabetes mellitus with stage 4 chronic kidney disease [E11.22, N18.4]  Yes    Paroxysmal atrial fibrillation [I48.0]  Yes    Mixed hyperlipidemia [E78.2]  Yes     Chronic    Pulmonary hypertension due to COPD [I27.23, J44.9]  Yes      1 - Normal left ventricular systolic function (EF 65-70%).     2 - Biatrial enlargement.     3 - Right ventricular enlargement with normal systolic function.     4 - Trivial aortic stenosis, MARY = 1.87 cm2, peak velocity = 1.7 m/s, mean gradient = 6.0 mmHg.     5 - The mitral valve is markedly sclerotic with moderately restricted leaflet mobility.     6 - Mild mitral stenosis, MVA = 2.2 cm2.     7 - Trivial to mild mitral regurgitation.     8 - Trivial to mild tricuspid regurgitation.     9 - Increased central venous pressure.     10 - Pulmonary hypertension. The estimated PA systolic pressure is 63 mmHg.       Tobacco abuse [Z72.0]  Yes     Chronic    On home oxygen therapy [Z99.81]  Not Applicable     3L NC      SUSAN (generalized anxiety disorder) [F41.1]  Yes      Resolved Hospital Problems    Diagnosis Date Resolved POA   No resolved problems to display.       Vital Signs: Routine.    Allergies:  Review of patient's allergies indicates:   Allergen Reactions    Latex, natural rubber  "Dermatitis and Rash    Amoxicillin      "They say that but they've given it to me and nothing happens."     Adhesive Itching and Rash     Allergy to adhesive in nicotine patch.       Diet: pleasure diet     Activities: As tolerated    Nursing: Per Hospice Routine    Medications:         Comfort Care Medications Only         Savita Polo   Home Medication Instructions SARTHAK:[id number]    Printed on:10/27/17 8826   Medication Information                      albuterol (PROAIR HFA) 90 mcg/actuation inhaler  Inhale 2 puffs into the lungs every 6 (six) hours as needed for Wheezing or Shortness of Breath.                          alprazolam (XANAX) 0.5 MG tablet  TAKE 1 TABLET BY MOUTH 3 TIMES A DAY as needed for anxiety              amiodarone (PACERONE) 200 MG Tab  Take 1 tablet (200 mg total) by mouth 2 (two) times daily.             aripiprazole (ABILIFY) 2 MG Tab  Take 5 mg by mouth once daily.             atorvastatin (LIPITOR) 10 MG tablet  Take 10 mg by mouth once daily.             cetirizine (ZYRTEC) 10 MG tablet  Take 1 tablet (10 mg total) by mouth once daily.             cloNIDine 0.3 mg/24 hr td ptwk (CATAPRES) 0.3 mg/24 hr  Place 1 patch onto the skin every 7 days.             diltiaZEM (CARDIZEM CD) 240 MG 24 hr capsule  Take 1 capsule (240 mg total) by mouth once daily.             ergocalciferol (ERGOCALCIFEROL) 50,000 unit Cap  Take 1 capsule (50,000 Units total) by mouth every 7 days.             FERROUS SULFATE ORAL  Take 1 tablet by mouth once daily.             fluticasone (FLONASE) 50 mcg/actuation nasal spray  1 spray by Each Nare route once daily.             fluticasone-salmeterol 500-50 mcg/dose (ADVAIR DISKUS) 500-50 mcg/dose DsDv diskus inhaler  Inhale 1 puff into the lungs 2 (two) times daily. Controller             furosemide (LASIX) 20 MG tablet  take 1 tablet by mouth twice a day                          insulin detemir (LEVEMIR FLEXTOUCH) 100 unit/mL (3 mL) SubQ InPn pen  Inject " 12 Units into the skin every evening.             levothyroxine (SYNTHROID) 75 MCG tablet  take 1 tablet by mouth every morning ON AN EMPTY STOMACH             lisinopril (PRINIVIL,ZESTRIL) 5 MG tablet  TAKE 1 TABLET BY MOUTH DAILY             loratadine (CLARITIN) 10 mg tablet  Take 10 mg by mouth once daily.             metoprolol tartrate (LOPRESSOR) 25 MG tablet  take 1 tablet by mouth twice a day             ondansetron (ZOFRAN) 4 MG tablet  Take 1 tablet (4 mg total) by mouth every 8 (eight) hours as needed for Nausea.             QUEtiapine (SEROQUEL) 25 MG Tab  Take 1 tablet (25 mg total) by mouth every evening.                                  _________________________________  Angel Hernandez MD  10/27/2017

## 2017-10-27 NOTE — EICU
Pt comfortable on total face mask on traditional dual circuit vent.  Plan for inpatient hospice transfer in AM and so no role for vent wean strategy at this time.  Pt is DNR.

## 2018-07-31 NOTE — PLAN OF CARE
Patient ID: Francoise Martinez is a 51 y o  female  Assessment/Plan:   Problem List Items Addressed This Visit        Cardiovascular and Mediastinum    Chronic migraine without aura without status migrainosus, not intractable - Primary    Relevant Orders    Chemodenervation             Subjective:  HPI  We had the pleasure of evaluating Maria De Jesus Gonzalez in neurological follow up today  As you know she is a 51year old female  She does have history of squamous cell CA  Protriptyline was increased to 10mg at her last appt  She reports that she has not noticed a change in migraines with the increase in Protriptyline  She does not have any side effects from the medication  Anxiety/ Stress:   She states she is a busy mother and thus it is normal for her  Has not noted Protriptyline helped with this  Chronic Migraine headaches:   PREVENTIVE: Topamax (cognitive slowness-hair loss), Mag, zonisamide, Protriptyline  ABORTIVE: Advil    Headache trigger: Stress/Tension, Menstruation, Missing meals    How often do the headaches occur - migraines - 16-20 per month, TTH 2-3 per month  What time of the day do the headaches start - Varies throughout the day  How long do the headaches last - TTH- 2-3 hr ; migraines- 4 hours to all day  Where are they located - bilateral apex, bilateral temporal, bilateral occipital region  What is the intensity of pain - 7-8/10  Describe your usual headache -Throbbing, Pulsing, Pressure  Associated symptoms: photophobia, nausea, vomiting, concentration problems, lightheaded/dizzy     The following portions of the patient's history were reviewed and updated as appropriate: allergies, current medications, past family history, past medical history, past social history, past surgical history and problem list        Objective:  Blood pressure 118/78, pulse 85, height 5' 4" (1 626 m), weight 85 6 kg (188 lb 12 8 oz)  Physical Exam   Constitutional: She appears well-developed     Eyes: EOM are normal  Problem: Patient Care Overview  Goal: Plan of Care Review  Outcome: Ongoing (interventions implemented as appropriate)  POC reviewed with pt. Pt verbalizes understanding of plan of care. Pt remained free of injury/trauma throughout the shift. Safety precautions maintained. Bedrails upx2, bed in lowest position and locked. Call bell in reach at all times. Pt remained on O2 throughout the shift, sat WNL. . Telemetry moniotring in process. Pt c/o pain and was medicated with PRN pain meds. Glucose monitored every 6 hours, SSI given as ordered. Pt able to reposition self in bed. No acute events throughout the shift.       Pupils are equal, round, and reactive to light  Neck: Normal range of motion  Neck supple  Cardiovascular: Normal rate  Pulmonary/Chest: Effort normal    Abdominal: Soft  Musculoskeletal: Normal range of motion  Neurological: She has normal strength and normal reflexes  Gait and coordination normal    Skin: Skin is warm  Psychiatric: She has a normal mood and affect  Her speech is normal        Neurological Exam    Mental Status  The patient is alert  Her speech is normal  Her language is fluent with no aphasia  She has normal attention span and concentration  Cranial Nerves    CN II: The patient's visual acuity and visual fields are normal   CN III, IV, VI: The patient's pupils are equally round and reactive to light and ocular movements are normal   CN V: The patient has normal facial sensation  CN VII:  The patient has symmetric facial movement  CN VIII:  The patient's hearing is normal   CN IX, X: The patient has symmetric palate movement and normal gag reflex  CN XI: The patient's shoulder shrug strength is normal   CN XII: The patient's tongue is midline without atrophy or fasciculations  Motor   Her strength is 5/5 throughout all four extremities  Sensory  The patient's sensation is normal in all four extremities  Reflexes  Deep tendon reflexes are 2+ and symmetric in all four extremities with downgoing toes bilaterally  Gait and Coordination  The patient has normal gait and station and normal casual, toe, heel, and tandem gait  She has normal coordination bilaterally  ROS:  Review of Systems   Constitutional: Negative  HENT: Negative  Eyes: Negative  Respiratory: Negative  Cardiovascular: Negative  Gastrointestinal: Negative  Endocrine: Negative  Genitourinary: Positive for menstrual problem  Musculoskeletal: Negative  Skin: Negative  Allergic/Immunologic: Negative  Neurological: Negative  Hematological: Negative  Psychiatric/Behavioral: Negative

## 2018-08-17 NOTE — ASSESSMENT & PLAN NOTE
Acute on chronic respiratory failure with hypoxia and hypercapnia secondary to multifactorial etiology including: COPD, tobacco abuse, pulmonary hypertension, and right-sided heart failure   · Now on vent.  Pulmonary following for vent management.  · Continue Nebs and steroids.  · IV Lasix  · Continue weaning efforts.           no difficulties

## 2019-11-16 NOTE — PROGRESS NOTES
AVSS on RA, afebrile. Denies pain, n/v, SOB. Tolerated Rituxan infusion well, now receiving CIVI cisplatin after 4hr NS bolus.  MIVF NS w/ Mag and K+ infusing with cisplatin. Plan for 2 doses of cytarabine after CIVI cisplatin. Good appetite, voiding well. Had a BM today. Wife supportive at bedside. No complaints at this time. Continue to monitor.     Per Mobility Level Guideline;  Bed/chair alarm No.  Patient may ambulate independently  Patient requires the following assistive equipment:N/A  PT/OT consult orders in place No       Pt recieved intubated on Servo i ventilator with the following settings;. PRVC/ AC , RR18, +8 Peep, 100% FI02.  Alarms are set and functioning, Ambu bag at bedside, Pt without distress RT will continue to monitor and wean as tolerated.     16:00 Pt currently on PRVC, , 20RR, 12+Peep, 70% FI02.  Pt stable, RT will continue to monitor

## 2020-01-04 NOTE — TELEPHONE ENCOUNTER
----- Message from Toya eVla sent at 4/20/2017 10:23 AM CDT -----  Contact: April/ [facility]/ [phone]   Type: Orders Request    What orders/ testing are being requested? Transfer Bench     Is there a future appointment scheduled for the patient with PCP? Yes     When? 04/27/2017     Comments: April is calling to have a Transfer Bench for the pt. Please call and advise     Thank you     
Fax sent    
Message left for pt to call office    
See prescription  
Spoke with tana and she requesting it be faxed over to [facility].  
04-Jan-2020 12:07

## 2020-03-08 NOTE — PLAN OF CARE
10/14/17 1633   Discharge Assessment   Assessment Type Discharge Planning Assessment   Patient/Family In Agreement With Plan other (see comments)  (TN attempted d.c assessment)   Does the patient have transportation to healthcare appointments? Yes  (Ronaldo Driscoll, long time friend)      Verified inform consent signed for colonoscopy with Dr Carlee Rm.

## 2021-08-10 NOTE — SUBJECTIVE & OBJECTIVE
Interval History/Significant Events:  Remains essentially BiPAP dependent due to marked desaturation on nasal cannula.    Review of Systems   Unable to perform ROS: Other (Unable to remove full face BiPAP mask)     Objective:     Vital Signs (Most Recent):  Temp: 98.9 °F (37.2 °C) (10/24/17 1101)  Pulse: 109 (10/24/17 1200)  Resp: (!) 32 (10/24/17 1145)  BP: (!) 147/65 (10/24/17 1220)  SpO2: (!) 93 % (10/24/17 1145) Vital Signs (24h Range):  Temp:  [96.9 °F (36.1 °C)-98.9 °F (37.2 °C)] 98.9 °F (37.2 °C)  Pulse:  [] 109  Resp:  [20-75] 32  SpO2:  [87 %-99 %] 93 %  BP: (132-180)/() 147/65   Weight: 83.5 kg (184 lb 1.4 oz)  Body mass index is 35.95 kg/m².      Intake/Output Summary (Last 24 hours) at 10/24/17 1233  Last data filed at 10/24/17 0900   Gross per 24 hour   Intake               40 ml   Output             1917 ml   Net            -1877 ml       Physical Exam   Cardiovascular: Exam reveals no gallop.    No murmur heard.  -120   Pulmonary/Chest: No respiratory distress. She has no wheezes. She has rales.   Respiratory rate ~20 on BiPAP.   Musculoskeletal: She exhibits no edema.   Psychiatric: She has a normal mood and affect.   Nursing note and vitals reviewed.      Vents:  Vent Mode: NIV+ PC (10/19/17 1631)  Set Rate: 12 bmp (10/24/17 0600)  Vt Set: 380 mL (10/19/17 0801)  Pressure Support: 40 cmH20 (10/19/17 1257)  PEEP/CPAP: 8 cmH20 (10/24/17 0600)  Oxygen Concentration (%): 70 (10/24/17 1220)  Peak Airway Pressure: 18.9 cmH2O (10/24/17 0600)  Total Ve: 12.2 mL (10/24/17 0600)  F/VT Ratio<105 (RSBI): (!) 58.46 (10/24/17 0600)     Lines/Drains/Airways     Drain                 Urethral Catheter 10/13/17 0900 Latex 16 Fr. 11 days          Airway                 Airway 11 days          Peripheral Intravenous Line                 Peripheral IV - Single Lumen 10/21/17 1300 Left;Anterior Forearm 2 days         Peripheral IV - Single Lumen 10/22/17 1840 Right Forearm 1 day               Significant Labs:    CBC/Anemia Profile:    Recent Labs  Lab 10/23/17  0143 10/24/17  0211   WBC 14.81* 19.12*   HGB 13.3 12.0   HCT 39.6 35.7*    129*   MCV 98 97   RDW 14.2 14.3        Chemistries:    Recent Labs  Lab 10/24/17  0211   *   K 4.0      CO2 31*   BUN 54*   CREATININE 1.2   CALCIUM 9.2   ALBUMIN 2.6*   PHOS 2.9       POCT Glucose:   Recent Labs  Lab 10/23/17  1801 10/24/17  0609 10/24/17  1148   POCTGLUCOSE 229* 225* 231*     All pertinent labs within the past 24 hours have been reviewed.    Significant Imaging:  I have reviewed all pertinent imaging results/findings within the past 24 hours.  No new imaging is available.   0-6 days ([place])

## 2023-08-23 NOTE — PLAN OF CARE
CHIEF COMPLAINT:    Mrs. Bernal Problem: Patient Care Overview  Goal: Plan of Care Review  Outcome: Ongoing (interventions implemented as appropriate)  Received in ICU s/p PEA arrest on the Telemetry floor.  Cardiac monitor shows A-Flutter.  Intubated prior to arrival to ICU.  Breath sounds with coarse rales and rhonchi.  Blood tinged sputum via ETT. Received call from Radiologist stating R Main Stem Intubation, Respiratory Therapist notified and tube repostioned by her.  Repeat CXR done; tubes in place. Sedated with Fentanyl & Propofol. OGT with continuous TFs initiated.  Ferris inserted with purulent milky brown drainage; specimen sent for UA, C&S.  Lasix admin with moderate output.  Saddle edema noted.  Echo today; EF 60-65%, Pulm HTN.  Remains free from falls, decubs or other hospital acquired injury this shift.           